# Patient Record
Sex: MALE | Race: WHITE | Employment: OTHER | URBAN - METROPOLITAN AREA
[De-identification: names, ages, dates, MRNs, and addresses within clinical notes are randomized per-mention and may not be internally consistent; named-entity substitution may affect disease eponyms.]

---

## 2019-05-07 ENCOUNTER — OFFICE VISIT (OUTPATIENT)
Dept: ORTHOPEDIC SURGERY | Age: 84
End: 2019-05-07
Payer: MEDICARE

## 2019-05-07 DIAGNOSIS — S46.312A STRAIN OF LEFT TRICEPS, INITIAL ENCOUNTER: ICD-10-CM

## 2019-05-07 DIAGNOSIS — M25.522 ELBOW PAIN, LEFT: Primary | ICD-10-CM

## 2019-05-07 PROCEDURE — G8421 BMI NOT CALCULATED: HCPCS | Performed by: ORTHOPAEDIC SURGERY

## 2019-05-07 PROCEDURE — E0191 PROTECTOR HEEL OR ELBOW: HCPCS | Performed by: ORTHOPAEDIC SURGERY

## 2019-05-07 PROCEDURE — G8427 DOCREV CUR MEDS BY ELIG CLIN: HCPCS | Performed by: ORTHOPAEDIC SURGERY

## 2019-05-07 PROCEDURE — 99203 OFFICE O/P NEW LOW 30 MIN: CPT | Performed by: ORTHOPAEDIC SURGERY

## 2019-05-07 PROCEDURE — 4004F PT TOBACCO SCREEN RCVD TLK: CPT | Performed by: ORTHOPAEDIC SURGERY

## 2019-05-07 PROCEDURE — 4040F PNEUMOC VAC/ADMIN/RCVD: CPT | Performed by: ORTHOPAEDIC SURGERY

## 2019-05-07 PROCEDURE — 1123F ACP DISCUSS/DSCN MKR DOCD: CPT | Performed by: ORTHOPAEDIC SURGERY

## 2019-05-07 NOTE — PROGRESS NOTES
Chief Complaint  Elbow Pain (left posterior elbow pain x 1 month, atruamatic (4/7/19))      History of Present Illness:  Cira Bridges is a 80 y.o. male left-hand-dominant, retired , who presents for left arm pain posteriorly just above the elbow along his triceps. No trauma or fall. He has been active and in a workout program, he feels he may have overdone it or strained. He denies any popping sensation. He has not noticed any bruising or abnormal swelling. Pain posteriorly is intermittent, mild and aching without radiation. No numbness of the hand. He was able to mow his yard yesterday and today without significant symptoms.   He has been trying rest.  He denies any fevers, night sweats or other red flags    Medical History  Past Medical History:   Diagnosis Date    Arthritis     Cancer ([address]     skin    Dry eyes, bilateral     Hyperlipidemia     Hypertension     Osteoporosis 2009    Type II or unspecified type diabetes mellitus without mention of complication, not stated as uncontrolled ([address]     Urinary incontinence      Past Surgical History:   Procedure Laterality Date    COLONOSCOPY  2002    diverticulosis    COLONOSCOPY  11/19/2012    diverticulosis    EYE SURGERY      micheal cataract    TONSILLECTOMY       Family History   Problem Relation Age of Onset    Heart Disease Mother     Cancer Sister 39        breast     Social History     Socioeconomic History    Marital status:      Spouse name: Not on file    Number of children: Not on file    Years of education: Not on file    Highest education level: Not on file   Occupational History    Not on file   Social Needs    Financial resource strain: Not on file    Food insecurity:     Worry: Not on file     Inability: Not on file    Transportation needs:     Medical: Not on file     Non-medical: Not on file   Tobacco Use    Smoking status: Never Smoker   Substance and Sexual Activity    Alcohol use: No    Drug use: Not on file    Sexual activity: Not on file   Lifestyle    Physical activity:     Days per week: Not on file     Minutes per session: Not on file    Stress: Not on file   Relationships    Social connections:     Talks on phone: Not on file     Gets together: Not on file     Attends Baptism service: Not on file     Active member of club or organization: Not on file     Attends meetings of clubs or organizations: Not on file     Relationship status: Not on file    Intimate partner violence:     Fear of current or ex partner: Not on file     Emotionally abused: Not on file     Physically abused: Not on file     Forced sexual activity: Not on file   Other Topics Concern    Not on file   Social History Narrative    Not on file     Current Outpatient Medications   Medication Sig Dispense Refill    lisinopril (PRINIVIL;ZESTRIL) 20 MG tablet Take 20 mg by mouth daily.  solifenacin (VESICARE) 5 MG tablet Take 10 mg by mouth daily.  simvastatin (ZOCOR) 10 MG tablet Take 10 mg by mouth nightly.  fosinopril (MONOPRIL) 20 MG tablet Take 20 mg by mouth daily.  sitagliptan (JANUVIA) 100 MG tablet Take 100 mg by mouth daily.  glipiZIDE (GLUCOTROL XL) 10 MG CR tablet Take 10 mg by mouth daily.  aspirin 81 MG chewable tablet Take 81 mg by mouth daily.  CycloSPORINE (RESTASIS OP) Apply  to eye. No current facility-administered medications for this visit. No Known Allergies    Review of Systems  Relevant review of systems reviewed and available in the patient's chart    Vital Signs  There were no vitals filed for this visit. General Exam:   Constitutional: Patient is adequately groomed with no evidence of malnutrition  Mental Status: The patient is oriented to time, place and person. The patient's mood and affect are appropriate. Lymphatic: The lymphatic examination bilaterally reveals all areas to be without enlargement or induration.   Neurological: The patient has good coordination. There is no weakness or sensory deficit. Elbow Examination  Inspection:  Left elbow reveals good alignment. No sign of bursitis. No apparent swelling or changes of the skin    Palpation:  Diffuse mild tenderness palpation along the triceps. No specific fullness or discrete masses palpable. No warmth or sign of infection. No palpable gap. Range of Motion:  Full motion left elbow is present. He has intact triceps extension strength today    Strength:  Good elbow motion strength. Intact neurovascular exam left hand    Special Tests:  No lymphadenopathy. No pain over the biceps    Skin: There are no additional worrisome rashes, ulcerations or lesions. Gait: normal    Circulation normal      Radiology:     X-rays obtained and reviewed in office:  Views 3  Location left elbow  Impression :  Osteophyte noted around the capitellum. Regarding the humerus, I do not see abnormal soft tissue mineralization or lesion of the bone. Assessment:  Left triceps pain    Impression:  Encounter Diagnoses   Name Primary?  Elbow pain, left Yes    Strain of left triceps, initial encounter        Office Procedures:  Orders Placed This Encounter   Procedures    XR ELBOW LEFT (MIN 3 VIEWS)     Standing Status:   Future     Number of Occurrences:   1     Standing Expiration Date:   6/7/2019    Elbow Protector B&C (Brace)     Patient was prescribed a [employer] Elbow Protector. The left elbow will require protection from this device to improve their function. The orthosis will assist in protecting the affected area, provide functional support and facilitate healing. The patient was educated and fit by a healthcare professional with expert knowledge and specialization in brace application while under the direct supervision of the treating physician. Verbal and written instructions for the use of and application of this item were provided.    They were instructed to contact the office

## 2019-06-03 DIAGNOSIS — S46.312A STRAIN OF LEFT TRICEPS, INITIAL ENCOUNTER: Primary | ICD-10-CM

## 2019-06-04 ENCOUNTER — OFFICE VISIT (OUTPATIENT)
Dept: ORTHOPEDIC SURGERY | Age: 84
End: 2019-06-04
Payer: MEDICARE

## 2019-06-04 VITALS — HEIGHT: 67 IN | BODY MASS INDEX: 21.18 KG/M2 | WEIGHT: 134.92 LBS

## 2019-06-04 DIAGNOSIS — M89.8X2 PAIN OF LEFT HUMERUS: Primary | ICD-10-CM

## 2019-06-04 PROCEDURE — 1036F TOBACCO NON-USER: CPT | Performed by: ORTHOPAEDIC SURGERY

## 2019-06-04 PROCEDURE — 4040F PNEUMOC VAC/ADMIN/RCVD: CPT | Performed by: ORTHOPAEDIC SURGERY

## 2019-06-04 PROCEDURE — G8420 CALC BMI NORM PARAMETERS: HCPCS | Performed by: ORTHOPAEDIC SURGERY

## 2019-06-04 PROCEDURE — 99213 OFFICE O/P EST LOW 20 MIN: CPT | Performed by: ORTHOPAEDIC SURGERY

## 2019-06-04 PROCEDURE — G8427 DOCREV CUR MEDS BY ELIG CLIN: HCPCS | Performed by: ORTHOPAEDIC SURGERY

## 2019-06-04 PROCEDURE — 1123F ACP DISCUSS/DSCN MKR DOCD: CPT | Performed by: ORTHOPAEDIC SURGERY

## 2019-06-04 RX ORDER — DIAZEPAM 5 MG/1
5 TABLET ORAL
Qty: 1 TABLET | Refills: 0 | Status: SHIPPED | OUTPATIENT
Start: 2019-06-04 | End: 2019-06-04

## 2019-06-04 NOTE — PROGRESS NOTES
Chief Complaint   Patient presents with    Follow-up     LEFT TRICEP       HISTORY OF PRESENT ILLNESS:  Luis Carlos Hall is a 80 y.o.  patient here for repeat x-ray evaluation after helping left humerus pain, posterior upper arm, over the past 2 months without specific trauma. Patient also reports some weight loss. Patient denies night sweats or fevers. We placed the patient under conservative measures since last visit 1 month ago, symptoms have persisted. ROS:  ROS neg     Past medical history is reviewed again today, no changes to report    PHYSICAL EXAMINATION:  Patient is alert and pleasant, in no acute distress. The affected extremity is examined today. Left upper extremity reveals no skin changes, erythema. No malalignment. No bruising. Diffuse discomfort posterior mid arm and distal aspect of the triceps. No palpable gap. No obvious discrete mass. Good motion of the left shoulder, left arm and left elbow. No streaking erythema or palpable lymphadenopathy. Intact neurovascular exam left hand    X-rays:  2 views of the left humerus are obtained and reviewed, impression: No fracture, no dislocation, no worrisome lytic lesion noted. There is some increased cortical density proximal medial humerus. IMPRESSION AND PLAN: Left humerus pain    We will proceed with MRI of the left humerus at this point to rule out soft tissue mass, triceps tear or other internal derangement. Thigh and was ordered for his pre-imaging anxiety, to be taken the day of the test.  He has done well with this in the past.  Follow-up after MRI. Narcotic monitoring system verified. All questions and concerns were addressed today. Patient is in agreement with the plan. Etta Akbar MD  Hand & Upper Extremity Surgery  [address] [facility]  A partner of [facility] ([facility])        Please note that this transcription was created using voice recognition software.   Any errors are unintentional

## 2019-06-11 ENCOUNTER — OFFICE VISIT (OUTPATIENT)
Dept: ORTHOPEDIC SURGERY | Age: 84
End: 2019-06-11
Payer: MEDICARE

## 2019-06-11 VITALS — HEIGHT: 67 IN | WEIGHT: 134.92 LBS | BODY MASS INDEX: 21.18 KG/M2

## 2019-06-11 DIAGNOSIS — M89.8X2 PAIN OF LEFT HUMERUS: Primary | ICD-10-CM

## 2019-06-11 PROCEDURE — G8420 CALC BMI NORM PARAMETERS: HCPCS | Performed by: ORTHOPAEDIC SURGERY

## 2019-06-11 PROCEDURE — 4040F PNEUMOC VAC/ADMIN/RCVD: CPT | Performed by: ORTHOPAEDIC SURGERY

## 2019-06-11 PROCEDURE — 1036F TOBACCO NON-USER: CPT | Performed by: ORTHOPAEDIC SURGERY

## 2019-06-11 PROCEDURE — G8427 DOCREV CUR MEDS BY ELIG CLIN: HCPCS | Performed by: ORTHOPAEDIC SURGERY

## 2019-06-11 PROCEDURE — 1123F ACP DISCUSS/DSCN MKR DOCD: CPT | Performed by: ORTHOPAEDIC SURGERY

## 2019-06-11 PROCEDURE — 99213 OFFICE O/P EST LOW 20 MIN: CPT | Performed by: ORTHOPAEDIC SURGERY

## 2019-06-11 NOTE — PROGRESS NOTES
Chief Complaint   Patient presents with    Follow-up     TR MRI LEFT HUMERUS        HISTORY OF PRESENT ILLNESS:  Solomon Solano is a 80 y.o.  patient here for repeat evaluation after undergoing advanced imaging of the left humerus and upper arm for evaluation of internal derangement or mass. He has had posterior upper left arm pain over the past 2 to 3 months without specific trauma. Patient still has some discomfort posteriorly. No numbness in the hand. he has some fatigue in the arm    ROS:  ROS neg     Past medical history, allergies, and medications are reviewed again today, no changes to report. PHYSICAL EXAMINATION:  Patient is alert and pleasant, in no acute distress. The affected extremity is again examined today. Left upper arm reveals no skin changes or palpable mass. Discomfort posterior centrally around the distal aspect of the triceps. Patient has full motion of the elbow, hand and wrist.  No lymphadenopathy. Hand is sensate with intact radial nerve function    X-rays:  None today    Advanced imaging:    MRI of the left wrist is reviewed, impression:  Site: [email or web address] [facility] #: [id number] #: [id number] Procedure: MR Left Upper Arm w/o Contrast ; Reason for Exam: soft tissue mass; pain of left humerus   This document is confidential medical information.  Unauthorized disclosure or use of this information is prohibited by law. If you are not the intended recipient of this document, please advise us by calling immediately [phone].       [email or web address] [facility]   [address]           Patient Name: Pooja Willis   Case ID: [id number]   Patient : 1934   Referring Physician: Melissa Metzger MD   Exam Date: 2019   Exam Description: MR Left Upper Arm w/o Contrast            HISTORY:  80year-old male, triceps soreness since 2019.  Assess for soft tissue mass and    pain in left humerus.       TECHNICAL FACTORS:  Long- and short-axis fat- and

## 2019-06-14 ENCOUNTER — HOSPITAL ENCOUNTER (OUTPATIENT)
Dept: OCCUPATIONAL THERAPY | Age: 84
Setting detail: THERAPIES SERIES
Discharge: HOME | End: 2019-06-14
Payer: MEDICARE

## 2019-06-14 PROCEDURE — 97035 APP MDLTY 1+ULTRASOUND EA 15: CPT | Performed by: OCCUPATIONAL THERAPIST

## 2019-06-14 PROCEDURE — 97530 THERAPEUTIC ACTIVITIES: CPT | Performed by: OCCUPATIONAL THERAPIST

## 2019-06-14 PROCEDURE — 97165 OT EVAL LOW COMPLEX 30 MIN: CPT | Performed by: OCCUPATIONAL THERAPIST

## 2019-06-14 PROCEDURE — 97140 MANUAL THERAPY 1/> REGIONS: CPT | Performed by: OCCUPATIONAL THERAPIST

## 2019-06-14 NOTE — PLAN OF CARE
[facility] [facility], [address]  Phone: [phone]  Fax [phone]    Occupational Sheba Kenny  Dear  Dr Baldemar Jensen,    We had the pleasure of evaluating the following patient for occupational therapy services at [address] [place] [address]. A summary of our findings can be found in the initial assessment below. This includes our plan of care. If you have any questions or concerns regarding these findings, please do not hesitate to contact me at the office phone number checked above. Thank you for the referral.     Physician Signature:_______________________________Date:__________________  By signing above (or electronic signature), therapists plan is approved by physician      Patient: Velvet Mccullough   : 1934   MRN: [id number]  Referring Physician: Referring Practitioner: Baldemar Jensen      Evaluation Date: 2019      Medical Diagnosis Information:  Diagnosis: P38.834 (ICD-10-CM) - Pain of left humerus       Treatment Dx:   H10.169 (ICD-10-CM) - Pain of left humerus     Date of injury: gradual onset over the last 2 mos ago   Date and type of Surgery: N/A                                   Insurance information:  Medicare and [place]  Precautions/ Contra-indications:   Latex Allergy:  [x]No      []Yes  Pacemaker:  [x] No       [] Yes     Preferred Language for Healthcare:   [x]English       []other:      [x] Patient reported history, allergies, and medications reviewed - see intake form. SUBJECTIVE:      Relevant Medical History/history of current problem: Pt reports frequent pain at mid upper arm in triceps muscle. Pt reports that he does strain triceps when he uses his hands to pushes up out of a chair due to pain and weakness in low back and both knees.   Pain Scale: 1-6/10   [x]Constant      []Intermittent    []other:  Pain Location:  Mid upper arm at tricep muscle  Easing factors: rest  Provocative factors: increased use      Occupational Profile:  Home Enviroment: lives with  [x] spouse,  [] family,  [] alone,  [] significant other,   [] other:    Occupation/School: retired    Recreational Activities/Meaningful Interests: none reported    Prior Level of Function: [x] Independent with ADLs/IADLs     [] Assistance needed (describe):    Patient-Identified Primary Performance Deficits (to be addressed in POC):   [] bathing    [x] household tasks (cooking/cleaning)   [] dressing    [] self feeding   [] grooming    [] work/education   [] functional mobility   [] sleeping/rest   [] toileting/hygiene   [] recreational activities   [] driving    [] community/social participation   [x] other:  Lifting, reaching    Comorbidities Affecting Functional Performance:     []Anxiety (F41.9)/Depression (F32.9)   [x]Diabetes Type 1(E10.65) or 2 (E11.65)   []Rheumatoid Arthritis (M05.9)  []Fibromyalgia (M79.7)  []Neuropathy(G60.9)  [x]Osteoarthritis(M19.91)  []None    []Other     OBJECTIVE:   Date:  Hand Dominance:     []  Right    [x] Left 6/14/2019     Objective Measures:    PAIN 1-6/10   Quick DASH %   Digits tip to DPFC in cm WFL   Thumb ROM WFL   Wrist ROM Ext/Flex WFL   Forearm ROM  Sup/pron WFL   Elbow ROM Ext/flex [facility]   Shoulder Flex  Shoulder Abd  Shoulder IR/ER WFL    strength in lbs R:50#  L:38#   Pinch Strengthin lbs: lat  R:  L:   Pinch Strength in lbs:  3 point R:  L:     MMT:       Observations (including splints, bandages, incisions, scars):   unremarkable    Sensation: [] No reported deficits  [] Intact to light touch    [] [place] Tam[place] test completed, findings as noted:  [x] Other:Constant Mild numbness in fingers    Palpation: unremarkable    Functional Mobility/Transfers/Gait: [x] Independent - no significant gait deviations  [] Assistance needed   [] Assistive device used:      Falls Risk Assessment (30 days):   [x] Falls Risk assessed and no intervention required. [] Falls Risk assessed and Patient requires intervention due to being higher risk   TUG score (>12s at risk):     [] Falls education provided, including      Review Of Systems (ROS): [x]Performed Review of systems (Integumentary, CardioPulmonary, Neurological) by intake and observation. Intake form has been scanned into medical record. Patient has been instructed to contact their primary care physician regarding ROS issues if not already being addressed at this time. ASSESSMENT:   This patient presents with signs and symptoms consistent with the medical diagnosis provided by the referring physician. Impairments (physical, cognitive and/or psychosocial):  [] Decreased mobility   [x] Weakness    [] Hypersensitivity   [x] Pain/tenderness   [] Edema/swelling   [] Decreased coordination (fine/gross motor)   [] Impaired body mechanics  [] Sensory loss  [] Loss of balance   [] Other:      Performance Deficits (to be addressed in plan of care):   [] Bathing    [x] Household Tasks (cooking/cleaning)   [] Dressing    [] Self Feeding   [] Grooming    [] Work/Education   [] Functional Mobility   [] Sleeping/Rest   [] Toileting/Hygiene   [] Recreational Activities   [] Driving    [] Community/Social Participation   [x] Other: lifting, reaching    Rehab Potential:   [] Excellent [x] Good [] Fair  [] Poor     Barriers affecting rehab potential:  []Age    []Lack of Motivation   []Co-Morbidities  []Cognitive Function  []Environmental/home/work barriers  []Other:     Tolerance of evaluation/treatment:    [] Excellent [x] Good [] Fair  [] Poor    PLAN OF CARE:  Interventions:   [x] Therapeutic Exercise [x] Therapeutic Activity    [x] Activities of Daily Living [x] Neuromuscular Re-education      [x] Patient Education  [x] Manual Therapy      [x] Modalities as needed, and not otherwise contraindicated, including: ultrasound,paraffin,moist heat/cold pack, electrical stimulation, contrast bath, iontophoresis  [] Splinting    Frequency/Duration:  1-2 days per week for 8 weeks    GOALS:  Short Term Goals: To be achieved in: 2 weeks  1. Independent in HEP and progression per patient tolerance, in order to prevent re-injury. 2. Patient will have a decrease in pain to facilitate improvement in movement, function, and ADLs as indicated by Functional Deficits. Long Term Goals to be achieved in 8  weeks, including patient directed goals to address identified performance deficits:  1) Pt to be independent in graded HEP progression with a good level of effort and compliance. 2) Pt to report a score of 25 % or less on the Quick DASH disability questionnaire for increased performance with carrying, moving, and handling objects. 4) Pt will demonstrate increased  strength to at least  43#for improved performance with home mgt tasks, lifting, and reaching tasks. 5) Pt will have a decrease in pain to 2/10 or less to facilitate improvement in performance with  home mgt tasks, lifting, and reaching tasks.   6)    7)    OCCUPATIONAL THERAPY EVALUATION COMPLEXITY JUSTIFICATION:    [x] An occupational profile and medical/therapy history, which includes:   [x] a brief history including medical and/or therapy records relating to the     presenting problem   [] an expanded review of medical and/or therapy records and additional review     of physical, cognitive or psychosocial history related to current functional    performance   [] an extensive additional review of review of medical and/or therapy records   and physical, cognitive, or psychosocial history related to current    functional performance    [x] An assessment that identifies performance deficits (relating to physical, cognitive, or psychosocial skills) that result in activity limitations and/or participation restrictions:   [x] 1-3 performance deficits   [] 3-5 performance deficits   [] 5 or more performance deficits    [x] Clinical decision making of:   [x] low complexity, including analysis of occupational profile, data analysis from problem focused assessment, and consideration of a limited number of treatment options. No comorbidities affect occupational performance. No task modifications or assistance needed to complete evaluation. [] moderate complexity, including analysis of occupational profile, data analysis from detailed assessment and consideration of several treatment options. Comorbidities that affect occupational performance may be present. Minimal to moderate task modifications or assistance needed to complete assessment. [] high complexity, including analysis of occupational profile, analysis of data from comprehensive assessment and consideration of multiple treatment options. Multiple comorbidities present that affect occupational performance. Significant task modifications or assistance needed to complete assessment.     Evaluation Code:  [x] Low Complexity EVAL 10537 (typically 30 minutes face to face)  [] Mod Complexity EVAL 81473 (typically 45 minutes face to face)  [] High Complexity EVAL 42948 (typically 60 minutes face to face)    Electronically signed by:  Nathen Leo OT/L [id number]

## 2019-06-14 NOTE — FLOWSHEET NOTE
Wrist ROM Ext/Flex WFL   Forearm ROM  Sup/pron WFL   Elbow ROM Ext/flex [facility]   Shoulder Flex  Shoulder Abd  Shoulder IR/ER WFL    strength in lbs R:50#  L:38#   Pinch Strengthin lbs: lat  R:  L:   Pinch Strength in lbs:  3 point R:  L:              Modalities: 6/14/19      HP 10'    US  50% @ 1.8 8'    Therapeutic Exercise & Activities:          Pt educ recommendations regarding precautions with ADL activities                                              Therapeutic Exercise and NMR EXR  [] (06020) Provided verbal/tactile cueing for activities related to strengthening, flexibility, endurance, ROM  for improvements in scapular, scapulothoracic and UE control with self care, reaching, carrying, lifting, house/yardwork, driving/computer work.    [] (22223) Provided verbal/tactile cueing for activities related to improving balance, coordination, kinesthetic sense, posture, motor skill, proprioception  to assist with  scapular, scapulothoracic and UE control with self care, reaching, carrying, lifting, house/yardwork, driving/computer work. Therapeutic Activities:    [] ( 58810) therapeutic activities, direct (one on one) patient contact. Use of dynamic activities to improve functional performance.     Activities of Daily Living:  [] (59536) Provided self-care/home management training (i.e., activities of daily living and compensatory training, meal preparation, safety procedures, and instructions in use of assistive technology devices/adaptive equipment)     Home Exercise Program:    [x] (72621) Reviewed/Progressed HEP activities related to strengthening, flexibility, endurance, ROM of scapular, scapulothoracic and UE control with self care, reaching, carrying, lifting, house/yardwork, driving/computer work    Manual Treatments: [facility], with Biofreeze  [x] ([phone]) Provided manual therapy to mobilize soft tissue/joints of the UE for the purpose of modulating pain, promoting relaxation,  increasing ROM, tasks.      Progression Towards Functional goals:  [] Patient is progressing as expected towards functional goals listed. [] Progression is slowed due to complexities listed. [] Progression has been slowed due to co-morbidities.   [x] Plan just implemented, too soon to assess goals progression  [] Other:     ASSESSMENT:     Treatment/Activity Tolerance:  [x] Patient tolerated treatment well [] Patient limited by fatique  [] Patient limited by pain  [] Patient limited by other medical complications  [] Other:     Prognosis: [x] Good [] Fair  [] Poor    Patient Requires Follow-up: [x] Yes  [] No    PLAN: Recommend Occupational Therapy 2  times a week for 28 weeks  [] Continue per plan of care [] Alter current plan (see comments)  [x] Plan of care initiated [] Hold pending MD visit [] Discharge    Plan for next session: modalities, ADL- activitiy modifications, [facility]    Electronically signed by: Davis Mckeon  OT/L [id number]

## 2019-06-18 ENCOUNTER — HOSPITAL ENCOUNTER (OUTPATIENT)
Dept: OCCUPATIONAL THERAPY | Age: 84
Setting detail: THERAPIES SERIES
Discharge: HOME | End: 2019-06-18
Payer: MEDICARE

## 2019-06-18 PROCEDURE — 97112 NEUROMUSCULAR REEDUCATION: CPT | Performed by: OCCUPATIONAL THERAPIST

## 2019-06-18 PROCEDURE — G0283 ELEC STIM OTHER THAN WOUND: HCPCS | Performed by: OCCUPATIONAL THERAPIST

## 2019-06-18 PROCEDURE — 97110 THERAPEUTIC EXERCISES: CPT | Performed by: OCCUPATIONAL THERAPIST

## 2019-06-18 PROCEDURE — 97140 MANUAL THERAPY 1/> REGIONS: CPT | Performed by: OCCUPATIONAL THERAPIST

## 2019-06-18 NOTE — FLOWSHEET NOTE
[facility],  [address] [place]  Phone: [phone]  Fax [phone]  Hand Therapy Daily Treatment Note  Date:  2019  Patient: Burgess Francois                                  : 1934                                  MRN: [id number]  Referring Physician: Referring Practitioner: Fabricio Aguilar                                                Evaluation Date: 2019                                         Medical Diagnosis Information:  Diagnosis: P45.628 (ICD-10-CM) - Pain of left humerus                       Treatment Dx:   R47.880 (ICD-10-CM) - Pain of left humerus           Date of injury: gradual onset over the last 2 mos ago   Date and type of Surgery: N/A                                   Insurance information:  Medicare and [employer]    Comorbidities Affecting Functional Performance:     []Anxiety (F41.9)/Depression (F32.9)   [x]Diabetes Type 1(E10.65) or 2 (E11.65)   []Rheumatoid Arthritis (M05.9)  []Fibromyalgia (M79.7)  []Neuropathy(G60.9)  [x]Osteoarthritis(M19.91)  []None   []Other:    Date of Patient follow up with Physician:   Preferred Language for Healthcare:   [x]English       []other:  Latex Allergy:  [x]NO      []YES  RESTRICTIONS/PRECAUTIONS:     Progress Note: [x]  Yes  []  No  Next due by : Visit #10       Visit # Insurance Allowable Requires auth   2 ?    no:[x]                  yes:[]    From 19 to 2019    Pain level:  1-6/10     SUBJECTIVE:       Relevant Medical History/history of current problem: Pt reports frequent pain at mid upper arm in triceps muscle. Pt reports that he does strain triceps when he uses his hands to pushes up out of a chair due to pain and weakness in low back and both knees.         OBJECTIVE:   Date:  Hand Dominance:     []  Right    [x] Left 2019      Objective Measures:     PAIN 1-6/10   Quick DASH 34%   Digits tip to DPFC in cm WFL   Thumb ROM [facility] tissue/joints of the UE for the purpose of modulating pain, promoting relaxation,  increasing ROM, reducing/eliminating soft tissue swelling/inflammation/restriction, improving soft tissue extensibility and allowing for proper ROM for normal function with self care, reaching, carrying, lifting, house/yardwork, driving/computer work    Splinting:  [] Fabrication of: L-code  [] (03568) Checkout for orthotic/prosthetic use, established patient   [] (34806) Orthotic management and training (fitting and assessment)  [] Comments:    Charges:  Timed Code Treatment Minutes: 45'   Total Treatment Minutes: 61'   [] EVAL (LOW) 22 637922   [] OT Re-eval (13174)  [] EVAL (MOD) 63470   [] EVAL (HIGH) 31316       [x] Vannesa (55567) x  1   [] RFIBW(69373)  [x] NMR (56802) x      [] Estim (attended) (94380)   [x] Manual ([id number]) x  1    [] US (97404)  [] TA (75904) x      [] Paraffin (98650)  [] ADL  (59901) x     [] Splint/L code:    [x] Estim (unattended) (00573)  [] Other:  [] Fluidotherapy (76231)  [](87703) Checkout for orthotic use, established patient x       [] (73656) Orthotic mgmt & training  x        GOALS:  Short Term Goals: To be achieved in: 2 weeks  1. Independent in HEP and progression per patient tolerance, in order to prevent re-injury. 2. Patient will have a decrease in pain to facilitate improvement in movement, function, and ADLs as indicated by Functional Deficits.     Long Term Goals to be achieved in 8  weeks, including patient directed goals to address identified performance deficits:  1) Pt to be independent in graded HEP progression with a good level of effort and compliance. 2) Pt to report a score of 25 % or less on the Quick DASH disability questionnaire for increased performance with carrying, moving, and handling objects. 4) Pt will demonstrate increased  strength to at least  43#for improved performance with home mgt tasks, lifting, and reaching tasks.   5) Pt will have a decrease in pain to 2/10 or less to facilitate improvement in performance with  home mgt tasks, lifting, and reaching tasks. Progression Towards Functional goals:  [] Patient is progressing as expected towards functional goals listed. [] Progression is slowed due to complexities listed. [] Progression has been slowed due to co-morbidities.   [x] Plan just implemented, too soon to assess goals progression  [] Other:     ASSESSMENT: No change in status    Treatment/Activity Tolerance:  [x] Patient tolerated treatment well [] Patient limited by fatique  [] Patient limited by pain  [] Patient limited by other medical complications  [] Other:     Prognosis: [x] Good [] Fair  [] Poor    Patient Requires Follow-up: [x] Yes  [] No    PLAN: Recommend Occupational Therapy 2  times a week for 28 weeks  [] Continue per plan of care [] Alter current plan (see comments)  [x] Plan of care initiated [] Hold pending MD visit [] Discharge    Plan for next session: modalities, ADL- activitiy modifications, [facility],[place]    Electronically signed by: Jovana Bridges  OT/L [id number]

## 2019-06-21 ENCOUNTER — HOSPITAL ENCOUNTER (OUTPATIENT)
Dept: OCCUPATIONAL THERAPY | Age: 84
Setting detail: THERAPIES SERIES
Discharge: HOME | End: 2019-06-21
Payer: MEDICARE

## 2019-06-21 PROCEDURE — 97035 APP MDLTY 1+ULTRASOUND EA 15: CPT | Performed by: OCCUPATIONAL THERAPIST

## 2019-06-21 PROCEDURE — 97140 MANUAL THERAPY 1/> REGIONS: CPT | Performed by: OCCUPATIONAL THERAPIST

## 2019-06-21 NOTE — FLOWSHEET NOTE
[facility], [address] [place]  Phone: [phone]  Fax [phone]  Hand Therapy Daily Treatment Note  Date:  2019  Patient: Clayton Heredia                                  : 1934                                  MRN: [id number]  Referring Physician: Referring Practitioner: Yolie Negro                                                Evaluation Date: 2019                                         Medical Diagnosis Information:  Diagnosis: T28.908 (ICD-10-CM) - Pain of left humerus                       Treatment Dx:   G56.167 (ICD-10-CM) - Pain of left humerus           Date of injury: gradual onset over the last 2 mos ago   Date and type of Surgery: N/A                                   Insurance information:  Medicare and [employer]    Comorbidities Affecting Functional Performance:     []Anxiety (F41.9)/Depression (F32.9)   [x]Diabetes Type 1(E10.65) or 2 (E11.65)   []Rheumatoid Arthritis (M05.9)  []Fibromyalgia (M79.7)  []Neuropathy(G60.9)  [x]Osteoarthritis(M19.91)  []None   []Other:    Date of Patient follow up with Physician:   Preferred Language for Healthcare:   [x]English       []other:  Latex Allergy:  [x]NO      []YES  RESTRICTIONS/PRECAUTIONS:     Progress Note: [x]  Yes  []  No  Next due by : Visit #10       Visit # Insurance Allowable Requires auth   3 ?    no:[x]                  yes:[]    From 19 to 2019    Pain level:  1-6/10     SUBJECTIVE:  Pt reports \"my arm feels the same. I tried the exercises you gave me and they made my arm very sore. I stopped doing them. \"     Relevant Medical History/history of current problem: Pt reports frequent pain at mid upper arm in triceps muscle. Pt reports that he does strain triceps when he uses his hands to pushes up out of a chair due to pain and weakness in low back and both knees.         OBJECTIVE:   Date:  Hand Dominance:     []  Right    [x] carrying, lifting, house/yardwork, driving/computer work    Manual Treatments: STM,SASTM  [x] (51604) Provided manual therapy to mobilize soft tissue/joints of the UE for the purpose of modulating pain, promoting relaxation,  increasing ROM, reducing/eliminating soft tissue swelling/inflammation/restriction, improving soft tissue extensibility and allowing for proper ROM for normal function with self care, reaching, carrying, lifting, house/yardwork, driving/computer work    Splinting:  [] Fabrication of: L-code  [] (97751) Checkout for orthotic/prosthetic use, established patient   [] (54993) Orthotic management and training (fitting and assessment)  [] Comments:    Charges:  Timed Code Treatment Minutes: 25'   Total Treatment Minutes: 25'   [] EVAL (LOW) 22 590370   [] OT Re-eval (36968)  [] EVAL (MOD) 21799   [] EVAL (HIGH) 0496 97 06 31       [x] Vannesa (23341) x  1   [] LMCEC(89086)  [] NMR (72879) x      [] Estim (attended) (61615)   [x] Manual ([id number]) x  1    [x] US (19145)  [] TA (04227) x      [] Paraffin (36356)  [] ADL  (43758) x     [] Splint/L code:    [] Estim (unattended) (68634)  [] Other:  [] Fluidotherapy (37867)  [](30063) Checkout for orthotic use, established patient x       [] (38937) Orthotic mgmt & training  x        GOALS:  Short Term Goals: To be achieved in: 2 weeks  1. Independent in HEP and progression per patient tolerance, in order to prevent re-injury. 2. Patient will have a decrease in pain to facilitate improvement in movement, function, and ADLs as indicated by Functional Deficits.     Long Term Goals to be achieved in 8  weeks, including patient directed goals to address identified performance deficits:  1) Pt to be independent in graded HEP progression with a good level of effort and compliance. 2) Pt to report a score of 25 % or less on the Quick DASH disability questionnaire for increased performance with carrying, moving, and handling objects.   4) Pt will demonstrate increased

## 2019-07-10 ENCOUNTER — OFFICE VISIT (OUTPATIENT)
Dept: ORTHOPEDIC SURGERY | Age: 84
End: 2019-07-10
Payer: MEDICARE

## 2019-07-10 VITALS — WEIGHT: 134.92 LBS | HEIGHT: 67 IN | BODY MASS INDEX: 21.18 KG/M2

## 2019-07-10 DIAGNOSIS — M54.9 CHRONIC BACK PAIN, UNSPECIFIED BACK LOCATION, UNSPECIFIED BACK PAIN LATERALITY: ICD-10-CM

## 2019-07-10 DIAGNOSIS — S46.312A STRAIN OF LEFT TRICEPS, INITIAL ENCOUNTER: ICD-10-CM

## 2019-07-10 DIAGNOSIS — G89.29 CHRONIC BACK PAIN, UNSPECIFIED BACK LOCATION, UNSPECIFIED BACK PAIN LATERALITY: ICD-10-CM

## 2019-07-10 DIAGNOSIS — M89.8X2 PAIN OF LEFT HUMERUS: Primary | ICD-10-CM

## 2019-07-10 PROCEDURE — 1036F TOBACCO NON-USER: CPT | Performed by: ORTHOPAEDIC SURGERY

## 2019-07-10 PROCEDURE — 4040F PNEUMOC VAC/ADMIN/RCVD: CPT | Performed by: ORTHOPAEDIC SURGERY

## 2019-07-10 PROCEDURE — 99213 OFFICE O/P EST LOW 20 MIN: CPT | Performed by: ORTHOPAEDIC SURGERY

## 2019-07-10 PROCEDURE — G8427 DOCREV CUR MEDS BY ELIG CLIN: HCPCS | Performed by: ORTHOPAEDIC SURGERY

## 2019-07-10 PROCEDURE — 1123F ACP DISCUSS/DSCN MKR DOCD: CPT | Performed by: ORTHOPAEDIC SURGERY

## 2019-07-10 PROCEDURE — G8420 CALC BMI NORM PARAMETERS: HCPCS | Performed by: ORTHOPAEDIC SURGERY

## 2019-07-10 NOTE — PROGRESS NOTES
Chief Complaint   Patient presents with    Follow-up     left Humerus pain , tricep  tendinitiS, STILL HAS PAIN       HISTORY OF PRESENT ILLNESS:  Melissa Denton is a 80 y.o.  patient here for repeat evaluation regarding pain on the posterior aspect of the left upper arm. He does not feel that the pain is related to his activities. However the pain has not gone away. 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 He does have history of lumbar spinal stenosis but does not remember being evaluated for cervical spinal stenosis. He has been seen in the past by a local surgeon colleague Dr. Kathy Benavidez, over at the [address] [place] [address], he would like to return there if possible. He denies troubles with his balance. Denies numbness in his hand. The recent course of therapy on the left upper arm has not resolved his posterior left arm pain. Patient states that certain movements of his cervical spine area actually worsen the left posterior arm pain. ROS:  ROS neg     Past medical history is reviewed again today, no changes to report    PHYSICAL EXAMINATION:  Patient is alert and pleasant, in no acute distress. Here with his wife. The affected extremity is examined today. Left arm does not reveal malalignment or atrophy. No palpable mass posterior left arm. No palpable discrete pinpoint area of pain posterior left arm. Good motion left arm, left elbow and left hand. Negative Toni test.  Intact neurovascular exam left hand. X-rays: None today    IMPRESSION AND PLAN: Left arm pain   We will place referral to Dr. Kathy Benavidez, over the [address] [place] [address], per the patient request, for evaluation of cervical involvement which may be causing radicular left upper arm pain. It would seem more reasonable, especially with normal MRI of the left arm, that the symptoms are originating from the cervical spine area.   We could offer a pinpoint cortisone injection into the posterior aspect of the left upper arm to see if it

## 2020-01-22 ENCOUNTER — HOSPITAL ENCOUNTER (EMERGENCY)
Age: 85
Discharge: HOME | End: 2020-01-22
Attending: EMERGENCY MEDICINE
Payer: MEDICARE

## 2020-01-22 VITALS
BODY MASS INDEX: 20.4 KG/M2 | HEART RATE: 88 BPM | SYSTOLIC BLOOD PRESSURE: 128 MMHG | WEIGHT: 130 LBS | TEMPERATURE: 97.9 F | DIASTOLIC BLOOD PRESSURE: 79 MMHG | RESPIRATION RATE: 20 BRPM | OXYGEN SATURATION: 99 %

## 2020-01-22 LAB
A/G RATIO: 1.5 (ref 1.1–2.2)
ALBUMIN SERPL-MCNC: 4.5 G/DL (ref 3.4–5)
ALP BLD-CCNC: 80 U/L (ref 40–129)
ALT SERPL-CCNC: 20 U/L (ref 10–40)
ANION GAP SERPL CALCULATED.3IONS-SCNC: 15 MMOL/L (ref 3–16)
AST SERPL-CCNC: 18 U/L (ref 15–37)
BASOPHILS ABSOLUTE: 0.1 K/UL (ref 0–0.2)
BASOPHILS RELATIVE PERCENT: 0.8 %
BILIRUB SERPL-MCNC: 0.3 MG/DL (ref 0–1)
BILIRUBIN URINE: NEGATIVE
BLOOD, URINE: NEGATIVE
BUN BLDV-MCNC: 46 MG/DL (ref 7–20)
CALCIUM SERPL-MCNC: 9.4 MG/DL (ref 8.3–10.6)
CHLORIDE BLD-SCNC: 100 MMOL/L (ref 99–110)
CLARITY: CLEAR
CO2: 25 MMOL/L (ref 21–32)
COLOR: YELLOW
CREAT SERPL-MCNC: 1.4 MG/DL (ref 0.8–1.3)
EOSINOPHILS ABSOLUTE: 0.2 K/UL (ref 0–0.6)
EOSINOPHILS RELATIVE PERCENT: 3 %
GFR AFRICAN AMERICAN: 58
GFR NON-AFRICAN AMERICAN: 48
GLOBULIN: 3.1 G/DL
GLUCOSE BLD-MCNC: 217 MG/DL (ref 70–99)
GLUCOSE URINE: 100 MG/DL
HCT VFR BLD CALC: 40.7 % (ref 40.5–52.5)
HEMOGLOBIN: 13.5 G/DL (ref 13.5–17.5)
KETONES, URINE: NEGATIVE MG/DL
LEUKOCYTE ESTERASE, URINE: NEGATIVE
LYMPHOCYTES ABSOLUTE: 2.1 K/UL (ref 1–5.1)
LYMPHOCYTES RELATIVE PERCENT: 25.7 %
MCH RBC QN AUTO: 33.1 PG (ref 26–34)
MCHC RBC AUTO-ENTMCNC: 33 G/DL (ref 31–36)
MCV RBC AUTO: 100.1 FL (ref 80–100)
MICROSCOPIC EXAMINATION: ABNORMAL
MONOCYTES ABSOLUTE: 0.9 K/UL (ref 0–1.3)
MONOCYTES RELATIVE PERCENT: 11.3 %
NEUTROPHILS ABSOLUTE: 4.9 K/UL (ref 1.7–7.7)
NEUTROPHILS RELATIVE PERCENT: 59.2 %
NITRITE, URINE: NEGATIVE
PDW BLD-RTO: 13.4 % (ref 12.4–15.4)
PH UA: 5 (ref 5–8)
PLATELET # BLD: 220 K/UL (ref 135–450)
PMV BLD AUTO: 7.6 FL (ref 5–10.5)
POTASSIUM SERPL-SCNC: 4.8 MMOL/L (ref 3.5–5.1)
POTASSIUM SERPL-SCNC: 5.2 MMOL/L (ref 3.5–5.1)
POTASSIUM SERPL-SCNC: 5.6 MMOL/L (ref 3.5–5.1)
PROTEIN UA: NEGATIVE MG/DL
RBC # BLD: 4.07 M/UL (ref 4.2–5.9)
SODIUM BLD-SCNC: 140 MMOL/L (ref 136–145)
SPECIFIC GRAVITY UA: 1.02 (ref 1–1.03)
TOTAL PROTEIN: 7.6 G/DL (ref 6.4–8.2)
URINE TYPE: ABNORMAL
UROBILINOGEN, URINE: 0.2 E.U./DL
WBC # BLD: 8.3 K/UL (ref 4–11)

## 2020-01-22 PROCEDURE — 99283 EMERGENCY DEPT VISIT LOW MDM: CPT

## 2020-01-22 PROCEDURE — 2580000003 HC RX 258: Performed by: NURSE PRACTITIONER

## 2020-01-22 PROCEDURE — 85025 COMPLETE CBC W/AUTO DIFF WBC: CPT

## 2020-01-22 PROCEDURE — 80053 COMPREHEN METABOLIC PANEL: CPT

## 2020-01-22 PROCEDURE — 81003 URINALYSIS AUTO W/O SCOPE: CPT

## 2020-01-22 PROCEDURE — 84132 ASSAY OF SERUM POTASSIUM: CPT

## 2020-01-22 RX ORDER — ACETAMINOPHEN 500 MG
500 TABLET ORAL EVERY 4 HOURS PRN
Status: ON HOLD | COMMUNITY
End: 2021-11-05 | Stop reason: HOSPADM

## 2020-01-22 RX ORDER — ACETAMINOPHEN 325 MG/1
1000 TABLET ORAL EVERY MORNING
Status: ON HOLD | COMMUNITY
End: 2021-09-19

## 2020-01-22 RX ORDER — 0.9 % SODIUM CHLORIDE 0.9 %
1000 INTRAVENOUS SOLUTION INTRAVENOUS ONCE
Status: COMPLETED | OUTPATIENT
Start: 2020-01-22 | End: 2020-01-22

## 2020-01-22 RX ADMIN — SODIUM CHLORIDE 1000 ML: 9 INJECTION, SOLUTION INTRAVENOUS at 16:14

## 2020-01-22 NOTE — ED PROVIDER NOTES
99%       GENERAL: Patient is well-developed, well-nourished. Awake and alert. Cooperative. Resting in bed. No apparent distress. HEENT:  Normocephalic, atraumatic. Conjunctiva appear normal. Sclera is non-icteric. External ears are normal.    NECK: Supple with normal ROM. Trachea midline. LUNGS: Equal and symmetric chest rise. Breathing is unlabored. Speaking comfortably in full sentences. Lungs are clear bilaterally to auscultation. Without wheezing, rales, or rhonchi. CADIOVASCULAR:  Regular rate and rhythm. Normal S1-S2 sounds. No murmurs, rubs, or gallops. Capillary refill is brisk in all 4 extremities. Bilateral lower extremities are equal in size, there is +1 swelling observed. There is no tenderness to palpation. There is no erythema observed or warmth palpated. GI: Soft, nontender, nondistended with positive bowel sounds. No rebound tenderness, guarding or any peritoneal signs. No masses or hepatosplenomegaly    MUSCULOSKELETAL:  No gross deformities or trauma noted. Moving all extremities equally and appropriately. Normal ROM. SKIN: Warm/dry. Skin is intact. No rashes/lesions noted. PSYCHIATRIC: Mood and affect appropriate. Speech is clear and articulate. NEUROLOGIC: Alert and oriented. No focal motor or sensory deficits. Gait was observed and is normal.    LABS  I have reviewed all labs for this visit.    Results for orders placed or performed during the hospital encounter of 01/22/20   CBC auto differential   Result Value Ref Range    WBC 8.3 4.0 - 11.0 K/uL    RBC 4.07 (L) 4.20 - 5.90 M/uL    Hemoglobin 13.5 13.5 - 17.5 g/dL    Hematocrit 40.7 40.5 - 52.5 %    .1 (H) 80.0 - 100.0 fL    MCH 33.1 26.0 - 34.0 pg    MCHC 33.0 31.0 - 36.0 g/dL    RDW 13.4 12.4 - 15.4 %    Platelets 740 757 - 022 K/uL    MPV 7.6 5.0 - 10.5 fL    Neutrophils % 59.2 %    Lymphocytes % 25.7 %    Monocytes % 11.3 %    Eosinophils % 3.0 %    Basophils % 0.8 %    Neutrophils Absolute 4.9 1.7 - 7.7 K/uL Lymphocytes Absolute 2.1 1.0 - 5.1 K/uL    Monocytes Absolute 0.9 0.0 - 1.3 K/uL    Eosinophils Absolute 0.2 0.0 - 0.6 K/uL    Basophils Absolute 0.1 0.0 - 0.2 K/uL   Comprehensive metabolic panel   Result Value Ref Range    Sodium 140 136 - 145 mmol/L    Potassium 5.2 (H) 3.5 - 5.1 mmol/L    Chloride 100 99 - 110 mmol/L    CO2 25 21 - 32 mmol/L    Anion Gap 15 3 - 16    Glucose 217 (H) 70 - 99 mg/dL    BUN 46 (H) 7 - 20 mg/dL    CREATININE 1.4 (H) 0.8 - 1.3 mg/dL    GFR Non- 48 (A) >60    GFR  58 (A) >60    Calcium 9.4 8.3 - 10.6 mg/dL    Total Protein 7.6 6.4 - 8.2 g/dL    Alb 4.5 3.4 - 5.0 g/dL    Albumin/Globulin Ratio 1.5 1.1 - 2.2    Total Bilirubin 0.3 0.0 - 1.0 mg/dL    Alkaline Phosphatase 80 40 - 129 U/L    ALT 20 10 - 40 U/L    AST 18 15 - 37 U/L    Globulin 3.1 g/dL   Urinalysis, reflex to microscopic   Result Value Ref Range    Color, UA Yellow Straw/Yellow    Clarity, UA Clear Clear    Glucose, Ur 100 (A) Negative mg/dL    Bilirubin Urine Negative Negative    Ketones, Urine Negative Negative mg/dL    Specific Gravity, UA 1.020 1.005 - 1.030    Blood, Urine Negative Negative    pH, UA 5.0 5.0 - 8.0    Protein, UA Negative Negative mg/dL    Urobilinogen, Urine 0.2 <2.0 E.U./dL    Nitrite, Urine Negative Negative    Leukocyte Esterase, Urine Negative Negative    Microscopic Examination Not Indicated     Urine Type NotGiven    Potassium   Result Value Ref Range    Potassium 5.6 (H) 3.5 - 5.1 mmol/L   Potassium   Result Value Ref Range    Potassium 4.8 3.5 - 5.1 mmol/L       RADIOLOGY    No results found. ED COURSE/MDM  Patient seen and evaluated. Old records reviewed. Diagnostic testing reviewed and results discussed. I have evaluated this patient. My supervising physician was available for consultation. Jose Braden presented to the ED today with above noted complaints. Physical exam unremarkable. CBC is without evidence of systemic infection.   No anemia. CMP without electrolyte abnormality except for the potassium. BUN and creatinine elevated at 46/1.4. When compared to results through care everywhere this is actually improved from yesterday when his  creatinine was 1.69. No liver dysfunction. UA is without evidence of infection. No blood. Potassium initially elevated at 5.2 but hemolyzed this had occurred so this is likely false. Potassium level was repeated and remained elevated at 5.6 but again hemolyzed this had occurred. Potassium level sent again on this time did come back normal at 4.8. Did consult the patient's primary care provider, I spoke with the on-call doctor for Dr. Larry Le. She stated that the patient was sent more for concern related to the potassium level and not the kidney function. She advised as long as the recheck was improved the patient can be discharged with follow-up in the office in the next 1 to 2 weeks. I advised the patient and his wife of this above conversation. They verbalized their agreement and understanding. Pt was given the following medications or treatments in the ED:   Medications   0.9 % sodium chloride bolus (0 mLs Intravenous Stopped 1/22/20 6717)       At this point I do not feel the patient requires further work up and it is reasonable to discharge the patient. Please refer to AVS for further details regarding discharge instructions. A discussion was had with the patient regarding diagnosis, diagnostic testing results, treatment/ plan of care, and follow up. All questions were answered. Patient will follow up as directed for further evaluation/treatment. The patient was given strict return precautions as we discussed symptoms that would necessitate return to the ED. Patient will return to ED for new/worsening symptoms. The patient verbalized their understanding and agreement with the above plan.       I estimate there is LOW risk for ACUTE CORONARY SYNDROME, INTRACRANIAL HEMORRHAGE,

## 2021-09-19 ENCOUNTER — APPOINTMENT (OUTPATIENT)
Dept: GENERAL RADIOLOGY | Age: 86
DRG: 682 | End: 2021-09-19
Payer: MEDICARE

## 2021-09-19 ENCOUNTER — APPOINTMENT (OUTPATIENT)
Dept: CT IMAGING | Age: 86
DRG: 682 | End: 2021-09-19
Payer: MEDICARE

## 2021-09-19 ENCOUNTER — HOSPITAL ENCOUNTER (INPATIENT)
Age: 86
LOS: 3 days | Discharge: ANOTHER HOSPITAL | DRG: 682 | End: 2021-09-22
Attending: EMERGENCY MEDICINE | Admitting: INTERNAL MEDICINE
Payer: MEDICARE

## 2021-09-19 DIAGNOSIS — R41.82 ALTERED MENTAL STATUS, UNSPECIFIED ALTERED MENTAL STATUS TYPE: Primary | ICD-10-CM

## 2021-09-19 DIAGNOSIS — N19 UREMIA: ICD-10-CM

## 2021-09-19 DIAGNOSIS — N13.9 ACUTE URINARY OBSTRUCTION: ICD-10-CM

## 2021-09-19 DIAGNOSIS — N17.9 ACUTE RENAL FAILURE, UNSPECIFIED ACUTE RENAL FAILURE TYPE (HCC): ICD-10-CM

## 2021-09-19 PROBLEM — E78.5 HLD (HYPERLIPIDEMIA): Status: ACTIVE | Noted: 2021-09-19

## 2021-09-19 PROBLEM — M48.062 LUMBAR STENOSIS WITH NEUROGENIC CLAUDICATION: Status: ACTIVE | Noted: 2021-09-19

## 2021-09-19 PROBLEM — N40.0 BPH (BENIGN PROSTATIC HYPERPLASIA): Status: ACTIVE | Noted: 2021-09-19

## 2021-09-19 PROBLEM — R33.8 ACUTE URINARY RETENTION: Status: ACTIVE | Noted: 2021-09-19

## 2021-09-19 LAB
A/G RATIO: 0.9 (ref 1.1–2.2)
A/G RATIO: 0.9 (ref 1.1–2.2)
ALBUMIN SERPL-MCNC: 3 G/DL (ref 3.4–5)
ALBUMIN SERPL-MCNC: 3.2 G/DL (ref 3.4–5)
ALP BLD-CCNC: 105 U/L (ref 40–129)
ALP BLD-CCNC: 109 U/L (ref 40–129)
ALT SERPL-CCNC: 26 U/L (ref 10–40)
ALT SERPL-CCNC: 27 U/L (ref 10–40)
AMORPHOUS: ABNORMAL /HPF
ANION GAP SERPL CALCULATED.3IONS-SCNC: 12 MMOL/L (ref 3–16)
ANION GAP SERPL CALCULATED.3IONS-SCNC: 12 MMOL/L (ref 3–16)
ANION GAP SERPL CALCULATED.3IONS-SCNC: 18 MMOL/L (ref 3–16)
ANISOCYTOSIS: ABNORMAL
AST SERPL-CCNC: 22 U/L (ref 15–37)
AST SERPL-CCNC: 28 U/L (ref 15–37)
BACTERIA: ABNORMAL /HPF
BANDED NEUTROPHILS RELATIVE PERCENT: 2 % (ref 0–7)
BASOPHILS ABSOLUTE: 0 K/UL (ref 0–0.2)
BASOPHILS RELATIVE PERCENT: 0 %
BILIRUB SERPL-MCNC: 0.4 MG/DL (ref 0–1)
BILIRUB SERPL-MCNC: 0.5 MG/DL (ref 0–1)
BILIRUBIN URINE: NEGATIVE
BLOOD, URINE: ABNORMAL
BUN BLDV-MCNC: 132 MG/DL (ref 7–20)
BUN BLDV-MCNC: 150 MG/DL (ref 7–20)
BUN BLDV-MCNC: 185 MG/DL (ref 7–20)
CALCIUM SERPL-MCNC: 8.4 MG/DL (ref 8.3–10.6)
CALCIUM SERPL-MCNC: 8.4 MG/DL (ref 8.3–10.6)
CALCIUM SERPL-MCNC: 8.7 MG/DL (ref 8.3–10.6)
CHLORIDE BLD-SCNC: 105 MMOL/L (ref 99–110)
CHLORIDE BLD-SCNC: 107 MMOL/L (ref 99–110)
CHLORIDE BLD-SCNC: 99 MMOL/L (ref 99–110)
CLARITY: CLEAR
CO2: 20 MMOL/L (ref 21–32)
CO2: 22 MMOL/L (ref 21–32)
CO2: 24 MMOL/L (ref 21–32)
COLOR: YELLOW
CREAT SERPL-MCNC: 2.2 MG/DL (ref 0.8–1.3)
CREAT SERPL-MCNC: 2.9 MG/DL (ref 0.8–1.3)
CREAT SERPL-MCNC: 4.5 MG/DL (ref 0.8–1.3)
EOSINOPHILS ABSOLUTE: 0 K/UL (ref 0–0.6)
EOSINOPHILS RELATIVE PERCENT: 0 %
EPITHELIAL CELLS, UA: ABNORMAL /HPF (ref 0–5)
GFR AFRICAN AMERICAN: 15
GFR AFRICAN AMERICAN: 25
GFR AFRICAN AMERICAN: 34
GFR NON-AFRICAN AMERICAN: 12
GFR NON-AFRICAN AMERICAN: 21
GFR NON-AFRICAN AMERICAN: 28
GLOBULIN: 3.2 G/DL
GLOBULIN: 3.4 G/DL
GLUCOSE BLD-MCNC: 178 MG/DL (ref 70–99)
GLUCOSE BLD-MCNC: 188 MG/DL (ref 70–99)
GLUCOSE BLD-MCNC: 197 MG/DL (ref 70–99)
GLUCOSE BLD-MCNC: 241 MG/DL (ref 70–99)
GLUCOSE BLD-MCNC: 241 MG/DL (ref 70–99)
GLUCOSE BLD-MCNC: 333 MG/DL (ref 70–99)
GLUCOSE BLD-MCNC: 371 MG/DL (ref 70–99)
GLUCOSE URINE: 100 MG/DL
HCT VFR BLD CALC: 23.8 % (ref 40.5–52.5)
HCT VFR BLD CALC: 25.3 % (ref 40.5–52.5)
HEMATOLOGY PATH CONSULT: YES
HEMOGLOBIN: 8.2 G/DL (ref 13.5–17.5)
HEMOGLOBIN: 8.5 G/DL (ref 13.5–17.5)
KETONES, URINE: NEGATIVE MG/DL
LEUKOCYTE ESTERASE, URINE: NEGATIVE
LYMPHOCYTES ABSOLUTE: 0.4 K/UL (ref 1–5.1)
LYMPHOCYTES RELATIVE PERCENT: 3 %
MCH RBC QN AUTO: 33.1 PG (ref 26–34)
MCH RBC QN AUTO: 33.9 PG (ref 26–34)
MCHC RBC AUTO-ENTMCNC: 33.4 G/DL (ref 31–36)
MCHC RBC AUTO-ENTMCNC: 34.4 G/DL (ref 31–36)
MCV RBC AUTO: 98.6 FL (ref 80–100)
MCV RBC AUTO: 99.1 FL (ref 80–100)
MICROSCOPIC EXAMINATION: YES
MONOCYTES ABSOLUTE: 0.7 K/UL (ref 0–1.3)
MONOCYTES RELATIVE PERCENT: 5 %
MONONUCLEAR UNIDENTIFIED CELLS: 1 %
NEUTROPHILS ABSOLUTE: 13.2 K/UL (ref 1.7–7.7)
NEUTROPHILS RELATIVE PERCENT: 89 %
NITRITE, URINE: NEGATIVE
OCCULT BLOOD SCREENING: ABNORMAL
PDW BLD-RTO: 14 % (ref 12.4–15.4)
PDW BLD-RTO: 14 % (ref 12.4–15.4)
PERFORMED ON: ABNORMAL
PH UA: 5.5 (ref 5–8)
PLATELET # BLD: 253 K/UL (ref 135–450)
PLATELET # BLD: 288 K/UL (ref 135–450)
PLATELET SLIDE REVIEW: ADEQUATE
PMV BLD AUTO: 7.5 FL (ref 5–10.5)
PMV BLD AUTO: 7.6 FL (ref 5–10.5)
POTASSIUM REFLEX MAGNESIUM: 4.8 MMOL/L (ref 3.5–5.1)
POTASSIUM SERPL-SCNC: 4.1 MMOL/L (ref 3.5–5.1)
POTASSIUM SERPL-SCNC: 4.3 MMOL/L (ref 3.5–5.1)
PROTEIN UA: NEGATIVE MG/DL
RBC # BLD: 2.41 M/UL (ref 4.2–5.9)
RBC # BLD: 2.56 M/UL (ref 4.2–5.9)
RBC UA: ABNORMAL /HPF (ref 0–4)
SLIDE REVIEW: ABNORMAL
SODIUM BLD-SCNC: 137 MMOL/L (ref 136–145)
SODIUM BLD-SCNC: 139 MMOL/L (ref 136–145)
SODIUM BLD-SCNC: 143 MMOL/L (ref 136–145)
SPECIFIC GRAVITY UA: 1.02 (ref 1–1.03)
TOTAL PROTEIN: 6.2 G/DL (ref 6.4–8.2)
TOTAL PROTEIN: 6.6 G/DL (ref 6.4–8.2)
URINE TYPE: ABNORMAL
UROBILINOGEN, URINE: 0.2 E.U./DL
WBC # BLD: 14.5 K/UL (ref 4–11)
WBC # BLD: 8.1 K/UL (ref 4–11)
WBC UA: ABNORMAL /HPF (ref 0–5)

## 2021-09-19 PROCEDURE — 80053 COMPREHEN METABOLIC PANEL: CPT

## 2021-09-19 PROCEDURE — 2580000003 HC RX 258: Performed by: INTERNAL MEDICINE

## 2021-09-19 PROCEDURE — 74176 CT ABD & PELVIS W/O CONTRAST: CPT

## 2021-09-19 PROCEDURE — 71045 X-RAY EXAM CHEST 1 VIEW: CPT

## 2021-09-19 PROCEDURE — 83036 HEMOGLOBIN GLYCOSYLATED A1C: CPT

## 2021-09-19 PROCEDURE — 6370000000 HC RX 637 (ALT 250 FOR IP): Performed by: INTERNAL MEDICINE

## 2021-09-19 PROCEDURE — 81001 URINALYSIS AUTO W/SCOPE: CPT

## 2021-09-19 PROCEDURE — 6360000002 HC RX W HCPCS: Performed by: INTERNAL MEDICINE

## 2021-09-19 PROCEDURE — 85025 COMPLETE CBC W/AUTO DIFF WBC: CPT

## 2021-09-19 PROCEDURE — 99285 EMERGENCY DEPT VISIT HI MDM: CPT

## 2021-09-19 PROCEDURE — 6370000000 HC RX 637 (ALT 250 FOR IP): Performed by: HOSPITALIST

## 2021-09-19 PROCEDURE — 1200000000 HC SEMI PRIVATE

## 2021-09-19 PROCEDURE — 36415 COLL VENOUS BLD VENIPUNCTURE: CPT

## 2021-09-19 PROCEDURE — 82270 OCCULT BLOOD FECES: CPT

## 2021-09-19 PROCEDURE — 51702 INSERT TEMP BLADDER CATH: CPT

## 2021-09-19 PROCEDURE — 85027 COMPLETE CBC AUTOMATED: CPT

## 2021-09-19 PROCEDURE — 70450 CT HEAD/BRAIN W/O DYE: CPT

## 2021-09-19 RX ORDER — POLYETHYLENE GLYCOL 3350 17 G/17G
17 POWDER, FOR SOLUTION ORAL DAILY
Status: DISCONTINUED | OUTPATIENT
Start: 2021-09-19 | End: 2021-09-22 | Stop reason: HOSPADM

## 2021-09-19 RX ORDER — SODIUM CHLORIDE, SODIUM LACTATE, POTASSIUM CHLORIDE, CALCIUM CHLORIDE 600; 310; 30; 20 MG/100ML; MG/100ML; MG/100ML; MG/100ML
INJECTION, SOLUTION INTRAVENOUS CONTINUOUS
Status: DISCONTINUED | OUTPATIENT
Start: 2021-09-19 | End: 2021-09-20

## 2021-09-19 RX ORDER — SENNA PLUS 8.6 MG/1
1 TABLET ORAL NIGHTLY
Status: DISCONTINUED | OUTPATIENT
Start: 2021-09-19 | End: 2021-09-19

## 2021-09-19 RX ORDER — AMLODIPINE BESYLATE 10 MG/1
10 TABLET ORAL DAILY
COMMUNITY

## 2021-09-19 RX ORDER — HEPARIN SODIUM 5000 [USP'U]/ML
5000 INJECTION, SOLUTION INTRAVENOUS; SUBCUTANEOUS EVERY 8 HOURS SCHEDULED
Status: DISCONTINUED | OUTPATIENT
Start: 2021-09-19 | End: 2021-09-20

## 2021-09-19 RX ORDER — DONEPEZIL HYDROCHLORIDE 10 MG/1
10 TABLET, FILM COATED ORAL NIGHTLY
COMMUNITY

## 2021-09-19 RX ORDER — CASTOR OIL AND BALSAM, PERU 788; 87 MG/G; MG/G
OINTMENT TOPICAL 2 TIMES DAILY
Status: DISCONTINUED | OUTPATIENT
Start: 2021-09-19 | End: 2021-09-22 | Stop reason: HOSPADM

## 2021-09-19 RX ORDER — INSULIN GLARGINE 100 [IU]/ML
0.15 INJECTION, SOLUTION SUBCUTANEOUS NIGHTLY
Status: DISCONTINUED | OUTPATIENT
Start: 2021-09-19 | End: 2021-09-22 | Stop reason: HOSPADM

## 2021-09-19 RX ORDER — TROSPIUM CHLORIDE 20 MG/1
20 TABLET, FILM COATED ORAL NIGHTLY
Status: DISCONTINUED | OUTPATIENT
Start: 2021-09-19 | End: 2021-09-20

## 2021-09-19 RX ORDER — CALCIUM CARB/VITAMIN D3/VIT K1 500-500-40
1000 TABLET,CHEWABLE ORAL DAILY
COMMUNITY

## 2021-09-19 RX ORDER — OXYCODONE AND ACETAMINOPHEN 10; 325 MG/1; MG/1
1 TABLET ORAL EVERY 6 HOURS PRN
Status: ON HOLD | COMMUNITY
End: 2021-09-21 | Stop reason: HOSPADM

## 2021-09-19 RX ORDER — VITAMIN B COMPLEX
1 CAPSULE ORAL DAILY
COMMUNITY

## 2021-09-19 RX ORDER — ZINC SULFATE 50(220)MG
220 CAPSULE ORAL DAILY
COMMUNITY

## 2021-09-19 RX ORDER — ATORVASTATIN CALCIUM 10 MG/1
10 TABLET, FILM COATED ORAL DAILY
Status: DISCONTINUED | OUTPATIENT
Start: 2021-09-19 | End: 2021-09-22 | Stop reason: HOSPADM

## 2021-09-19 RX ORDER — ONDANSETRON 4 MG/1
4 TABLET, ORALLY DISINTEGRATING ORAL EVERY 8 HOURS PRN
Status: DISCONTINUED | OUTPATIENT
Start: 2021-09-19 | End: 2021-09-22 | Stop reason: HOSPADM

## 2021-09-19 RX ORDER — SODIUM CHLORIDE 9 MG/ML
25 INJECTION, SOLUTION INTRAVENOUS PRN
Status: DISCONTINUED | OUTPATIENT
Start: 2021-09-19 | End: 2021-09-22 | Stop reason: HOSPADM

## 2021-09-19 RX ORDER — ACETAMINOPHEN 325 MG/1
650 TABLET ORAL EVERY 6 HOURS PRN
Status: DISCONTINUED | OUTPATIENT
Start: 2021-09-19 | End: 2021-09-22 | Stop reason: HOSPADM

## 2021-09-19 RX ORDER — NICOTINE POLACRILEX 4 MG
15 LOZENGE BUCCAL PRN
Status: DISCONTINUED | OUTPATIENT
Start: 2021-09-19 | End: 2021-09-22 | Stop reason: HOSPADM

## 2021-09-19 RX ORDER — ONDANSETRON 2 MG/ML
4 INJECTION INTRAMUSCULAR; INTRAVENOUS EVERY 6 HOURS PRN
Status: DISCONTINUED | OUTPATIENT
Start: 2021-09-19 | End: 2021-09-22 | Stop reason: HOSPADM

## 2021-09-19 RX ORDER — AMOXICILLIN 250 MG
1 CAPSULE ORAL 2 TIMES DAILY
COMMUNITY

## 2021-09-19 RX ORDER — SENNA PLUS 8.6 MG/1
1 TABLET ORAL 2 TIMES DAILY
Status: DISCONTINUED | OUTPATIENT
Start: 2021-09-19 | End: 2021-09-22 | Stop reason: HOSPADM

## 2021-09-19 RX ORDER — DEXTROSE MONOHYDRATE 50 MG/ML
100 INJECTION, SOLUTION INTRAVENOUS PRN
Status: DISCONTINUED | OUTPATIENT
Start: 2021-09-19 | End: 2021-09-22 | Stop reason: HOSPADM

## 2021-09-19 RX ORDER — LANOLIN ALCOHOL/MO/W.PET/CERES
3 CREAM (GRAM) TOPICAL NIGHTLY PRN
Status: DISCONTINUED | OUTPATIENT
Start: 2021-09-19 | End: 2021-09-22 | Stop reason: HOSPADM

## 2021-09-19 RX ORDER — OXYCODONE HYDROCHLORIDE AND ACETAMINOPHEN 5; 325 MG/1; MG/1
1 TABLET ORAL EVERY 6 HOURS PRN
Status: ON HOLD | COMMUNITY
End: 2021-09-21 | Stop reason: HOSPADM

## 2021-09-19 RX ORDER — HEPARIN SODIUM 5000 [USP'U]/ML
5000 INJECTION, SOLUTION INTRAVENOUS; SUBCUTANEOUS EVERY 8 HOURS
Status: ON HOLD | COMMUNITY
End: 2021-09-21 | Stop reason: HOSPADM

## 2021-09-19 RX ORDER — SODIUM CHLORIDE, SODIUM LACTATE, POTASSIUM CHLORIDE, CALCIUM CHLORIDE 600; 310; 30; 20 MG/100ML; MG/100ML; MG/100ML; MG/100ML
2000 INJECTION, SOLUTION INTRAVENOUS ONCE
Status: COMPLETED | OUTPATIENT
Start: 2021-09-19 | End: 2021-09-19

## 2021-09-19 RX ORDER — METHOCARBAMOL 500 MG/1
500 TABLET, FILM COATED ORAL EVERY 6 HOURS PRN
Status: ON HOLD | COMMUNITY
End: 2021-11-05 | Stop reason: HOSPADM

## 2021-09-19 RX ORDER — DEXTROSE MONOHYDRATE 25 G/50ML
12.5 INJECTION, SOLUTION INTRAVENOUS PRN
Status: DISCONTINUED | OUTPATIENT
Start: 2021-09-19 | End: 2021-09-22 | Stop reason: HOSPADM

## 2021-09-19 RX ORDER — TAMSULOSIN HYDROCHLORIDE 0.4 MG/1
0.4 CAPSULE ORAL DAILY
Status: DISCONTINUED | OUTPATIENT
Start: 2021-09-19 | End: 2021-09-22 | Stop reason: HOSPADM

## 2021-09-19 RX ORDER — SODIUM CHLORIDE 0.9 % (FLUSH) 0.9 %
10 SYRINGE (ML) INJECTION PRN
Status: DISCONTINUED | OUTPATIENT
Start: 2021-09-19 | End: 2021-09-22 | Stop reason: HOSPADM

## 2021-09-19 RX ORDER — ACETAMINOPHEN 650 MG/1
650 SUPPOSITORY RECTAL EVERY 6 HOURS PRN
Status: DISCONTINUED | OUTPATIENT
Start: 2021-09-19 | End: 2021-09-22 | Stop reason: HOSPADM

## 2021-09-19 RX ORDER — M-VIT,TX,IRON,MINS/CALC/FOLIC 27MG-0.4MG
1 TABLET ORAL DAILY
COMMUNITY

## 2021-09-19 RX ADMIN — HEPARIN SODIUM 5000 UNITS: 5000 INJECTION INTRAVENOUS; SUBCUTANEOUS at 14:50

## 2021-09-19 RX ADMIN — POLYETHYLENE GLYCOL 3350 17 G: 17 POWDER, FOR SOLUTION ORAL at 10:09

## 2021-09-19 RX ADMIN — CASTOR OIL AND BALSAM, PERU: 788; 87 OINTMENT TOPICAL at 15:19

## 2021-09-19 RX ADMIN — INSULIN LISPRO 3 UNITS: 100 INJECTION, SOLUTION INTRAVENOUS; SUBCUTANEOUS at 10:12

## 2021-09-19 RX ADMIN — ATORVASTATIN CALCIUM 10 MG: 10 TABLET, FILM COATED ORAL at 10:09

## 2021-09-19 RX ADMIN — SODIUM CHLORIDE, POTASSIUM CHLORIDE, SODIUM LACTATE AND CALCIUM CHLORIDE: 600; 310; 30; 20 INJECTION, SOLUTION INTRAVENOUS at 15:48

## 2021-09-19 RX ADMIN — INSULIN LISPRO 3 UNITS: 100 INJECTION, SOLUTION INTRAVENOUS; SUBCUTANEOUS at 12:36

## 2021-09-19 RX ADMIN — HEPARIN SODIUM 5000 UNITS: 5000 INJECTION INTRAVENOUS; SUBCUTANEOUS at 21:21

## 2021-09-19 RX ADMIN — INSULIN LISPRO 5 UNITS: 100 INJECTION, SOLUTION INTRAVENOUS; SUBCUTANEOUS at 12:36

## 2021-09-19 RX ADMIN — INSULIN LISPRO 4 UNITS: 100 INJECTION, SOLUTION INTRAVENOUS; SUBCUTANEOUS at 10:11

## 2021-09-19 RX ADMIN — INSULIN LISPRO 3 UNITS: 100 INJECTION, SOLUTION INTRAVENOUS; SUBCUTANEOUS at 16:58

## 2021-09-19 RX ADMIN — INSULIN GLARGINE 9 UNITS: 100 INJECTION, SOLUTION SUBCUTANEOUS at 21:20

## 2021-09-19 RX ADMIN — INSULIN LISPRO 1 UNITS: 100 INJECTION, SOLUTION INTRAVENOUS; SUBCUTANEOUS at 21:19

## 2021-09-19 RX ADMIN — CASTOR OIL AND BALSAM, PERU: 788; 87 OINTMENT TOPICAL at 21:29

## 2021-09-19 RX ADMIN — TAMSULOSIN HYDROCHLORIDE 0.4 MG: 0.4 CAPSULE ORAL at 10:09

## 2021-09-19 RX ADMIN — HEPARIN SODIUM 5000 UNITS: 5000 INJECTION INTRAVENOUS; SUBCUTANEOUS at 10:08

## 2021-09-19 RX ADMIN — INSULIN LISPRO 1 UNITS: 100 INJECTION, SOLUTION INTRAVENOUS; SUBCUTANEOUS at 16:58

## 2021-09-19 RX ADMIN — TROSPIUM CHLORIDE 20 MG: 20 TABLET, FILM COATED ORAL at 21:16

## 2021-09-19 RX ADMIN — SODIUM CHLORIDE, POTASSIUM CHLORIDE, SODIUM LACTATE AND CALCIUM CHLORIDE 2000 ML: 600; 310; 30; 20 INJECTION, SOLUTION INTRAVENOUS at 05:09

## 2021-09-19 ASSESSMENT — PAIN DESCRIPTION - LOCATION: LOCATION: GENERALIZED

## 2021-09-19 ASSESSMENT — PAIN SCALES - GENERAL: PAINLEVEL_OUTOF10: 3

## 2021-09-19 NOTE — CONSULTS
Consult Call Back    Who: DR. Tahir Yusuf  Date:9/19/2021,  Time:3:40 PM    Electronically signed by Sandra Kraus RN on 9/19/21 at 3:40 PM EDT

## 2021-09-19 NOTE — ED NOTES
Bed: 31  Expected date:   Expected time:   Means of arrival:   Comments:  Antonio Diana RN  09/19/21 0002

## 2021-09-19 NOTE — H&P
Hospital Medicine History & Physical      Patient:  Linsey Murphy  :   1934  MRN:   [id number]  Date of Service: 21    Chief Complaint   Patient presents with    Fatigue     hx laminectomy, lethargic 21    Abnormal Lab     creatinine 5.4, hx CKD st 3       HISTORY OF PRESENT ILLNESS:    Linsey Murphy is a 80 y.o. male. He was sent from his SNF. He has been staying there since a recent admission at [facility] for lumbar laminectomy. His course there was complicated by an epidural fluid collection which required urgent re-exploration of the operative wound. He was sent from his SNF because of abnormal labs and  altered mental status. He is confused but able to answer a few questions and follow simple commands. He has no complaints except some jain catheter related discomfort. Review of Systems:  All pertinent positives and negatives are as noted in the HPI section. All other systems were reviewed and are negative. Past Medical History:   Diagnosis Date    Arthritis     Cancer ([facility] [address]     skin    Dry eyes, bilateral     Hyperlipidemia     Hypertension     Osteoporosis     Type II or unspecified type diabetes mellitus without mention of complication, not stated as uncontrolled     Urinary incontinence        Past Surgical History:   Procedure Laterality Date    COLONOSCOPY      diverticulosis    COLONOSCOPY  2012    diverticulosis    EYE SURGERY      micheal cataract    TONSILLECTOMY           Prior to Admission medications    Medication Sig Start Date End Date Taking? Authorizing Provider   acetaminophen (TYLENOL) 325 MG tablet Take 1,000 mg by mouth every morning    Historical Provider, MD   acetaminophen (TYLENOL) 500 MG tablet Take 500 mg by mouth nightly    Historical Provider, MD   lisinopril (PRINIVIL;ZESTRIL) 20 MG tablet Take 20 mg by mouth daily. Historical Provider, MD   solifenacin (VESICARE) 5 MG tablet Take 10 mg by mouth daily.       Historical FINDINGS: Lower Chest: The visualized lung bases are clear. Elevation of the left hemidiaphragm is noted. Organs: The liver, spleen, pancreas, gallbladder, and adrenal glands are grossly negative given the limitations of the noncontrast technique. There is mild bilateral hydronephrosis. There are multiple calculi both kidneys. There are no ureteral calculi. GI/Bowel: Small bowel caliber is normal.  The appendix is not identified. There is no evidence for appendicitis. There is a large amount of stool in the colon, particularly the rectal vault. There is mild rectal wall thickening with perirectal fat stranding. Pelvis: There is a catheter in the urinary bladder. There are no bladder calculi Peritoneum/Retroperitoneum: No adenopathy, mesenteric stranding or free fluid. Aortic caliber is normal.  However, a small saccular aneurysm arises from the posterior wall of the distal aorta, best seen on the sagittal reconstructions. This measures 11 mm. Bones/Soft Tissues: There are moderate to severe degenerative changes in the lumbar spine. Wide laminectomy defects are identified from L1 through L4. A few small bubbles of gas are identified in the operative bed. 1. Bilateral hydronephrosis with bilateral intrarenal calculi but no distal calculi or obvious cause for obstruction seen. 2. Possible fecal impaction with suggested stercoral proctitis. 3. Soft tissue gas in lumbar laminectomy defects. If this is not a recent surgery then infection should be considered. 4. 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  Recommend referral to a vascular specialist. Reference: Megan Chen Ash Radiol 3599;[id number]     CT Head WO Contrast    Result Date: 9/19/2021  EXAMINATION: CT OF THE HEAD WITHOUT CONTRAST  9/19/2021 2:22 am TECHNIQUE: CT of the head was performed without the administration of intravenous contrast. Dose modulation, iterative reconstruction, and/or weight based adjustment of the mA/kV was utilized to reduce the radiation dose to as low as reasonably achievable. COMPARISON: None. HISTORY: ORDERING SYSTEM PROVIDED HISTORY: AMS TECHNOLOGIST PROVIDED HISTORY: Reason for exam:->AMS Has a \"code stroke\" or \"stroke alert\" been called? ->No Decision Support Exception - unselect if not a suspected or confirmed emergency medical condition->Emergency Medical Condition (MA) Reason for Exam: abd pain. unaable to get details from pt Acuity: Acute Type of Exam: Initial FINDINGS: BRAIN/VENTRICLES: There is no acute intracranial hemorrhage, mass effect or midline shift. No abnormal extra-axial fluid collection. The gray-white differentiation is maintained without evidence of an acute infarct. There is no evidence of hydrocephalus. There is mild periventricular and subcortical white matter low attenuation. There is a small, old lacunar infarct in the maria eugenia just left of midline. Prominence of the ventricles, sulci and fissures is identified. ORBITS: The visualized portion of the orbits demonstrate no acute abnormality. SINUSES: The visualized paranasal sinuses and mastoid air cells demonstrate no acute abnormality. SOFT TISSUES/SKULL:  No acute abnormality of the visualized skull or soft tissues. Small vessel chronic ischemic changes without acute hemorrhage or definite evidence for acute ischemia. XR CHEST PORTABLE    Result Date: 9/19/2021  EXAMINATION: ONE XRAY VIEW OF THE CHEST 9/19/2021 1:24 am COMPARISON: None. HISTORY: ORDERING SYSTEM PROVIDED HISTORY: AMS TECHNOLOGIST PROVIDED HISTORY: Reason for exam:->AMS Reason for Exam: fatigue and AMS, pt unable to give hx Acuity: Acute Type of Exam: Initial FINDINGS: The heart is normal.  The pulmonary vessels are normal.  There is asymmetric elevation of the left hemidiaphragm. No consolidation or effusion is seen. There is overlying EKG lead artifact. Mild asymmetric elevation of the left hemidiaphragm which is of uncertain etiology.   Recommend short-term follow-up. Overlying EKG lead artifact with no obvious acute pulmonary abnormality. I directly reviewed all recent imaging studies as well as pertinent prior studies. Radiology reports may or may not be available at the time of my review. Active Hospital Problems    Diagnosis Date Noted    HLD (hyperlipidemia) [E78.5] 09/19/2021    Lumbar stenosis with neurogenic claudication [M48.062] 09/19/2021    BPH (benign prostatic hyperplasia) [N40.0] 09/19/2021    ROXI (acute kidney injury) ([facility] [address] [N17.9] 09/19/2021    Acute urinary retention [R33.8] 09/19/2021    DM2 (diabetes mellitus, type 2) ([facility] [address] [E11.9]     Hypertension [I10]        ASSESSMENT & PLAN  ROXI, HTN  -  Due to postrenal obstruction and use of nephrotoxic medications (ACE, diuretics, etc.)  -  Avilez placed and urine draining. 1400mL drained in total since placement. U/A not suggestive of UTI. -  Avoid nephrotoxin exposure where possible. -  LR @ 250mL/hr x 8 hours overnight. Vigilance for post-ATN autodiuresis is warranted. -  Normotensive at the moment. Non-nephrotoxic antihypertensives may be needed once patient is volume repleted. BPH w/ acute urinary retention  -  Avilez as above. Start tamsulosin. Constipation  -  Recent surgery. Start daily miralax and senna. Hold for diarrhea. DM2  -  Hold all oral antidiabetic agents. Start s.c. Insulin regimen based on weight. DVT prophylaxis: SCDs, s.c. UFH  Code Status:  Full  Disposition:  Inpatient. Anticipate d/c to SNF in 3-4 days.     Lois Cruz MD MD

## 2021-09-19 NOTE — CONSULTS
Consult Call Back    Who: Dr. Aurelia Livingston [facility]  Date:9/19/2021,  Time:3:40 PM    Electronically signed by Avani Terrazas RN on 9/19/21 at 3:40 PM EDT

## 2021-09-19 NOTE — PROGRESS NOTES
Pt alert with some confusion, vss, assessment complete and charted, no needs or complaints at this time, call light in reach, will monitor

## 2021-09-19 NOTE — PROGRESS NOTES
4 Eyes Skin Assessment     The patient is being assess for  Admission    I agree that 2 RN's have performed a thorough Head to Toe Skin Assessment on the patient. ALL assessment sites listed below have been assessed. Areas assessed by both nurses: Goldy Castro RN  [x]   Head, Face, and Ears   [x]   Shoulders, Back, and Chest  [x]   Arms, Elbows, and Hands   [x]   Coccyx, Sacrum, and IschIum  [x]   Legs, Feet, and Heels        Does the Patient have Skin Breakdown?   Yes LDA WOUND CARE was Initiated documentation include the Cassie-wound, Wound Assessment, Measurements, Dressing Treatment, Drainage, and Color\",         Trace Prevention initiated:  Yes   Wound Care Orders initiated:  Yes      [id number] nurse consulted for Pressure Injury (Stage 3,4, Unstageable, DTI, NWPT, and Complex wounds), New and Established Ostomies:  Yes      Nurse 1 eSignature: Electronically signed by Bobbi Echavarria RN on 9/19/21 at 2:13 PM EDT    **SHARE this note so that the co-signing nurse is able to place an eSignature**    Nurse 2 eSignature: Electronically signed by Dianna Corrales RN on 9/19/21 at 2:19 PM EDT

## 2021-09-19 NOTE — ED NOTES
Wound care complete for st 3 bl buttock/coccyx area. Nonadherent pad and sacral mepilex in place.       Harmony Fontana RN  09/19/21 [id number]

## 2021-09-19 NOTE — CONSULTS
Consult placed    Who:Dr. Scooby Taylor  Date:9/19/2021,  Time:2:29 PM        Electronically signed by Vaughn Joyner on 9/19/2021 at 2:29 PM

## 2021-09-19 NOTE — CONSULTS
[address] [id number] Continence Nurse  Consult Note       NAME:  Salinas Valle  MEDICAL RECORD NUMBER:  [id number]  AGE: 80 y.o. GENDER: male  : 1934  TODAY'S DATE:  2021    Subjective   Reason for WOCN Evaluation and Assessment: Bilateral Heels and Sacrum - DTI      Salinas Valle is a 80 y.o. male referred by:   [] Physician  [x] Nursing  [] Other:     Wound Identification:  Wound Type: pressure  Contributing Factors: diabetes, chronic pressure, decreased mobility, shear force, incontinence of stool and incontinence of urine    Wound History: Salinas Valle is a 80 y.o. male. He was sent from his SNF. He has been staying there since a recent admission at [facility] for lumbar laminectomy. His course there was complicated by an epidural fluid collection which required urgent re-exploration of the operative wound. He was sent from his SNF because of abnormal labs and  altered mental status. He is confused but able to answer a few questions and follow simple commands.   He has no complaints except some jain catheter related discomfort  Current Wound Care Treatment: Bilateral Heels and Sacrum - Venelex    Patient Goal of Care:  [x] Wound Healing  [] Odor Control  [] Palliative Care  [] Pain Control   [] Other:         PAST MEDICAL HISTORY        Diagnosis Date    Arthritis     Cancer ([facility] [address]     skin    Dry eyes, bilateral     Hyperlipidemia     Hypertension     Osteoporosis     Type II or unspecified type diabetes mellitus without mention of complication, not stated as uncontrolled     Urinary incontinence        PAST SURGICAL HISTORY    Past Surgical History:   Procedure Laterality Date    COLONOSCOPY      diverticulosis    COLONOSCOPY  2012    diverticulosis    EYE SURGERY      micheal cataract    TONSILLECTOMY         FAMILY HISTORY    Family History   Problem Relation Age of Onset    Heart Disease Mother     Cancer Sister 39        breast       SOCIAL HISTORY    Social History     Tobacco Use    Smoking status: Never Smoker    Smokeless tobacco: Never Used   Vaping Use    Vaping Use: Never used   Substance Use Topics    Alcohol use: No    Drug use: Never       ALLERGIES    Allergies   Allergen Reactions    Iodine Itching and Swelling       MEDICATIONS    No current facility-administered medications on file prior to encounter. Current Outpatient Medications on File Prior to Encounter   Medication Sig Dispense Refill    Chromium Picolinate 1000 MCG TABS Take 1,000 mcg by mouth daily      cyanocobalamin 1000 MCG tablet Take 1,000 mcg by mouth daily      zinc sulfate (ZINCATE) 220 (50 Zn) MG capsule Take 220 mg by mouth daily      amLODIPine (NORVASC) 10 MG tablet Take 10 mg by mouth daily      Multiple Vitamins-Minerals (THERAPEUTIC MULTIVITAMIN-MINERALS) tablet Take 1 tablet by mouth daily      Heparin Sodium, Porcine, (HEPARIN, PORCINE,) 5000 UNIT/ML injection Inject 5,000 Units into the skin every 8 hours      donepezil (ARICEPT) 10 MG tablet Take 10 mg by mouth nightly      senna-docusate (PERICOLACE) 8.6-50 MG per tablet Take 1 tablet by mouth 2 times daily      b complex vitamins capsule Take 1 capsule by mouth daily      methocarbamol (ROBAXIN) 500 MG tablet Take 500 mg by mouth every 6 hours as needed      oxyCODONE-acetaminophen (PERCOCET) 5-325 MG per tablet Take 1 tablet by mouth every 6 hours as needed for Pain.  oxyCODONE-acetaminophen (PERCOCET)  MG per tablet Take 1 tablet by mouth every 6 hours as needed for Pain.  acetaminophen (TYLENOL) 500 MG tablet Take 500 mg by mouth every 4 hours as needed       lisinopril (PRINIVIL;ZESTRIL) 20 MG tablet Take 20 mg by mouth nightly       solifenacin (VESICARE) 5 MG tablet Take 10 mg by mouth daily.  simvastatin (ZOCOR) 10 MG tablet Take 10 mg by mouth nightly.  fosinopril (MONOPRIL) 20 MG tablet Take 20 mg by mouth daily.       sitagliptan (JANUVIA) 100 MG tablet Take 100 mg by mouth daily.  glipiZIDE (GLUCOTROL XL) 10 MG CR tablet Take 10 mg by mouth daily. Objective: Alert to self and time, wife at bedside    BP (!) 143/64   Pulse 89   Temp 97.8 °F (36.6 °C) (Oral)   Resp 18   Ht 5' 7\" (1.702 m)   Wt 123 lb 7.3 oz (56 kg)   SpO2 97%   BMI 19.34 kg/m²     LABS:  WBC:    Lab Results   Component Value Date    WBC 8.1 09/19/2021     H/H:    Lab Results   Component Value Date    HGB 8.2 09/19/2021    HCT 23.8 09/19/2021     PTT:  No results found for: APTT, PTT[APTT}  PT/INR:  No results found for: PROTIME, INR  HgBA1c:  No results found for: LABA1C    Assessment: Sacrum - large non blanching purple/maroon area with denuded skin from shearing  L Heel - intact maroon color blister  R Heel - non blanching purple/maroon area   Trace Risk Score:      Patient Active Problem List   Diagnosis Code    DM2 (diabetes mellitus, type 2) (HCC) E11.9    Hypertension I10    Localized osteoarthrosis, lower leg M17.10    HLD (hyperlipidemia) E78.5    Lumbar stenosis with neurogenic claudication M48.062    BPH (benign prostatic hyperplasia) N40.0    ROXI (acute kidney injury) ([facility] [address] N17.9    Acute urinary retention R33.8       Measurements:  Wound 09/19/21 Sacrum (Active)   Wound Image   09/19/21 1520   Wound Etiology Deep tissue/Injury 09/19/21 1520   Dressing Status New dressing applied 09/19/21 1520   Wound Cleansed Cleansed with saline 09/19/21 1520   Dressing/Treatment Pharmaceutical agent (see MAR); Foam 09/19/21 1520   Dressing Change Due 09/19/21 09/19/21 1520   Wound Length (cm) 8.5 cm 09/19/21 1520   Wound Width (cm) 4.5 cm 09/19/21 1520   Wound Depth (cm) 0.1 cm 09/19/21 1520   Wound Surface Area (cm^2) 38.25 cm^2 09/19/21 1520   Wound Volume (cm^3) 3.825 cm^3 09/19/21 1520   Wound Assessment Denuded; Purple/maroon 09/19/21 1520   Drainage Amount Small 09/19/21 1520   Drainage Description Serosanguinous 09/19/21 1520   Odor None 09/19/21 1520   Cassie-wound Assessment Blanchable erythema 09/19/21 1520   Margins Defined edges 09/19/21 1520   Number of days: 0    Sacrum:         Wound 09/19/21 Heel Left (Active)   Wound Image   09/19/21 1520   Wound Etiology Deep tissue/Injury 09/19/21 1520   Dressing Status New dressing applied 09/19/21 1520   Wound Cleansed Cleansed with saline 09/19/21 1520   Dressing/Treatment Pharmaceutical agent (see MAR) 09/19/21 1520   Offloading for Diabetic Foot Ulcers Offloading boot 09/19/21 1520   Dressing Change Due 09/19/21 09/19/21 1520   Wound Length (cm) 3.5 cm 09/19/21 1520   Wound Width (cm) 4 cm 09/19/21 1520   Wound Depth (cm) 0 cm 09/19/21 1520   Wound Surface Area (cm^2) 14 cm^2 09/19/21 1520   Wound Volume (cm^3) 0 cm^3 09/19/21 1520   Wound Assessment Blood filled blister; Purple/maroon 09/19/21 1520   Drainage Amount None 09/19/21 1520   Odor None 09/19/21 1520   Cassie-wound Assessment Dry/flaky 09/19/21 1520   Margins Attached edges; Defined edges 09/19/21 1520   Number of days: 0    L Heel:         Wound 09/19/21 Heel Right (Active)   Wound Image   09/19/21 1520   Wound Etiology Deep tissue/Injury 09/19/21 1520   Dressing Status New dressing applied 09/19/21 1520   Wound Cleansed Cleansed with saline 09/19/21 1520   Dressing/Treatment Pharmaceutical agent (see MAR) 09/19/21 1520   Offloading for Diabetic Foot Ulcers Offloading boot 09/19/21 1520   Dressing Change Due 09/19/21 09/19/21 1520   Wound Length (cm) 4.5 cm 09/19/21 1520   Wound Width (cm) 5 cm 09/19/21 1520   Wound Depth (cm) 0 cm 09/19/21 1520   Wound Surface Area (cm^2) 22.5 cm^2 09/19/21 1520   Wound Volume (cm^3) 0 cm^3 09/19/21 1520   Wound Assessment Purple/maroon 09/19/21 1520   Drainage Amount None 09/19/21 1520   Odor None 09/19/21 1520   Cassie-wound Assessment Blanchable erythema 09/19/21 1520   Margins Attached edges; Defined edges 09/19/21 1520   Number of days: 0   R Heel:      Response to treatment:  With complaints of pain.      Pain Assessment:  Severity:  7 / 10  Quality of pain: sharp  Wound Pain Timing/Severity: intermittent  Premedicated: No    Plan   Plan of Care: Wound 09/19/21 Sacrum-Dressing/Treatment: Pharmaceutical agent (see MAR), Foam  Wound 09/19/21 Heel Left-Dressing/Treatment: Pharmaceutical agent (see MAR) (Venelex)  Wound 09/19/21 Heel Right-Dressing/Treatment: Pharmaceutical agent (see MAR) (Venelex)   Recommendation: Sacrum, L Heel and R Heel - clean with NS, dry, apply Venelex BID  Midline Incision on Back - clean with NS, dry, apply abd pad, medipore tape, change daily  Reposition pt every 2 hours  Call Wound Care for deterioration [phone]    Specialty Bed Required : Yes ordered  [x] Low Air Loss   [x] Pressure Redistribution  [] Fluid Immersion  [] Bariatric  [] Total Pressure Relief  [] Other:     Current Diet: ADULT DIET; Regular; 3 carb choices (45 gm/meal);  Low Fat/Low Chol/High Fiber/OREN  Dietician consult:  No    Discharge Plan:  Placement for patient upon discharge: skilled nursing    Patient appropriate for Outpatient [address]: Yes    Referrals:  [x]   [] [facility]  [] Supplies  [] Other    Patient/Caregiver Teaching:  Level of patient/caregiver understanding able to:   [] Indicates understanding       [] Needs reinforcement  [] Unsuccessful      [] Verbal Understanding  [] Demonstrated understanding       [] No evidence of learning  [] Refused teaching         [] N/A       Electronically signed by Tereso Bailey RN, CWOCN on 9/19/2021 at 3:24 PM

## 2021-09-19 NOTE — PROGRESS NOTES
Hospitalist Progress Note      PCP: Marlin Julien MD    Date of Admission: 9/19/2021    Chief Complaint: Fatigue, abnormal lab    Hospital Course: This is a 31-year-old nursing home resident to emergency room with increased fatigue, creatinine of 5.4 history of a chronic kidney disease history of a recent lumbar laminectomy at [facility] where postop complicated by an epidural fluid collection which required urgent reexploration of the operative wound. Subjective: Patient is comfortable eating breakfast Avilez in place denies any chest pain or shortness of breath no nausea vomiting. Medications:  Reviewed    Infusion Medications    lactated ringers 2,000 mL (09/19/21 0506)    dextrose      sodium chloride       Scheduled Medications    trospium  20 mg Oral Nightly    atorvastatin  10 mg Oral Daily    heparin (porcine)  5,000 Units SubCUTAneous 3 times per day    insulin glargine  0.15 Units/kg SubCUTAneous Nightly    insulin lispro  0.05 Units/kg SubCUTAneous TID WC    insulin lispro  0-6 Units SubCUTAneous TID WC    insulin lispro  0-3 Units SubCUTAneous Nightly    polyethylene glycol  17 g Oral Daily    senna  1 tablet Oral Nightly    tamsulosin  0.4 mg Oral Daily     PRN Meds: glucose, dextrose, glucagon (rDNA), dextrose, sodium chloride flush, sodium chloride, ondansetron **OR** ondansetron, acetaminophen **OR** acetaminophen, melatonin      Intake/Output Summary (Last 24 hours) at 9/19/2021 0929  Last data filed at 9/19/2021 0141  Gross per 24 hour   Intake    Output 1400 ml   Net -1400 ml       Physical Exam Performed:    /60   Pulse 99   Temp 98.4 °F (36.9 °C) (Axillary)   Resp 22   Ht 5' 7\" (1.702 m)   Wt 123 lb 7.3 oz (56 kg)   SpO2 99%   BMI 19.34 kg/m²     General appearance: No apparent distress, appears stated age and cooperative. HEENT: Pupils equal, round, and reactive to light. Conjunctivae/corneas clear. Neck: Supple, with full range of motion.  No jugular venous distention. Trachea midline. Respiratory:  Normal respiratory effort. Clear to auscultation, bilaterally without Rales/Wheezes/Rhonchi. Cardiovascular: Regular rate and rhythm with normal S1/S2 without murmurs, rubs or gallops. Abdomen: Soft, non-tender, non-distended with normal bowel sounds. Musculoskeletal: No clubbing, cyanosis or edema bilaterally. Full range of motion without deformity. Skin: Skin color, texture, turgor normal.  No rashes or lesions. Neurologic:  Neurovascularly intact without any focal sensory/motor deficits. Cranial nerves: II-XII intact, grossly non-focal.  Psychiatric: Alert and oriented, thought content appropriate, normal insight  Capillary Refill: Brisk,3 seconds, normal   Peripheral Pulses: +2 palpable, equal bilaterally       Labs:   Recent Labs     09/19/21  0025   WBC 14.5*   HGB 8.5*   HCT 25.3*        Recent Labs     09/19/21  0025      K 4.8   CL 99   CO2 20*   *   CREATININE 4.5*   CALCIUM 8.4     Recent Labs     09/19/21  0025   AST 28   ALT 27   BILITOT 0.4   ALKPHOS 109     No results for input(s): INR in the last 72 hours. No results for input(s): Berta Shanks in the last 72 hours. Urinalysis:      Lab Results   Component Value Date    NITRU Negative 09/19/2021    WBCUA 0-2 09/19/2021    BACTERIA 3+ 09/19/2021    RBCUA 5-10 09/19/2021    BLOODU SMALL 09/19/2021    SPECGRAV 1.020 09/19/2021    GLUCOSEU 100 09/19/2021       Radiology:  CT ABDOMEN PELVIS WO CONTRAST Additional Contrast? None   Final Result   1. Bilateral hydronephrosis with bilateral intrarenal calculi but no distal   calculi or obvious cause for obstruction seen. 2. Possible fecal impaction with suggested stercoral proctitis. 3. Soft tissue gas in lumbar laminectomy defects. If this is not a recent   surgery then infection should be considered. 4. 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  Recommend   referral to a vascular

## 2021-09-20 LAB
ANION GAP SERPL CALCULATED.3IONS-SCNC: 11 MMOL/L (ref 3–16)
BANDED NEUTROPHILS RELATIVE PERCENT: 3 % (ref 0–7)
BASOPHILS ABSOLUTE: 0 K/UL (ref 0–0.2)
BASOPHILS RELATIVE PERCENT: 0 %
BUN BLDV-MCNC: 89 MG/DL (ref 7–20)
CALCIUM SERPL-MCNC: 8.5 MG/DL (ref 8.3–10.6)
CHLORIDE BLD-SCNC: 113 MMOL/L (ref 99–110)
CO2: 25 MMOL/L (ref 21–32)
CREAT SERPL-MCNC: 1.4 MG/DL (ref 0.8–1.3)
EOSINOPHILS ABSOLUTE: 0 K/UL (ref 0–0.6)
EOSINOPHILS RELATIVE PERCENT: 0 %
ESTIMATED AVERAGE GLUCOSE: 168.6 MG/DL
ESTIMATED AVERAGE GLUCOSE: 168.6 MG/DL
GFR AFRICAN AMERICAN: 58
GFR NON-AFRICAN AMERICAN: 48
GLUCOSE BLD-MCNC: 104 MG/DL (ref 70–99)
GLUCOSE BLD-MCNC: 143 MG/DL (ref 70–99)
GLUCOSE BLD-MCNC: 162 MG/DL (ref 70–99)
GLUCOSE BLD-MCNC: 174 MG/DL (ref 70–99)
GLUCOSE BLD-MCNC: 183 MG/DL (ref 70–99)
GLUCOSE BLD-MCNC: 302 MG/DL (ref 70–99)
HBA1C MFR BLD: 7.5 %
HBA1C MFR BLD: 7.5 %
HCT VFR BLD CALC: 23.2 % (ref 40.5–52.5)
HEMATOLOGY PATH CONSULT: NORMAL
HEMOGLOBIN: 7.9 G/DL (ref 13.5–17.5)
LYMPHOCYTES ABSOLUTE: 0.8 K/UL (ref 1–5.1)
LYMPHOCYTES RELATIVE PERCENT: 10 %
MAGNESIUM: 2.8 MG/DL (ref 1.8–2.4)
MCH RBC QN AUTO: 33.6 PG (ref 26–34)
MCHC RBC AUTO-ENTMCNC: 34.1 G/DL (ref 31–36)
MCV RBC AUTO: 98.5 FL (ref 80–100)
METAMYELOCYTES RELATIVE PERCENT: 1 %
MONOCYTES ABSOLUTE: 0.2 K/UL (ref 0–1.3)
MONOCYTES RELATIVE PERCENT: 3 %
NEUTROPHILS ABSOLUTE: 6.9 K/UL (ref 1.7–7.7)
NEUTROPHILS RELATIVE PERCENT: 83 %
PDW BLD-RTO: 13.9 % (ref 12.4–15.4)
PERFORMED ON: ABNORMAL
PLATELET # BLD: 252 K/UL (ref 135–450)
PLATELET SLIDE REVIEW: ADEQUATE
PMV BLD AUTO: 7.7 FL (ref 5–10.5)
POTASSIUM SERPL-SCNC: 4.2 MMOL/L (ref 3.5–5.1)
RBC # BLD: 2.35 M/UL (ref 4.2–5.9)
SLIDE REVIEW: ABNORMAL
SODIUM BLD-SCNC: 149 MMOL/L (ref 136–145)
WBC # BLD: 7.9 K/UL (ref 4–11)

## 2021-09-20 PROCEDURE — 1200000000 HC SEMI PRIVATE

## 2021-09-20 PROCEDURE — 36415 COLL VENOUS BLD VENIPUNCTURE: CPT

## 2021-09-20 PROCEDURE — 97530 THERAPEUTIC ACTIVITIES: CPT

## 2021-09-20 PROCEDURE — 97167 OT EVAL HIGH COMPLEX 60 MIN: CPT

## 2021-09-20 PROCEDURE — 6370000000 HC RX 637 (ALT 250 FOR IP): Performed by: INTERNAL MEDICINE

## 2021-09-20 PROCEDURE — 97162 PT EVAL MOD COMPLEX 30 MIN: CPT

## 2021-09-20 PROCEDURE — 2580000003 HC RX 258: Performed by: HOSPITALIST

## 2021-09-20 PROCEDURE — 80048 BASIC METABOLIC PNL TOTAL CA: CPT

## 2021-09-20 PROCEDURE — 83735 ASSAY OF MAGNESIUM: CPT

## 2021-09-20 PROCEDURE — 85025 COMPLETE CBC W/AUTO DIFF WBC: CPT

## 2021-09-20 PROCEDURE — 2580000003 HC RX 258: Performed by: INTERNAL MEDICINE

## 2021-09-20 PROCEDURE — 6360000002 HC RX W HCPCS: Performed by: INTERNAL MEDICINE

## 2021-09-20 PROCEDURE — 99223 1ST HOSP IP/OBS HIGH 75: CPT | Performed by: HOSPITALIST

## 2021-09-20 PROCEDURE — 97110 THERAPEUTIC EXERCISES: CPT

## 2021-09-20 PROCEDURE — 97535 SELF CARE MNGMENT TRAINING: CPT

## 2021-09-20 RX ORDER — DEXTROSE MONOHYDRATE 50 MG/ML
INJECTION, SOLUTION INTRAVENOUS CONTINUOUS
Status: DISCONTINUED | OUTPATIENT
Start: 2021-09-20 | End: 2021-09-21

## 2021-09-20 RX ADMIN — TAMSULOSIN HYDROCHLORIDE 0.4 MG: 0.4 CAPSULE ORAL at 08:28

## 2021-09-20 RX ADMIN — POLYETHYLENE GLYCOL 3350 17 G: 17 POWDER, FOR SOLUTION ORAL at 08:28

## 2021-09-20 RX ADMIN — INSULIN LISPRO 3 UNITS: 100 INJECTION, SOLUTION INTRAVENOUS; SUBCUTANEOUS at 08:29

## 2021-09-20 RX ADMIN — ATORVASTATIN CALCIUM 10 MG: 10 TABLET, FILM COATED ORAL at 08:28

## 2021-09-20 RX ADMIN — INSULIN LISPRO 2 UNITS: 100 INJECTION, SOLUTION INTRAVENOUS; SUBCUTANEOUS at 21:43

## 2021-09-20 RX ADMIN — DEXTROSE MONOHYDRATE: 50 INJECTION, SOLUTION INTRAVENOUS at 15:29

## 2021-09-20 RX ADMIN — SODIUM CHLORIDE, POTASSIUM CHLORIDE, SODIUM LACTATE AND CALCIUM CHLORIDE: 600; 310; 30; 20 INJECTION, SOLUTION INTRAVENOUS at 02:20

## 2021-09-20 RX ADMIN — CASTOR OIL AND BALSAM, PERU: 788; 87 OINTMENT TOPICAL at 08:28

## 2021-09-20 RX ADMIN — INSULIN GLARGINE 9 UNITS: 100 INJECTION, SOLUTION SUBCUTANEOUS at 21:43

## 2021-09-20 RX ADMIN — HEPARIN SODIUM 5000 UNITS: 5000 INJECTION INTRAVENOUS; SUBCUTANEOUS at 06:00

## 2021-09-20 RX ADMIN — CASTOR OIL AND BALSAM, PERU: 788; 87 OINTMENT TOPICAL at 21:49

## 2021-09-20 RX ADMIN — INSULIN LISPRO 1 UNITS: 100 INJECTION, SOLUTION INTRAVENOUS; SUBCUTANEOUS at 08:29

## 2021-09-20 ASSESSMENT — PAIN SCALES - GENERAL: PAINLEVEL_OUTOF10: 0

## 2021-09-20 NOTE — CARE COORDINATION
CASE MANAGEMENT INITIAL ASSESSMENT      Reviewed chart and completed assessment via telephone with: Son Sindy Bush on phone  Explained Case Management role/services. yes    Primary contact information:    Health Care Decision Maker :   Primary Decision Maker: Felipa Agosto - Spouse - [phone]    Secondary Decision Maker: Hira Nava - Child - [phone]          Can this person be reached and be able to respond quickly, such as within a few minutes or hours? Yes  Who would be your back-up decision maker? Name   Phone Number:    Admit date/status:9/19 IP  Diagnosis: ROXI, DM, PI  Is this a Readmission?:  No      Insurance:[facility]   Precert required for SNF: No       3 night stay required: No    Living arrangements, Adls, care needs, prior to admission:Lives w spouse- was recently in [facility] for lumbar OR and complications. DC to [facility]. Transportation:Ambulance to SNF     Durable Medical Equipment at home:  Walker__Cane__RTS__ BSC__Shower Chair__  02__ HHN__ CPAP__  BiPap__  Hospital Bed__ W/C___ Other___none per son_______    Services in the home and/or outpatient, prior to admission:none per son    ·     PT/OT recs:pending    Hospital Exemption Notification (HEN):not if return     Barriers to discharge:None identified    Plan/comments:Plans return to SNF- unclear if that will be [facility].   Following    [facility] on chart for MD ghulam Gonzales RN

## 2021-09-20 NOTE — PROGRESS NOTES
Physician Progress Note      PATIENT:               Yoon Miller  CSN #:                  [id number]  :                       1934  ADMIT DATE:       2021 12:02 AM  DISCH DATE:  RESPONDING  PROVIDER #:        Korina Hurtado MD          QUERY TEXT:    Pt admitted with ROXI. Pt noted by ED provider noted for \"acute change\" in   mental status, \"significant uremia is likely contributing to his altered   mental status\". If possible, please document in the progress notes and   discharge summary if you are evaluating and / or treating any of the   following: The medical record reflects the following:  Risk Factors: 79 yo. male s/p lumbar laminectomy, at SNF  Clinical Indicators: ER provider: 'Patient is not able to provide additional   history as patient is currently altered. He reportedly is alert and oriented   x3 at the nursing home and this represents an acute change. \"  on ER exam pt is   \"not oriented to circumstance or place. \"  h/p notes \"somnolent but arousable\" and oriented to \"self and hospital\"  subsequent pn: \" Alert and mostly oriented, thought content appropriate,   limited insight, poor focus, tangential thoughts\"    Treatment: jain catheter, urology, nephrology consults, serial labs, CT head   and abd/pelvis, supportive care and monitoring    Thank you,  Miroslava Wilson RN [facility]  [phone]  Options provided:  -- Metabolic encephalopathy  -- Delirium due to, Please specify cause. -- Delirium  -- Other - I will add my own diagnosis  -- Disagree - Not applicable / Not valid  -- Disagree - Clinically unable to determine / Unknown  -- Refer to Clinical Documentation Reviewer    PROVIDER RESPONSE TEXT:    This patient had metabolic encephalopathy. Query created by:  Reg Recio on 2021 1:19 PM      Electronically signed by:  Korina Hurtado MD 2021 3:23 PM

## 2021-09-20 NOTE — CONSULTS
Consult received   D/w RN   Had urinary retention   After jain placed UOP 1900 ml   Continue LR   Rpt BMP in 3 hrs     Call if any questions

## 2021-09-20 NOTE — PROGRESS NOTES
Hospitalist Progress Note      PCP: Dick Arrington MD    Date of Admission: 9/19/2021    Chief Complaint: Fatigue, abnormal lab    Hospital Course: This is a 77-year-old nursing home resident to emergency room with increased fatigue, creatinine of 5.4 history of a chronic kidney disease history of a recent lumbar laminectomy at [facility] where postop complicated by an epidural fluid collection which required urgent reexploration of the operative wound. Subjective:  No complaints other than not liking the food. Medications:  Reviewed    Infusion Medications    dextrose      dextrose      sodium chloride       Scheduled Medications    atorvastatin  10 mg Oral Daily    insulin glargine  0.15 Units/kg SubCUTAneous Nightly    insulin lispro  0.05 Units/kg SubCUTAneous TID WC    insulin lispro  0-6 Units SubCUTAneous TID WC    insulin lispro  0-3 Units SubCUTAneous Nightly    polyethylene glycol  17 g Oral Daily    tamsulosin  0.4 mg Oral Daily    Venelex   Topical BID    senna  1 tablet Oral BID     PRN Meds: glucose, dextrose, glucagon (rDNA), dextrose, sodium chloride flush, sodium chloride, ondansetron **OR** ondansetron, acetaminophen **OR** acetaminophen, melatonin      Intake/Output Summary (Last 24 hours) at 9/20/2021 1158  Last data filed at 9/20/2021 0610  Gross per 24 hour   Intake 1678.37 ml   Output 4520 ml   Net -2841.63 ml       Physical Exam Performed:    BP (!) 148/64   Pulse 100   Temp 97.8 °F (36.6 °C) (Oral)   Resp 16   Ht 5' 7\" (1.702 m)   Wt 126 lb 8.7 oz (57.4 kg)   SpO2 99%   BMI 19.82 kg/m²     General appearance: No apparent distress, appears stated age and cooperative. HEENT: Pupils equal, round, and reactive to light. Conjunctivae/corneas clear. Neck: Supple, with full range of motion. No jugular venous distention. Trachea midline. Respiratory:  Normal respiratory effort.  Clear to auscultation, bilaterally without Rales/Wheezes/Rhonchi. Cardiovascular: Regular rate and rhythm with normal S1/S2 without murmurs, rubs or gallops. Abdomen: Soft, non-tender, non-distended with normal bowel sounds. Musculoskeletal: No clubbing, cyanosis or edema bilaterally. Full range of motion without deformity. Muscle wasting. Skin: Skin color, texture, turgor normal.  Large wound on lower back. Neurologic:  Neurovascularly intact without any focal sensory/motor deficits. Cranial nerves: II-XII intact, grossly non-focal.  Psychiatric: Alert and mostly oriented, thought content appropriate, limited insight, poor focus, tangential thoughts  Capillary Refill: Brisk,3 seconds, normal   Peripheral Pulses: +2 palpable, equal bilaterally       Labs:   Recent Labs     09/19/21  0025 09/19/21  1400 09/20/21  0533   WBC 14.5* 8.1 7.9   HGB 8.5* 8.2* 7.9*   HCT 25.3* 23.8* 23.2*    253 252     Recent Labs     09/19/21  1400 09/19/21  1741 09/20/21  0533    143 149*   K 4.1 4.3 4.2    107 113*   CO2 22 24 25   * 132* 89*   CREATININE 2.9* 2.2* 1.4*   CALCIUM 8.4 8.7 8.5     Recent Labs     09/19/21  0025 09/19/21  1741   AST 28 22   ALT 27 26   BILITOT 0.4 0.5   ALKPHOS 109 105     No results for input(s): INR in the last 72 hours. No results for input(s): Cherre Gash in the last 72 hours. Urinalysis:      Lab Results   Component Value Date    NITRU Negative 09/19/2021    WBCUA 0-2 09/19/2021    BACTERIA 3+ 09/19/2021    RBCUA 5-10 09/19/2021    BLOODU SMALL 09/19/2021    SPECGRAV 1.020 09/19/2021    GLUCOSEU 100 09/19/2021       Radiology:  CT ABDOMEN PELVIS WO CONTRAST Additional Contrast? None   Final Result   1. Bilateral hydronephrosis with bilateral intrarenal calculi but no distal   calculi or obvious cause for obstruction seen. 2. Possible fecal impaction with suggested stercoral proctitis. 3. Soft tissue gas in lumbar laminectomy defects.   If this is not a recent   surgery then infection should be DIET; Regular; 3 carb choices (45 gm/meal); Low Fat/Low Chol/High Fiber/OREN  Code Status: Full Code    PT/OT Eval Status: eval ordered    Dispo - perhaps ready 9/21, pending improvement in renal function. He came from [facility].        Lenny Sanchez MD

## 2021-09-20 NOTE — PROGRESS NOTES
Shift assessment completed. Pt A&O, with some confusion. VSS. Avilez catheter intact draining clear, yellow urine. Q2 turning patient. Denies any needs at this time. Bed locked and in lowest position. Call light and bedside table within reach. Will continue to monitor.

## 2021-09-20 NOTE — PROGRESS NOTES
Pt alert with confusion. VSS, room air. Assessment completed as charted. Pt incontinent, elian care given and pt repositioned for comfort. Wound care completed per order. Avilez catheter in place. Pt resting in bed, denies needs at present time. Call light and bedside table within reach. Bed locked and in lowest position.

## 2021-09-20 NOTE — CONSULTS
Nephrology Consult Note                                                                                                                                                                                                                                                                                                                                                               Office : [phone]     Fax :[phone]              Patient's Name: Krunal Jimenes  11:44 AM  9/20/2021    Reason for Consult: ROXI      Requesting Physician:  Diana Kendall MD      Chief Complaint:      History of Present Ilness:    Krunal Jimenes is a 80 y.o. male with  PMH of DLP, HTN , DM type 2    Arthritis was sent from [facility] for abnormal labs    His serum cr 4.5 on 9/19  ----> had urinary retention ----> s/p jain : made 1400 ml urine     CT abdomen : BL hydronephrosis with BL intra renal calculi    He is AAO x3  Jain in place  Serum cr trending down    No diarrhea or vomiting or sob    Past Medical History:   Diagnosis Date    Arthritis     Cancer ([address]     skin    Dry eyes, bilateral     Hyperlipidemia     Hypertension     Osteoporosis 2009    Type II or unspecified type diabetes mellitus without mention of complication, not stated as uncontrolled     Urinary incontinence        Past Surgical History:   Procedure Laterality Date    COLONOSCOPY  2002    diverticulosis    COLONOSCOPY  11/19/2012    diverticulosis    EYE SURGERY      micheal cataract    TONSILLECTOMY         Family History   Problem Relation Age of Onset    Heart Disease Mother     Cancer Sister 39        breast        reports that he has never smoked. He has never used smokeless tobacco. He reports that he does not drink alcohol and does not use drugs.     Allergies:  Iodine    Current Medications:    dextrose 5 % solution, Continuous  atorvastatin (LIPITOR) tablet 10 mg, Daily  glucose (GLUTOSE) 40 % oral gel 15 g, PRN  dextrose 50 % IV solution, PRN  glucagon (rDNA) injection 1 mg, PRN  dextrose 5 % solution, PRN  sodium chloride flush 0.9 % injection 10 mL, PRN  0.9 % sodium chloride infusion, PRN  ondansetron (ZOFRAN-ODT) disintegrating tablet 4 mg, Q8H PRN   Or  ondansetron (ZOFRAN) injection 4 mg, Q6H PRN  acetaminophen (TYLENOL) tablet 650 mg, Q6H PRN   Or  acetaminophen (TYLENOL) suppository 650 mg, Q6H PRN  insulin glargine (LANTUS) injection vial 9 Units, Nightly  insulin lispro (HUMALOG) injection vial 3 Units, TID WC  insulin lispro (HUMALOG) injection vial 0-6 Units, TID WC  insulin lispro (HUMALOG) injection vial 0-3 Units, Nightly  melatonin tablet 3 mg, Nightly PRN  polyethylene glycol (GLYCOLAX) packet 17 g, Daily  tamsulosin (FLOMAX) capsule 0.4 mg, Daily  Venelex ointment, BID  senna (SENOKOT) tablet 8.6 mg, BID        Review of Systems:   14 point ROS obtained but were negative except mentioned in HPI      Physical exam:     Vitals:  BP (!) 148/64   Pulse 100   Temp 97.8 °F (36.6 °C) (Oral)   Resp 16   Ht 5' 7\" (1.702 m)   Wt 126 lb 8.7 oz (57.4 kg)   SpO2 99%   BMI 19.82 kg/m²   Constitutional:  OAA X3 NAD  Skin: no rash, turgor wnl  Heent:  eomi, mmm  Neck: no bruits or jvd noted  Cardiovascular:  S1, S2 without m/r/g  Respiratory: CTA B without w/r/r  Abdomen:  +bs, soft, nt, nd  Ext: no  lower extremity edema  Psychiatric: mood and affect appropriate  Musculoskeletal:  Rom, muscular strength intact    Data:   Labs:  CBC:   Recent Labs     09/19/21  0025 09/19/21  1400 09/20/21  0533   WBC 14.5* 8.1 7.9   HGB 8.5* 8.2* 7.9*    253 252     BMP:    Recent Labs     09/19/21  1400 09/19/21  1741 09/20/21  0533    143 149*   K 4.1 4.3 4.2    107 113*   CO2 22 24 25   * 132* 89*   CREATININE 2.9* 2.2* 1.4*   GLUCOSE 241* 188* 162*     Ca/Mg/Phos:   Recent Labs     09/19/21  1400 09/19/21  1741 09/20/21  0533   CALCIUM 8.4 8.7 8.5   MG  --   --  2.80*     Hepatic:   Recent Labs     09/19/21  0025 09/19/21  1741 AST 28 22   ALT 27 26   BILITOT 0.4 0.5   ALKPHOS 109 105     Troponin: No results for input(s): TROPONINI in the last 72 hours. BNP: No results for input(s): BNP in the last 72 hours. Lipids: No results for input(s): CHOL, TRIG, HDL, LDLCALC, LABVLDL in the last 72 hours. ABGs: No results for input(s): PHART, PO2ART, IVI2JRH in the last 72 hours. INR: No results for input(s): INR in the last 72 hours. UA:  Recent Labs     09/19/21  0025   COLORU Yellow   CLARITYU Clear   GLUCOSEU 100*   BILIRUBINUR Negative   KETUA Negative   SPECGRAV 1.020   BLOODU SMALL*   PHUR 5.5   PROTEINU Negative   UROBILINOGEN 0.2   NITRU Negative   LEUKOCYTESUR Negative   LABMICR YES   URINETYPE NotGiven      Urine Microscopic:   Recent Labs     09/19/21  0025   BACTERIA 3+*   WBCUA 0-2   RBCUA 5-10*   EPIU 0-1     Urine Culture: No results for input(s): LABURIN in the last 72 hours. Urine Chemistry: No results for input(s): Suann Pummel, PROTEINUR, NAUR in the last 72 hours. IMAGING:  CT ABDOMEN PELVIS WO CONTRAST Additional Contrast? None   Final Result   1. Bilateral hydronephrosis with bilateral intrarenal calculi but no distal   calculi or obvious cause for obstruction seen. 2. Possible fecal impaction with suggested stercoral proctitis. 3. Soft tissue gas in lumbar laminectomy defects. If this is not a recent   surgery then infection should be considered. 4. 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 Recommend   referral to a vascular specialist. Reference: J Am Ash Radiol 4124;25   (72):[id number]         CT Head WO Contrast   Final Result   Small vessel chronic ischemic changes without acute hemorrhage or definite   evidence for acute ischemia. XR CHEST PORTABLE   Final Result   Mild asymmetric elevation of the left hemidiaphragm which is of uncertain   etiology. Recommend short-term follow-up. Overlying EKG lead artifact with no obvious acute pulmonary abnormality. Assessment/Plan       1. ROXI on CKD 3 2/2 obstruction      Baseline serum cr : 1.4    Serum cr trend : 4.5 -----> 2.9 -----> 1.4    Plan    Cont jain catheter    Would DC LR    Give D5 water @ 100 ml/hr    Check Na lab in evening      2.  Hypernatremia    Dry mucosa on exam    Would give free water    Encourage PO intake of water       3 urinary obstruction    S/p jain            Thank you for allowing us to participate in care of Alyssa Leiva MD  Feel free to contact me   [facility] [address]  Office : [phone]  Fax :[phone]

## 2021-09-20 NOTE — PROGRESS NOTES
Physical Therapy    Facility/Department: [facility] C3 TELE/MED SURG/ONC  Initial Assessment    NAME: Wayne Jones  : 1934  MRN: [id number]    Date of Service: 2021    Discharge Recommendations:  Subacute/Skilled Nursing Facility   PT Equipment Recommendations  Equipment Needed: No  Other: defer to facility  If pt is unable to be seen after this session, please let this note serve as discharge summary. Please see case management note for discharge disposition. Thank you. Assessment   Body structures, Functions, Activity limitations: Decreased functional mobility ; Decreased strength;Decreased endurance;Decreased sensation;Decreased balance;Decreased posture  Assessment: Pt funnctioning below baseline following lumbar laminectomy then admitted to SNF and returned for AMS/UTI. Pt was previously indep living at home with his wife. No use of AD. Pt is very deconditioned and now has wounds on sacrum and heels. Pt required Mod A for rolling and Max to sit EOB. Fatigued very quickly and unable to safely attempt to stand. Returned to supine at EOS. Recommend returning to SNF upon DC. Pending much improved tolerance to activity, pt may benefit from ARU. Treatment Diagnosis: deconditioning  Prognosis: Good  Decision Making: Medium Complexity  PT Education: Goals; General Safety;PT Role;Plan of Care;Transfer Training;Pressure Relief  Patient Education: Pt expressed understanding on benefits of positional changes to improve skin integrity. Barriers to Learning: none  REQUIRES PT FOLLOW UP: Yes  Activity Tolerance  Activity Tolerance: Patient limited by fatigue;Patient limited by endurance       Patient Diagnosis(es): The primary encounter diagnosis was Altered mental status, unspecified altered mental status type. Diagnoses of Acute urinary obstruction, Acute renal failure, unspecified acute renal failure type ([address] and Uremia were also pertinent to this visit.      has a past medical history of Arthritis, Cancer ([facility] [address] Dry eyes, bilateral, Hyperlipidemia, Hypertension, Osteoporosis, Type II or unspecified type diabetes mellitus without mention of complication, not stated as uncontrolled, and Urinary incontinence. has a past surgical history that includes Tonsillectomy; Colonoscopy (2002); eye surgery; and Colonoscopy (11/19/2012). Restrictions  Restrictions/Precautions  Restrictions/Precautions: Fall Risk  Position Activity Restriction  Other position/activity restrictions: jain, IV  Vision/Hearing  Vision:  (reports double vision at times. Does not wear glasses)  Hearing: Within functional limits     Subjective  General  Chart Reviewed: Yes  Patient assessed for rehabilitation services?: Yes  Response To Previous Treatment: Not applicable  Family / Caregiver Present: Yes  Referring Practitioner: Adarsh Edward MD  Referral Date : 09/19/21  Diagnosis: Fatigue, abnormal lab  Follows Commands: Within Functional Limits  General Comment  Comments: cleared by nursing  Subjective  Subjective: pt resting in bed. Denies pain  Pain Screening  Patient Currently in Pain: No  Vital Signs  Pulse: 99  BP: (!) 146/57  Oxygen Therapy  SpO2: 98 %       Orientation  Orientation  Overall Orientation Status: Within Functional Limits  Social/Functional History  Social/Functional History  Lives With: Spouse  Type of Home: House  Home Layout: Two level, Bed/Bath upstairs, 1/2 bath on main level (stair lift to the room after completing 2 steps from basement to main level. 1 flight ot stairs to bedroom)  Home Access: Stairs to enter without rails  Entrance Stairs - Number of Steps: 2  Bathroom Shower/Tub: Tub/Shower unit  Bathroom Toilet: Standard  Bathroom Equipment: Shower chair, Grab bars around toilet  Home Equipment:  (no DME)  ADL Assistance: [address]: Needs assistance (shares with wife.  Wife reports she did most of of the chores)  Ambulation Assistance: Independent (without AD)  Active : bed, Nurse notified  Restraints  Initially in place: No      AM-PAC Score  AM-PAC Inpatient Mobility Raw Score : 8 (09/20/21 1552)  AM-PAC Inpatient T-Scale Score : 28.52 (09/20/21 1552)  Mobility Inpatient CMS 0-100% Score: 86.62 (09/20/21 1552)  Mobility Inpatient CMS G-Code Modifier : CM (09/20/21 1552)          Goals  Short term goals  Time Frame for Short term goals: 9/30  Short term goal 1: Pt will complete bed mobility with Min A  Short term goal 2: Pt will sit to stand with Mod A  Short term goal 3: Pt will SPT from bed to chair with RW with Mod A x 2  Short term goal 4: Pt will participate in B LE exercises to improve functional strength by 9/24  Patient Goals   Patient goals : to get stronger       Therapy Time   Individual Concurrent Group Co-treatment   Time In 1773         Time Out 1458         Minutes 43         Timed Code Treatment Minutes: 62 [facility], PT

## 2021-09-20 NOTE — CONSULTS
Urology Attending Consult Note      Reason for Consultation: AUR    History: This is a 80 y.o. male who presented with fatigue, ROXI on CKD, AUR. Urology is consulted for evaluation of AUR. Urologic history is pertinent for h/o nephrolithiasis. Unable to void, 1.9L on Avilez placement - CR and BUN downtrending now      Imaging: na    Family History, Social History, Review of Systems:  Reviewed and agreed to as per chart    Vitals:  BP (!) 148/64   Pulse 100   Temp 97.8 °F (36.6 °C) (Oral)   Resp 16   Ht 5' 7\" (1.702 m)   Wt 126 lb 8.7 oz (57.4 kg)   SpO2 99%   BMI 19.82 kg/m²   Temp  Av.9 °F (36.6 °C)  Min: 97.8 °F (36.6 °C)  Max: 98.4 °F (36.9 °C)    Intake/Output Summary (Last 24 hours) at 2021 0943  Last data filed at 2021 4069  Gross per 24 hour   Intake 1918.37 ml   Output 4520 ml   Net -2601.63 ml         Physical:   Well developed, well nourished in no acute distress   Mood indicates no abnormalities. Pt doesnt appear depressed   Orientated to time and place   Neck is supple, trachea is midline   Respiratory effort is normal   Cardiovascular show no extremity swelling   Abdomen no masses or hernias are palpated, there is no tenderness. Liver and Spleen appear normal.   Skin show no abnormal lesions   Lymph nodes are not palpated in the inguinal, neck, or axillary area.      Avilez fahad urine      Labs:  WBC:    Lab Results   Component Value Date    WBC 7.9 2021     Hemoglobin/Hematocrit:    Lab Results   Component Value Date    HGB 7.9 2021    HCT 23.2 2021     BMP:    Lab Results   Component Value Date     2021    K 4.2 2021    K 4.8 2021     2021    CO2 25 2021    BUN 89 2021    LABALBU 3.0 2021    CREATININE 1.4 2021    CALCIUM 8.5 2021    GFRAA 58 2021    LABGLOM 48 2021     PT/INR:  No results found for: PROTIME, INR  PTT:  No results found for: APTT[APTT        Impression/Plan:   Acute urinary retention  ROXI on CKD - improving    -- leave jain in for 10-14 days to allow extended period of bladder rest - leave jain in at discharge, patient understands  -- continue Flomax at discharge  -- stopping his trospium - this can exacerbate retention - he should stop this for now, we will decide as outpatient if needed    Can DC home from urology standpoint with jain when stable    Signing off - call with questions     Thank you for the opportunity to be involved in the care of this patient - please call with questions    Lorelei Ronquillo MD  [facility]  Office - [phone]

## 2021-09-21 ENCOUNTER — APPOINTMENT (OUTPATIENT)
Dept: MRI IMAGING | Age: 86
DRG: 682 | End: 2021-09-21
Payer: MEDICARE

## 2021-09-21 LAB
ANION GAP SERPL CALCULATED.3IONS-SCNC: 7 MMOL/L (ref 3–16)
BANDED NEUTROPHILS RELATIVE PERCENT: 9 % (ref 0–7)
BASOPHILS ABSOLUTE: 0.1 K/UL (ref 0–0.2)
BASOPHILS RELATIVE PERCENT: 1 %
BUN BLDV-MCNC: 40 MG/DL (ref 7–20)
CALCIUM SERPL-MCNC: 8.1 MG/DL (ref 8.3–10.6)
CHLORIDE BLD-SCNC: 109 MMOL/L (ref 99–110)
CO2: 27 MMOL/L (ref 21–32)
CREAT SERPL-MCNC: 1 MG/DL (ref 0.8–1.3)
EOSINOPHILS ABSOLUTE: 0.1 K/UL (ref 0–0.6)
EOSINOPHILS RELATIVE PERCENT: 1 %
GFR AFRICAN AMERICAN: >60
GFR NON-AFRICAN AMERICAN: >60
GLUCOSE BLD-MCNC: 117 MG/DL (ref 70–99)
GLUCOSE BLD-MCNC: 136 MG/DL (ref 70–99)
GLUCOSE BLD-MCNC: 243 MG/DL (ref 70–99)
GLUCOSE BLD-MCNC: 246 MG/DL (ref 70–99)
GLUCOSE BLD-MCNC: 259 MG/DL (ref 70–99)
GLUCOSE BLD-MCNC: 270 MG/DL (ref 70–99)
HCT VFR BLD CALC: 23.6 % (ref 40.5–52.5)
HEMOGLOBIN: 8 G/DL (ref 13.5–17.5)
LYMPHOCYTES ABSOLUTE: 0.4 K/UL (ref 1–5.1)
LYMPHOCYTES RELATIVE PERCENT: 7 %
MAGNESIUM: 2.3 MG/DL (ref 1.8–2.4)
MCH RBC QN AUTO: 33.4 PG (ref 26–34)
MCHC RBC AUTO-ENTMCNC: 33.8 G/DL (ref 31–36)
MCV RBC AUTO: 98.8 FL (ref 80–100)
MONOCYTES ABSOLUTE: 0.6 K/UL (ref 0–1.3)
MONOCYTES RELATIVE PERCENT: 10 %
NEUTROPHILS ABSOLUTE: 4.9 K/UL (ref 1.7–7.7)
NEUTROPHILS RELATIVE PERCENT: 72 %
PDW BLD-RTO: 13.4 % (ref 12.4–15.4)
PERFORMED ON: ABNORMAL
PLATELET # BLD: 227 K/UL (ref 135–450)
PMV BLD AUTO: 7.2 FL (ref 5–10.5)
POTASSIUM SERPL-SCNC: 4 MMOL/L (ref 3.5–5.1)
RBC # BLD: 2.38 M/UL (ref 4.2–5.9)
RBC # BLD: NORMAL 10*6/UL
SARS-COV-2, NAAT: NOT DETECTED
SODIUM BLD-SCNC: 143 MMOL/L (ref 136–145)
WBC # BLD: 6 K/UL (ref 4–11)

## 2021-09-21 PROCEDURE — 6360000002 HC RX W HCPCS: Performed by: INTERNAL MEDICINE

## 2021-09-21 PROCEDURE — 92610 EVALUATE SWALLOWING FUNCTION: CPT

## 2021-09-21 PROCEDURE — 85025 COMPLETE CBC W/AUTO DIFF WBC: CPT

## 2021-09-21 PROCEDURE — 72148 MRI LUMBAR SPINE W/O DYE: CPT

## 2021-09-21 PROCEDURE — 36415 COLL VENOUS BLD VENIPUNCTURE: CPT

## 2021-09-21 PROCEDURE — 83735 ASSAY OF MAGNESIUM: CPT

## 2021-09-21 PROCEDURE — 97530 THERAPEUTIC ACTIVITIES: CPT

## 2021-09-21 PROCEDURE — 99232 SBSQ HOSP IP/OBS MODERATE 35: CPT | Performed by: HOSPITALIST

## 2021-09-21 PROCEDURE — 1200000000 HC SEMI PRIVATE

## 2021-09-21 PROCEDURE — 80048 BASIC METABOLIC PNL TOTAL CA: CPT

## 2021-09-21 PROCEDURE — 6370000000 HC RX 637 (ALT 250 FOR IP): Performed by: INTERNAL MEDICINE

## 2021-09-21 PROCEDURE — 87635 SARS-COV-2 COVID-19 AMP PRB: CPT

## 2021-09-21 PROCEDURE — 2580000003 HC RX 258: Performed by: HOSPITALIST

## 2021-09-21 RX ORDER — HYDRALAZINE HYDROCHLORIDE 20 MG/ML
5 INJECTION INTRAMUSCULAR; INTRAVENOUS EVERY 4 HOURS PRN
Status: DISCONTINUED | OUTPATIENT
Start: 2021-09-21 | End: 2021-09-22 | Stop reason: HOSPADM

## 2021-09-21 RX ORDER — LORAZEPAM 2 MG/ML
0.5 INJECTION INTRAMUSCULAR DAILY PRN
Status: COMPLETED | OUTPATIENT
Start: 2021-09-21 | End: 2021-09-21

## 2021-09-21 RX ORDER — LISINOPRIL 20 MG/1
20 TABLET ORAL DAILY
Status: DISCONTINUED | OUTPATIENT
Start: 2021-09-21 | End: 2021-09-22 | Stop reason: HOSPADM

## 2021-09-21 RX ORDER — AMLODIPINE BESYLATE 5 MG/1
10 TABLET ORAL DAILY
Status: DISCONTINUED | OUTPATIENT
Start: 2021-09-21 | End: 2021-09-22 | Stop reason: HOSPADM

## 2021-09-21 RX ORDER — TAMSULOSIN HYDROCHLORIDE 0.4 MG/1
0.4 CAPSULE ORAL DAILY
Qty: 30 CAPSULE | Refills: 3
Start: 2021-09-22

## 2021-09-21 RX ADMIN — INSULIN LISPRO 2 UNITS: 100 INJECTION, SOLUTION INTRAVENOUS; SUBCUTANEOUS at 16:59

## 2021-09-21 RX ADMIN — CASTOR OIL AND BALSAM, PERU: 788; 87 OINTMENT TOPICAL at 20:58

## 2021-09-21 RX ADMIN — LORAZEPAM 0.5 MG: 2 INJECTION INTRAMUSCULAR; INTRAVENOUS at 12:08

## 2021-09-21 RX ADMIN — Medication 3 MG: at 21:04

## 2021-09-21 RX ADMIN — CASTOR OIL AND BALSAM, PERU: 788; 87 OINTMENT TOPICAL at 08:27

## 2021-09-21 RX ADMIN — TAMSULOSIN HYDROCHLORIDE 0.4 MG: 0.4 CAPSULE ORAL at 08:27

## 2021-09-21 RX ADMIN — ATORVASTATIN CALCIUM 10 MG: 10 TABLET, FILM COATED ORAL at 08:27

## 2021-09-21 RX ADMIN — INSULIN LISPRO 3 UNITS: 100 INJECTION, SOLUTION INTRAVENOUS; SUBCUTANEOUS at 16:59

## 2021-09-21 RX ADMIN — POLYETHYLENE GLYCOL 3350 17 G: 17 POWDER, FOR SOLUTION ORAL at 08:27

## 2021-09-21 RX ADMIN — HYDRALAZINE HYDROCHLORIDE 5 MG: 20 INJECTION INTRAMUSCULAR; INTRAVENOUS at 08:34

## 2021-09-21 RX ADMIN — INSULIN LISPRO 3 UNITS: 100 INJECTION, SOLUTION INTRAVENOUS; SUBCUTANEOUS at 08:28

## 2021-09-21 RX ADMIN — INSULIN GLARGINE 9 UNITS: 100 INJECTION, SOLUTION SUBCUTANEOUS at 21:05

## 2021-09-21 RX ADMIN — DEXTROSE MONOHYDRATE: 50 INJECTION, SOLUTION INTRAVENOUS at 03:49

## 2021-09-21 ASSESSMENT — PAIN DESCRIPTION - DESCRIPTORS: DESCRIPTORS: DISCOMFORT

## 2021-09-21 ASSESSMENT — PAIN DESCRIPTION - LOCATION: LOCATION: SACRUM

## 2021-09-21 ASSESSMENT — PAIN DESCRIPTION - PAIN TYPE: TYPE: ACUTE PAIN

## 2021-09-21 ASSESSMENT — PAIN SCALES - WONG BAKER: WONGBAKER_NUMERICALRESPONSE: 6

## 2021-09-21 NOTE — PROGRESS NOTES
Discussed with radiology. Patient has a probable postoperative hematoma or seroma causing severe spinal canal stenosis which is compressing his cauda equina. In light of his urinary retention we will need to get input from his neurosurgery group prior to discharge. We need to make sure he doesn't need another urgent hematoma evacuation like he had on 9/6 with Dr. Kiana Romero. The patient currently has essentially no strength in his L foot or ankle. His sensation is intact in his legs and perineum. He denies paresthesias. He has rectal tone. He has been unable to walk with PT here - requiring max assist x2 in order to do pivot transfers. We will see what neurosurgery says. I will update family.

## 2021-09-21 NOTE — CARE COORDINATION
CASE MANAGEMENT DISCHARGE SUMMARY      Discharge to: [facility]- return for skilled care    Precertification completed: [facility] Exemption Notification (HENS) completed: na- return    IMM given: (date) admit    New Durable Medical Equipment ordered/agency: na    Transportation:    Family/car:   Medical Transport explained to [facility] [employer]. Pt/family voice no agency preference. Agency used:[facility]   time:1500   Ambulance form completed: Yes    Confirmed discharge plan with:     Patient: yes     Family:  yes    Name: Son Colt Pugh number:     Facility/Agency, name:  [facility] faxed   Phone number for report to facility: [phone]     RN, name: Rideruzma Donnelly    Note: Discharging nurse to complete [facility], reconcile [facility], and place final copy with patient's discharge packet. RN to ensure that written prescriptions for  Level II medications are sent with patient to the facility as per protocol. Son requesting to speak to Dr Catarina Santizo and to [facility]- messages given to both with request to call son. RN states pt to have MRI prior to departure- will follow to be sure completed prior to departure. 46 MRI has been completed.      Leanne Mata RN

## 2021-09-21 NOTE — PROGRESS NOTES
Physical Therapy  Facility/Department: [facility] C3 TELE/MED SURG/ONC  Daily Treatment Note  NAME: Elza Her  : 1934  MRN: [id number]    Date of Service: 2021    Discharge Recommendations:  Subacute/Skilled Nursing Facility   PT Equipment Recommendations  Equipment Needed: No  Other: defer to facility    Assessment    Body structures, Functions, Activity limitations: Decreased functional mobility ; Decreased strength;Decreased endurance;Decreased sensation;Decreased balance;Decreased posture  Assessment: Patient seen for bed mobility and transfers. Patient cleared by RN for therapy participation this date. Patient agreeable to therapy. Patient completed rolling with mod A for pericare, max A x2 for supine>sit, sat EOB with SBA x5 mins, and completed squat pivot toward R with max A. P.T .will continue to follow throughout LOS. Recommending DC to SNF to progress independence with mobility. Treatment Diagnosis: deconditioning  Prognosis: Good  Decision Making: Medium Complexity  PT Education: Goals; General Safety;PT Role;Plan of Care;Transfer Training;Pressure Relief;Disease Specific Education; Functional Mobility Training;Home Exercise Program;Orientation;Precautions  Patient Education: pt educated on benefits of OOB  mobility - requires reinforcement  Barriers to Learning: none  REQUIRES PT FOLLOW UP: Yes  Activity Tolerance  Activity Tolerance: Patient limited by fatigue;Patient limited by endurance; Patient limited by cognitive status; Patient limited by pain  Activity Tolerance: 141/60, 107 bpm, 98% at rest in supine with HOB elevated; 119/52 at EOB with lightheadedness; 120/54 in recliner at end of session     Patient Diagnosis(es): The primary encounter diagnosis was Altered mental status, unspecified altered mental status type. Diagnoses of Acute urinary obstruction, Acute renal failure, unspecified acute renal failure type ([address] and Uremia were also pertinent to this visit.      has a past medical history of Arthritis, Cancer ([facility] Dry eyes, bilateral, Hyperlipidemia, Hypertension, Osteoporosis, Type II or unspecified type diabetes mellitus without mention of complication, not stated as uncontrolled, and Urinary incontinence. has a past surgical history that includes Tonsillectomy; Colonoscopy (); eye surgery; and Colonoscopy (2012). Restrictions  Restrictions/Precautions  Restrictions/Precautions: General Precautions, Fall Risk  Position Activity Restriction  Spinal Precautions: No Bending, No Lifting, No Twisting  Other position/activity restrictions: jain, limit up in chair no more than 2 hours due to sacral decubitus  Subjective   General  Chart Reviewed: Yes  Response To Previous Treatment: Patient with no complaints from previous session.   Family / Caregiver Present: Yes  Referring Practitioner: Kota Pinedo MD  Subjective  Subjective: pt in bed, agreeable to therapy  Pain Screening  Patient Currently in Pain: Yes  Pain Assessment  Pain Assessment: Faces  Gill-Baker Pain Rating: Hurts even more  Pain Type: Acute pain  Pain Location: Sacrum  Pain Descriptors: Discomfort  Non-Pharmaceutical Pain Intervention(s): Ambulation/Increased Activity;Repositioned  Vital Signs  Patient Currently in Pain: Yes       Orientation  Orientation  Overall Orientation Status: Within Functional Limits   Objective   Bed mobility  Rolling to Left: Moderate assistance  Rolling to Right: Moderate assistance  Supine to Sit: 2 Person assistance;Maximum assistance (max A x2 bed flat)  Sit to Supine: Unable to assess  Scootin Person assistance;Maximal assistance (max A x2 to reposition in bed, max A to EOB)  Comment: pt in recliner at end of session  Transfers  Sit to Stand: Maximum Assistance  Stand to sit: Maximum Assistance  Squat Pivot Transfers: Maximum Assistance (toward R bed>recliner)  Comment: Pt completes squat pivot transfers toward R with max A with pt placing RUE on armrest to assist  Ambulation  Ambulation?: No        Exercises  Ankle Pumps: 1x 10 BLE AAROM                   Addendum Second Session  Assessment: Returned to reposition pt in bed d/t low BP and to promote wound healing. Pt propped onto R side at end of session with wedge. Pt required additional pericare d/t small BM. Vitals: 97/45 in sitting, pt unable to report symptoms    Bed mobility: sit>supine max A x2, rolling L and R with mod A for pericare    Transfers: Pt completed squat pivot recliner>bed toward L with max A with pt assisting with LUE on bedrail           AM-PAC Score  AM-PAC Inpatient Mobility Raw Score : 10 (09/21/21 1025)  AM-PAC Inpatient T-Scale Score : 32.29 (09/21/21 1025)  Mobility Inpatient CMS 0-100% Score: 76.75 (09/21/21 1025)  Mobility Inpatient CMS G-Code Modifier : CL (09/21/21 1025)          Goals  Short term goals  Time Frame for Short term goals: 9/30  Short term goal 1: Pt will complete bed mobility with Min A  Short term goal 2: Pt will sit to stand with Mod A  Short term goal 3: Pt will SPT from bed to chair with RW with Mod A x 2  Short term goal 4: Pt will participate in B LE exercises to improve functional strength by 9/24  Patient Goals   Patient goals : to get stronger    Plan    Plan  Times per week: 3-5x/week  Current Treatment Recommendations: Strengthening, Balance Training, Endurance Training, Functional Mobility Training, Transfer Training, Positioning, Equipment Evaluation, Education, & procurement, Patient/Caregiver Education & Training, Safety Education & Training, Home Exercise Program  Safety Devices  Type of devices:  All fall risk precautions in place, Call light within reach, Patient at risk for falls, Nurse notified, Left in chair, Chair alarm in place, Gait belt  Restraints  Initially in place: No     Therapy Time   Individual Concurrent Group Co-treatment   Time In 0900         Time Out 0935         Minutes 35         Timed Code Treatment Minutes: 35 Minutes    Second

## 2021-09-21 NOTE — PROGRESS NOTES
Speech Language Pathology  Facility/Department: [facility] C3 TELE/MED SURG/ONC   CLINICAL BEDSIDE SWALLOW EVALUATION    NAME: Kike Guo  : 1934  MRN: [id number]    ADMISSION DATE: 2021  ADMITTING DIAGNOSIS: has DM2 (diabetes mellitus, type 2) ([facility] Hypertension; Localized osteoarthrosis, lower leg; HLD (hyperlipidemia); Lumbar stenosis with neurogenic claudication; BPH (benign prostatic hyperplasia); ROXI (acute kidney injury) ([facility] and Acute urinary retention on their problem list.  ONSET DATE: 21    Recent Chest Xray/CT of Chest: (21)  Impression   Mild asymmetric elevation of the left hemidiaphragm which is of uncertain   etiology.  Recommend short-term follow-up.       Overlying EKG lead artifact with no obvious acute pulmonary abnormality. Date of Eval: 2021  Evaluating Therapist: LIZETTE Pitts    Current Diet level:  Current Diet : Regular  Current Liquid Diet : Thin    Primary Complaint  Patient Complaint: Per MD H&P: \"\" Kike Guo is a 80 y.o. male. He was sent from his SNF. He has been staying there since a recent admission at [facility] for lumbar laminectomy. His course there was complicated by an epidural fluid collection which required urgent re-exploration of the operative wound. He was sent from his SNF because of abnormal labs and  altered mental status. He is confused but able to answer a few questions and follow simple commands. He has no complaints except some jain catheter related discomfort. \"\"    Pain:  Pain Assessment  Pain Assessment: 0-10  Pain Level: 0  Pain Location: Generalized  Response to Pain Intervention: Patient Satisfied  Multiple Pain Sites: No     Vitals/labs:   Temp: N/A  SpO2: 98%  RR: 18  BP: 173/72  HR: 96    Reason for Referral  Kike Guo was referred for a bedside swallow evaluation to assess the efficiency of his swallow function, identify signs and symptoms of aspiration and make recommendations regarding safe dietary consistencies, effective compensatory strategies, and safe eating environment. PO trials:   IDDSI 0 (thin):   - Cup: single x1, sequential x1; no overt s/s dysphagia  - Straw: sequential x2; no overt s/s dysphagia  - Pills w/ straw: 2 pills; no overt s/s dysphagia  IDDSI 7 (regular): bites x2; no overt s/s dysphagia    Assessment:  Pt is an 81 y/o male admitted d/t AMS who was seen for a clinical swallow evaluation. Pt oriented x3, able to answer questions and follow directions. Pt w/ tremors at baseline. RN gave SLP permission to administer meds PO. Pt w/ no overt s/s dysphagia across all consistencies trialed. Recommend pt remain on current diet. No further ST intervention indicated at this time. Impression  Dysphagia Diagnosis: Swallow function appears grossly intact  Dysphagia Outcome Severity Scale: Level 7: Normal in all situations     Treatment Plan  Requires SLP Intervention: No  Duration/Frequency of Treatment: No further ST intervention indicated  D/C Recommendations: To be determined    Recommended Diet and Intervention  Diet Solids Recommendation: Regular  Liquid Consistency Recommendation: Thin  Recommended Form of Meds: PO    Compensatory Swallowing Strategies  Compensatory Swallowing Strategies: Eat/Feed slowly;Upright as possible for all oral intake;Remain upright for 30-45 minutes after meals; External pacing    Treatment/Goals No further ST intervention indicated     General  Chart Reviewed: Yes  Subjective  Subjective: RN okayed SLP entry. Pt received sitting up in bed. Behavior/Cognition: Alert; Cooperative;Pleasant mood  Respiratory Status: Room air  Communication Observation: Functional  Follows Directions: Simple  Dentition: Adequate  Patient Positioning: Upright in bed  Baseline Vocal Quality: Normal  Volitional Cough: Weak  Consistencies Administered: Reg solid; Thin - cup; Thin - straw    Vision/Hearing  Vision  Vision: Within Functional Limits  Hearing  Hearing: Within functional limits    Oral Motor Deficits  Oral/Motor  Oral Motor: Exceptions to [facility]  Labial Strength: Reduced  Labial Coordination: Reduced  Lingual ROM: Reduced left; Reduced right  Lingual Strength: Reduced  Lingual Coordination: Reduced    Oral Phase Dysfunction  Oral Phase  Oral Phase: WFL     Indicators of Pharyngeal Phase Dysfunction   Pharyngeal Phase  Pharyngeal Phase: WFL    Prognosis  Prognosis  Prognosis for safe diet advancement: good  Individuals consulted  Consulted and agree with results and recommendations: Patient    Education  Patient Education: Pt educated on role of SLP, results of evaluation and diet recommendations. Patient Education Response: Verbalizes understanding  Safety Devices in place: Yes  Type of devices: Left in bed;Call light within reach; Bed alarm in place       Therapy Time  SLP Individual Minutes  Time In: 8255  Time Out: 0848  Minutes: [address] [facility], [address]  Speech Language Pathologist

## 2021-09-21 NOTE — PROGRESS NOTES
Occupational Therapy  Facility/Department: [facility] C3 TELE/MED SURG/ONC  Daily Treatment Note  NAME: Hillary Aguilar  : 1934  MRN: [id number]    Date of Service: 2021    Discharge Recommendations:  Subacute/Skilled Nursing Facility       Assessment   Performance deficits / Impairments: Decreased functional mobility ; Decreased ADL status; Decreased safe awareness;Decreased cognition;Decreased balance;Decreased strength;Decreased fine motor control;Decreased endurance;Decreased coordination  Assessment: Pt admitted for uremia. He has recent hx of lumbar laminectomy and was at [facility] for skilled therapy prior to admission. Pt reports BLE weakness and inability to ambulate. During OT session, he required max x2 for bed mobility and total assist LE ADLs in bed d/t impaired balance, endurance and strength. Recommend SNF for skilled OT to improve ADLs and mobility. cont skilled OT in acute care. OT Education: OT Role;Plan of Care;Precautions  Patient Education: disease specific:  importance of use or RED/nurse call light for A with ADL needs/transfers  Barriers to Learning: anxiety  REQUIRES OT FOLLOW UP: Yes  Activity Tolerance  Activity Tolerance: Patient Tolerated treatment well  Activity Tolerance: high vanegas's:  BP = 140/60, HR = 107, 98 % O 2 sats on RA; sitting in chair with Otoniel LE elevated:  BP = 120/54; Safety Devices  Safety Devices in place: Yes  Type of devices: Call light within reach; Chair alarm in place; Left in chair;Nurse notified (waffle cushion & Maxi Move sling to chair)         Patient Diagnosis(es): The primary encounter diagnosis was Altered mental status, unspecified altered mental status type. Diagnoses of Acute urinary obstruction, Acute renal failure, unspecified acute renal failure type ([address] and Uremia were also pertinent to this visit.       has a past medical history of Arthritis, Cancer ([address] Dry eyes, bilateral, Hyperlipidemia, Hypertension, Osteoporosis, Type II or unspecified type diabetes mellitus without mention of complication, not stated as uncontrolled, and Urinary incontinence. has a past surgical history that includes Tonsillectomy; Colonoscopy (2002); eye surgery; and Colonoscopy (11/19/2012). Restrictions  Restrictions/Precautions  Restrictions/Precautions: General Precautions, Fall Risk  Position Activity Restriction  Spinal Precautions: No Bending, No Lifting, No Twisting  Other position/activity restrictions: jain, limit up in chair no more than 2 hours due to sacral decubitus  Subjective   General  Chart Reviewed: Labs  Patient assessed for rehabilitation services?: Yes  Additional Pertinent Hx: s/p lumbar laminectomy L2, L3, L4 9-02-21  Family / Caregiver Present: No  Subjective  Subjective: \"can you help me call my wife? \"  General Comment  Comments: RN cleared pt for OT; pt awake in bed agreeable to therapy      Orientation  Orientation  Overall Orientation Status: Within Functional Limits  Objective    ADL  Feeding: Setup  Grooming: Setup (in chair to wash hands & face)  LE Dressing: Dependent/Total (for changing brief, donning non-skid socks)  Toileting: Dependent/Total (jain catheter, incontinent of watery stool)        Balance  Sitting Balance: Contact guard assistance (EOB 5 minutes)  Standing Balance: Unable to assess(comment)     Transfers  Sit Pivot Transfers: Maximum assistance (to Right with chair with arm rest removed)        Cognition  Overall Cognitive Status: Exceptions (anxious but cooperative)  Arousal/Alertness: Appropriate responses to stimuli  Following Commands:  Follows one step commands consistently  Attention Span: Attends with cues to redirect  Memory: Decreased recall of precautions  Safety Judgement: Decreased awareness of need for safety;Decreased awareness of need for assistance  Insights: Not aware of deficits  Initiation: Requires cues for some  Sequencing: Requires cues for some     Plan   Plan  Times per week: 3-5x  Current Treatment Recommendations: Strengthening, Balance Training, Functional Mobility Training, Positioning, Safety Education & Training, Self-Care / ADL    AM-PAC Score        AM-PAC Inpatient Daily Activity Raw Score: 11 (09/21/21 1043)  AM-PAC Inpatient ADL T-Scale Score : 29.04 (09/21/21 1043)  ADL Inpatient CMS 0-100% Score: 70.42 (09/21/21 1043)  ADL Inpatient CMS G-Code Modifier : CL (09/21/21 1043)    Goals  Short term goals  Time Frame for Short term goals: 1 week (9/27) unless noted  Short term goal 1: Perform 1-2 grooming tasks with min A by 9/24; 9/21 STG partially met, pt able wash face & hands with set up  Short term goal 2: Sit EOB x3 min with CGA in prep to transfer; 9/21 STG met pt tolerated sitting EOB 5 minutes with CGA  Short term goal 3: Participate in transfer with mod x2; 9/21 STG met, pt max assist of 1 sit-pivot transfers  Short term goal 4: Perform UE exer 10-15x each to improve endurance  Patient Goals   Patient goals :  \"Be able to sit up\"       Therapy Time   Individual Concurrent Group Co-treatment   Time In 0900         Time Out 0935         Minutes [address] [place] [address]

## 2021-09-21 NOTE — PROGRESS NOTES
Occupational Therapy  Facility/Department: [facility] C3 TELE/MED SURG/ONC  Daily Treatment Note  NAME: Chris Daly  : 1934  MRN: [id number]    Date of Service: 2021    Discharge Recommendations:  Subacute/Skilled Nursing Facility       Assessment   Performance deficits / Impairments: Decreased functional mobility ; Decreased ADL status; Decreased safe awareness;Decreased cognition;Decreased balance;Decreased strength;Decreased fine motor control;Decreased endurance;Decreased coordination  Assessment: Pt admitted for uremia. He has recent hx of lumbar laminectomy and was at [facility] for skilled therapy prior to admission. Pt reports BLE weakness and inability to ambulate. During OT session, he required max x2 for bed mobility and total assist LE ADLs in bed d/t impaired balance, endurance and strength. Recommend SNF for skilled OT to improve ADLs and mobility. cont skilled OT in acute care. OT Education: OT Role;Plan of Care;Precautions  Patient Education: disease specific:  importance of use or RED/nurse call light for A with ADL needs/transfers  Barriers to Learning: anxiety  REQUIRES OT FOLLOW UP: Yes  Activity Tolerance  Activity Tolerance: Patient Tolerated treatment well  Activity Tolerance: high vanegas's:  BP = 140/60, HR = 107, 98 % O 2 sats on RA; sitting in chair with Otoniel LE elevated:  BP = 120/54; Safety Devices  Safety Devices in place: Yes  Type of devices: Call light within reach; Chair alarm in place; Left in chair;Nurse notified (waffle cushion & Maxi Move sling to chair)         Patient Diagnosis(es): The primary encounter diagnosis was Altered mental status, unspecified altered mental status type. Diagnoses of Acute urinary obstruction, Acute renal failure, unspecified acute renal failure type ([address] and Uremia were also pertinent to this visit.       has a past medical history of Arthritis, Cancer ([address] Dry eyes, bilateral, Hyperlipidemia, Hypertension, Osteoporosis, Type II or unspecified type diabetes mellitus without mention of complication, not stated as uncontrolled, and Urinary incontinence. has a past surgical history that includes Tonsillectomy; Colonoscopy (); eye surgery; and Colonoscopy (2012). Restrictions  Restrictions/Precautions  Restrictions/Precautions: General Precautions, Fall Risk  Position Activity Restriction  Spinal Precautions: No Bending, No Lifting, No Twisting  Other position/activity restrictions: jain, limit up in chair no more than 2 hours due to sacral decubitus  Subjective   General  Chart Reviewed: Labs  Patient assessed for rehabilitation services?: Yes  Additional Pertinent Hx: s/p lumbar laminectomy L2, L3, L4 21  Family / Caregiver Present: No  Subjective  Subjective: \"can you help me call my wife? \"  General Comment  Comments: RN cleared pt for OT; pt awake in bed agreeable to therapy      Orientation  Orientation  Overall Orientation Status: Within Functional Limits  Objective    ADL  Feeding: Setup  Grooming: Setup (in chair to wash hands & face)  LE Dressing: Dependent/Total (for changing brief, donning non-skid socks)  Toileting: Dependent/Total (jain catheter, incontinent of watery stool)        Balance  Sitting Balance: Contact guard assistance (EOB 5 minutes)  Standing Balance: Unable to assess(comment)     Transfers  Sit Pivot Transfers: Maximum assistance (to Right with chair with arm rest removed)   Pt up in chair for 1 hour requesting to sit up longer in chair, however BP = 97/45 & educated pt due to pressure ulcer needed to be back in bed, for side lying, pt agreeable. Pt max assist of 1 with gait belt for squat -pivot transfer chair to bed with arm rest removed & pt using bedrail. Pt positioned on RIght side in bed, BP in Right side lyin/51; Cognition  Overall Cognitive Status: Exceptions (anxious but cooperative)  Arousal/Alertness: Appropriate responses to stimuli  Following Commands:  Follows one step commands consistently  Attention Span: Attends with cues to redirect  Memory: Decreased recall of precautions  Safety Judgement: Decreased awareness of need for safety;Decreased awareness of need for assistance  Insights: Not aware of deficits  Initiation: Requires cues for some  Sequencing: Requires cues for some         Plan   Plan  Times per week: 3-5x  Current Treatment Recommendations: Strengthening, Balance Training, Functional Mobility Training, Positioning, Safety Education & Training, Self-Care / ADL    AM-PAC Score        AM-PAC Inpatient Daily Activity Raw Score: 11 (09/21/21 1043)  AM-PAC Inpatient ADL T-Scale Score : 29.04 (09/21/21 1043)  ADL Inpatient CMS 0-100% Score: 70.42 (09/21/21 1043)  ADL Inpatient CMS G-Code Modifier : CL (09/21/21 1043)    Goals  Short term goals  Time Frame for Short term goals: 1 week (9/27) unless noted  Short term goal 1: Perform 1-2 grooming tasks with min A by 9/24; 9/21 STG partially met, pt able wash face & hands with set up  Short term goal 2: Sit EOB x3 min with CGA in prep to transfer; 9/21 STG met pt tolerated sitting EOB 5 minutes with CGA  Short term goal 3: Participate in transfer with mod x2; 9/21 STG met, pt max assist of 1 sit-pivot transfers  Short term goal 4: Perform UE exer 10-15x each to improve endurance  Patient Goals   Patient goals :  \"Be able to sit up\"       Therapy Time   Individual Concurrent Group Co-treatment   Time In 0900         Time Out 0935         Minutes [address] [place] [address]   Second Session Therapy Time:   Individual Concurrent Group Co-treatment   Time In 1100         Time Out 1119         Minutes 18           Timed Code Treatment Minutes:  18 minutes  Total Treatment Minutes:  53 minutes

## 2021-09-21 NOTE — PLAN OF CARE
Problem: Nutrition  Intervention: Swallowing evaluation  Note: Bedside swallow evaluation completed. Myriam Jacobs M.A. CCC-SLP  Speech Language Pathologist       Problem: Nutrition  Intervention: Aspiration precautions  Note: Bedside swallow evaluation completed. Myriam Jacobs M.A.  [id number] [facility]  Speech Language Pathologist

## 2021-09-21 NOTE — CARE COORDINATION
Pt had DC order and was set to depart at 3 pm for [facility]. MRI done at [id number] but not resulted- discussed with RN- she paged attending prior to pt leaving to see if ok to DC. MRI found to have ventral bulging and severe stenosis- DC to be cancelled. [facility] notified. Marta called to explain cancellation and delays experienced, and to thank the squad for their patience. Dr Omari Lopez will follow up with pt's surgeon from [facility], and states he will notify family. CM continues to follow.   Kostas Goodson RN

## 2021-09-21 NOTE — DISCHARGE INSTR - COC
Continuity of Care Form    Patient Name: Holli Roberson   :  1934  MRN:  [id number]    [facility] date:  2021  Discharge date:      Code Status Order: Full Code   Advance Directives:      Admitting Physician:  Emiliano Rivero MD  PCP: Marlin Julien MD    Discharging Nurse: Taya Echavarria RN  [address] Unit/Room#: 5278/3920-24  Discharging Unit Phone Number: [phone]    Emergency Contact:   Extended Emergency Contact Information  Primary Emergency Contact: Brunner,Janet  Address: [address] [place] [address]  Home Phone: [phone]  Relation: Spouse  Secondary Emergency Contact: Cirilo Zuniga  Mobile Phone: [phone]  Relation: Child    Past Surgical History:  Past Surgical History:   Procedure Laterality Date    COLONOSCOPY      diverticulosis    COLONOSCOPY  2012    diverticulosis    EYE SURGERY      micheal cataract    TONSILLECTOMY         Immunization History:   Immunization History   Administered Date(s) Administered   JULISA Enrique, 100mcg/0.5mL 2021, 2021       Active Problems:  Patient Active Problem List   Diagnosis Code    DM2 (diabetes mellitus, type 2) ([facility] E11.9    Hypertension I10    Localized osteoarthrosis, lower leg M17.10    HLD (hyperlipidemia) E78.5    Lumbar stenosis with neurogenic claudication M48.062    BPH (benign prostatic hyperplasia) N40.0    ROXI (acute kidney injury) ([facility] N17.9    Acute urinary retention R33.8       Isolation/Infection:   Isolation            No Isolation          Patient Infection Status       None to display            Nurse Assessment:  Last Vital Signs: BP (!) 173/72   Pulse 94   Temp 97.5 °F (36.4 °C) (Oral)   Resp 18   Ht 5' 7\" (1.702 m)   Wt 126 lb 5.2 oz (57.3 kg)   SpO2 100%   BMI 19.79 kg/m²     Last documented pain score (0-10 scale): Pain Level: 0  Last Weight:   Wt Readings from Last 1 Encounters:   21 126 lb 5.2 oz (57.3 kg)     Mental Status:  oriented, alert and confusion at times     IV Access:  - None    Nursing Mobility/ADLs:  Walking   Assisted  Transfer  Assisted  Bathing  Assisted  Dressing  Assisted  Toileting  Assisted  Feeding  Assisted  Med Admin  Assisted  Med Delivery   whole    Wound Care Documentation and Therapy:  Wound 09/19/21 Sacrum (Active)   Wound Image   09/19/21 1520   Wound Etiology Deep tissue/Injury 09/19/21 1520   Dressing Status New dressing applied 09/20/21 2248   Wound Cleansed Cleansed with saline 09/19/21 1520   Dressing/Treatment Pharmaceutical agent (see MAR); Foam 09/20/21 2248   Dressing Change Due 09/19/21 09/19/21 1520   Wound Length (cm) 8.5 cm 09/19/21 1520   Wound Width (cm) 4.5 cm 09/19/21 1520   Wound Depth (cm) 0.1 cm 09/19/21 1520   Wound Surface Area (cm^2) 38.25 cm^2 09/19/21 1520   Wound Volume (cm^3) 3.825 cm^3 09/19/21 1520   Wound Assessment Denuded; Purple/maroon 09/20/21 2248   Drainage Amount Small 09/19/21 1520   Drainage Description Serosanguinous 09/19/21 1520   Odor None 09/19/21 1520   Cassie-wound Assessment Blanchable erythema 09/19/21 1520   Margins Defined edges 09/19/21 1520   Number of days: 1       Wound 09/19/21 Heel Left (Active)   Wound Image   09/19/21 1520   Wound Etiology Deep tissue/Injury 09/19/21 1520   Dressing Status New dressing applied 09/19/21 1520   Wound Cleansed Cleansed with saline 09/19/21 1520   Dressing/Treatment Pharmaceutical agent (see MAR) 09/20/21 2248   Offloading for Diabetic Foot Ulcers Offloading boot 09/19/21 1520   Dressing Change Due 09/19/21 09/19/21 1520   Wound Length (cm) 3.5 cm 09/19/21 1520   Wound Width (cm) 4 cm 09/19/21 1520   Wound Depth (cm) 0 cm 09/19/21 1520   Wound Surface Area (cm^2) 14 cm^2 09/19/21 1520   Wound Volume (cm^3) 0 cm^3 09/19/21 1520   Wound Assessment Blood filled blister; Purple/maroon 09/20/21 2248   Drainage Amount None 09/19/21 1520   Odor None 09/19/21 1520   Cassie-wound Assessment Dry/flaky 09/19/21 1520   Margins Attached edges; Defined edges 09/19/21 1520   Number of days: 1       Wound 09/19/21 Heel Right (Active)   Wound Image   09/19/21 1520   Wound Etiology Deep tissue/Injury 09/19/21 1520   Dressing Status New dressing applied 09/19/21 1520   Wound Cleansed Cleansed with saline 09/19/21 1520   Dressing/Treatment Pharmaceutical agent (see MAR) 09/20/21 2248   Offloading for Diabetic Foot Ulcers Offloading boot 09/19/21 1520   Dressing Change Due 09/19/21 09/19/21 1520   Wound Length (cm) 4.5 cm 09/19/21 1520   Wound Width (cm) 5 cm 09/19/21 1520   Wound Depth (cm) 0 cm 09/19/21 1520   Wound Surface Area (cm^2) 22.5 cm^2 09/19/21 1520   Wound Volume (cm^3) 0 cm^3 09/19/21 1520   Wound Assessment Purple/maroon 09/20/21 2248   Drainage Amount None 09/19/21 1520   Odor None 09/19/21 1520   Cassie-wound Assessment Blanchable erythema 09/19/21 1520   Margins Attached edges; Defined edges 09/19/21 1520   Number of days: 1        Elimination:  Continence:   · Bowel: Yes  · Bladder: Yes  Urinary Catheter: Insertion Date: 9/19   Colostomy/Ileostomy/Ileal Conduit: No       Date of Last BM: 9/22/2021 - digital disimpaction - large amt    Intake/Output Summary (Last 24 hours) at 9/21/2021 0845  Last data filed at 9/21/2021 0554  Gross per 24 hour   Intake 2401.87 ml   Output 2700 ml   Net -298.13 ml     I/O last 3 completed shifts: In: 2401.9 [P.O.:530; I.V.:1871.9]  Out: 2700 [Urine:2700]    Safety Concerns: At Risk for Falls, Aspiration Risk   Impairments/Disabilities:      None    Nutrition Therapy:  Current Nutrition Therapy:   - Oral Diet:  General    Routes of Feeding: Oral  Liquids: Thin Liquids  Daily Fluid Restriction: no  Last Modified Barium Swallow with Video (Video Swallowing Test): not done    Treatments at the Time of Hospital Discharge:   Respiratory Treatments:   Oxygen Therapy:  is not on home oxygen therapy.   Ventilator:    - No ventilator support    Rehab Therapies: Physical Therapy and Occupational Therapy  Weight Bearing Status/Restrictions: No weight bearing restirctions  Other Medical Equipment (for information only, NOT a DME order):  walker and hospital bed  Other Treatments:     Patient's personal belongings (please select all that are sent with patient):  None    RN SIGNATURE:  Electronically signed by Jerrod Stauffer RN on 9/21/21 at 2:47 PM EDT    CASE MANAGEMENT/SOCIAL WORK SECTION    Inpatient Status Date: ***    Readmission Risk Assessment Score:  Readmission Risk              Risk of Unplanned Readmission:  15           Discharging to Facility/ Agency   [facility]   [phone]     ·     / signature: Electronically signed by Srinivasa Rice RN on 9/21/21 at 10:37 AM EDT    PHYSICIAN SECTION    Prognosis: Good    Condition at Discharge: Stable    Rehab Potential (if transferring to Rehab): Good    Recommended Labs or Other Treatments After Discharge: Follow up with PCP within 1-2 weeks. Follow up with your [place] [address] neurosurgeon within the next few days as originally planned - this is very important. Leave the jain in place and follow up with urology within two weeks to see if it can be removed. Physician Certification: I certify the above information and transfer of Micah Pryor  is necessary for the continuing treatment of the diagnosis listed and that he requires [facility] for less 30 days.      Update Admission H&P: No change in H&P    PHYSICIAN SIGNATURE:  Electronically signed by Serenity Frazier MD on 9/21/21 at 8:45 AM EDT

## 2021-09-21 NOTE — PROGRESS NOTES
Nephrology Progress Note                                                                                                                                                                                                                                                                                                                                                               Office : [phone]     Fax :[phone]              Patient's Name: Meryle Senate  11:28 AM  9/21/2021    Reason for Consult: ROXI      Requesting Physician:  Dalton Wadsworth MD      Chief Complaint:        Interval History :    Serum cr improved to baseline  Jain +  Hypernatremia improved with D5 water     History of Present Ilness:    Meryle Senate is a 80 y.o. male with  PMH of DLP, HTN , DM type 2    Arthritis was sent from [facility] for abnormal labs    His serum cr 4.5 on 9/19  ----> had urinary retention ----> s/p jain : made 1400 ml urine     CT abdomen : BL hydronephrosis with BL intra renal calculi    He is AAO x3  Jain in place  Serum cr trending down    No diarrhea or vomiting or sob    Past Medical History:   Diagnosis Date    Arthritis     Cancer ([facility]     skin    Dry eyes, bilateral     Hyperlipidemia     Hypertension     Osteoporosis 2009    Type II or unspecified type diabetes mellitus without mention of complication, not stated as uncontrolled     Urinary incontinence        Past Surgical History:   Procedure Laterality Date    COLONOSCOPY  2002    diverticulosis    COLONOSCOPY  11/19/2012    diverticulosis    EYE SURGERY      micheal cataract    TONSILLECTOMY         Family History   Problem Relation Age of Onset    Heart Disease Mother     Cancer Sister 39        breast        reports that he has never smoked. He has never used smokeless tobacco. He reports that he does not drink alcohol and does not use drugs.     Allergies:  Iodine    Current Medications:    hydrALAZINE (APRESOLINE) injection 5 mg, Q4H PRN  amLODIPine (NORVASC) tablet 10 mg, Daily  lisinopril (PRINIVIL;ZESTRIL) tablet 20 mg, Daily  LORazepam (ATIVAN) injection 0.5 mg, Daily PRN  atorvastatin (LIPITOR) tablet 10 mg, Daily  glucose (GLUTOSE) 40 % oral gel 15 g, PRN  dextrose 50 % IV solution, PRN  glucagon (rDNA) injection 1 mg, PRN  dextrose 5 % solution, PRN  sodium chloride flush 0.9 % injection 10 mL, PRN  0.9 % sodium chloride infusion, PRN  ondansetron (ZOFRAN-ODT) disintegrating tablet 4 mg, Q8H PRN   Or  ondansetron (ZOFRAN) injection 4 mg, Q6H PRN  acetaminophen (TYLENOL) tablet 650 mg, Q6H PRN   Or  acetaminophen (TYLENOL) suppository 650 mg, Q6H PRN  insulin glargine (LANTUS) injection vial 9 Units, Nightly  insulin lispro (HUMALOG) injection vial 3 Units, TID WC  insulin lispro (HUMALOG) injection vial 0-6 Units, TID WC  insulin lispro (HUMALOG) injection vial 0-3 Units, Nightly  melatonin tablet 3 mg, Nightly PRN  polyethylene glycol (GLYCOLAX) packet 17 g, Daily  tamsulosin (FLOMAX) capsule 0.4 mg, Daily  Venelex ointment, BID  senna (SENOKOT) tablet 8.6 mg, BID        Review of Systems:   14 point ROS obtained but were negative except mentioned in HPI      Physical exam:     Vitals:  BP (!) 141/60   Pulse 102   Temp 97.5 °F (36.4 °C) (Oral)   Resp 18   Ht 5' 7\" (1.702 m)   Wt 126 lb 5.2 oz (57.3 kg)   SpO2 98%   BMI 19.79 kg/m²   Constitutional:  OAA X3 NAD  Skin: no rash, turgor wnl  Heent:  eomi, mmm  Neck: no bruits or jvd noted  Cardiovascular:  S1, S2 without m/r/g  Respiratory: CTA B without w/r/r  Abdomen:  +bs, soft, nt, nd  Ext: no  lower extremity edema  Psychiatric: mood and affect appropriate  Musculoskeletal:  Rom, muscular strength intact    Data:   Labs:  CBC:   Recent Labs     09/19/21  1400 09/20/21  0533 09/21/21  0626   WBC 8.1 7.9 6.0   HGB 8.2* 7.9* 8.0*    252 227     BMP:    Recent Labs     09/19/21  1741 09/20/21  0533 09/21/21  0626    149* 143   K 4.3 4.2 4.0    113* 109   CO2 24 25 27   * 89* 40*   CREATININE 2.2* 1.4* 1.0   GLUCOSE 188* 162* 243*     Ca/Mg/Phos:   Recent Labs     09/19/21  1741 09/20/21  0533 09/21/21  0626   CALCIUM 8.7 8.5 8.1*   MG  --  2.80* 2.30     Hepatic:   Recent Labs     09/19/21  0025 09/19/21  1741   AST 28 22   ALT 27 26   BILITOT 0.4 0.5   ALKPHOS 109 105     Troponin: No results for input(s): TROPONINI in the last 72 hours. BNP: No results for input(s): BNP in the last 72 hours. Lipids: No results for input(s): CHOL, TRIG, HDL, LDLCALC, LABVLDL in the last 72 hours. ABGs: No results for input(s): PHART, PO2ART, BRQ1GYR in the last 72 hours. INR: No results for input(s): INR in the last 72 hours. UA:  Recent Labs     09/19/21  0025   COLORU Yellow   CLARITYU Clear   GLUCOSEU 100*   BILIRUBINUR Negative   KETUA Negative   SPECGRAV 1.020   BLOODU SMALL*   PHUR 5.5   PROTEINU Negative   UROBILINOGEN 0.2   NITRU Negative   LEUKOCYTESUR Negative   LABMICR YES   URINETYPE NotGiven      Urine Microscopic:   Recent Labs     09/19/21  0025   BACTERIA 3+*   WBCUA 0-2   RBCUA 5-10*   EPIU 0-1     Urine Culture: No results for input(s): LABURIN in the last 72 hours. Urine Chemistry: No results for input(s): Chanel Pipes, PROTEINUR, NAUR in the last 72 hours. IMAGING:  CT ABDOMEN PELVIS WO CONTRAST Additional Contrast? None   Final Result   1. Bilateral hydronephrosis with bilateral intrarenal calculi but no distal   calculi or obvious cause for obstruction seen. 2. Possible fecal impaction with suggested stercoral proctitis. 3. Soft tissue gas in lumbar laminectomy defects. If this is not a recent   surgery then infection should be considered. 4. 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  Recommend   referral to a vascular specialist. Reference: J Am Ash Radiol 3140;19   (95):[id number]         CT Head WO Contrast   Final Result   Small vessel chronic ischemic changes without acute hemorrhage or definite   evidence for acute ischemia. XR CHEST PORTABLE   Final Result   Mild asymmetric elevation of the left hemidiaphragm which is of uncertain   etiology. Recommend short-term follow-up. Overlying EKG lead artifact with no obvious acute pulmonary abnormality. MRI LUMBAR SPINE WO CONTRAST    (Results Pending)       Assessment/Plan       1. ROXI on CKD 3 2/2 obstruction      Baseline serum cr : 1.4    Serum cr trend : 4.5 -----> 2.9 -----> 1.4    Plan    Cont jain catheter    Serum cr improved to baseline          2.  Hypernatremia    Dry mucosa on exam      Hypernatremia improved to 143    Encourage water intake to thirst          3 urinary obstruction    S/p jain            Thank you for allowing us to participate in care of Lola Meza MD  Feel free to contact me   [facility] [address]  Office : [phone]  Fax :[phone]

## 2021-09-21 NOTE — ACP (ADVANCE CARE PLANNING)
ADVANCED CARE PLANNING    Name:Juan Alberto Go       :  1934              MRN:  [id number]  Admission Date: 2021 12:02 AM  Date of Discussion:  21       Purpose of Encounter: Advanced care planning in light of ROXI, AUR, sacral wound. Parties in attendance: :Anabell Novoa MD, Family member. Decisional Capacity:No      Objective/Medical Story:   Patient seems to be overall declining. Getting weaker, not healing his sacral wound well, not thinking clearly. The son wonders if it is time for \"palliative care. \"  After our discussion it was clear that the son wasn't ready for this yet, wants the patient to keep fighting to live. The patient is not capable of making these sort of decisions. For now we will see if he gets stronger and if his wound heals. Active Diagnoses: Active Hospital Problems    Diagnosis Date Noted    HLD (hyperlipidemia) [E78.5] 2021    Lumbar stenosis with neurogenic claudication [M48.062] 2021    BPH (benign prostatic hyperplasia) [N40.0] 2021    ROXI (acute kidney injury) ([facility] [N17.9] 2021    Acute urinary retention [R33.8] 2021    DM2 (diabetes mellitus, type 2) ([facility] [E11.9]     Hypertension [I10]        These active diagnoses are of sufficient risk that focused discussion on advance care planning is indicated in order to allow the patient to thoughtfully consider personal goals of care; and if situations arise that prevent the ability to personally give input, to ensure appropriate representation of their personal desires for different levels and agressiveness of care. Goals of Care Determinations: family wishes to focus on ongoing aggressive care. Code Status: At this time patient wishes to be DNR-CCA         Time Spent on Advanced Planning Documents: 16 mins. The following items were considered in medical decision making:  Independent review of images , Review / order clinical lab tests. Review / order radiology tests, Decision to obtain old records. Advanced Care Planning Documents:  advanced directives in chart. Electronically signed by Koki John MD on 9/21/2021 at 12:56 PM    Thank you Ileana Zendejas MD for the opportunity to be involved in this patient's care. If you have any questions or concerns please feel free to contact me at [phone].

## 2021-09-21 NOTE — DISCHARGE SUMMARY
Hospital Medicine PROGRESS NOTE - going to [facility] but no bed available on this day. Patient ID: Chris Daly      Patient's PCP: Jennifer Harrison MD    Admit Date: 9/19/2021     Discharge Date:   09/21/21     Admitting Physician: Angelita Simon MD     Discharge Physician: Josh Gaxiola MD     Discharge Diagnoses: Active Hospital Problems    Diagnosis     HLD (hyperlipidemia) [E78.5]     Lumbar stenosis with neurogenic claudication [M48.062]     BPH (benign prostatic hyperplasia) [N40.0]     ROXI (acute kidney injury) ([facility] [address] [N17.9]     Acute urinary retention [R33.8]     DM2 (diabetes mellitus, type 2) ([facility] [address] [E11.9]     Hypertension [I10]        The patient was seen and examined on day of discharge and this discharge summary is in conjunction with any daily progress note from day of discharge. Hospital Course:  \"80year-old male admitted from nursing home due to acute kidney injury on chronic kidney disease stage III due to post renal obstruction and on nephrotoxic medication. \"        Post-obstuctive ROXI on CKD3  - baseline Cr was normal a few weeks ago. Cr was 4.5 on admission here. - he couldn't urinate in the ED and a jain was placed, immediately drained 1.4 L. Cr rapidly improved thereafter, was 1 at the time of discharge. - urology recommended leaving the jain in place upon discharge, planning at least 10-14 days before outpatient voiding trial.  F/u with urology as outpatient. - stopped solifenacin. Constipation. Resolved with miralax. Recent spine surgery, with concern for cauda equina syndrome in light of the above issues  - on 9/2 Dr. Yaya Maher performed an L2-5 decompression and laminectomy at [place] [address].  - then postoperatively on 9/5 [facility] noted that the patient was unable to wiggle his toes and had BLE weakness to the point that he required maximum assistance x2 in order to stand.   An MRI was performed which showed severe spinal canal stenosis due to hematoma. - on 9/6 Dr. Emory Davila took him back to the OR for an urgent exploration and hematoma evacuation, left drains in place. - eventually the patient was discharged to SNF on 9/10.  - Now that the patient was admitted here with urinary retention, constipation, and ongoing leg weakness, we obtained a repeat MRI on this admission to [facility]. It showed an ill-defind fluid/soft tissue causing severe spinal canal stenosis from L1-4, also moderate-severe spinal canal stenosis from L4 - S1. I discussed with radiology and this could be residual hematoma or seroma. Discussed situation, MRI, and exam findings with Dr. Emory Davila, who would like this patient transferred to [facility]. He would like their hospital medicine team to be primary and he will follow in consultation. The hospital medicine team will be contacting me soon through the transfer center. DM2. A1c 7.5, continue outpatient oral regimen upon discharge. Noted the large, dark, unstageable sacral wound. It doesn't need debrided and isn't infected, but if he isn't able to consistently get off his back this will be at high risk of worsening and causing life-threatening complications. Nursing will keep working hard to offload this pressure area. Overall his prognosis seems poor and he might continue to have setbacks. The son has recognized this as well. The son is simultaneously considering palliative care and encouraging the patient to \"fight harder to live. \"  We talked about how if he isn't stronger and if the large sacral wound still isn't healing well in a few weeks then Hospice would be reasonable. The son was tearful but wasn't surprised by this and appreciated the advice.             Physical Exam Performed:     BP (!) 173/72   Pulse 94   Temp 97.5 °F (36.4 °C) (Oral)   Resp 18   Ht 5' 7\" (1.702 m)   Wt 126 lb 5.2 oz (57.3 kg)   SpO2 100%   BMI 19.79 kg/m²       General appearance: No apparent distress, appears stated age and cooperative. HEENT: Pupils equal, round, and reactive to light. Conjunctivae/corneas clear. Neck: Supple, with full range of motion. No jugular venous distention. Trachea midline. Respiratory:  Normal respiratory effort. Clear to auscultation, bilaterally without Rales/Wheezes/Rhonchi. Cardiovascular: Regular rate and rhythm with normal S1/S2 without murmurs, rubs or gallops. Abdomen: Soft, non-tender, non-distended with normal bowel sounds. Musculoskeletal: No clubbing, cyanosis or edema bilaterally. Full range of motion without deformity. Muscle wasting. Skin: Skin color, texture, turgor normal.  Large wound on lower back. Neurologic:  L foot and L ankle are flaccid. He does not have the strength to bend his knees or hips more than a couple inches against gravity. He reports normal sensation throughout BLE and in the perineal area. Normal rectal sphincter strength. Psychiatric: Alert and mostly oriented, thought content appropriate, limited insight, poor focus, tangential thoughts  Capillary Refill: Brisk,3 seconds, normal   Peripheral Pulses: +2 palpable, equal bilaterally       Labs: For convenience and continuity at follow-up the following most recent labs are provided:      CBC:    Lab Results   Component Value Date    WBC 6.0 09/21/2021    HGB 8.0 09/21/2021    HCT 23.6 09/21/2021     09/21/2021       Renal:    Lab Results   Component Value Date     09/21/2021    K 4.0 09/21/2021    K 4.8 09/19/2021     09/21/2021    CO2 27 09/21/2021    BUN 40 09/21/2021    CREATININE 1.0 09/21/2021    CALCIUM 8.1 09/21/2021         Significant Diagnostic Studies    Radiology:   CT ABDOMEN PELVIS WO CONTRAST Additional Contrast? None   Final Result   1. Bilateral hydronephrosis with bilateral intrarenal calculi but no distal   calculi or obvious cause for obstruction seen. 2. Possible fecal impaction with suggested stercoral proctitis.    3. Soft tissue gas in lumbar laminectomy defects. If this is not a recent   surgery then infection should be considered. 4. 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 Recommend   referral to a vascular specialist. Reference: J Am Ash Radiol 0630;02   (21):[id number]         CT Head WO Contrast   Final Result   Small vessel chronic ischemic changes without acute hemorrhage or definite   evidence for acute ischemia. XR CHEST PORTABLE   Final Result   Mild asymmetric elevation of the left hemidiaphragm which is of uncertain   etiology. Recommend short-term follow-up. Overlying EKG lead artifact with no obvious acute pulmonary abnormality.          MRI LUMBAR SPINE WO CONTRAST    (Results Pending)          Consults:     IP CONSULT TO HOSPITALIST  IP CONSULT TO NEPHROLOGY  IP CONSULT TO UROLOGY    Disposition:  Back to SNF     Condition at Discharge: Stable    Discharge Instructions/Follow-up:  TBD    Code Status:  Full Code     Activity: activity as tolerated    Diet: diabetic diet      Discharge Medications:     Current Discharge Medication List           Details   tamsulosin (FLOMAX) 0.4 MG capsule Take 1 capsule by mouth daily  Qty: 30 capsule, Refills: 3              Details   Chromium Picolinate 1000 MCG TABS Take 1,000 mcg by mouth daily      cyanocobalamin 1000 MCG tablet Take 1,000 mcg by mouth daily      zinc sulfate (ZINCATE) 220 (50 Zn) MG capsule Take 220 mg by mouth daily      amLODIPine (NORVASC) 10 MG tablet Take 10 mg by mouth daily      Multiple Vitamins-Minerals (THERAPEUTIC MULTIVITAMIN-MINERALS) tablet Take 1 tablet by mouth daily      donepezil (ARICEPT) 10 MG tablet Take 10 mg by mouth nightly      senna-docusate (PERICOLACE) 8.6-50 MG per tablet Take 1 tablet by mouth 2 times daily      b complex vitamins capsule Take 1 capsule by mouth daily      methocarbamol (ROBAXIN) 500 MG tablet Take 500 mg by mouth every 6 hours as needed      acetaminophen (TYLENOL) 500 MG tablet Take 500 mg by mouth every 4 hours as needed       lisinopril (PRINIVIL;ZESTRIL) 20 MG tablet Take 20 mg by mouth nightly     Associated Diagnoses: Bilateral knee pain      simvastatin (ZOCOR) 10 MG tablet Take 10 mg by mouth nightly. sitagliptan (JANUVIA) 100 MG tablet Take 100 mg by mouth daily. glipiZIDE (GLUCOTROL XL) 10 MG CR tablet Take 10 mg by mouth daily. Time Spent on discharge is more than 30 minutes in the examination, evaluation, counseling and review of medications and discharge plan. Signed:    Irina Henderson MD   9/21/2021      Thank you Betty Holland MD for the opportunity to be involved in this patient's care. If you have any questions or concerns please feel free to contact me at [phone].

## 2021-09-22 VITALS
TEMPERATURE: 98 F | RESPIRATION RATE: 16 BRPM | HEIGHT: 67 IN | HEART RATE: 105 BPM | DIASTOLIC BLOOD PRESSURE: 63 MMHG | WEIGHT: 126.32 LBS | OXYGEN SATURATION: 99 % | BODY MASS INDEX: 19.83 KG/M2 | SYSTOLIC BLOOD PRESSURE: 119 MMHG

## 2021-09-22 LAB
ANION GAP SERPL CALCULATED.3IONS-SCNC: 9 MMOL/L (ref 3–16)
ATYPICAL LYMPHOCYTE RELATIVE PERCENT: 1 % (ref 0–6)
BANDED NEUTROPHILS RELATIVE PERCENT: 14 % (ref 0–7)
BASOPHILS ABSOLUTE: 0.1 K/UL (ref 0–0.2)
BASOPHILS RELATIVE PERCENT: 1 %
BUN BLDV-MCNC: 33 MG/DL (ref 7–20)
CALCIUM SERPL-MCNC: 8.1 MG/DL (ref 8.3–10.6)
CHLORIDE BLD-SCNC: 109 MMOL/L (ref 99–110)
CO2: 26 MMOL/L (ref 21–32)
CREAT SERPL-MCNC: 0.9 MG/DL (ref 0.8–1.3)
EOSINOPHILS ABSOLUTE: 0.3 K/UL (ref 0–0.6)
EOSINOPHILS RELATIVE PERCENT: 4 %
GFR AFRICAN AMERICAN: >60
GFR NON-AFRICAN AMERICAN: >60
GLUCOSE BLD-MCNC: 166 MG/DL (ref 70–99)
GLUCOSE BLD-MCNC: 171 MG/DL (ref 70–99)
GLUCOSE BLD-MCNC: 171 MG/DL (ref 70–99)
HCT VFR BLD CALC: 24.9 % (ref 40.5–52.5)
HEMOGLOBIN: 8.3 G/DL (ref 13.5–17.5)
LYMPHOCYTES ABSOLUTE: 1.2 K/UL (ref 1–5.1)
LYMPHOCYTES RELATIVE PERCENT: 16 %
MAGNESIUM: 2.1 MG/DL (ref 1.8–2.4)
MCH RBC QN AUTO: 33.2 PG (ref 26–34)
MCHC RBC AUTO-ENTMCNC: 33.4 G/DL (ref 31–36)
MCV RBC AUTO: 99.5 FL (ref 80–100)
METAMYELOCYTES RELATIVE PERCENT: 2 %
MONOCYTES ABSOLUTE: 1.1 K/UL (ref 0–1.3)
MONOCYTES RELATIVE PERCENT: 15 %
MYELOCYTE PERCENT: 2 %
NEUTROPHILS ABSOLUTE: 4.5 K/UL (ref 1.7–7.7)
NEUTROPHILS RELATIVE PERCENT: 45 %
OVALOCYTES: ABNORMAL
PDW BLD-RTO: 13.6 % (ref 12.4–15.4)
PERFORMED ON: ABNORMAL
PERFORMED ON: ABNORMAL
PLATELET # BLD: 208 K/UL (ref 135–450)
PMV BLD AUTO: 7.5 FL (ref 5–10.5)
POTASSIUM SERPL-SCNC: 4.2 MMOL/L (ref 3.5–5.1)
RBC # BLD: 2.51 M/UL (ref 4.2–5.9)
SODIUM BLD-SCNC: 144 MMOL/L (ref 136–145)
WBC # BLD: 7.2 K/UL (ref 4–11)

## 2021-09-22 PROCEDURE — 85025 COMPLETE CBC W/AUTO DIFF WBC: CPT

## 2021-09-22 PROCEDURE — 36415 COLL VENOUS BLD VENIPUNCTURE: CPT

## 2021-09-22 PROCEDURE — 6370000000 HC RX 637 (ALT 250 FOR IP): Performed by: HOSPITALIST

## 2021-09-22 PROCEDURE — 99232 SBSQ HOSP IP/OBS MODERATE 35: CPT | Performed by: HOSPITALIST

## 2021-09-22 PROCEDURE — 80048 BASIC METABOLIC PNL TOTAL CA: CPT

## 2021-09-22 PROCEDURE — 6370000000 HC RX 637 (ALT 250 FOR IP): Performed by: INTERNAL MEDICINE

## 2021-09-22 PROCEDURE — 83735 ASSAY OF MAGNESIUM: CPT

## 2021-09-22 RX ADMIN — INSULIN LISPRO 3 UNITS: 100 INJECTION, SOLUTION INTRAVENOUS; SUBCUTANEOUS at 10:10

## 2021-09-22 RX ADMIN — TAMSULOSIN HYDROCHLORIDE 0.4 MG: 0.4 CAPSULE ORAL at 10:15

## 2021-09-22 RX ADMIN — INSULIN LISPRO 1 UNITS: 100 INJECTION, SOLUTION INTRAVENOUS; SUBCUTANEOUS at 10:11

## 2021-09-22 RX ADMIN — LISINOPRIL 20 MG: 20 TABLET ORAL at 10:09

## 2021-09-22 RX ADMIN — SENNOSIDES 8.6 MG: 8.6 TABLET, FILM COATED ORAL at 10:08

## 2021-09-22 RX ADMIN — ATORVASTATIN CALCIUM 10 MG: 10 TABLET, FILM COATED ORAL at 10:08

## 2021-09-22 RX ADMIN — AMLODIPINE BESYLATE 10 MG: 5 TABLET ORAL at 10:09

## 2021-09-22 RX ADMIN — CASTOR OIL AND BALSAM, PERU: 788; 87 OINTMENT TOPICAL at 10:08

## 2021-09-22 NOTE — PROGRESS NOTES
Pt assessment completed and charted. VSS. Pt a/o w/ intermittent confusion. Avilez catheter in place and patent. Pt digitally disimpacted w/ a large amount of solid brown stool. Pt tolerated well. Wound care completed. Pt complete Q2T. Able to feed himself after set up. Bed in lowest position and wheels locked. Call light within reach. Bedside table within reach. Non-skid footwear in place. Pt denies any other needs at this time. Pt calls out appropriately. Will continue to monitor.

## 2021-09-22 NOTE — DISCHARGE SUMMARY
Hospital Medicine Discharge Summary - going to [facility]       Patient ID: Henderson Situ      Patient's PCP: Marlin Julien MD    Admit Date: 9/19/2021     Discharge Date:   09/22/21     Admitting Physician: Emiliano Rivero MD     Discharge Physician: Ashley Esquivel MD     Discharge Diagnoses: Active Hospital Problems    Diagnosis     HLD (hyperlipidemia) [E78.5]     Lumbar stenosis with neurogenic claudication [M48.062]     BPH (benign prostatic hyperplasia) [N40.0]     ROXI (acute kidney injury) ([facility] [address] [N17.9]     Acute urinary retention [R33.8]     DM2 (diabetes mellitus, type 2) ([facility] [address] [E11.9]     Hypertension [I10]        The patient was seen and examined on day of discharge and this discharge summary is in conjunction with any daily progress note from day of discharge. Hospital Course:  \"80year-old male admitted from nursing home due to acute kidney injury on chronic kidney disease stage III due to post renal obstruction and on nephrotoxic medication. \"        Post-obstuctive ROXI on CKD3  - baseline Cr was normal a few weeks ago. Cr was 4.5 on admission here. - he couldn't urinate in the ED and a jain was placed, immediately drained 1.4 L. Cr rapidly improved thereafter, was 1 at the time of discharge. - urology recommended leaving the jain in place upon discharge, planning at least 10-14 days before outpatient voiding trial.  F/u with urology as outpatient. - stopped solifenacin. Constipation. Resolved with miralax. Recent spine surgery, with concern for cauda equina syndrome in light of the above issues  - on 9/2 Dr. Kishor Farrell performed an L2-5 decompression and laminectomy at [place] [address].  - then postoperatively on 9/5 [facility] noted that the patient was unable to wiggle his toes and had BLE weakness to the point that he required maximum assistance x2 in order to stand.   An MRI was performed which showed severe spinal canal stenosis due to hematoma. - on 9/6 Dr. Timmy Alvarado took him back to the OR for an urgent exploration and hematoma evacuation, left drains in place. - eventually the patient was discharged to SNF on 9/10.  - Now that the patient was admitted here with urinary retention, constipation, and ongoing leg weakness, we obtained a repeat MRI on this admission to [facility]. It showed an ill-defind fluid/soft tissue causing severe spinal canal stenosis from L1-4, also moderate-severe spinal canal stenosis from L4 - S1. I discussed with radiology and this could be residual hematoma or seroma. Discussed situation, MRI, and exam findings with Dr. Timmy Alvarado, who would like this patient transferred to [place] [address]. He would like their hospital medicine team to be primary and he will follow in consultation. Dr. Leroy Em (zulma?) of [facility] medicine graciously accepted this patient to his service on 9/21. DM2. A1c 7.5, continue outpatient oral regimen upon discharge. Noted the large, dark, unstageable sacral wound. It doesn't need debrided and isn't infected, but if he isn't able to consistently get off his back this will be at high risk of worsening and causing life-threatening complications. Nursing will keep working hard to offload this pressure area. Overall his prognosis seems poor and he might continue to have setbacks. The son has recognized this as well. The son is simultaneously considering palliative care and encouraging the patient to \"fight harder to live. \"  We talked about how if he isn't stronger and if the large sacral wound still isn't healing well in a few weeks then Hospice would be reasonable. The son was tearful but wasn't surprised by this and appreciated the advice.             Physical Exam Performed:     BP (!) 119/58   Pulse 109   Temp 97.6 °F (36.4 °C) (Oral)   Resp 17   Ht 5' 7\" (1.702 m)   Wt 126 lb 5.2 oz (57.3 kg)   SpO2 97%   BMI 19.79 kg/m²       General appearance: No apparent distress, appears stated age and cooperative. HEENT: Pupils equal, round, and reactive to light. Conjunctivae/corneas clear. Neck: Supple, with full range of motion. No jugular venous distention. Trachea midline. Respiratory:  Normal respiratory effort. Clear to auscultation, bilaterally without Rales/Wheezes/Rhonchi. Cardiovascular: Regular rate and rhythm with normal S1/S2 without murmurs, rubs or gallops. Abdomen: Soft, non-tender, non-distended with normal bowel sounds. Musculoskeletal: No clubbing, cyanosis or edema bilaterally. Full range of motion without deformity. Muscle wasting. Skin: Skin color, texture, turgor normal.  Large wound on lower back. Neurologic:  L foot and L ankle are flaccid. He does not have the strength to bend his knees or hips more than a couple inches against gravity. He reports normal sensation throughout BLE and in the perineal area. Normal rectal sphincter strength. Upper extremity intention tremors. Psychiatric: Alert and mostly oriented, thought content appropriate, limited insight, poor focus, tangential thoughts  Capillary Refill: Brisk,3 seconds, normal   Peripheral Pulses: +2 palpable, equal bilaterally       Labs:  For convenience and continuity at follow-up the following most recent labs are provided:      CBC:    Lab Results   Component Value Date    WBC 7.2 09/22/2021    HGB 8.3 09/22/2021    HCT 24.9 09/22/2021     09/22/2021       Renal:    Lab Results   Component Value Date     09/22/2021    K 4.2 09/22/2021    K 4.8 09/19/2021     09/22/2021    CO2 26 09/22/2021    BUN 33 09/22/2021    CREATININE 0.9 09/22/2021    CALCIUM 8.1 09/22/2021         Significant Diagnostic Studies    Radiology:   MRI LUMBAR SPINE WO CONTRAST   Final Result   Extensive postoperative changes from prior decompressive surgery with   ill-defined fluid/soft tissue postoperative products throughout the   laminectomy beds with resultant ventral bulging of the underlying dura   effacing the dorsal CSF space superimposed on degenerative disc changes   resulting in severe canal stenosis at L1-L2 through L3-L4. Additional   moderate to severe canal stenosis at L4-L5, and L5-S1, as above. Please note that prior MR lumbar spine was not available at the time of   dictation. Critical results were called by Dr. Tereza Obrien DO to Dr. Robin Allison   on 9/21/2021 at [id number]. CT ABDOMEN PELVIS WO CONTRAST Additional Contrast? None   Final Result   1. Bilateral hydronephrosis with bilateral intrarenal calculi but no distal   calculi or obvious cause for obstruction seen. 2. Possible fecal impaction with suggested stercoral proctitis. 3. Soft tissue gas in lumbar laminectomy defects. If this is not a recent   surgery then infection should be considered. 4. 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 Recommend   referral to a vascular specialist. Reference: J Am Ash Radiol 0431;57   (65):[id number]         CT Head WO Contrast   Final Result   Small vessel chronic ischemic changes without acute hemorrhage or definite   evidence for acute ischemia. XR CHEST PORTABLE   Final Result   Mild asymmetric elevation of the left hemidiaphragm which is of uncertain   etiology. Recommend short-term follow-up. Overlying EKG lead artifact with no obvious acute pulmonary abnormality.                 Consults:     IP CONSULT TO HOSPITALIST  IP CONSULT TO NEPHROLOGY  IP CONSULT TO UROLOGY    Disposition:  Back to SNF     Condition at Discharge: Stable    Discharge Instructions/Follow-up:  TBD    Code Status:  DNR-CCA     Activity: activity as tolerated    Diet: diabetic diet      Discharge Medications:     Current Discharge Medication List           Details   tamsulosin (FLOMAX) 0.4 MG capsule Take 1 capsule by mouth daily  Qty: 30 capsule, Refills: 3              Details   Chromium Picolinate 1000 MCG TABS Take 1,000 mcg by mouth daily      cyanocobalamin 1000 MCG tablet Take 1,000 mcg by mouth daily      zinc sulfate (ZINCATE) 220 (50 Zn) MG capsule Take 220 mg by mouth daily      amLODIPine (NORVASC) 10 MG tablet Take 10 mg by mouth daily      Multiple Vitamins-Minerals (THERAPEUTIC MULTIVITAMIN-MINERALS) tablet Take 1 tablet by mouth daily      donepezil (ARICEPT) 10 MG tablet Take 10 mg by mouth nightly      senna-docusate (PERICOLACE) 8.6-50 MG per tablet Take 1 tablet by mouth 2 times daily      b complex vitamins capsule Take 1 capsule by mouth daily      methocarbamol (ROBAXIN) 500 MG tablet Take 500 mg by mouth every 6 hours as needed      acetaminophen (TYLENOL) 500 MG tablet Take 500 mg by mouth every 4 hours as needed       lisinopril (PRINIVIL;ZESTRIL) 20 MG tablet Take 20 mg by mouth nightly     Associated Diagnoses: Bilateral knee pain      simvastatin (ZOCOR) 10 MG tablet Take 10 mg by mouth nightly. sitagliptan (JANUVIA) 100 MG tablet Take 100 mg by mouth daily. glipiZIDE (GLUCOTROL XL) 10 MG CR tablet Take 10 mg by mouth daily. Time Spent on discharge is more than 30 minutes in the examination, evaluation, counseling and review of medications and discharge plan. Signed:    John Zaragoza MD   9/22/2021      Thank you Madison Mcgill MD for the opportunity to be involved in this patient's care. If you have any questions or concerns please feel free to contact me at [phone].

## 2021-09-22 NOTE — PROGRESS NOTES
Pt a/o w/ intermittent confusion. VSS. All prescriptions, discharge and follow up instructions given to [facility]. Wife @ bedside. Pt being transported to [facility]. All questions answered. All belongings collected and sent with the patient. The patient was discharged off the unit by stretcher and [facility] in stable condition.

## 2021-09-22 NOTE — PROGRESS NOTES
Nephrology Progress Note                                                                                                                                                                                                                                                                                                                                                               Office : [phone]     Fax :[phone]              Patient's Name: Linsey Murphy  11:55 AM  9/22/2021    Reason for Consult: ROXI      Requesting Physician:  Marlene Jeff MD      Chief Complaint:        Interval History :    No sob or chest pain or fever  Good UOP  Serum cr improved to baseline  Jain +  Hypernatremia improved with D5 water     History of Present Ilness:    Linsey Murphy is a 80 y.o. male with  PMH of DLP, HTN , DM type 2    Arthritis was sent from [facility] for abnormal labs    His serum cr 4.5 on 9/19  ----> had urinary retention ----> s/p jain : made 1400 ml urine     CT abdomen : BL hydronephrosis with BL intra renal calculi    He is AAO x3  Jain in place  Serum cr trending down    No diarrhea or vomiting or sob    Past Medical History:   Diagnosis Date    Arthritis     Cancer ([address]     skin    Dry eyes, bilateral     Hyperlipidemia     Hypertension     Osteoporosis 2009    Type II or unspecified type diabetes mellitus without mention of complication, not stated as uncontrolled     Urinary incontinence        Past Surgical History:   Procedure Laterality Date    COLONOSCOPY  2002    diverticulosis    COLONOSCOPY  11/19/2012    diverticulosis    EYE SURGERY      micheal cataract    TONSILLECTOMY         Family History   Problem Relation Age of Onset    Heart Disease Mother     Cancer Sister 39        breast        reports that he has never smoked. He has never used smokeless tobacco. He reports that he does not drink alcohol and does not use drugs.     Allergies:  Iodine    Current Medications: hydrALAZINE (APRESOLINE) injection 5 mg, Q4H PRN  amLODIPine (NORVASC) tablet 10 mg, Daily  lisinopril (PRINIVIL;ZESTRIL) tablet 20 mg, Daily  atorvastatin (LIPITOR) tablet 10 mg, Daily  glucose (GLUTOSE) 40 % oral gel 15 g, PRN  dextrose 50 % IV solution, PRN  glucagon (rDNA) injection 1 mg, PRN  dextrose 5 % solution, PRN  sodium chloride flush 0.9 % injection 10 mL, PRN  0.9 % sodium chloride infusion, PRN  ondansetron (ZOFRAN-ODT) disintegrating tablet 4 mg, Q8H PRN   Or  ondansetron (ZOFRAN) injection 4 mg, Q6H PRN  acetaminophen (TYLENOL) tablet 650 mg, Q6H PRN   Or  acetaminophen (TYLENOL) suppository 650 mg, Q6H PRN  insulin glargine (LANTUS) injection vial 9 Units, Nightly  insulin lispro (HUMALOG) injection vial 3 Units, TID WC  insulin lispro (HUMALOG) injection vial 0-6 Units, TID WC  insulin lispro (HUMALOG) injection vial 0-3 Units, Nightly  melatonin tablet 3 mg, Nightly PRN  polyethylene glycol (GLYCOLAX) packet 17 g, Daily  tamsulosin (FLOMAX) capsule 0.4 mg, Daily  Venelex ointment, BID  senna (SENOKOT) tablet 8.6 mg, BID        Review of Systems:   14 point ROS obtained but were negative except mentioned in HPI      Physical exam:     Vitals:  BP (!) 167/52   Pulse 100   Temp 98 °F (36.7 °C) (Oral)   Resp 18   Ht 5' 7\" (1.702 m)   Wt 126 lb 5.2 oz (57.3 kg)   SpO2 96%   BMI 19.79 kg/m²   Constitutional:  OAA X3 NAD  Skin: no rash, turgor wnl  Heent:  eomi, mmm  Neck: no bruits or jvd noted  Cardiovascular:  S1, S2 without m/r/g  Respiratory: CTA B without w/r/r  Abdomen:  +bs, soft, nt, nd  Ext: no  lower extremity edema  Psychiatric: mood and affect appropriate  Musculoskeletal:  Rom, muscular strength intact    Data:   Labs:  CBC:   Recent Labs     09/20/21  0533 09/21/21  0626 09/22/21  0521   WBC 7.9 6.0 7.2   HGB 7.9* 8.0* 8.3*    227 208     BMP:    Recent Labs     09/20/21  0533 09/21/21  0626 09/22/21  0521   * 143 144   K 4.2 4.0 4.2   * 109 109   CO2 25 27 26 BUN 89* 40* 33*   CREATININE 1.4* 1.0 0.9   GLUCOSE 162* 243* 171*     Ca/Mg/Phos:   Recent Labs     09/20/21  0533 09/21/21  0626 09/22/21  0521   CALCIUM 8.5 8.1* 8.1*   MG 2.80* 2.30 2.10     Hepatic:   Recent Labs     09/19/21  1741   AST 22   ALT 26   BILITOT 0.5   ALKPHOS 105     Troponin: No results for input(s): TROPONINI in the last 72 hours. BNP: No results for input(s): BNP in the last 72 hours. Lipids: No results for input(s): CHOL, TRIG, HDL, LDLCALC, LABVLDL in the last 72 hours. ABGs: No results for input(s): PHART, PO2ART, MLO5OIX in the last 72 hours. INR: No results for input(s): INR in the last 72 hours. UA:  No results for input(s): Theone Ground, GLUCOSEU, BILIRUBINUR, KETUA, SPECGRAV, BLOODU, PHUR, PROTEINU, UROBILINOGEN, NITRU, LEUKOCYTESUR, Ancelmo Revels in the last 72 hours. Urine Microscopic:   No results for input(s): LABCAST, BACTERIA, COMU, HYALCAST, WBCUA, RBCUA, EPIU in the last 72 hours. Urine Culture: No results for input(s): LABURIN in the last 72 hours. Urine Chemistry: No results for input(s): Suann Pummel, PROTEINUR, NAUR in the last 72 hours. IMAGING:  MRI LUMBAR SPINE WO CONTRAST   Final Result   Extensive postoperative changes from prior decompressive surgery with   ill-defined fluid/soft tissue postoperative products throughout the   laminectomy beds with resultant ventral bulging of the underlying dura   effacing the dorsal CSF space superimposed on degenerative disc changes   resulting in severe canal stenosis at L1-L2 through L3-L4. Additional   moderate to severe canal stenosis at L4-L5, and L5-S1, as above. Please note that prior MR lumbar spine was not available at the time of   dictation. Critical results were called by Dr. Judith Cyr DO to Dr. Tony Brought   on 9/21/2021 at [id number] [id number]. CT ABDOMEN PELVIS WO CONTRAST Additional Contrast? None   Final Result   1.  Bilateral hydronephrosis with bilateral intrarenal calculi but no distal   calculi or obvious cause for obstruction seen. 2. Possible fecal impaction with suggested stercoral proctitis. 3. Soft tissue gas in lumbar laminectomy defects. If this is not a recent   surgery then infection should be considered. 4. 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 Recommend   referral to a vascular specialist. Reference: J Am Ash Radiol 6042;92   (33):124-272         CT Head WO Contrast   Final Result   Small vessel chronic ischemic changes without acute hemorrhage or definite   evidence for acute ischemia. XR CHEST PORTABLE   Final Result   Mild asymmetric elevation of the left hemidiaphragm which is of uncertain   etiology. Recommend short-term follow-up. Overlying EKG lead artifact with no obvious acute pulmonary abnormality. Assessment/Plan       1. ROXI on CKD 3 2/2 obstruction      Baseline serum cr : 1.4    Serum cr trend : 4.5 -----> 2.9 -----> 1.4    Plan    Stable renal standpoint    Cont jain catheter    Serum cr improved to baseline          2.  Hypernatremia : improved           Hypernatremia improved to 143    Encourage water intake to thirst          3 urinary obstruction    S/p jain            Thank you for allowing us to participate in care of Jennifer Prieto MD  Feel free to contact me   [facility] [address]  Office : [phone]  Fax :[phone]

## 2021-09-22 NOTE — PROGRESS NOTES
Physical Therapy/Occupational Therapy  Per chart, pt with new Neurosurgery consult to review MRI results. Will hold therapy pending consult. Thank you.   Elio Gaming, PT, DPT  Memory Cathy OTR/L

## 2021-09-22 NOTE — PROGRESS NOTES
Pt assessment completed and charted. VSS. Pt a/o with come confusion. Avilez catheter in place and draining appropriately. Repositioning pt Q2 hours. Changing pt as needed. Bed check active for safety. No IV or telemetry monitoring per nursing communication order. Wound care performed on sacrum and bilat heels per orders. Bed in lowest position and wheels locked. Call light within reach. Bedside table within reach. Non-skid footwear in place. Pt denies any other needs at this time. Pt calls out appropriately. Will continue to monitor.

## 2021-09-22 NOTE — CARE COORDINATION
Chart reviewed day 3. Care managed per nephrology and IM. Consult pending with neurosurgery, re: postoperative hematoma or seroma causing severe spinal canal stenosis which is compressing his cauda equina. Plan for [facility] when medically ready. Following.  Marilyn Perez RN

## 2021-09-22 NOTE — PROGRESS NOTES
Report called to [facility] @ the [facility] [address], [place]. All questions answered.

## 2021-09-28 NOTE — PROGRESS NOTES
Physician Progress Note      PATIENT:               Bunny Francisco  [facility] #:                  [id number]  :                       1934  ADMIT DATE:       2021 12:02 AM  DISCH DATE:        2021 1:36 PM  RESPONDING  PROVIDER #:        Ashley Esquivel MD          QUERY TEXT:    Pt admitted with post-obstructive ROXI. ? Pt noted to have ill-defined   fluid/tissue causing severe spinal canal stenosis from L1-4 on MRI, with hx   L2-5 laminectomy, suspect residual hematoma. ? If possible, please document in   progress notes and discharge summary:    The medical record reflects the following:  ?? Risk Factors: s/p laminectomy   ?? Clinical Indicators: PN : \" Patient has a probable postoperative   hematoma or seroma causing severe spinal canal stenosis which is compressing   his cauda equina. In light of his urinary retention we will need to get input   from his neurosurgery group prior to discharge. We need to make sure he   doesn't need another urgent hematoma evacuation like he had on  with Dr. Chris Teran"  ? ? Treatment: imaging, consult and transfer to [facility] for NSR eval    Thank you,  Luz Acosta RN [facility]  [phone]  Options provided:  -- Fluid/tissue causing severe spinal canal stenosis is a postoperative   complication and likely causing patient's inability to void  -- Fluid/tissue causing severe spinal canal stenosis is an expected/inherent   condition that occurred postoperatively and not a complication  -- Fluid/tissue causing severe spinal canal stenosis is a postoperative   complication, but is not contributing to patient's inability to void  -- Other - I will add my own diagnosis  -- Disagree - Not applicable / Not valid  -- Disagree - Clinically unable to determine / Unknown  -- Refer to Clinical Documentation Reviewer    PROVIDER RESPONSE TEXT:    The severe spinal stenosis was a postoperative complication but I am unsure   whether it was causing his inability to void.     Query created by:  Merline Keene on 9/28/2021 1:29 PM      Electronically signed by:  Tanner Habermann MD 9/28/2021 5:15 PM

## 2021-10-15 ENCOUNTER — APPOINTMENT (OUTPATIENT)
Dept: GENERAL RADIOLOGY | Age: 86
DRG: 853 | End: 2021-10-15
Payer: MEDICARE

## 2021-10-15 ENCOUNTER — HOSPITAL ENCOUNTER (INPATIENT)
Age: 86
LOS: 21 days | Discharge: NURSING, REHAB OR LONG-TERM CARE | DRG: 853 | End: 2021-11-05
Attending: EMERGENCY MEDICINE | Admitting: INTERNAL MEDICINE
Payer: MEDICARE

## 2021-10-15 ENCOUNTER — APPOINTMENT (OUTPATIENT)
Dept: CT IMAGING | Age: 86
DRG: 853 | End: 2021-10-15
Payer: MEDICARE

## 2021-10-15 DIAGNOSIS — L89.150 PRESSURE INJURY OF SACRAL REGION, UNSTAGEABLE (HCC): ICD-10-CM

## 2021-10-15 DIAGNOSIS — R65.20 SEVERE SEPSIS (HCC): Primary | ICD-10-CM

## 2021-10-15 DIAGNOSIS — A41.9 SEVERE SEPSIS (HCC): Primary | ICD-10-CM

## 2021-10-15 DIAGNOSIS — F41.9 ANXIETY: ICD-10-CM

## 2021-10-15 PROBLEM — R33.9 URINARY RETENTION: Status: ACTIVE | Noted: 2021-09-19

## 2021-10-15 LAB
A/G RATIO: 0.6 (ref 1.1–2.2)
ALBUMIN SERPL-MCNC: 2.5 G/DL (ref 3.4–5)
ALP BLD-CCNC: 146 U/L (ref 40–129)
ALT SERPL-CCNC: 37 U/L (ref 10–40)
ANION GAP SERPL CALCULATED.3IONS-SCNC: 15 MMOL/L (ref 3–16)
ANISOCYTOSIS: ABNORMAL
AST SERPL-CCNC: 35 U/L (ref 15–37)
ATYPICAL LYMPHOCYTE RELATIVE PERCENT: 3 % (ref 0–6)
BACTERIA: ABNORMAL /HPF
BANDED NEUTROPHILS RELATIVE PERCENT: 27 % (ref 0–7)
BASOPHILS ABSOLUTE: 0 K/UL (ref 0–0.2)
BASOPHILS RELATIVE PERCENT: 0 %
BILIRUB SERPL-MCNC: 0.5 MG/DL (ref 0–1)
BILIRUBIN URINE: NEGATIVE
BLOOD, URINE: ABNORMAL
BUN BLDV-MCNC: 58 MG/DL (ref 7–20)
CALCIUM SERPL-MCNC: 8.5 MG/DL (ref 8.3–10.6)
CHLORIDE BLD-SCNC: 101 MMOL/L (ref 99–110)
CLARITY: ABNORMAL
CO2: 23 MMOL/L (ref 21–32)
COLOR: YELLOW
CREAT SERPL-MCNC: 1.2 MG/DL (ref 0.8–1.3)
EKG ATRIAL RATE: 112 BPM
EKG DIAGNOSIS: NORMAL
EKG P AXIS: 69 DEGREES
EKG P-R INTERVAL: 140 MS
EKG Q-T INTERVAL: 366 MS
EKG QRS DURATION: 102 MS
EKG QTC CALCULATION (BAZETT): 499 MS
EKG R AXIS: 55 DEGREES
EKG T AXIS: 38 DEGREES
EKG VENTRICULAR RATE: 112 BPM
EOSINOPHILS ABSOLUTE: 0 K/UL (ref 0–0.6)
EOSINOPHILS RELATIVE PERCENT: 0 %
GFR AFRICAN AMERICAN: >60
GFR NON-AFRICAN AMERICAN: 57
GLOBULIN: 4.2 G/DL
GLUCOSE BLD-MCNC: 75 MG/DL (ref 70–99)
GLUCOSE URINE: 250 MG/DL
HCT VFR BLD CALC: 31.7 % (ref 40.5–52.5)
HEMOGLOBIN: 10.6 G/DL (ref 13.5–17.5)
KETONES, URINE: NEGATIVE MG/DL
LACTIC ACID, SEPSIS: 2.7 MMOL/L (ref 0.4–1.9)
LACTIC ACID, SEPSIS: 3.2 MMOL/L (ref 0.4–1.9)
LEUKOCYTE ESTERASE, URINE: ABNORMAL
LYMPHOCYTES ABSOLUTE: 2.7 K/UL (ref 1–5.1)
LYMPHOCYTES RELATIVE PERCENT: 15 %
MACROCYTES: ABNORMAL
MCH RBC QN AUTO: 31.2 PG (ref 26–34)
MCHC RBC AUTO-ENTMCNC: 33.3 G/DL (ref 31–36)
MCV RBC AUTO: 93.7 FL (ref 80–100)
MICROCYTES: ABNORMAL
MICROSCOPIC EXAMINATION: YES
MONOCYTES ABSOLUTE: 0.3 K/UL (ref 0–1.3)
MONOCYTES RELATIVE PERCENT: 2 %
NEUTROPHILS ABSOLUTE: 11.9 K/UL (ref 1.7–7.7)
NEUTROPHILS RELATIVE PERCENT: 53 %
NITRITE, URINE: NEGATIVE
PDW BLD-RTO: 15.7 % (ref 12.4–15.4)
PH UA: 7.5 (ref 5–8)
PLATELET # BLD: 398 K/UL (ref 135–450)
PLATELET SLIDE REVIEW: ADEQUATE
PMV BLD AUTO: 7 FL (ref 5–10.5)
POIKILOCYTES: ABNORMAL
POLYCHROMASIA: ABNORMAL
POTASSIUM REFLEX MAGNESIUM: 4.8 MMOL/L (ref 3.5–5.1)
PROTEIN UA: ABNORMAL MG/DL
RBC # BLD: 3.39 M/UL (ref 4.2–5.9)
RBC UA: ABNORMAL /HPF (ref 0–4)
SARS-COV-2, NAAT: NOT DETECTED
SLIDE REVIEW: ABNORMAL
SODIUM BLD-SCNC: 139 MMOL/L (ref 136–145)
SPECIFIC GRAVITY UA: 1.01 (ref 1–1.03)
TOTAL PROTEIN: 6.7 G/DL (ref 6.4–8.2)
URINE TYPE: ABNORMAL
UROBILINOGEN, URINE: 0.2 E.U./DL
WBC # BLD: 14.9 K/UL (ref 4–11)
WBC UA: ABNORMAL /HPF (ref 0–5)

## 2021-10-15 PROCEDURE — 80053 COMPREHEN METABOLIC PANEL: CPT

## 2021-10-15 PROCEDURE — 87205 SMEAR GRAM STAIN: CPT

## 2021-10-15 PROCEDURE — 70450 CT HEAD/BRAIN W/O DYE: CPT

## 2021-10-15 PROCEDURE — 83605 ASSAY OF LACTIC ACID: CPT

## 2021-10-15 PROCEDURE — 96367 TX/PROPH/DG ADDL SEQ IV INF: CPT

## 2021-10-15 PROCEDURE — 93005 ELECTROCARDIOGRAM TRACING: CPT | Performed by: PHYSICIAN ASSISTANT

## 2021-10-15 PROCEDURE — 87070 CULTURE OTHR SPECIMN AEROBIC: CPT

## 2021-10-15 PROCEDURE — 2580000003 HC RX 258: Performed by: PHYSICIAN ASSISTANT

## 2021-10-15 PROCEDURE — 71045 X-RAY EXAM CHEST 1 VIEW: CPT

## 2021-10-15 PROCEDURE — 72192 CT PELVIS W/O DYE: CPT

## 2021-10-15 PROCEDURE — 87077 CULTURE AEROBIC IDENTIFY: CPT

## 2021-10-15 PROCEDURE — 99284 EMERGENCY DEPT VISIT MOD MDM: CPT

## 2021-10-15 PROCEDURE — 96365 THER/PROPH/DIAG IV INF INIT: CPT

## 2021-10-15 PROCEDURE — 2700000000 HC OXYGEN THERAPY PER DAY

## 2021-10-15 PROCEDURE — 1200000000 HC SEMI PRIVATE

## 2021-10-15 PROCEDURE — 2580000003 HC RX 258: Performed by: EMERGENCY MEDICINE

## 2021-10-15 PROCEDURE — 93010 ELECTROCARDIOGRAM REPORT: CPT | Performed by: INTERNAL MEDICINE

## 2021-10-15 PROCEDURE — 96375 TX/PRO/DX INJ NEW DRUG ADDON: CPT

## 2021-10-15 PROCEDURE — 87186 SC STD MICRODIL/AGAR DIL: CPT

## 2021-10-15 PROCEDURE — 85025 COMPLETE CBC W/AUTO DIFF WBC: CPT

## 2021-10-15 PROCEDURE — 81001 URINALYSIS AUTO W/SCOPE: CPT

## 2021-10-15 PROCEDURE — 87040 BLOOD CULTURE FOR BACTERIA: CPT

## 2021-10-15 PROCEDURE — 6360000002 HC RX W HCPCS: Performed by: PHYSICIAN ASSISTANT

## 2021-10-15 PROCEDURE — 6370000000 HC RX 637 (ALT 250 FOR IP): Performed by: PHYSICIAN ASSISTANT

## 2021-10-15 PROCEDURE — 87086 URINE CULTURE/COLONY COUNT: CPT

## 2021-10-15 PROCEDURE — 87635 SARS-COV-2 COVID-19 AMP PRB: CPT

## 2021-10-15 PROCEDURE — 94761 N-INVAS EAR/PLS OXIMETRY MLT: CPT

## 2021-10-15 PROCEDURE — 96376 TX/PRO/DX INJ SAME DRUG ADON: CPT

## 2021-10-15 RX ORDER — ACETAMINOPHEN 650 MG/1
650 SUPPOSITORY RECTAL ONCE
Status: COMPLETED | OUTPATIENT
Start: 2021-10-15 | End: 2021-10-15

## 2021-10-15 RX ORDER — LORAZEPAM 2 MG/ML
0.5 INJECTION INTRAMUSCULAR ONCE
Status: COMPLETED | OUTPATIENT
Start: 2021-10-15 | End: 2021-10-15

## 2021-10-15 RX ORDER — FENTANYL CITRATE 50 UG/ML
25 INJECTION, SOLUTION INTRAMUSCULAR; INTRAVENOUS ONCE
Status: COMPLETED | OUTPATIENT
Start: 2021-10-15 | End: 2021-10-15

## 2021-10-15 RX ORDER — FENTANYL CITRATE 50 UG/ML
50 INJECTION, SOLUTION INTRAMUSCULAR; INTRAVENOUS ONCE
Status: COMPLETED | OUTPATIENT
Start: 2021-10-15 | End: 2021-10-15

## 2021-10-15 RX ORDER — 0.9 % SODIUM CHLORIDE 0.9 %
30 INTRAVENOUS SOLUTION INTRAVENOUS ONCE
Status: COMPLETED | OUTPATIENT
Start: 2021-10-15 | End: 2021-10-15

## 2021-10-15 RX ORDER — 0.9 % SODIUM CHLORIDE 0.9 %
1000 INTRAVENOUS SOLUTION INTRAVENOUS ONCE
Status: COMPLETED | OUTPATIENT
Start: 2021-10-15 | End: 2021-10-15

## 2021-10-15 RX ADMIN — LORAZEPAM 0.5 MG: 2 INJECTION INTRAMUSCULAR; INTRAVENOUS at 22:40

## 2021-10-15 RX ADMIN — ACETAMINOPHEN 650 MG: 650 SUPPOSITORY RECTAL at 19:38

## 2021-10-15 RX ADMIN — FENTANYL CITRATE 25 MCG: 50 INJECTION, SOLUTION INTRAMUSCULAR; INTRAVENOUS at 20:20

## 2021-10-15 RX ADMIN — SODIUM CHLORIDE 1632 ML: 9 INJECTION, SOLUTION INTRAVENOUS at 19:05

## 2021-10-15 RX ADMIN — VANCOMYCIN HYDROCHLORIDE 1000 MG: 1 INJECTION, POWDER, LYOPHILIZED, FOR SOLUTION INTRAVENOUS at 19:38

## 2021-10-15 RX ADMIN — CEFEPIME HYDROCHLORIDE 2000 MG: 2 INJECTION, POWDER, FOR SOLUTION INTRAVENOUS at 19:00

## 2021-10-15 RX ADMIN — SODIUM CHLORIDE 1000 ML: 9 INJECTION, SOLUTION INTRAVENOUS at 21:42

## 2021-10-15 RX ADMIN — FENTANYL CITRATE 50 MCG: 50 INJECTION, SOLUTION INTRAMUSCULAR; INTRAVENOUS at 21:30

## 2021-10-15 ASSESSMENT — PAIN SCALES - GENERAL
PAINLEVEL_OUTOF10: 6
PAINLEVEL_OUTOF10: 4
PAINLEVEL_OUTOF10: 9
PAINLEVEL_OUTOF10: 8

## 2021-10-15 NOTE — ED PROVIDER NOTES
[facility] Emergency Department    CHIEF COMPLAINT  Altered Mental Status, Shortness of Breath, and Wound Check      SHARED SERVICE VISIT  I have seen and evaluated this patient with my supervising physician, Dr. Marcia Gonsalves. HISTORY OF PRESENT ILLNESS  Nahum Weber is a 80 y.o. male Hx of Alzheimer's, sacral decubitus ulcer s/p debridement 9/23; L2-5 decompression and laminectomy (9/2); presenting from extended care facility for evaluation of altered mental status and wound check. Arrival patient was found to have soiled garments with saturated dressings within the wound. Patient denies any chest pain require breathing. She admits to chills. No other complaints, modifying factors or associated symptoms. Nursing notes reviewed.    Past Medical History:   Diagnosis Date    Arthritis     Cancer ([address]     skin    Dry eyes, bilateral     Hyperlipidemia     Hypertension     Osteoporosis 2009    Type II or unspecified type diabetes mellitus without mention of complication, not stated as uncontrolled     Urinary incontinence      Past Surgical History:   Procedure Laterality Date    COLONOSCOPY  2002    diverticulosis    COLONOSCOPY  11/19/2012    diverticulosis    EYE SURGERY      micheal cataract    TONSILLECTOMY       Family History   Problem Relation Age of Onset    Heart Disease Mother     Cancer Sister 39        breast     Social History     Socioeconomic History    Marital status:      Spouse name: Not on file    Number of children: Not on file    Years of education: Not on file    Highest education level: Not on file   Occupational History    Not on file   Tobacco Use    Smoking status: Never Smoker    Smokeless tobacco: Never Used   Vaping Use    Vaping Use: Never used   Substance and Sexual Activity    Alcohol use: No    Drug use: Never    Sexual activity: Not Currently   Other Topics Concern    Not on file   Social History Narrative    Not on file     Social Determinants of Health     Financial Resource Strain:     Difficulty of Paying Living Expenses:    Food Insecurity:     Worried About Running Out of Food in the Last Year:     [address] in the Last Year:    Transportation Needs:     Lack of Transportation (Medical):  Lack of Transportation (Non-Medical):    Physical Activity:     Days of Exercise per Week:     Minutes of Exercise per Session:    Stress:     Feeling of Stress :    Social Connections:     Frequency of Communication with Friends and Family:     Frequency of Social Gatherings with Friends and Family:     Attends Sabianism Services:     Active Member of Clubs or Organizations:     Attends Club or Organization Meetings:     Marital Status:    Intimate Partner Violence:     Fear of Current or Ex-Partner:     Emotionally Abused:     Physically Abused:     Sexually Abused:      No current facility-administered medications for this encounter.      Current Outpatient Medications   Medication Sig Dispense Refill    tamsulosin (FLOMAX) 0.4 MG capsule Take 1 capsule by mouth daily 30 capsule 3    Chromium Picolinate 1000 MCG TABS Take 1,000 mcg by mouth daily      cyanocobalamin 1000 MCG tablet Take 1,000 mcg by mouth daily      zinc sulfate (ZINCATE) 220 (50 Zn) MG capsule Take 220 mg by mouth daily      amLODIPine (NORVASC) 10 MG tablet Take 10 mg by mouth daily      Multiple Vitamins-Minerals (THERAPEUTIC MULTIVITAMIN-MINERALS) tablet Take 1 tablet by mouth daily      donepezil (ARICEPT) 10 MG tablet Take 10 mg by mouth nightly      senna-docusate (PERICOLACE) 8.6-50 MG per tablet Take 1 tablet by mouth 2 times daily      b complex vitamins capsule Take 1 capsule by mouth daily      methocarbamol (ROBAXIN) 500 MG tablet Take 500 mg by mouth every 6 hours as needed      acetaminophen (TYLENOL) 500 MG tablet Take 500 mg by mouth every 4 hours as needed       lisinopril (PRINIVIL;ZESTRIL) 20 MG tablet Take 20 mg by mouth nightly       simvastatin (ZOCOR) 10 MG tablet Take 10 mg by mouth nightly.  sitagliptan (JANUVIA) 100 MG tablet Take 100 mg by mouth daily.  glipiZIDE (GLUCOTROL XL) 10 MG CR tablet Take 10 mg by mouth daily. Allergies   Allergen Reactions    Iodine Itching and Swelling       REVIEW OF SYSTEMS  10 systems reviewed, pertinent positives per HPI otherwise noted to be negative    PHYSICAL EXAM  BP (!) 115/49   Pulse 85   Temp 98.4 °F (36.9 °C) (Oral)   Resp 14   Ht 6' (1.829 m)   Wt 120 lb (54.4 kg)   SpO2 99%   BMI 16.27 kg/m²   GENERAL APPEARANCE: Awake and alert. Cooperative. Frail  HEAD: Normocephalic. Atraumatic. EYES: PERRL. EOM's grossly intact. ENT: Mucous membranes are moist.   NECK: Supple. HEART: Tachycardic, regular rhythm  LUNGS: Respirations unlabored. CTAB. Good air exchange. Speaking comfortably in full sentences. ABDOMEN: Soft. Non-distended. Non-tender. No guarding or rebound. No masses. No organomegaly. EXTREMITIES: Decubitus ulcers bilateral heels. Upper extremity tremors. Upper extremity  strength and sensation is strongly intact. Strength 2 out of 5 bilateral lower extremities. Large decubitus ulcer in the sacral sacral region  SKIN: Large sacral acute ulcer as pictured below. NEUROLOGICAL: Alert and oriented. CN's 2-12 intact. No gross facial drooping. Strength 5/5, sensation intact. PSYCHIATRIC: Normal mood and affect. RADIOLOGY  CT HEAD WO CONTRAST   Final Result      Stable study. No evidence for acute intracranial hemorrhage, territorial   infarction or intracranial mass lesion. Mild chronic microangiopathic ischemic disease. Mild generalized volume loss. CT PELVIS WO CONTRAST Additional Contrast? None   Final Result   1. Large deep sacral decubitus ulcer with mild adjacent cellulitis. No   abscess or soft tissue gas.    2. Osseous uncovering of the posterior aspect of the lower sacrum and coccyx   without convincing evidence of osteomyelitis. If clinically indicated   further evaluation could be obtained with MRI. XR CHEST PORTABLE   Final Result   Chronic elevation of the left hemidiaphragm which is unchanged with   increasing subsegmental atelectasis or early infiltrate along the left lung   base.                    LABS  Labs Reviewed   CBC WITH AUTO DIFFERENTIAL - Abnormal; Notable for the following components:       Result Value    WBC 14.9 (*)     RBC 3.39 (*)     Hemoglobin 10.6 (*)     Hematocrit 31.7 (*)     RDW 15.7 (*)     Neutrophils Absolute 11.9 (*)     Bands Relative 27 (*)     Anisocytosis 1+ (*)     Macrocytes Occasional (*)     Microcytes Occasional (*)     Polychromasia Occasional (*)     Poikilocytes Occasional (*)     All other components within normal limits    Narrative:     Performed at:  [address], [facility] [place]   Phone [phone]   COMPREHENSIVE METABOLIC PANEL W/ REFLEX TO MG FOR LOW K - Abnormal; Notable for the following components:    BUN 58 (*)     GFR Non- 57 (*)     Albumin 2.5 (*)     Albumin/Globulin Ratio 0.6 (*)     Alkaline Phosphatase 146 (*)     All other components within normal limits    Narrative:     Performed at:  [address], [facility] [place]   Phone [phone] - Abnormal; Notable for the following components:    Clarity, UA CLOUDY (*)     Glucose, Ur 250 (*)     Blood, Urine SMALL (*)     Protein, UA TRACE (*)     Leukocyte Esterase, Urine LARGE (*)     All other components within normal limits    Narrative:     Performed at:  [place] [address] [place]   Phone [phone]   LACTATE, SEPSIS - Abnormal; Notable for the following components:    Lactic Acid, Sepsis 3.2 (*)     All other components within normal limits    Narrative:     Performed at:  [facility] [address], [address] [place]   Phone [phone]   LACTATE, SEPSIS - Abnormal; Notable for the following components:    Lactic Acid, Sepsis 2.7 (*)     All other components within normal limits    Narrative:     Performed at:  [address], [address]   Phone [phone]   MICROSCOPIC URINALYSIS - Abnormal; Notable for the following components:    WBC, UA 10-20 (*)     RBC, UA 5-10 (*)     Bacteria, UA 4+ (*)     All other components within normal limits    Narrative:     Performed at:  [address], [address] [place]   Phone [phone], RAPID    Narrative:     Performed at:  [address], [address] [place]   Phone [phone]   CULTURE, URINE   CULTURE, BLOOD 1   CULTURE, BLOOD 2   CULTURE, WOUND    Narrative:     ORDER#: [id number]                          ORDERED BY: Jana Cervantes  SOURCE: Anus                               COLLECTED:  10/15/21 20:16  ANTIBIOTICS AT [place].:                      RECEIVED :  10/15/21 20:28  Performed at:  [address] [address]   Phone [phone]       PROCEDURES  Unless otherwise noted below, none  Procedures    ED COURSE    ED Course as of Oct 15 2349   Fri Oct 15, 2021   2315 Was informed that [facility] is not currently taking any patients due to being at capacity. Will reach out to the hospital service to request admission. [MM]      ED Course User Index  [MM] Rubio Sterling PA-C       CRITICAL CARE TIME  The total critical care time spent while evaluating and treating this patient was 45 minutes. This excludes time spent doing separately billable procedures.   This includes time at the bedside, data interpretation, medication management, obtaining critical history from collateral sources if the patient is unable to provide it directly, and physician consultation. Specifics of interventions taken and potentially life-threatening diagnostic considerations are listed above in the medical decision making. MDM  MDM  24-year-old male history of large sacral decubitus ulcer multiple prior debridements presenting to the emergency department for evaluation of altered mental status and wound check. Patient is presenting from care facility. On arrival patient was tachycardic 124, febrile with a blood pressure 115/54. Initial evaluation patient has a large sacral cubitus ulcer as documented in pictured above. Initially the wound was filled with saturated gauze pads as well as fecal matter. Sepsis work-up initiated blood cultures were obtained as well as a lactic acid. Patient started on 30 cc/kg and was started on broad-spectrum antibiotics cefepime and vancomycin. On evaluation patient's most recent wound culture cefepime vancomycin are appropriate and susceptible antibiotics. He was given Tylenol for the fever. His vitals responded well to the fluids as he received 2 L of normal saline. She was on a cardiac monitor from arrival due to risk of dysrhythmia and tachycardia. Lab work demonstrated leukocytosis 14.9, lactic acid 3.2. BUN 58 creatinine 1.2. COVID-19 not detected. Patient's mental status actually did improve throughout his course in the emergency department with Tylenol and fluids. Patient was responsive to questioning and was swelling appropriately. We will reach out to the hospital service to request admission for severe sepsis secondary to decubitus ulceration    After discussion with the hospital service; we will plan to reach out to [facility] given he has had multiple prior general and neuro surgeries in the past at [facility] with this wound. Currently pending discussion from the hospital service.     DISPOSITION  Requested

## 2021-10-15 NOTE — ED NOTES
Bed: 07  Expected date:   Expected time:   Means of arrival:   Comments:  [address] Karine Salomon RN  10/15/21 9649

## 2021-10-15 NOTE — ED NOTES
Pt arrived via EMS moderate amount of stool noted in depends upon arrival, wound dressing on coccyx was saturated in stool. Hygiene performed at this time. MLP called to bedside to examine wound. Dressing applied, clean depend applied, reported that patient has had increased confusion, fever, sob. Pt arrived alert and oriented to self at this time. Bed alarm applied. Pt placed on left side, supported with pillows. Pt placed on tele. IV established.       Aishwarya Negro RN  10/15/21 [address], RN  10/15/21 1102

## 2021-10-16 ENCOUNTER — ANESTHESIA EVENT (OUTPATIENT)
Dept: OPERATING ROOM | Age: 86
DRG: 853 | End: 2021-10-16
Payer: MEDICARE

## 2021-10-16 ENCOUNTER — ANESTHESIA (OUTPATIENT)
Dept: OPERATING ROOM | Age: 86
DRG: 853 | End: 2021-10-16
Payer: MEDICARE

## 2021-10-16 VITALS
RESPIRATION RATE: 1 BRPM | DIASTOLIC BLOOD PRESSURE: 60 MMHG | SYSTOLIC BLOOD PRESSURE: 144 MMHG | OXYGEN SATURATION: 100 %

## 2021-10-16 PROBLEM — M86.9 OSTEOMYELITIS (HCC): Status: ACTIVE | Noted: 2021-10-16

## 2021-10-16 PROBLEM — D64.9 CHRONIC ANEMIA: Status: ACTIVE | Noted: 2021-10-16

## 2021-10-16 LAB
ANION GAP SERPL CALCULATED.3IONS-SCNC: 6 MMOL/L (ref 3–16)
BUN BLDV-MCNC: 20 MG/DL (ref 7–20)
CALCIUM SERPL-MCNC: 8 MG/DL (ref 8.3–10.6)
CHLORIDE BLD-SCNC: 104 MMOL/L (ref 99–110)
CO2: 26 MMOL/L (ref 21–32)
CREAT SERPL-MCNC: 0.6 MG/DL (ref 0.8–1.3)
GFR AFRICAN AMERICAN: >60
GFR NON-AFRICAN AMERICAN: >60
GLUCOSE BLD-MCNC: 159 MG/DL (ref 70–99)
GLUCOSE BLD-MCNC: 164 MG/DL (ref 70–99)
GLUCOSE BLD-MCNC: 173 MG/DL (ref 70–99)
GLUCOSE BLD-MCNC: 181 MG/DL (ref 70–99)
GLUCOSE BLD-MCNC: 212 MG/DL (ref 70–99)
PERFORMED ON: ABNORMAL
POTASSIUM SERPL-SCNC: 5 MMOL/L (ref 3.5–5.1)
SODIUM BLD-SCNC: 136 MMOL/L (ref 136–145)

## 2021-10-16 PROCEDURE — 87641 MR-STAPH DNA AMP PROBE: CPT

## 2021-10-16 PROCEDURE — 2580000003 HC RX 258: Performed by: INTERNAL MEDICINE

## 2021-10-16 PROCEDURE — 2500000003 HC RX 250 WO HCPCS: Performed by: SURGERY

## 2021-10-16 PROCEDURE — 3700000000 HC ANESTHESIA ATTENDED CARE: Performed by: SURGERY

## 2021-10-16 PROCEDURE — 1200000000 HC SEMI PRIVATE

## 2021-10-16 PROCEDURE — 3700000001 HC ADD 15 MINUTES (ANESTHESIA): Performed by: SURGERY

## 2021-10-16 PROCEDURE — 2709999900 HC NON-CHARGEABLE SUPPLY: Performed by: SURGERY

## 2021-10-16 PROCEDURE — 6370000000 HC RX 637 (ALT 250 FOR IP): Performed by: INTERNAL MEDICINE

## 2021-10-16 PROCEDURE — 6370000000 HC RX 637 (ALT 250 FOR IP): Performed by: SURGERY

## 2021-10-16 PROCEDURE — 2700000000 HC OXYGEN THERAPY PER DAY

## 2021-10-16 PROCEDURE — 87205 SMEAR GRAM STAIN: CPT

## 2021-10-16 PROCEDURE — 0QB10ZZ EXCISION OF SACRUM, OPEN APPROACH: ICD-10-PCS | Performed by: SURGERY

## 2021-10-16 PROCEDURE — 94761 N-INVAS EAR/PLS OXIMETRY MLT: CPT

## 2021-10-16 PROCEDURE — 87076 CULTURE ANAEROBE IDENT EACH: CPT

## 2021-10-16 PROCEDURE — 3600000004 HC SURGERY LEVEL 4 BASE: Performed by: SURGERY

## 2021-10-16 PROCEDURE — 7100000001 HC PACU RECOVERY - ADDTL 15 MIN: Performed by: SURGERY

## 2021-10-16 PROCEDURE — 6360000002 HC RX W HCPCS: Performed by: SURGERY

## 2021-10-16 PROCEDURE — 7100000000 HC PACU RECOVERY - FIRST 15 MIN: Performed by: SURGERY

## 2021-10-16 PROCEDURE — 2580000003 HC RX 258: Performed by: ANESTHESIOLOGY

## 2021-10-16 PROCEDURE — 2500000003 HC RX 250 WO HCPCS: Performed by: INTERNAL MEDICINE

## 2021-10-16 PROCEDURE — 87075 CULTR BACTERIA EXCEPT BLOOD: CPT

## 2021-10-16 PROCEDURE — 11047 DBRDMT BONE EACH ADDL: CPT | Performed by: SURGERY

## 2021-10-16 PROCEDURE — 3600000014 HC SURGERY LEVEL 4 ADDTL 15MIN: Performed by: SURGERY

## 2021-10-16 PROCEDURE — 87070 CULTURE OTHR SPECIMN AEROBIC: CPT

## 2021-10-16 PROCEDURE — 80048 BASIC METABOLIC PNL TOTAL CA: CPT

## 2021-10-16 PROCEDURE — 6360000002 HC RX W HCPCS: Performed by: INTERNAL MEDICINE

## 2021-10-16 PROCEDURE — 36415 COLL VENOUS BLD VENIPUNCTURE: CPT

## 2021-10-16 PROCEDURE — 2500000003 HC RX 250 WO HCPCS: Performed by: ANESTHESIOLOGY

## 2021-10-16 PROCEDURE — 99222 1ST HOSP IP/OBS MODERATE 55: CPT | Performed by: SURGERY

## 2021-10-16 PROCEDURE — 6360000002 HC RX W HCPCS: Performed by: ANESTHESIOLOGY

## 2021-10-16 PROCEDURE — 11044 DBRDMT BONE 1ST 20 SQ CM/<: CPT | Performed by: SURGERY

## 2021-10-16 RX ORDER — ONDANSETRON 2 MG/ML
INJECTION INTRAMUSCULAR; INTRAVENOUS PRN
Status: DISCONTINUED | OUTPATIENT
Start: 2021-10-16 | End: 2021-10-16 | Stop reason: SDUPTHER

## 2021-10-16 RX ORDER — DONEPEZIL HYDROCHLORIDE 5 MG/1
10 TABLET, FILM COATED ORAL NIGHTLY
Status: DISCONTINUED | OUTPATIENT
Start: 2021-10-16 | End: 2021-11-05 | Stop reason: HOSPADM

## 2021-10-16 RX ORDER — OXYCODONE HYDROCHLORIDE AND ACETAMINOPHEN 5; 325 MG/1; MG/1
1 TABLET ORAL PRN
Status: DISCONTINUED | OUTPATIENT
Start: 2021-10-16 | End: 2021-10-16 | Stop reason: HOSPADM

## 2021-10-16 RX ORDER — ACETAMINOPHEN 650 MG/1
650 SUPPOSITORY RECTAL EVERY 6 HOURS PRN
Status: DISCONTINUED | OUTPATIENT
Start: 2021-10-16 | End: 2021-11-05 | Stop reason: HOSPADM

## 2021-10-16 RX ORDER — SODIUM CHLORIDE, SODIUM LACTATE, POTASSIUM CHLORIDE, CALCIUM CHLORIDE 600; 310; 30; 20 MG/100ML; MG/100ML; MG/100ML; MG/100ML
INJECTION, SOLUTION INTRAVENOUS CONTINUOUS
Status: DISCONTINUED | OUTPATIENT
Start: 2021-10-16 | End: 2021-10-16

## 2021-10-16 RX ORDER — POLYETHYLENE GLYCOL 3350 17 G/17G
17 POWDER, FOR SOLUTION ORAL DAILY PRN
COMMUNITY

## 2021-10-16 RX ORDER — INSULIN GLARGINE 100 [IU]/ML
0.15 INJECTION, SOLUTION SUBCUTANEOUS NIGHTLY
Status: DISCONTINUED | OUTPATIENT
Start: 2021-10-16 | End: 2021-10-24

## 2021-10-16 RX ORDER — POTASSIUM CHLORIDE 20 MEQ/1
40 TABLET, EXTENDED RELEASE ORAL PRN
Status: DISCONTINUED | OUTPATIENT
Start: 2021-10-16 | End: 2021-11-05 | Stop reason: HOSPADM

## 2021-10-16 RX ORDER — LANOLIN ALCOHOL/MO/W.PET/CERES
3 CREAM (GRAM) TOPICAL NIGHTLY PRN
COMMUNITY

## 2021-10-16 RX ORDER — ATORVASTATIN CALCIUM 10 MG/1
10 TABLET, FILM COATED ORAL NIGHTLY
Status: DISCONTINUED | OUTPATIENT
Start: 2021-10-16 | End: 2021-11-05 | Stop reason: HOSPADM

## 2021-10-16 RX ORDER — SODIUM CHLORIDE 9 MG/ML
INJECTION, SOLUTION INTRAVENOUS CONTINUOUS PRN
Status: DISCONTINUED | OUTPATIENT
Start: 2021-10-16 | End: 2021-10-16 | Stop reason: SDUPTHER

## 2021-10-16 RX ORDER — MORPHINE SULFATE 2 MG/ML
2 INJECTION, SOLUTION INTRAMUSCULAR; INTRAVENOUS EVERY 5 MIN PRN
Status: DISCONTINUED | OUTPATIENT
Start: 2021-10-16 | End: 2021-10-16 | Stop reason: HOSPADM

## 2021-10-16 RX ORDER — ONDANSETRON 2 MG/ML
4 INJECTION INTRAMUSCULAR; INTRAVENOUS
Status: DISCONTINUED | OUTPATIENT
Start: 2021-10-16 | End: 2021-10-16 | Stop reason: HOSPADM

## 2021-10-16 RX ORDER — OXYCODONE HYDROCHLORIDE 5 MG/1
5 CAPSULE ORAL
Status: ON HOLD | COMMUNITY
End: 2021-11-05 | Stop reason: HOSPADM

## 2021-10-16 RX ORDER — SODIUM CHLORIDE 9 MG/ML
25 INJECTION, SOLUTION INTRAVENOUS PRN
Status: DISCONTINUED | OUTPATIENT
Start: 2021-10-16 | End: 2021-10-16 | Stop reason: HOSPADM

## 2021-10-16 RX ORDER — POTASSIUM CHLORIDE 7.45 MG/ML
10 INJECTION INTRAVENOUS PRN
Status: DISCONTINUED | OUTPATIENT
Start: 2021-10-16 | End: 2021-11-05 | Stop reason: HOSPADM

## 2021-10-16 RX ORDER — HYDRALAZINE HYDROCHLORIDE 20 MG/ML
25 INJECTION INTRAMUSCULAR; INTRAVENOUS EVERY 8 HOURS PRN
Status: ON HOLD | COMMUNITY
End: 2021-11-05 | Stop reason: HOSPADM

## 2021-10-16 RX ORDER — OXYCODONE HYDROCHLORIDE AND ACETAMINOPHEN 5; 325 MG/1; MG/1
2 TABLET ORAL PRN
Status: DISCONTINUED | OUTPATIENT
Start: 2021-10-16 | End: 2021-10-16 | Stop reason: HOSPADM

## 2021-10-16 RX ORDER — ONDANSETRON 4 MG/1
4 TABLET, ORALLY DISINTEGRATING ORAL EVERY 8 HOURS PRN
Status: DISCONTINUED | OUTPATIENT
Start: 2021-10-16 | End: 2021-11-05 | Stop reason: HOSPADM

## 2021-10-16 RX ORDER — ACETAMINOPHEN 325 MG/1
650 TABLET ORAL EVERY 6 HOURS PRN
Status: DISCONTINUED | OUTPATIENT
Start: 2021-10-16 | End: 2021-11-05 | Stop reason: HOSPADM

## 2021-10-16 RX ORDER — DEXTROSE MONOHYDRATE 25 G/50ML
12.5 INJECTION, SOLUTION INTRAVENOUS PRN
Status: DISCONTINUED | OUTPATIENT
Start: 2021-10-16 | End: 2021-11-05 | Stop reason: HOSPADM

## 2021-10-16 RX ORDER — PROPOFOL 10 MG/ML
INJECTION, EMULSION INTRAVENOUS PRN
Status: DISCONTINUED | OUTPATIENT
Start: 2021-10-16 | End: 2021-10-16 | Stop reason: SDUPTHER

## 2021-10-16 RX ORDER — OXYBUTYNIN CHLORIDE 10 MG/1
10 TABLET, EXTENDED RELEASE ORAL DAILY
Status: ON HOLD | COMMUNITY
End: 2021-11-05 | Stop reason: HOSPADM

## 2021-10-16 RX ORDER — M-VIT,TX,IRON,MINS/CALC/FOLIC 27MG-0.4MG
1 TABLET ORAL DAILY
Status: DISCONTINUED | OUTPATIENT
Start: 2021-10-16 | End: 2021-11-05 | Stop reason: HOSPADM

## 2021-10-16 RX ORDER — NICOTINE POLACRILEX 4 MG
15 LOZENGE BUCCAL PRN
Status: DISCONTINUED | OUTPATIENT
Start: 2021-10-16 | End: 2021-11-05 | Stop reason: HOSPADM

## 2021-10-16 RX ORDER — SUCCINYLCHOLINE CHLORIDE 20 MG/ML
INJECTION INTRAMUSCULAR; INTRAVENOUS PRN
Status: DISCONTINUED | OUTPATIENT
Start: 2021-10-16 | End: 2021-10-16 | Stop reason: SDUPTHER

## 2021-10-16 RX ORDER — MEPERIDINE HYDROCHLORIDE 50 MG/ML
12.5 INJECTION INTRAMUSCULAR; INTRAVENOUS; SUBCUTANEOUS EVERY 5 MIN PRN
Status: DISCONTINUED | OUTPATIENT
Start: 2021-10-16 | End: 2021-10-16 | Stop reason: HOSPADM

## 2021-10-16 RX ORDER — DOCUSATE SODIUM 100 MG/1
100 CAPSULE, LIQUID FILLED ORAL 2 TIMES DAILY
COMMUNITY

## 2021-10-16 RX ORDER — MAGNESIUM SULFATE 1 G/100ML
1000 INJECTION INTRAVENOUS PRN
Status: DISCONTINUED | OUTPATIENT
Start: 2021-10-16 | End: 2021-11-05 | Stop reason: HOSPADM

## 2021-10-16 RX ORDER — SODIUM CHLORIDE 0.9 % (FLUSH) 0.9 %
10 SYRINGE (ML) INJECTION PRN
Status: DISCONTINUED | OUTPATIENT
Start: 2021-10-16 | End: 2021-11-05 | Stop reason: HOSPADM

## 2021-10-16 RX ORDER — MORPHINE SULFATE 2 MG/ML
1 INJECTION, SOLUTION INTRAMUSCULAR; INTRAVENOUS EVERY 5 MIN PRN
Status: DISCONTINUED | OUTPATIENT
Start: 2021-10-16 | End: 2021-10-16 | Stop reason: HOSPADM

## 2021-10-16 RX ORDER — INSULIN GLARGINE 100 [IU]/ML
10 INJECTION, SOLUTION SUBCUTANEOUS NIGHTLY
Status: ON HOLD | COMMUNITY
End: 2021-11-05 | Stop reason: HOSPADM

## 2021-10-16 RX ORDER — BUPIVACAINE HYDROCHLORIDE 5 MG/ML
INJECTION, SOLUTION EPIDURAL; INTRACAUDAL PRN
Status: DISCONTINUED | OUTPATIENT
Start: 2021-10-16 | End: 2021-10-16 | Stop reason: ALTCHOICE

## 2021-10-16 RX ORDER — DEXTROSE MONOHYDRATE 50 MG/ML
100 INJECTION, SOLUTION INTRAVENOUS PRN
Status: DISCONTINUED | OUTPATIENT
Start: 2021-10-16 | End: 2021-11-05 | Stop reason: HOSPADM

## 2021-10-16 RX ORDER — SODIUM CHLORIDE 9 MG/ML
25 INJECTION, SOLUTION INTRAVENOUS PRN
Status: DISCONTINUED | OUTPATIENT
Start: 2021-10-16 | End: 2021-11-05 | Stop reason: HOSPADM

## 2021-10-16 RX ORDER — LANOLIN ALCOHOL/MO/W.PET/CERES
3 CREAM (GRAM) TOPICAL NIGHTLY PRN
Status: DISCONTINUED | OUTPATIENT
Start: 2021-10-16 | End: 2021-11-05 | Stop reason: HOSPADM

## 2021-10-16 RX ORDER — SODIUM CHLORIDE 0.9 % (FLUSH) 0.9 %
10 SYRINGE (ML) INJECTION PRN
Status: DISCONTINUED | OUTPATIENT
Start: 2021-10-16 | End: 2021-10-16 | Stop reason: HOSPADM

## 2021-10-16 RX ORDER — SODIUM CHLORIDE 0.9 % (FLUSH) 0.9 %
10 SYRINGE (ML) INJECTION EVERY 12 HOURS SCHEDULED
Status: DISCONTINUED | OUTPATIENT
Start: 2021-10-16 | End: 2021-10-16 | Stop reason: HOSPADM

## 2021-10-16 RX ORDER — FENTANYL CITRATE 50 UG/ML
INJECTION, SOLUTION INTRAMUSCULAR; INTRAVENOUS PRN
Status: DISCONTINUED | OUTPATIENT
Start: 2021-10-16 | End: 2021-10-16 | Stop reason: SDUPTHER

## 2021-10-16 RX ORDER — SODIUM CHLORIDE, SODIUM LACTATE, POTASSIUM CHLORIDE, CALCIUM CHLORIDE 600; 310; 30; 20 MG/100ML; MG/100ML; MG/100ML; MG/100ML
INJECTION, SOLUTION INTRAVENOUS CONTINUOUS
Status: DISCONTINUED | OUTPATIENT
Start: 2021-10-16 | End: 2021-11-04

## 2021-10-16 RX ORDER — ONDANSETRON 2 MG/ML
4 INJECTION INTRAMUSCULAR; INTRAVENOUS EVERY 6 HOURS PRN
Status: DISCONTINUED | OUTPATIENT
Start: 2021-10-16 | End: 2021-11-05 | Stop reason: HOSPADM

## 2021-10-16 RX ADMIN — FENTANYL CITRATE 50 MCG: 50 INJECTION INTRAMUSCULAR; INTRAVENOUS at 12:28

## 2021-10-16 RX ADMIN — INSULIN GLARGINE 8 UNITS: 100 INJECTION, SOLUTION SUBCUTANEOUS at 19:57

## 2021-10-16 RX ADMIN — CEFEPIME 2000 MG: 2 INJECTION, POWDER, FOR SOLUTION INTRAVENOUS at 17:48

## 2021-10-16 RX ADMIN — INSULIN LISPRO 2 UNITS: 100 INJECTION, SOLUTION INTRAVENOUS; SUBCUTANEOUS at 08:34

## 2021-10-16 RX ADMIN — FAMOTIDINE 20 MG: 10 INJECTION, SOLUTION INTRAVENOUS at 12:18

## 2021-10-16 RX ADMIN — SODIUM CHLORIDE, POTASSIUM CHLORIDE, SODIUM LACTATE AND CALCIUM CHLORIDE: 600; 310; 30; 20 INJECTION, SOLUTION INTRAVENOUS at 08:31

## 2021-10-16 RX ADMIN — PROPOFOL 100 MG: 10 INJECTION, EMULSION INTRAVENOUS at 12:23

## 2021-10-16 RX ADMIN — ONDANSETRON 4 MG: 2 INJECTION INTRAMUSCULAR; INTRAVENOUS at 12:38

## 2021-10-16 RX ADMIN — PHENYLEPHRINE HYDROCHLORIDE 100 MCG: 10 INJECTION INTRAVENOUS at 12:41

## 2021-10-16 RX ADMIN — FENTANYL CITRATE 25 MCG: 50 INJECTION INTRAMUSCULAR; INTRAVENOUS at 13:08

## 2021-10-16 RX ADMIN — SODIUM CHLORIDE: 9 INJECTION, SOLUTION INTRAVENOUS at 12:22

## 2021-10-16 RX ADMIN — SODIUM CHLORIDE, POTASSIUM CHLORIDE, SODIUM LACTATE AND CALCIUM CHLORIDE: 600; 310; 30; 20 INJECTION, SOLUTION INTRAVENOUS at 14:02

## 2021-10-16 RX ADMIN — FENTANYL CITRATE 25 MCG: 50 INJECTION INTRAMUSCULAR; INTRAVENOUS at 12:55

## 2021-10-16 RX ADMIN — Medication 3 MG: at 23:03

## 2021-10-16 RX ADMIN — SUCCINYLCHOLINE CHLORIDE 60 MG: 20 INJECTION, SOLUTION INTRAMUSCULAR; INTRAVENOUS at 12:23

## 2021-10-16 RX ADMIN — HYDROMORPHONE HYDROCHLORIDE 0.5 MG: 1 INJECTION, SOLUTION INTRAMUSCULAR; INTRAVENOUS; SUBCUTANEOUS at 19:57

## 2021-10-16 RX ADMIN — INSULIN LISPRO 1 UNITS: 100 INJECTION, SOLUTION INTRAVENOUS; SUBCUTANEOUS at 19:58

## 2021-10-16 RX ADMIN — METRONIDAZOLE 500 MG: 500 INJECTION, SOLUTION INTRAVENOUS at 18:24

## 2021-10-16 ASSESSMENT — PULMONARY FUNCTION TESTS
PIF_VALUE: 1
PIF_VALUE: 14
PIF_VALUE: 15
PIF_VALUE: 1
PIF_VALUE: 15
PIF_VALUE: 15
PIF_VALUE: 31
PIF_VALUE: 19
PIF_VALUE: 9
PIF_VALUE: 19
PIF_VALUE: 15
PIF_VALUE: 20
PIF_VALUE: 15
PIF_VALUE: 21
PIF_VALUE: 20
PIF_VALUE: 17
PIF_VALUE: 22
PIF_VALUE: 15
PIF_VALUE: 13
PIF_VALUE: 16
PIF_VALUE: 5
PIF_VALUE: 22
PIF_VALUE: 17
PIF_VALUE: 13
PIF_VALUE: 1
PIF_VALUE: 38
PIF_VALUE: 16
PIF_VALUE: 18
PIF_VALUE: 15
PIF_VALUE: 16
PIF_VALUE: 7
PIF_VALUE: 1
PIF_VALUE: 17
PIF_VALUE: 0
PIF_VALUE: 20
PIF_VALUE: 15
PIF_VALUE: 16
PIF_VALUE: 18
PIF_VALUE: 15
PIF_VALUE: 0
PIF_VALUE: 3
PIF_VALUE: -14
PIF_VALUE: 29
PIF_VALUE: 2
PIF_VALUE: 0
PIF_VALUE: 0
PIF_VALUE: 15
PIF_VALUE: 19
PIF_VALUE: 17
PIF_VALUE: 13
PIF_VALUE: 7
PIF_VALUE: 1
PIF_VALUE: 19
PIF_VALUE: 16
PIF_VALUE: 19
PIF_VALUE: 31
PIF_VALUE: 17
PIF_VALUE: 16
PIF_VALUE: 16
PIF_VALUE: 13
PIF_VALUE: 19
PIF_VALUE: -14
PIF_VALUE: 1

## 2021-10-16 ASSESSMENT — PAIN DESCRIPTION - PAIN TYPE
TYPE: SURGICAL PAIN
TYPE: ACUTE PAIN

## 2021-10-16 ASSESSMENT — PAIN DESCRIPTION - ONSET: ONSET: ON-GOING

## 2021-10-16 ASSESSMENT — PAIN DESCRIPTION - ORIENTATION
ORIENTATION: MID
ORIENTATION: MID

## 2021-10-16 ASSESSMENT — PAIN DESCRIPTION - LOCATION
LOCATION: BUTTOCKS
LOCATION: BUTTOCKS

## 2021-10-16 ASSESSMENT — PAIN SCALES - GENERAL
PAINLEVEL_OUTOF10: 0
PAINLEVEL_OUTOF10: 4
PAINLEVEL_OUTOF10: 0
PAINLEVEL_OUTOF10: 8
PAINLEVEL_OUTOF10: 0

## 2021-10-16 ASSESSMENT — ENCOUNTER SYMPTOMS: SHORTNESS OF BREATH: 0

## 2021-10-16 ASSESSMENT — PAIN DESCRIPTION - DESCRIPTORS: DESCRIPTORS: DISCOMFORT

## 2021-10-16 ASSESSMENT — PAIN DESCRIPTION - FREQUENCY: FREQUENCY: CONTINUOUS

## 2021-10-16 NOTE — PROGRESS NOTES
10/16/21 1702 -Infectious disease consult perfect served and RNs number provided for callback.    -Gabby Schuler, PCA

## 2021-10-16 NOTE — CONSULTS
Pharmacy to Dose Vancomycin    Dx: Sepsis  Goal trough = 15-20  Pt wt = 55.2 kg  Recent Labs     10/15/21  1753   CREATININE 1.2     Recent Labs     10/15/21  1753   WBC 14.9*     Vanc 1g x1 in ED 10/15 1930  Pulse dose  Vancomycin random now  Anca Garzon, [facility] 10/16/2021 7:00 AM

## 2021-10-16 NOTE — ED PROVIDER NOTES
I independently performed a history and physical on Radha Dotson. All diagnostic, treatment, and disposition decisions were made by myself in conjunction with the advanced practice provider. I have participated in the medical decision making and directed the treatment plan and disposition of the patient. For further details of [place] emergency department encounter, please see the advanced practice provider's documentation. CHIEF COMPLAINT  Chief Complaint   Patient presents with    Altered Mental Status    Shortness of Breath    Wound Check       Briefly, Radha Dotson is a 80 y.o. male  who presents to the ED complaining of altered mental status, shortness of breath    FOCUSED PHYSICAL EXAMINATION  BP (!) 115/52   Pulse 89   Temp 99.7 °F (37.6 °C) (Oral)   Resp (!) 35   Ht 6' (1.829 m)   Wt 120 lb (54.4 kg)   SpO2 100%   BMI 16.27 kg/m²      Focused physical examination:  General appearance:  Cooperative. No acute distress. Skin:  Warm. Dry. Very large open unstageable necrotic wound down to the bone over the patient's sacrum and upper buttocks with surrounding erythema and warmth. Patient also has darkened pressure wound to the bilateral heels. Eye:  Extraocular movements intact. Ears, nose, mouth and throat:  Oral mucosa dry  Neck:  Trachea midline. Heart: Tachycardic but regular  Perfusion:  intact  Respiratory:  Lungs clear to auscultation bilaterally. Respirations nonlabored. Abdominal:   Non distended. Nontender  Neurological: Awake but oriented to person and place but not time. .  Moves all extremities spontaneously  Musculoskeletal:   Normal ROM, no deformities          Psychiatric:  Normal mood      EKG: Sinus tachycardia at a rate of 112 bpm with an incomplete right bundle branch block. Nonspecific T wave abnormalities. No ST elevation. MDM: Patient presents emergency department today concern for sepsis. Febrile tachycardic. Blood pressure stable. Found to have leukocytosis and lactic acidosis. Suspect infected sacral wound as the etiology that the patient does have a concerning findings for UTI as well. Started on broad-spectrum antibiotics and plan to admit to the hospital.    [address]   Total Critical Care time was 30 minutes, excluding separately reportable procedures. There was a high probability of clinically significant/life threatening deterioration in the patient's condition which required my urgent intervention. This time was spent reviewing the patient chart, interpreting diagnostic/laboratory data, transfusion of large-volume IV fluids and broad-spectrum antibiotics and treatment for sepsis      During the patient's ED course, the patient was given:  Medications   0.9 % sodium chloride IV bolus 1,632 mL (0 mLs IntraVENous Stopped 10/15/21 2051)   cefepime (MAXIPIME) 2000 mg IVPB minibag (0 mg IntraVENous Stopped 10/15/21 1935)   vancomycin 1000 mg IVPB in 250 mL D5W addavial (0 mg IntraVENous Stopped 10/15/21 2046)   acetaminophen (TYLENOL) suppository 650 mg (650 mg Rectal Given 10/15/21 1938)   fentaNYL (SUBLIMAZE) injection 25 mcg (25 mcg IntraVENous Given 10/15/21 2020)   fentaNYL (SUBLIMAZE) injection 50 mcg (50 mcg IntraVENous Given 10/15/21 2130)        CLINICAL IMPRESSION  1. Severe sepsis ([address]    2. Pressure injury of sacral region, unstageable [facility])        DISPOSITION  Admission      This chart was created using Dragon dictation software. Efforts were made by me to ensure accuracy, however some errors may be present due to limitations of this technology.             Daniel Mendoza MD  10/15/21 2138

## 2021-10-16 NOTE — PROGRESS NOTES
10/16/21 0848 -General surgery consult called in and RNs number provided for callback.    -Kamryn Jessica, PCA

## 2021-10-16 NOTE — ED NOTES
Wound care performed on patient's coccyx. Wound was irrigated with sterile saline, cleaned with wet gauze, and dressed with wet to dry dressing and mepilex. Wound cultures were also obtained from wound and sent to lab.       Barbara Olivares RN  10/15/21 0337

## 2021-10-16 NOTE — ED NOTES
Patient old jain catheter discontinued and new jain inserted. Patient tolerated well.      Neha Paulson, RN  10/16/21 0005

## 2021-10-16 NOTE — OP NOTE
Date of Surgery: 10/16/21    Preop Dx:  Sacral Wound    Postop Dx:  Same, osteomyelitis    Procedure:  Excisional Debridement of Sacral Wound    Surgeon:  Trevin Piña    Assistant:      Anesthesia:  GETA    EBL:   <50ml    Specimen:  Wound cultures and bone/tissue culture    Complications: none    Drains/Lines:  none    Indications:  79 yo with progressive sacral wound with nonviable tissue and infection. Description:  Patient's family was given adequate description of the risks and rewards of the procedure, including bleeding, infection, need for other operations and freely consented. He was given appropriate antibiotics and brought to the OR where GETA anesthesia was induced. He was placed in lateral position. Prepped and draped in usual sterile fashion. Nonviable tissue was sharply/excisionally debrided. This included skin, subcutaneous tissue, muscle and bone. The lower part of the coccyx was exposed and friable. Wound cultures and bone/tissue cultures were taken. Hemostasis achieved with cautery. Local anesthetic instilled and irrigated with saline. Measured about [id number] and conclusion. Saline soaked gauze placed. Outer gauze dressing placed. All suture, sponge and instrument count correct times two at end of case. Transferred to PACU in stable condition.     Steve Hernandez MD

## 2021-10-16 NOTE — PROGRESS NOTES
Spoke to son Kaykay Fall [phone], obtained phone consent for sacral wound debridement. Son would like DNR to remain in place during surgery. 2nd witness Stacy Persaud RN.

## 2021-10-16 NOTE — H&P
Hospital Medicine History & Physical      Patient:  Grecia Garzon  :   1934  MRN:   [id number]  Date of Service: 10/15/21    Chief Complaint   Patient presents with    Altered Mental Status    Shortness of Breath    Wound Check       HISTORY OF PRESENT ILLNESS:    Grecia Garzon is a 80 y.o. male. He was sent from his SNF for altered mental status and a large sacral wound. He was agitated and screaming in the ER. He received IV morphine. He is now very somnolent and difficult to arouse. He stirs and opens his eyes briefly but does not answer questions or follow commands. History was obtained via chart review. Patient has a h/o lumbar spinal stenosis. On 21 Dr. Szymanski performed an L2-5 decompression and laminectomy at [facility].  Three days later on  patient was unable to wiggle his toes and had BLE weakness to the point that he required maximum assistance x 2 in order to stand. An MRI was performed which showed severe spinal canal stenosis due to a new hematoma. On  Dr. Debra Manzo took him back to the OR for an urgent exploration and hematoma evacuation and left drains in place. Patient was discharged to SNF on 9/10. Patient was then admitted to [facility] on . He had a large sacral ulcer and ROXI due to urinary retention requiring placement of a jain catheter. Another L-spine MRI was performed showing an ill-defind fluid/soft tissue causing severe spinal canal stenosis from L1-4, also moderate-severe spinal canal stenosis from L4 - S1. He was transferred to [facility] on  for operative management  By Dr. Debra Manzo. No further operative intervention was necessary on patient's lumbar spine. He did undergo operative debridement of the sacral ulcer on . Tissue culture was positive for enterococcus and enterobacter. Based on sensitivities he was treated with Augmentin. He was also treated with a wound vac and discharged back to the SNF on .       Review of Systems:  All pertinent positives and negatives are as noted in the HPI section. All other systems were reviewed and are negative. Past Medical History:   Diagnosis Date    Arthritis     Cancer ([address]     skin    Dry eyes, bilateral     Hyperlipidemia     Hypertension     Osteoporosis 2009    Type II or unspecified type diabetes mellitus without mention of complication, not stated as uncontrolled     Urinary incontinence        Past Surgical History:   Procedure Laterality Date    COLONOSCOPY  2002    diverticulosis    COLONOSCOPY  11/19/2012    diverticulosis    EYE SURGERY      micheal cataract    TONSILLECTOMY           Prior to Admission medications    Medication Sig Start Date End Date Taking?  Authorizing Provider   tamsulosin (FLOMAX) 0.4 MG capsule Take 1 capsule by mouth daily 9/22/21   Abimbola Bustillos MD   Chromium Picolinate 1000 MCG TABS Take 1,000 mcg by mouth daily    Historical Provider, MD   cyanocobalamin 1000 MCG tablet Take 1,000 mcg by mouth daily    Historical Provider, MD   zinc sulfate (ZINCATE) 220 (50 Zn) MG capsule Take 220 mg by mouth daily    Historical Provider, MD   amLODIPine (NORVASC) 10 MG tablet Take 10 mg by mouth daily    Historical Provider, MD   Multiple Vitamins-Minerals (THERAPEUTIC MULTIVITAMIN-MINERALS) tablet Take 1 tablet by mouth daily    Historical Provider, MD   donepezil (ARICEPT) 10 MG tablet Take 10 mg by mouth nightly    Historical Provider, MD   senna-docusate (Reford Second) 8.6-50 MG per tablet Take 1 tablet by mouth 2 times daily    Historical Provider, MD   b complex vitamins capsule Take 1 capsule by mouth daily    Historical Provider, MD   methocarbamol (ROBAXIN) 500 MG tablet Take 500 mg by mouth every 6 hours as needed    Historical Provider, MD   acetaminophen (TYLENOL) 500 MG tablet Take 500 mg by mouth every 4 hours as needed     Historical Provider, MD   lisinopril (PRINIVIL;ZESTRIL) 20 MG tablet Take 20 mg by mouth nightly     Historical Provider, MD   simvastatin (ZOCOR) 10 MG tablet Take 10 mg by mouth nightly. Historical Provider, MD   sitagliptan (JANUVIA) 100 MG tablet Take 100 mg by mouth daily. Historical Provider, MD   glipiZIDE (GLUCOTROL XL) 10 MG CR tablet Take 10 mg by mouth daily. Historical Provider, MD       Allergies:   Iodine    Social:   reports that he has never smoked. He has never used smokeless tobacco.   reports no history of alcohol use. Social History     Substance and Sexual Activity   Drug Use Never       Family History   Problem Relation Age of Onset    Heart Disease Mother     Cancer Sister 39        breast       PHYSICAL EXAM:  I performed this physical examination. Vitals:  Patient Vitals for the past 24 hrs:   BP Temp Temp src Pulse Resp SpO2 Height Weight   10/15/21 2131 (!) 115/52   89 (!) 35 100 %     10/15/21 2035 (!) 111/35   92 23 96 %     10/15/21 2004 (!) 116/53   104 (!) 32 100 %     10/15/21 1944    107       10/1934 (!) 143/46   104 21 100 %     10/15/21 1905 (!) 121/44   105 29 98 %     10/15/21 1902 (!) 121/44 99.7 °F (37.6 °C) Oral        10/15/21 1901  [facility]    6' (1.829 m) 120 lb (54.4 kg)   10/15/21 1713  101.8 °F (38.8 °C) Rectal        10/15/21 1705 (!) 115/54   124 18 96 %       No intake or output data in the 24 hours ending 10/15/21 2148    Room air     GEN:               Appearance:  Elderly male in NAD . Level of Consciousness:  Somnolent but arousable . Orientation:  Self and \"the hospital\"     HEENT:           Sclera anicteric.  no conjunctival chemosis. tacky mucus membranes. no specific or diagnostic oral lesions. NECK:             no signs of meningismus. Jugular veins not distended. Carotid pulses  2+.  no cervical lymphadenopathy. no thyromegaly. CV:                  regular rhythm. normal S1 & S2.    no murmur. no rub.  no gallop.      PULM:             Chest excursion is symmetric. Breath sounds are vesicular. Adventitious sounds:  none     AB:                  Abdominal shape is normal.  Bowel sounds are hypoactive but present. Generally soft to palpation. no tenderness is present. no involuntary guarding. no rebound guarding. RECTAL:           :                  Avilez in situ. Diana urine in reservoir. EXTR:              Skin is warm. Capillary refill brisk. no specific or pathognomic rash. no clubbing. no pitting edema. There are bilateral heel ulcers. BACK:  Large, foul-smelling sacral wound as picture below. LABS:  Lab Results   Component Value Date    WBC 14.9 (H) 10/15/2021    HGB 10.6 (L) 10/15/2021    HCT 31.7 (L) 10/15/2021    MCV 93.7 10/15/2021     10/15/2021     Lab Results   Component Value Date    CREATININE 1.2 10/15/2021    BUN 58 (H) 10/15/2021     10/15/2021    K 4.8 10/15/2021     10/15/2021    CO2 23 10/15/2021     Lab Results   Component Value Date    ALT 37 10/15/2021    AST 35 10/15/2021    ALKPHOS 146 (H) 10/15/2021    BILITOT 0.5 10/15/2021                   IMAGING:  CT HEAD WO CONTRAST    Result Date: 10/15/2021  EXAMINATION: CT OF THE HEAD WITHOUT CONTRAST  10/15/2021 7:59 pm TECHNIQUE: CT of the head was performed without the administration of intravenous contrast. Dose modulation, iterative reconstruction, and/or weight based adjustment of the mA/kV was utilized to reduce the radiation dose to as low as reasonably achievable. COMPARISON: 09/19/2021 HISTORY: ORDERING SYSTEM PROVIDED HISTORY: AMS TECHNOLOGIST PROVIDED HISTORY: Reason for exam:->AMS Has a \"code stroke\" or \"stroke alert\" been called? ->No Decision Support Exception - unselect if not a suspected or confirmed emergency medical condition->Emergency Medical Condition (MA) Reason for Exam: ams Acuity: Acute Type of Exam: Initial FINDINGS: There is no acute infarction, intracranial hemorrhage or intracranial mass lesion. No mass effect, midline shift or extra-axial collection is noted. There are mild nonspecific foci of periventricular and subcortical cerebral white matter hypodensity, most likely representing chronic microangiopathic disease in this age group. The brain parenchyma is otherwise normal. The cerebellar tonsils are in normal position. The ventricles, sulci, and cisterns are mildly prominent suggestive of mild generalized volume loss. The globes and orbits are within normal limits. The visualized extracranial structures including paranasal sinuses and mastoid air cells are unremarkable. No fracture is identified. Stable study. No evidence for acute intracranial hemorrhage, territorial infarction or intracranial mass lesion. Mild chronic microangiopathic ischemic disease. Mild generalized volume loss. XR CHEST PORTABLE    Result Date: 10/15/2021  EXAMINATION: ONE XRAY VIEW OF THE CHEST 10/15/2021 5:27 pm COMPARISON: 09/19/2021 HISTORY: ORDERING SYSTEM PROVIDED HISTORY: Sepsis TECHNOLOGIST PROVIDED HISTORY: Reason for exam:->Sepsis Reason for Exam: SOB, and AMS Additional signs and symptoms: Came in for wound check on back FINDINGS: The heart is normal.  The pulmonary vessels are slightly prominent centrally. The lungs are hypoinflated with asymmetric elevation left hemidiaphragm which is unchanged. There is subsegmental linear opacity along the left lung base which is more apparent. No effusion is seen. Chronic elevation of the left hemidiaphragm which is unchanged with increasing subsegmental atelectasis or early infiltrate along the left lung base. I directly reviewed all recent imaging studies as well as pertinent prior studies. Radiology reports may or may not be available at the time of my review. EKG:  New and pertinent prior tracings were directly reviewed.   My interpretation is as follows:  Sinus tachycardia w/ IRBBB    Active Hospital Problems    Diagnosis Date Noted    Decubitus ulcer of sacral region, unstageable [facility]) Mick Garcia 10/15/2021    Lumbar stenosis with neurogenic claudication [M48.062] 09/19/2021    Urinary retention [R33.9] 09/19/2021    DM2 [facility]di[facility] mellitus, [facility] [E11.9]        ASSESSMENT & PLAN  Altered mental status  -  Likely d/t infection. Clear sources of potential infection include SSTI/Bone (sacrum) as well as CA-UTI. -  Patient was given vancomycin and cefepime in the ER. With deep tissue infections it is best to avoid empiric abx if at all possible until an operative tissue specimen can be obtained for culture, so will not continue empiric abx beyond these one-time doses. -  Replace existing jain catheter. Urine sent for culture. -  Will request help from surgery and enterostomy. Sacral decubitus Ulcer  -  This has developed following patient's initial lumbar decompression surgery in September, 2021. It has been managed primarily at [address] [place] [address] St was contacted but unable to accept patient. The hospital was at capacity. -  Cultures at [facility] grew of Enterococcus and Enterobacter. See results above. -  General surgery assistance was requested for debridement. Enterostomy assistance also requested. -  Considering patient's advanced age and very limited strength and mobility the odds of this wound ever healing seem slim. Urinary retention, Indwelling Jain catheter  -  Indwelling jain since 9/19. Jain replaced this admission 10/15. DM2  -  Hold all oral antidiabetic agents. Start s.c. Insulin regimen based on weight    HTN  -  Holding lisinopril    Dementia  -  Donepezil. DVT prophylaxis: SCDs, lovenox  Code Status:  DNR-CCA. Warrants ongoing discussion. Disposition:  Inpatient for the foreseeable future.     Joya Alexander MD MD

## 2021-10-16 NOTE — CONSULTS
Department of General Surgery Consult    PATIENT NAME: Tobias Lugo OF BIRTH: 1934    ADMISSION DATE: 10/15/2021  5:04 PM      TODAY'S DATE: 10/16/2021    Reason for Consult:  Sacral wound    Chief Complaint: change in mental status    Requesting Physician:  Axel Sanchez    HISTORY OF PRESENT ILLNESS:              The patient is a 80 y.o. male who presents with change in mental status and worsening sacral wound. Patient with recent history of L2-5 decompression and laminectomy with postop hematoma and spinal canal stenosis and cauda equina syndrome. Also with large sacral wound that underwent debridement about three weeks ago. No reported bone involvement at that time. Was reportedly getting wound treatment at SNF but worsened per family. Has not been eating much and losing weight.   Mental status changes and wound changes prompted return to hospital.    Past Medical History:        Diagnosis Date    Arthritis     Cancer ([address]     skin    Dry eyes, bilateral     Hyperlipidemia     Hypertension     Osteoporosis 2009    Type II or unspecified type diabetes mellitus without mention of complication, not stated as uncontrolled     Urinary incontinence        Past Surgical History:        Procedure Laterality Date    COLONOSCOPY  2002    diverticulosis    COLONOSCOPY  11/19/2012    diverticulosis    EYE SURGERY      micheal cataract    TONSILLECTOMY         Current Medications:   Current Facility-Administered Medications: donepezil (ARICEPT) tablet 10 mg, 10 mg, Oral, Nightly  atorvastatin (LIPITOR) tablet 10 mg, 10 mg, Oral, Nightly  vitamin B complex w/C 1 tablet, 1 tablet, Oral, Daily  therapeutic multivitamin-minerals 1 tablet, 1 tablet, Oral, Daily  glucose (GLUTOSE) 40 % oral gel 15 g, 15 g, Oral, PRN  dextrose 50 % IV solution, 12.5 g, IntraVENous, PRN  glucagon (rDNA) injection 1 mg, 1 mg, IntraMUSCular, PRN  dextrose 5 % solution, 100 mL/hr, IntraVENous, PRN  sodium chloride flush 0.9 % injection 10 mL, 10 mL, IntraVENous, PRN  0.9 % sodium chloride infusion, 25 mL, IntraVENous, PRN  potassium chloride (KLOR-CON M) extended release tablet 40 mEq, 40 mEq, Oral, PRN **OR** potassium bicarb-citric acid (EFFER-K) effervescent tablet 40 mEq, 40 mEq, Oral, PRN **OR** potassium chloride 10 mEq/100 mL IVPB (Peripheral Line), 10 mEq, IntraVENous, PRN  magnesium sulfate 1000 mg in dextrose 5% 100 mL IVPB, 1,000 mg, IntraVENous, PRN  ondansetron (ZOFRAN-ODT) disintegrating tablet 4 mg, 4 mg, Oral, Q8H PRN **OR** ondansetron (ZOFRAN) injection 4 mg, 4 mg, IntraVENous, Q6H PRN  acetaminophen (TYLENOL) tablet 650 mg, 650 mg, Oral, Q6H PRN **OR** acetaminophen (TYLENOL) suppository 650 mg, 650 mg, Rectal, Q6H PRN  enoxaparin (LOVENOX) injection 40 mg, 40 mg, SubCUTAneous, Daily  insulin glargine (LANTUS) injection vial 8 Units, 0.15 Units/kg, SubCUTAneous, Nightly  insulin lispro (HUMALOG) injection vial 3 Units, 0.05 Units/kg, SubCUTAneous, TID WC  insulin lispro (HUMALOG) injection vial 0-6 Units, 0-6 Units, SubCUTAneous, TID WC  insulin lispro (HUMALOG) injection vial 0-3 Units, 0-3 Units, SubCUTAneous, Nightly  insulin lispro (HUMALOG) injection vial 0-6 Units, 0-6 Units, SubCUTAneous, Q4H  melatonin tablet 3 mg, 3 mg, Oral, Nightly PRN  HYDROmorphone (DILAUDID) injection 0.5 mg, 0.5 mg, IntraVENous, Q3H PRN  lactated ringers infusion, , IntraVENous, Continuous  Prior to Admission medications    Medication Sig Start Date End Date Taking? Authorizing Provider   insulin glargine (LANTUS) 100 UNIT/ML injection vial Inject 10 Units into the skin nightly   Yes Historical Provider, MD   melatonin 3 MG TABS tablet Take 3 mg by mouth nightly as needed   Yes Historical Provider, MD   oxybutynin (DITROPAN-XL) 10 MG extended release tablet Take 10 mg by mouth daily   Yes Historical Provider, MD   oxyCODONE 5 MG capsule Take 5 mg by mouth every 3 hours as needed for Pain.    Yes Historical Provider, MD polyethylene glycol (GLYCOLAX) 17 g packet Take 17 g by mouth daily as needed for Constipation   Yes Historical Provider, MD   Chromium Picolinate 1000 MCG TABS Take 1,000 mcg by mouth daily   Yes Historical Provider, MD   cyanocobalamin 1000 MCG tablet Take 1,000 mcg by mouth daily   Yes Historical Provider, MD   amLODIPine (NORVASC) 10 MG tablet Take 10 mg by mouth daily   Yes Historical Provider, MD   Multiple Vitamins-Minerals (THERAPEUTIC MULTIVITAMIN-MINERALS) tablet Take 1 tablet by mouth daily   Yes Historical Provider, MD   donepezil (ARICEPT) 10 MG tablet Take 10 mg by mouth nightly   Yes Historical Provider, MD   senna-docusate (Darcella Baldy) 8.6-50 MG per tablet Take 1 tablet by mouth 2 times daily   Yes Historical Provider, MD   b complex vitamins capsule Take 1 capsule by mouth daily   Yes Historical Provider, MD   methocarbamol (ROBAXIN) 500 MG tablet Take 500 mg by mouth every 6 hours as needed   Yes Historical Provider, MD   lisinopril (PRINIVIL;ZESTRIL) 20 MG tablet Take 20 mg by mouth nightly    Yes Historical Provider, MD   simvastatin (ZOCOR) 10 MG tablet Take 10 mg by mouth nightly. Yes Historical Provider, MD   hydrALAZINE (APRESOLINE) 20 MG/ML injection Infuse 25 mg intravenously every 8 hours as needed (for hypertension SBP >160)    Historical Provider, MD   docusate sodium (COLACE) 100 MG capsule Take 100 mg by mouth 2 times daily    Historical Provider, MD   tamsulosin (FLOMAX) 0.4 MG capsule Take 1 capsule by mouth daily 9/22/21   Uyen Girard MD   zinc sulfate (ZINCATE) 220 (50 Zn) MG capsule Take 220 mg by mouth daily    Historical Provider, MD   acetaminophen (TYLENOL) 500 MG tablet Take 500 mg by mouth every 4 hours as needed     Historical Provider, MD   sitagliptan (JANUVIA) 100 MG tablet Take 100 mg by mouth daily. Historical Provider, MD   glipiZIDE (GLUCOTROL XL) 10 MG CR tablet Take 10 mg by mouth daily.     Historical Provider, MD        Allergies:  Iodine    Social no obvious osteomyelitis      IMPRESSION/RECOMMENDATIONS:    81 yo with large sacral wound  Discussed at length with son and wife. Barriers to healing and potential for worsening include large wound, possible osteomyelitis, location near rectum and soilage, malnutrition and lack of mobility and pressure. Currently are against a feeding tube. I discussed that hospice is an option but do not want to go that route yet. Still want everything else done and want an operation to clean wound. If osteomyelitis is present and if continues to worsen because of the above parameters may decide upon hospice but are not considering currently.     Electronically signed by Eve Campuzano, [address] [place] [address]  [phone]

## 2021-10-16 NOTE — ANESTHESIA PRE PROCEDURE
Department of Anesthesiology  Preprocedure Note       Name:  Leidy Velazquez   Age:  80 y.o.  :  1934                                          MRN:  [id number]         Date:  10/16/2021      Surgeon: April Lagunas):  Tracie Gomez MD    Procedure: Procedure(s):  SACRAL WOUND DEBRIDMENT    Medications prior to admission:   Prior to Admission medications    Medication Sig Start Date End Date Taking? Authorizing Provider   insulin glargine (LANTUS) 100 UNIT/ML injection vial Inject 10 Units into the skin nightly   Yes Historical Provider, MD   melatonin 3 MG TABS tablet Take 3 mg by mouth nightly as needed   Yes Historical Provider, MD   oxybutynin (DITROPAN-XL) 10 MG extended release tablet Take 10 mg by mouth daily   Yes Historical Provider, MD   oxyCODONE 5 MG capsule Take 5 mg by mouth every 3 hours as needed for Pain.    Yes Historical Provider, MD   polyethylene glycol (GLYCOLAX) 17 g packet Take 17 g by mouth daily as needed for Constipation   Yes Historical Provider, MD   Chromium Picolinate 1000 MCG TABS Take 1,000 mcg by mouth daily   Yes Historical Provider, MD   cyanocobalamin 1000 MCG tablet Take 1,000 mcg by mouth daily   Yes Historical Provider, MD   amLODIPine (NORVASC) 10 MG tablet Take 10 mg by mouth daily   Yes Historical Provider, MD   Multiple Vitamins-Minerals (THERAPEUTIC MULTIVITAMIN-MINERALS) tablet Take 1 tablet by mouth daily   Yes Historical Provider, MD   donepezil (ARICEPT) 10 MG tablet Take 10 mg by mouth nightly   Yes Historical Provider, MD   senna-docusate (PERICOLACE) 8.6-50 MG per tablet Take 1 tablet by mouth 2 times daily   Yes Historical Provider, MD   b complex vitamins capsule Take 1 capsule by mouth daily   Yes Historical Provider, MD   methocarbamol (ROBAXIN) 500 MG tablet Take 500 mg by mouth every 6 hours as needed   Yes Historical Provider, MD   lisinopril (PRINIVIL;ZESTRIL) 20 MG tablet Take 20 mg by mouth nightly    Yes Historical Provider, MD   simvastatin (ZOCOR) 10 MG tablet Take 10 mg by mouth nightly. Yes Historical Provider, MD   hydrALAZINE (APRESOLINE) 20 MG/ML injection Infuse 25 mg intravenously every 8 hours as needed (for hypertension SBP >160)    Historical Provider, MD   docusate sodium (COLACE) 100 MG capsule Take 100 mg by mouth 2 times daily    Historical Provider, MD   tamsulosin (FLOMAX) 0.4 MG capsule Take 1 capsule by mouth daily 9/22/21   Bulmaro Arce MD   zinc sulfate (ZINCATE) 220 (50 Zn) MG capsule Take 220 mg by mouth daily    Historical Provider, MD   acetaminophen (TYLENOL) 500 MG tablet Take 500 mg by mouth every 4 hours as needed     Historical Provider, MD   sitagliptan (JANUVIA) 100 MG tablet Take 100 mg by mouth daily. Historical Provider, MD   glipiZIDE (GLUCOTROL XL) 10 MG CR tablet Take 10 mg by mouth daily.     Historical Provider, MD       Current medications:    Current Facility-Administered Medications   Medication Dose Route Frequency Provider Last Rate Last Admin    donepezil (ARICEPT) tablet 10 mg  10 mg Oral Nightly Jazmine Holland MD        atorvastatin (LIPITOR) tablet 10 mg  10 mg Oral Nightly Jazmine Holland MD        b complex vitamins capsule 1 capsule  1 capsule Oral Daily Jazmine Holland MD        therapeutic multivitamin-minerals 1 tablet  1 tablet Oral Daily Jazmine Holland MD        glucose (GLUTOSE) 40 % oral gel 15 g  15 g Oral PRN Jazmine Holland MD        dextrose 50 % IV solution  12.5 g IntraVENous PRASHWIN Holland MD        glucagon (rDNA) injection 1 mg  1 mg IntraMUSCular PRN Jazmine Holland MD        dextrose 5 % solution  100 mL/hr IntraVENous PRASHWIN Holland MD        sodium chloride flush 0.9 % injection 10 mL  10 mL IntraVENous PRASHWIN Holland MD        0.9 % sodium chloride infusion  25 mL IntraVENous PRASHWIN Holland MD        potassium chloride (KLOR-CON M) extended release tablet 40 mEq  40 mEq Oral PRMD Jaime Devine potassium bicarb-citric acid (EFFER-K) effervescent tablet 40 mEq  40 mEq Oral PRN Lesly Amezcua MD        Or    potassium chloride 10 mEq/100 mL IVPB (Peripheral Line)  10 mEq IntraVENous PRN Lesly Amezcua MD        magnesium sulfate 1000 mg in dextrose 5% 100 mL IVPB  1,000 mg IntraVENous PRN Lesly Amezcua MD        ondansetron (ZOFRAN-ODT) disintegrating tablet 4 mg  4 mg Oral Q8H PRN Lesly Amezcua MD        Or    ondansetron [facility]) injection 4 mg  4 mg IntraVENous Q6H PRN Lesly Amezcua MD        acetaminophen (TYLENOL) tablet 650 mg  650 mg Oral Q6H PRN Lesly Amezcua MD        Or    acetaminophen (TYLENOL) suppository 650 mg  650 mg Rectal Q6H PRN Lesly Amezcua MD        enoxaparin (LOVENOX) injection 40 mg  40 mg SubCUTAneous Daily Lesly Amezcua MD        insulin glargine (LANTUS) injection vial 8 Units  0.15 Units/kg SubCUTAneous Nightly Lesly Amezcua MD        insulin lispro (HUMALOG) injection vial 3 Units  0.05 Units/kg SubCUTAneous TID  Lesly Amezcua MD        insulin lispro (HUMALOG) injection vial 0-6 Units  0-6 Units SubCUTAneous TID  Lesly Amezcua MD        insulin lispro (HUMALOG) injection vial 0-3 Units  0-3 Units SubCUTAneous Nightly Lesly Amezcua MD        insulin lispro (HUMALOG) injection vial 0-6 Units  0-6 Units SubCUTAneous Q4H Lesly Amezcua MD   2 Units at 10/16/21 0834    melatonin tablet 3 mg  3 mg Oral Nightly PRN Lesly Amezcua MD        HYDROmorphone (DILAUDID) injection 0.5 mg  0.5 mg IntraVENous Q3H PRN Lesly Amezcua MD        lactated ringers infusion   IntraVENous Continuous Lesly Amezcua  mL/hr at 10/16/21 0831 New Bag at 10/16/21 0831       Allergies:     Allergies   Allergen Reactions    Iodine Itching and Swelling       Problem List:    Patient Active Problem List   Diagnosis Code    DM2 (diabetes mellitus, type 2) ([facility] E11.9    Hypertension I10    Localized osteoarthrosis, lower leg M17.10    HLD (hyperlipidemia) E78.5    Lumbar stenosis with neurogenic claudication M48.062    BPH (benign prostatic hyperplasia) N40.0    ROXI (acute kidney injury) ([facility] N17.9    Urinary retention R33.9    Decubitus ulcer of sacral region, unstageable ([facility] L89.150       Past Medical History:        Diagnosis Date    Arthritis     Cancer ([facility]     skin    Dry eyes, bilateral     Hyperlipidemia     Hypertension     Osteoporosis 2009    Type II or unspecified type diabetes mellitus without mention of complication, not stated as uncontrolled     Urinary incontinence        Past Surgical History:        Procedure Laterality Date    COLONOSCOPY  2002    diverticulosis    COLONOSCOPY  11/19/2012    diverticulosis    EYE SURGERY      micheal cataract    TONSILLECTOMY         Social History:    Social History     Tobacco Use    Smoking status: Never Smoker    Smokeless tobacco: Never Used   Substance Use Topics    Alcohol use: No                                Counseling given: Not Answered      Vital Signs (Current):   Vitals:    10/16/21 0445 10/16/21 0459 10/16/21 0502 10/16/21 0751   BP:  (!) 100/47 (!) 144/69    Pulse:  68 77    Resp:  20 20    Temp: 98.7 °F (37.1 °C) 98.7 °F (37.1 °C)     TempSrc:  Oral     SpO2:   97% 97%   Weight:   121 lb 11.1 oz (55.2 kg)    Height:                                                  BP Readings from Last 3 Encounters:   10/16/21 (!) 144/69   09/22/21 119/63   01/22/20 128/79       NPO Status:  MN+, SEE MAR                                                                               BMI:   Wt Readings from Last 3 Encounters:   10/16/21 121 lb 11.1 oz (55.2 kg)   09/21/21 126 lb 5.2 oz (57.3 kg)   01/22/20 130 lb (59 kg)     Body mass index is 16.5 kg/m².     CBC:   Lab Results   Component Value Date    WBC 14.9 10/15/2021    RBC 3.39 10/15/2021    HGB 10.6 10/15/2021    HCT 31.7 10/15/2021    MCV 93.7 10/15/2021    RDW 15.7 10/15/2021     10/15/2021 malignancy/cancer               Abdominal:       Abdomen: soft. Vascular: negative vascular ROS. Other Findings: POOR SCATTERED DENTITION  //  Avilez in          Anesthesia Plan      general     ASA 3 - emergent     (DNR: ETT, IV RESUS MEDS ONLY , NO COMP, DEFIB)  Induction: intravenous. MIPS: Postoperative opioids intended and Prophylactic antiemetics administered. Anesthetic plan and risks discussed with patient and healthcare power of  (SOFIE KIRKLAND). This pre-anesthesia assessment may be used as a history and physical.    DOS STAFF ADDENDUM:    Pt seen and examined, chart reviewed (including anesthesia, drug and allergy history). No interval changes to history and physical examination. Anesthetic plan, risks, benefits, alternatives, and personnel involved discussed with patient. Patient verbalized an understanding and agrees to proceed.     SOFIE KIRKLAND PREOP CALL FOR H/P , PLAN, CONSENT, DNR  Ravi Antoine MD  October 16, 2021  9:28 AM    Ravi Antoine MD   10/16/2021

## 2021-10-16 NOTE — PROGRESS NOTES
Hospitalist Progress Note      PCP: Timothy Romano MD    Date of Admission: 10/15/2021    Chief Complaint: Fatigue and abnormal labs, creatinine 5.4 with history of CKD stage III    Hospital Course: Marce Cyr is a 80 y.o. male. He was sent from his SNF. He has been staying there since a recent admission at [facility] for lumbar laminectomy. His course there was complicated by an epidural fluid collection which required urgent re-exploration of the operative wound. He was sent from his SNF because of abnormal labs and  altered mental status. He is confused but able to answer a few questions and follow simple commands. Was find to have a large necrotic decubitus ulcer. General surgery consulted and planning for debridement. Subjective:   Seen and examined at bedside. He was oriented to person, place, time. However, with intermittent confusion. Denies pain. Underwent surgical debridement today. Wound culture showed 4+ gram-positive cocci, 4+ gram-positive rods. Surgical culture showed 4+ gram-negative rods present, 1+ gram-positive cocci. Darted Vanco, cefepime and Flagyl. Consulted ID.     Medications:  Reviewed    Infusion Medications    dextrose      sodium chloride      lactated ringers 100 mL/hr at 10/16/21 1402     Scheduled Medications    donepezil  10 mg Oral Nightly    atorvastatin  10 mg Oral Nightly    vitamin B complex w/C  1 tablet Oral Daily    therapeutic multivitamin-minerals  1 tablet Oral Daily    enoxaparin  40 mg SubCUTAneous Daily    insulin glargine  0.15 Units/kg SubCUTAneous Nightly    insulin lispro  0.05 Units/kg SubCUTAneous TID WC    insulin lispro  0-6 Units SubCUTAneous TID WC    insulin lispro  0-3 Units SubCUTAneous Nightly    insulin lispro  0-6 Units SubCUTAneous Q4H    cefepime  2,000 mg IntraVENous Q12H    metroNIDAZOLE  500 mg IntraVENous Q8H     PRN Meds: glucose, dextrose, glucagon (rDNA), dextrose, sodium chloride flush, sodium chloride, potassium chloride **OR** potassium alternative oral replacement **OR** potassium chloride, magnesium sulfate, ondansetron **OR** ondansetron, acetaminophen **OR** acetaminophen, melatonin, HYDROmorphone      Intake/Output Summary (Last 24 hours) at 10/16/2021 1656  Last data filed at 10/16/2021 1327  Gross per 24 hour   Intake 350 ml   Output 1250 ml   Net -900 ml       Physical Exam Performed:    /70   Pulse 81   Temp 97.4 °F (36.3 °C) (Axillary)   Resp 14   Ht 6' (1.829 m)   Wt 121 lb 11.1 oz (55.2 kg)   SpO2 96%   BMI 16.50 kg/m²     General appearance: No apparent distress, appears stated age and cooperative. HEENT: Pupils equal, round, and reactive to light. Conjunctivae/corneas clear. Neck: Supple, with full range of motion. No jugular venous distention. Trachea midline. Respiratory:  Normal respiratory effort. Clear to auscultation, bilaterally without Rales/Wheezes/Rhonchi. Cardiovascular: Regular rate and rhythm with normal S1/S2 without murmurs, rubs or gallops. Abdomen: Soft, non-tender, non-distended with normal bowel sounds. Musculoskeletal: No clubbing, cyanosis or edema bilaterally. Full range of motion without deformity. Skin: Big decubitus wound  Neurologic:  Neurovascularly intact without any focal sensory/motor deficits. Cranial nerves: II-XII intact, grossly non-focal.  Psychiatric: Alert and oriented x4 with remittent confusion  Capillary Refill: Brisk,3 seconds, normal   Peripheral Pulses: +2 palpable, equal bilaterally       Labs:   Recent Labs     10/15/21  1753   WBC 14.9*   HGB 10.6*   HCT 31.7*        Recent Labs     10/15/21  1753      K 4.8      CO2 23   BUN 58*   CREATININE 1.2   CALCIUM 8.5     Recent Labs     10/15/21  1753   AST 35   ALT 37   BILITOT 0.5   ALKPHOS 146*     No results for input(s): INR in the last 72 hours. No results for input(s): Elma Dross in the last 72 hours.     Urinalysis:      Lab Results   Component Value Date    NITRU Negative 10/15/2021    WBCUA 10-20 10/15/2021    BACTERIA 4+ 10/15/2021    RBCUA 5-10 10/15/2021    BLOODU SMALL 10/15/2021    SPECGRAV 1.015 10/15/2021    GLUCOSEU 250 10/15/2021       Radiology:  CT HEAD WO CONTRAST   Final Result      Stable study. No evidence for acute intracranial hemorrhage, territorial   infarction or intracranial mass lesion. Mild chronic microangiopathic ischemic disease. Mild generalized volume loss. CT PELVIS WO CONTRAST Additional Contrast? None   Final Result   1. Large deep sacral decubitus ulcer with mild adjacent cellulitis. No   abscess or soft tissue gas. 2. Osseous uncovering of the posterior aspect of the lower sacrum and coccyx   without convincing evidence of osteomyelitis. If clinically indicated   further evaluation could be obtained with MRI. XR CHEST PORTABLE   Final Result   Chronic elevation of the left hemidiaphragm which is unchanged with   increasing subsegmental atelectasis or early infiltrate along the left lung   base. Assessment/Plan:    Active Hospital Problems    Diagnosis     Osteomyelitis ([address] [M86.9]     Chronic anemia [D64.9]     Severe sepsis ([address] [A41.9, R65.20]     Decubitus ulcer of sacral region, unstageable ([address] [L89.150]     Lumbar stenosis with neurogenic claudication [M48.062]     Urinary retention [R33.9]     DM2 (diabetes mellitus, type 2) ([address] [E11.9]        Acute metabolic encephalopathy  -  Likely d/t infection. Clear sources of potential infection include SSTI/Bone as well as UTI. -  Patient was given vancomycin and cefepime in the ER. With deep tissue infections it is best to avoid empiric abx until an operative tissue specimen can be obtained for culture, so will not continue empiric abx beyond these one-time doses. -  Replace existing jain catheter.   Urine sent for culture.         Osteomyelitis secondary to sacral decubitus Ulcer  -  This has been managed primary at [address] [place] [address] was contacted but unable to accept patient. The hospital was at capacity. -  Culture growth of Enterococcus and Enterobacter. See results above.  -Underwent surgical debridement today 10/16/21  -Wound culture showed 4+ gram-positive cocci, 4+ gram-positive rods.  -Surgical culture showed 4+ gram-negative rods present, 1+ gram-positive cocci.  -started Vanco, cefepime and Flagyl. Consulted ID.     Urinary retention, Indwelling Avilez catheter  -  Avilez replaced.     DM2  -  Hold all oral antidiabetic agents. Start s.c. Insulin regimen based on weight     HTN  -  Holding lisinopril     Dementia  -  Donepezil. Chronic anemia  -Hgb 10.6 on admission, baseline around 8  -Monitor with daily CBC, transfuse if Hgb <7      DVT Prophylaxis: Lovenox  Diet: ADULT DIET;  Regular  Adult Oral Nutrition Supplement; Standard High Calorie/High Protein Oral Supplement  Code Status: DNR-CCA    PT/OT Eval Status: Ordered    Dispo -opending clinical improvement    Rogers Eduardo MD

## 2021-10-16 NOTE — PROGRESS NOTES
D: Pt brought to PACU. Report obtained from OR RN and anesthesia. Pt placed on monitor and O2, VSS, drowsy but arousable, denies pain at this time.

## 2021-10-17 LAB
A/G RATIO: 0.7 (ref 1.1–2.2)
ALBUMIN SERPL-MCNC: 2.1 G/DL (ref 3.4–5)
ALP BLD-CCNC: 104 U/L (ref 40–129)
ALT SERPL-CCNC: 20 U/L (ref 10–40)
ANION GAP SERPL CALCULATED.3IONS-SCNC: 5 MMOL/L (ref 3–16)
AST SERPL-CCNC: 31 U/L (ref 15–37)
BANDED NEUTROPHILS RELATIVE PERCENT: 12 % (ref 0–7)
BASOPHILS ABSOLUTE: 0 K/UL (ref 0–0.2)
BASOPHILS RELATIVE PERCENT: 0 %
BILIRUB SERPL-MCNC: 0.3 MG/DL (ref 0–1)
BUN BLDV-MCNC: 16 MG/DL (ref 7–20)
C-REACTIVE PROTEIN: 127.9 MG/L (ref 0–5.1)
CALCIUM SERPL-MCNC: 7.9 MG/DL (ref 8.3–10.6)
CHLORIDE BLD-SCNC: 103 MMOL/L (ref 99–110)
CO2: 27 MMOL/L (ref 21–32)
CREAT SERPL-MCNC: 0.6 MG/DL (ref 0.8–1.3)
EOSINOPHILS ABSOLUTE: 0.1 K/UL (ref 0–0.6)
EOSINOPHILS RELATIVE PERCENT: 1 %
GFR AFRICAN AMERICAN: >60
GFR NON-AFRICAN AMERICAN: >60
GLOBULIN: 3 G/DL
GLUCOSE BLD-MCNC: 115 MG/DL (ref 70–99)
GLUCOSE BLD-MCNC: 167 MG/DL (ref 70–99)
GLUCOSE BLD-MCNC: 182 MG/DL (ref 70–99)
GLUCOSE BLD-MCNC: 185 MG/DL (ref 70–99)
GLUCOSE BLD-MCNC: 248 MG/DL (ref 70–99)
HCT VFR BLD CALC: 26.9 % (ref 40.5–52.5)
HEMOGLOBIN: 9 G/DL (ref 13.5–17.5)
HYPOCHROMIA: ABNORMAL
LYMPHOCYTES ABSOLUTE: 1 K/UL (ref 1–5.1)
LYMPHOCYTES RELATIVE PERCENT: 12 %
MAGNESIUM: 1.9 MG/DL (ref 1.8–2.4)
MCH RBC QN AUTO: 31.4 PG (ref 26–34)
MCHC RBC AUTO-ENTMCNC: 33.4 G/DL (ref 31–36)
MCV RBC AUTO: 93.8 FL (ref 80–100)
MONOCYTES ABSOLUTE: 0.4 K/UL (ref 0–1.3)
MONOCYTES RELATIVE PERCENT: 5 %
MRSA SCREEN RT-PCR: ABNORMAL
MYELOCYTE PERCENT: 3 %
NEUTROPHILS ABSOLUTE: 7 K/UL (ref 1.7–7.7)
NEUTROPHILS RELATIVE PERCENT: 67 %
ORGANISM: ABNORMAL
PDW BLD-RTO: 15.3 % (ref 12.4–15.4)
PERFORMED ON: ABNORMAL
PLATELET # BLD: 300 K/UL (ref 135–450)
PLATELET SLIDE REVIEW: ADEQUATE
PMV BLD AUTO: 6.6 FL (ref 5–10.5)
POTASSIUM SERPL-SCNC: 4.8 MMOL/L (ref 3.5–5.1)
RBC # BLD: 2.87 M/UL (ref 4.2–5.9)
REASON FOR REJECTION: NORMAL
REJECTED TEST: NORMAL
SEDIMENTATION RATE, ERYTHROCYTE: 85 MM/HR (ref 0–20)
SLIDE REVIEW: ABNORMAL
SODIUM BLD-SCNC: 135 MMOL/L (ref 136–145)
TOTAL PROTEIN: 5.1 G/DL (ref 6.4–8.2)
WBC # BLD: 8.5 K/UL (ref 4–11)

## 2021-10-17 PROCEDURE — 85025 COMPLETE CBC W/AUTO DIFF WBC: CPT

## 2021-10-17 PROCEDURE — 6360000002 HC RX W HCPCS: Performed by: INTERNAL MEDICINE

## 2021-10-17 PROCEDURE — 36415 COLL VENOUS BLD VENIPUNCTURE: CPT

## 2021-10-17 PROCEDURE — 2580000003 HC RX 258: Performed by: SURGERY

## 2021-10-17 PROCEDURE — 6370000000 HC RX 637 (ALT 250 FOR IP): Performed by: SURGERY

## 2021-10-17 PROCEDURE — 99223 1ST HOSP IP/OBS HIGH 75: CPT | Performed by: INTERNAL MEDICINE

## 2021-10-17 PROCEDURE — 2580000003 HC RX 258: Performed by: INTERNAL MEDICINE

## 2021-10-17 PROCEDURE — 2500000003 HC RX 250 WO HCPCS: Performed by: INTERNAL MEDICINE

## 2021-10-17 PROCEDURE — 6360000002 HC RX W HCPCS: Performed by: SURGERY

## 2021-10-17 PROCEDURE — 94761 N-INVAS EAR/PLS OXIMETRY MLT: CPT

## 2021-10-17 PROCEDURE — 1200000000 HC SEMI PRIVATE

## 2021-10-17 PROCEDURE — 2700000000 HC OXYGEN THERAPY PER DAY

## 2021-10-17 PROCEDURE — 86140 C-REACTIVE PROTEIN: CPT

## 2021-10-17 PROCEDURE — 83735 ASSAY OF MAGNESIUM: CPT

## 2021-10-17 PROCEDURE — 80053 COMPREHEN METABOLIC PANEL: CPT

## 2021-10-17 PROCEDURE — 6370000000 HC RX 637 (ALT 250 FOR IP): Performed by: INTERNAL MEDICINE

## 2021-10-17 PROCEDURE — 85652 RBC SED RATE AUTOMATED: CPT

## 2021-10-17 PROCEDURE — 99231 SBSQ HOSP IP/OBS SF/LOW 25: CPT | Performed by: SURGERY

## 2021-10-17 RX ORDER — LORAZEPAM 2 MG/ML
0.5 INJECTION INTRAMUSCULAR ONCE
Status: COMPLETED | OUTPATIENT
Start: 2021-10-17 | End: 2021-10-17

## 2021-10-17 RX ADMIN — HYDROMORPHONE HYDROCHLORIDE 0.5 MG: 1 INJECTION, SOLUTION INTRAMUSCULAR; INTRAVENOUS; SUBCUTANEOUS at 10:12

## 2021-10-17 RX ADMIN — SODIUM CHLORIDE, POTASSIUM CHLORIDE, SODIUM LACTATE AND CALCIUM CHLORIDE: 600; 310; 30; 20 INJECTION, SOLUTION INTRAVENOUS at 01:18

## 2021-10-17 RX ADMIN — CEFEPIME 2000 MG: 2 INJECTION, POWDER, FOR SOLUTION INTRAVENOUS at 04:59

## 2021-10-17 RX ADMIN — INSULIN LISPRO 1 UNITS: 100 INJECTION, SOLUTION INTRAVENOUS; SUBCUTANEOUS at 08:57

## 2021-10-17 RX ADMIN — METRONIDAZOLE 500 MG: 500 INJECTION, SOLUTION INTRAVENOUS at 02:31

## 2021-10-17 RX ADMIN — INSULIN LISPRO 1 UNITS: 100 INJECTION, SOLUTION INTRAVENOUS; SUBCUTANEOUS at 12:11

## 2021-10-17 RX ADMIN — HYDROMORPHONE HYDROCHLORIDE 0.5 MG: 1 INJECTION, SOLUTION INTRAMUSCULAR; INTRAVENOUS; SUBCUTANEOUS at 19:49

## 2021-10-17 RX ADMIN — INSULIN GLARGINE 8 UNITS: 100 INJECTION, SOLUTION SUBCUTANEOUS at 21:19

## 2021-10-17 RX ADMIN — METRONIDAZOLE 500 MG: 500 INJECTION, SOLUTION INTRAVENOUS at 08:49

## 2021-10-17 RX ADMIN — VANCOMYCIN HYDROCHLORIDE 1000 MG: 1 INJECTION, POWDER, LYOPHILIZED, FOR SOLUTION INTRAVENOUS at 01:28

## 2021-10-17 RX ADMIN — CEFEPIME 2000 MG: 2 INJECTION, POWDER, FOR SOLUTION INTRAVENOUS at 18:13

## 2021-10-17 RX ADMIN — MUPIROCIN: 20 OINTMENT TOPICAL at 23:12

## 2021-10-17 RX ADMIN — ENOXAPARIN SODIUM 40 MG: 40 INJECTION SUBCUTANEOUS at 08:46

## 2021-10-17 RX ADMIN — INSULIN LISPRO 1 UNITS: 100 INJECTION, SOLUTION INTRAVENOUS; SUBCUTANEOUS at 21:18

## 2021-10-17 RX ADMIN — METRONIDAZOLE 500 MG: 500 INJECTION, SOLUTION INTRAVENOUS at 18:47

## 2021-10-17 RX ADMIN — LORAZEPAM 0.5 MG: 2 INJECTION INTRAMUSCULAR; INTRAVENOUS at 16:30

## 2021-10-17 RX ADMIN — Medication 3 MG: at 19:49

## 2021-10-17 ASSESSMENT — PAIN SCALES - GENERAL
PAINLEVEL_OUTOF10: 4
PAINLEVEL_OUTOF10: 0
PAINLEVEL_OUTOF10: 5

## 2021-10-17 NOTE — CONSULTS
Pharmacy to Dose Vancomycin    Dx: ssti  Goal trough = 15-20  Pt wt = 55.2 kg  Recent Labs     10/15/21  1753 10/16/21  2157   CREATININE 1.2 0.6*     Recent Labs     10/15/21  1753   WBC 14.9*     Vanc 1000 x1 now. Pulse dose for now. Vancomycin random 10/18 0600  Griselda Lyons, [address] [place] [address] 10/17/2021 12:44 AM     Vancomycin Day 4  Current Dosing: Pulse dosing    Recent Labs     10/18/21  0540   VANCORANDOM 4.3     Recent Labs     10/16/21  2157 10/17/21  0608 10/18/21  0540   CREATININE 0.6* 0.6* 0.6*     Estimated Creatinine Clearance: 68 mL/min (A) (based on SCr of 0.6 mg/dL (L)). Recent Labs     10/15/21  1753 10/17/21  0926 10/18/21  0539   WBC 14.9* 8.5 6.3     Renal functions greatly improved  Scheduled Vancomycin 750 mg q12h with predicted AUC of 454  Recheck Vanc level 10/19 0700    Chito Esquivel, [address] [place] [address] 10/18/2021 7:20 AM    Vancomycin Day 10   Current Dosing: Vancomycin 750 q12h  Vancomycin trough = 19.7 mcg/mL at 0625  SCr < 0.5; Calc   Continue current dose & monitor  Rolando Rivera, PharmD 10/26/2021  1:48 PM    Vancomycin Day 17  Current Dosing: Vancomycin 750 mg q12h  Estimated Creatinine Clearance: 60 mL/min (A) (based on SCr of 0.7 mg/dL (L)).   Calc  likely due to bump in Scr   Decrease to Vancomycin 1000 mg q24h  Vanc level ordered for 11/5 at [facility], PharmD 11/2/2021  11:47 AM    Vancomycin Day 18  Current Dosing: Vancomycin 1000 mg q24h  SCr < 0.5; Est AUC < 400  Will adjust dose to Vanc 1000 mg q18h  Vanc level ordered for tomorrow at [facility], PharmD 11/3/2021  11:58 AM

## 2021-10-17 NOTE — CONSULTS
Infectious Diseases Inpatient Consult Note    Reason for Consult:   Sacral wound infection  Requesting Physician:   Dr Ratna Cervantes  Primary Care Physician:  Surjit Patterson MD  History Obtained From:   Pt, EPIC    Admit Date: 10/15/2021  Hospital Day: 3    CHIEF COMPLAINT:       Chief Complaint   Patient presents with    Altered Mental Status    Shortness of Breath    Wound Check       HISTORY OF PRESENT ILLNESS:      81 yo man with hx DM, HTN, HL, lumbar stenosis, dementia    Hx L2-5 lumbar surg - 9/2/21, [facility], postop hematoma, return to OR 9/5 and 9/23  Sacral PU, s/p debridement 9/23 at [facility], cult + Enterococcus, Enterobacter  Discharged on 9/27 on po augmentin    Presented 10/15 with lethargy  Afeb, WBC 14.9  Seen by Gen Surg - OR debridement 10/16    Today 10/17 - afeb, WBC 8.5      Past Medical History:    Past Medical History:   Diagnosis Date    Arthritis     Cancer ([address]     skin    Dry eyes, bilateral     Hyperlipidemia     Hypertension     Osteoporosis 2009    Type II or unspecified type diabetes mellitus without mention of complication, not stated as uncontrolled     Urinary incontinence        Past Surgical History:    Past Surgical History:   Procedure Laterality Date    COLONOSCOPY  2002    diverticulosis    COLONOSCOPY  11/19/2012    diverticulosis    EYE SURGERY      micheal cataract    TONSILLECTOMY         Current Medications:     vancomycin (VANCOCIN) intermittent dosing (placeholder)   Other RX Placeholder    donepezil  10 mg Oral Nightly    atorvastatin  10 mg Oral Nightly    vitamin B complex w/C  1 tablet Oral Daily    therapeutic multivitamin-minerals  1 tablet Oral Daily    enoxaparin  40 mg SubCUTAneous Daily    insulin glargine  0.15 Units/kg SubCUTAneous Nightly    insulin lispro  0.05 Units/kg SubCUTAneous TID WC    insulin lispro  0-6 Units SubCUTAneous TID WC    insulin lispro  0-3 Units SubCUTAneous Nightly    cefepime  2,000 mg IntraVENous Q12H    metroNIDAZOLE  500 mg IntraVENous Q8H       Allergies:  Iodine    Social History:    TOBACCO:    None   ETOH:    None   DRUGS:   None   MARITAL STATUS:      OCCUPATION:   Retired     Family History:   No immunodeficiency    REVIEW OF SYSTEMS:    No fever / chills / sweats. No weight loss. No visual change, eye pain, eye discharge. No oral lesion, sore throat, dysphagia. Denies cough / sputum. Denies chest pain, palpitations. Denies n / v / abd pain. No diarrhea. Denies dysuria or change in urinary function. Denies joint swelling or pain. No myalgia, arthralgia. Denies skin changes, itching  Denies focal weakness, sensory change or other neurologic symptom    Denies new / worse depression, psychiatric symptoms    PHYSICAL EXAM:      Vitals:    /62   Pulse 75   Temp 97.5 °F (36.4 °C) (Oral)   Resp 14   Ht 6' (1.829 m)   Wt 121 lb 7.6 oz (55.1 kg)   SpO2 100%   BMI 16.47 kg/m²     GENERAL: No apparent distress.     HEENT: Membranes moist, no oral lesion, PERRL  NECK:  Supple, no lymphadenopathy  LUNGS: Clear b/l, no rales, no dullness  CARDIAC: RRR, no murmur appreciated  ABD:  + BS, soft / NT  EXT:  No rash, no edema, no lesions  NEURO: No focal neurologic findings  PSYCH: Orientation, sensorium, mood normal  LINES:  Peripheral iv  Wound - sacral, with packing, no surrounding induration     DATA:    Lab Results   Component Value Date    WBC 8.5 10/17/2021    HGB 9.0 (L) 10/17/2021    HCT 26.9 (L) 10/17/2021    MCV 93.8 10/17/2021     10/17/2021     Lab Results   Component Value Date    CREATININE 0.6 (L) 10/17/2021    BUN 16 10/17/2021     (L) 10/17/2021    K 4.8 10/17/2021     10/17/2021    CO2 27 10/17/2021       Hepatic Function Panel:   Lab Results   Component Value Date    ALKPHOS 104 10/17/2021    ALT 20 10/17/2021    AST 31 10/17/2021    PROT 5.1 10/17/2021    BILITOT 0.3 10/17/2021    LABALBU 2.1 10/17/2021       Micro:  10/16 Sacral wound: GS 3+WBC, 2+GPR, 2+GPC, 1+GNR  10/16 Nasal MRSA probe sent  10/15 BC no growth to date  10/15 SARS CoV-2 NAAT neg    9/23 Wound cult -   Heavy growth E faecalis (S amp / vanco), E cloacae   Clostridium perfringens, Clostridium innocuum, Bacteriodes fragilis    Imaging:   10/15 Pelvic CT  1. Large deep sacral decubitus ulcer with mild adjacent cellulitis.  No   abscess or soft tissue gas. 2. Osseous uncovering of the posterior aspect of the lower sacrum and coccyx   without convincing evidence of osteomyelitis.  If clinically indicated   further evaluation could be obtained with MRI    10/15 CXR  Chronic elevation of the left hemidiaphragm which is unchanged with   increasing subsegmental atelectasis or early infiltrate along the left lung base      IMPRESSION:      Patient Active Problem List   Diagnosis    DM2 (diabetes mellitus, type 2) ([address]    Hypertension    Localized osteoarthrosis, lower leg    HLD (hyperlipidemia)    Lumbar stenosis with neurogenic claudication    BPH (benign prostatic hyperplasia)    ROXI (acute kidney injury) ([address]    Urinary retention    Decubitus ulcer of sacral region, unstageable ([address]    Severe sepsis (HCC)    Osteomyelitis ([facility]CC)    Chronic anemia       Hx DM, HTN, HL, lumbar stenosis, dementia    Hx L2-5 lumbar surg - 9/2/21, [facility], postop hematoma, return to OR 9/5 and 9/23    Sacral PU  - s/p debridement 9/23 at [address], cult + Enterococcus faecalis, Enterobacter cloacae, 3 anaerobes  Discharged on 9/27 on po augmentin   - POD#1 debridement     Encephalopathy - improved / resolved  Leukocytosis - resolved     RECOMMENDATIONS:    Cont vanco / cefepime / flagyl for now - reviewed dosing  Will follow on cult  Wound care    Anticipate able to simplify antibiotic    Medical Decision Making:   The following items were considered in medical decision making:  Discussion of patient care with other providers  Reviewed clinical lab tests  Reviewed radiology tests  Reviewed other diagnostic tests/interventions  Independent review of radiologic images  Microbiology cultures and other micro tests reviewed      Risk of Complications/Morbidity: High   Illness(es)/ Infection present that pose threat to bodily function. There is potential for severe exacerbation of infection/side effects of treatment.   Therapy requires intensive monitoring for antimicrobial agent toxicity    Discussed with pt, CLEMENTE Florian MD

## 2021-10-17 NOTE — PROGRESS NOTES
[facility], [place] GENERAL SURGERY    Surgery Progress Note           POD # 1    PATIENT NAME: Bear Liz     TODAY'S DATE: 10/17/2021    INTERVAL HISTORY:    Pt with mild pain. OBJECTIVE:   VITALS:  /62   Pulse 75   Temp 97.5 °F (36.4 °C) (Oral)   Resp 14   Ht 6' (1.829 m)   Wt 121 lb 7.6 oz (55.1 kg)   SpO2 97%   BMI 16.47 kg/m²     INTAKE/OUTPUT:    I/O last 3 completed shifts: In: 26 [P.O.:120; I.V.:350]  Out: 1925 [Urine:1825; Blood:100]  No intake/output data recorded. CONSTITUTIONAL:  awake and alert  ABDOMEN:   normal bowel sounds, soft, non-distended, non-tender   INCISION: clean base, exposed coccyx, minor areas of oozing    Data:  CBC: Recent Labs     10/15/21  1753   WBC 14.9*   HGB 10.6*   HCT 31.7*        BMP:    Recent Labs     10/15/21  1753 10/16/21  2157    136   K 4.8 5.0    104   CO2 23 26   BUN 58* 20   CREATININE 1.2 0.6*   GLUCOSE 75 159*     Hepatic:   Recent Labs     10/15/21  1753   AST 35   ALT 37   BILITOT 0.5   ALKPHOS 146*     Mag:    No results for input(s): MG in the last 72 hours. Phos:   No results for input(s): PHOS in the last 72 hours. INR: No results for input(s): INR in the last 72 hours. Radiology Review:  NA    ASSESSMENT AND PLAN:  80 y.o. male status post sacral wound debridement  1. Dressing changed today. Continue with wet to dry as daily and as needed  2. Follow up labs and cultures  3.   Continue IV abx         Electronically signed by Warner Rudolph MD

## 2021-10-17 NOTE — PROGRESS NOTES
Called to rapid response for onset of perioral dyskinesia. Wife at bedside reports this is new but may have happened after a prior surgery. He is post op day #1 excision and debridement of sacral wound for which received general anesthesia. Review medications and he is not on any anti-psychotics although he is donepezil for Alzheimer dementia. He is also usually on oxybutynin which was held on admission. Exam:  Relatively mild perioral dyskinesia. We will try a one time dose of IV lorazepam. It is possible that he received compazine perioperatively although we don't see it on the [facility]         Monique Mccallum MD  [facility]

## 2021-10-18 LAB
A/G RATIO: 0.6 (ref 1.1–2.2)
ALBUMIN SERPL-MCNC: 1.8 G/DL (ref 3.4–5)
ALP BLD-CCNC: 109 U/L (ref 40–129)
ALT SERPL-CCNC: 21 U/L (ref 10–40)
ANION GAP SERPL CALCULATED.3IONS-SCNC: 8 MMOL/L (ref 3–16)
AST SERPL-CCNC: 22 U/L (ref 15–37)
BANDED NEUTROPHILS RELATIVE PERCENT: 18 % (ref 0–7)
BASOPHILS ABSOLUTE: 0.1 K/UL (ref 0–0.2)
BASOPHILS RELATIVE PERCENT: 1 %
BILIRUB SERPL-MCNC: 0.3 MG/DL (ref 0–1)
BUN BLDV-MCNC: 17 MG/DL (ref 7–20)
CALCIUM SERPL-MCNC: 7.5 MG/DL (ref 8.3–10.6)
CHLORIDE BLD-SCNC: 98 MMOL/L (ref 99–110)
CO2: 26 MMOL/L (ref 21–32)
CREAT SERPL-MCNC: 0.6 MG/DL (ref 0.8–1.3)
EOSINOPHILS ABSOLUTE: 0.1 K/UL (ref 0–0.6)
EOSINOPHILS RELATIVE PERCENT: 1 %
GFR AFRICAN AMERICAN: >60
GFR NON-AFRICAN AMERICAN: >60
GLOBULIN: 3 G/DL
GLUCOSE BLD-MCNC: 133 MG/DL (ref 70–99)
GLUCOSE BLD-MCNC: 218 MG/DL (ref 70–99)
GLUCOSE BLD-MCNC: 267 MG/DL (ref 70–99)
GLUCOSE BLD-MCNC: 273 MG/DL (ref 70–99)
GLUCOSE BLD-MCNC: 296 MG/DL (ref 70–99)
HCT VFR BLD CALC: 23 % (ref 40.5–52.5)
HEMOGLOBIN: 7.7 G/DL (ref 13.5–17.5)
LYMPHOCYTES ABSOLUTE: 1.1 K/UL (ref 1–5.1)
LYMPHOCYTES RELATIVE PERCENT: 18 %
MAGNESIUM: 1.8 MG/DL (ref 1.8–2.4)
MCH RBC QN AUTO: 30.5 PG (ref 26–34)
MCHC RBC AUTO-ENTMCNC: 33.4 G/DL (ref 31–36)
MCV RBC AUTO: 91.4 FL (ref 80–100)
METAMYELOCYTES RELATIVE PERCENT: 2 %
MONOCYTES ABSOLUTE: 0.4 K/UL (ref 0–1.3)
MONOCYTES RELATIVE PERCENT: 7 %
MYELOCYTE PERCENT: 4 %
NEUTROPHILS ABSOLUTE: 4.6 K/UL (ref 1.7–7.7)
NEUTROPHILS RELATIVE PERCENT: 49 %
ORGANISM: ABNORMAL
PDW BLD-RTO: 15.3 % (ref 12.4–15.4)
PERFORMED ON: ABNORMAL
PLATELET # BLD: 270 K/UL (ref 135–450)
PMV BLD AUTO: 6.9 FL (ref 5–10.5)
POTASSIUM SERPL-SCNC: 4.4 MMOL/L (ref 3.5–5.1)
RBC # BLD: 2.52 M/UL (ref 4.2–5.9)
RBC # BLD: NORMAL 10*6/UL
SODIUM BLD-SCNC: 132 MMOL/L (ref 136–145)
TOTAL PROTEIN: 4.8 G/DL (ref 6.4–8.2)
URINE CULTURE, ROUTINE: ABNORMAL
VANCOMYCIN RANDOM: 4.3 UG/ML
WBC # BLD: 6.3 K/UL (ref 4–11)

## 2021-10-18 PROCEDURE — 2500000003 HC RX 250 WO HCPCS: Performed by: INTERNAL MEDICINE

## 2021-10-18 PROCEDURE — 6360000002 HC RX W HCPCS: Performed by: SURGERY

## 2021-10-18 PROCEDURE — 6370000000 HC RX 637 (ALT 250 FOR IP): Performed by: INTERNAL MEDICINE

## 2021-10-18 PROCEDURE — 6360000002 HC RX W HCPCS: Performed by: INTERNAL MEDICINE

## 2021-10-18 PROCEDURE — 80053 COMPREHEN METABOLIC PANEL: CPT

## 2021-10-18 PROCEDURE — 2580000003 HC RX 258: Performed by: SURGERY

## 2021-10-18 PROCEDURE — 80202 ASSAY OF VANCOMYCIN: CPT

## 2021-10-18 PROCEDURE — 99232 SBSQ HOSP IP/OBS MODERATE 35: CPT | Performed by: SURGERY

## 2021-10-18 PROCEDURE — 6370000000 HC RX 637 (ALT 250 FOR IP): Performed by: SURGERY

## 2021-10-18 PROCEDURE — 83735 ASSAY OF MAGNESIUM: CPT

## 2021-10-18 PROCEDURE — 1200000000 HC SEMI PRIVATE

## 2021-10-18 PROCEDURE — 2580000003 HC RX 258: Performed by: INTERNAL MEDICINE

## 2021-10-18 PROCEDURE — 36415 COLL VENOUS BLD VENIPUNCTURE: CPT

## 2021-10-18 PROCEDURE — 85025 COMPLETE CBC W/AUTO DIFF WBC: CPT

## 2021-10-18 RX ORDER — HYDROXYZINE HYDROCHLORIDE 50 MG/ML
50 INJECTION, SOLUTION INTRAMUSCULAR ONCE
Status: COMPLETED | OUTPATIENT
Start: 2021-10-18 | End: 2021-10-19

## 2021-10-18 RX ADMIN — HYDROMORPHONE HYDROCHLORIDE 0.5 MG: 1 INJECTION, SOLUTION INTRAMUSCULAR; INTRAVENOUS; SUBCUTANEOUS at 20:28

## 2021-10-18 RX ADMIN — CEFEPIME 2000 MG: 2 INJECTION, POWDER, FOR SOLUTION INTRAVENOUS at 18:28

## 2021-10-18 RX ADMIN — HYDROMORPHONE HYDROCHLORIDE 0.5 MG: 1 INJECTION, SOLUTION INTRAMUSCULAR; INTRAVENOUS; SUBCUTANEOUS at 10:00

## 2021-10-18 RX ADMIN — METRONIDAZOLE 500 MG: 500 INJECTION, SOLUTION INTRAVENOUS at 17:07

## 2021-10-18 RX ADMIN — MUPIROCIN: 20 OINTMENT TOPICAL at 20:31

## 2021-10-18 RX ADMIN — Medication 3 MG: at 20:28

## 2021-10-18 RX ADMIN — METRONIDAZOLE 500 MG: 500 INJECTION, SOLUTION INTRAVENOUS at 02:03

## 2021-10-18 RX ADMIN — VANCOMYCIN HYDROCHLORIDE 750 MG: 750 INJECTION, POWDER, LYOPHILIZED, FOR SOLUTION INTRAVENOUS at 20:34

## 2021-10-18 RX ADMIN — CEFEPIME 2000 MG: 2 INJECTION, POWDER, FOR SOLUTION INTRAVENOUS at 04:51

## 2021-10-18 RX ADMIN — METRONIDAZOLE 500 MG: 500 INJECTION, SOLUTION INTRAVENOUS at 11:55

## 2021-10-18 RX ADMIN — VANCOMYCIN HYDROCHLORIDE 750 MG: 750 INJECTION, POWDER, LYOPHILIZED, FOR SOLUTION INTRAVENOUS at 09:19

## 2021-10-18 RX ADMIN — SODIUM CHLORIDE, POTASSIUM CHLORIDE, SODIUM LACTATE AND CALCIUM CHLORIDE: 600; 310; 30; 20 INJECTION, SOLUTION INTRAVENOUS at 20:32

## 2021-10-18 RX ADMIN — SODIUM CHLORIDE, PRESERVATIVE FREE 10 ML: 5 INJECTION INTRAVENOUS at 20:26

## 2021-10-18 RX ADMIN — HYDROMORPHONE HYDROCHLORIDE 0.5 MG: 1 INJECTION, SOLUTION INTRAMUSCULAR; INTRAVENOUS; SUBCUTANEOUS at 16:29

## 2021-10-18 RX ADMIN — INSULIN GLARGINE 8 UNITS: 100 INJECTION, SOLUTION SUBCUTANEOUS at 20:38

## 2021-10-18 RX ADMIN — ENOXAPARIN SODIUM 40 MG: 40 INJECTION SUBCUTANEOUS at 09:10

## 2021-10-18 RX ADMIN — HYDROMORPHONE HYDROCHLORIDE 0.5 MG: 1 INJECTION, SOLUTION INTRAMUSCULAR; INTRAVENOUS; SUBCUTANEOUS at 13:14

## 2021-10-18 RX ADMIN — INSULIN LISPRO 3 UNITS: 100 INJECTION, SOLUTION INTRAVENOUS; SUBCUTANEOUS at 09:11

## 2021-10-18 RX ADMIN — INSULIN LISPRO 3 UNITS: 100 INJECTION, SOLUTION INTRAVENOUS; SUBCUTANEOUS at 12:43

## 2021-10-18 RX ADMIN — HYDROMORPHONE HYDROCHLORIDE 0.5 MG: 1 INJECTION, SOLUTION INTRAMUSCULAR; INTRAVENOUS; SUBCUTANEOUS at 04:50

## 2021-10-18 RX ADMIN — MUPIROCIN: 20 OINTMENT TOPICAL at 09:20

## 2021-10-18 RX ADMIN — ACETAMINOPHEN 650 MG: 325 TABLET ORAL at 11:55

## 2021-10-18 RX ADMIN — INSULIN LISPRO 2 UNITS: 100 INJECTION, SOLUTION INTRAVENOUS; SUBCUTANEOUS at 17:12

## 2021-10-18 ASSESSMENT — PAIN SCALES - PAIN ASSESSMENT IN ADVANCED DEMENTIA (PAINAD)
FACIALEXPRESSION: 1
BREATHING: 1
TOTALSCORE: 8
NEGVOCALIZATION: 2
BODYLANGUAGE: 1
FACIALEXPRESSION: 2
CONSOLABILITY: 2
CONSOLABILITY: 1
TOTALSCORE: 5
BODYLANGUAGE: 1
BREATHING: 1
NEGVOCALIZATION: 1

## 2021-10-18 ASSESSMENT — PAIN SCALES - GENERAL
PAINLEVEL_OUTOF10: 8
PAINLEVEL_OUTOF10: 10
PAINLEVEL_OUTOF10: 9
PAINLEVEL_OUTOF10: 8
PAINLEVEL_OUTOF10: 9
PAINLEVEL_OUTOF10: 10

## 2021-10-18 ASSESSMENT — PAIN SCALES - WONG BAKER: WONGBAKER_NUMERICALRESPONSE: 6

## 2021-10-18 ASSESSMENT — PAIN DESCRIPTION - PAIN TYPE: TYPE: SURGICAL PAIN

## 2021-10-18 ASSESSMENT — PAIN DESCRIPTION - LOCATION: LOCATION: BUTTOCKS

## 2021-10-18 NOTE — PROGRESS NOTES
Several episodes of diarrhea thru the night. Incontinence care provided. Dressing on coccyx changed d/t soiled. Wound bed cleaned with normal saline. Entire kerlix roll dampened with normal saline and fluffed placed in wound covered with dry dressing and secured with tape.

## 2021-10-18 NOTE — CARE COORDINATION
[facility] can accept at [place]. Has Palliative care consult pending. Continue to follow.   Sona Paige RN

## 2021-10-18 NOTE — PROGRESS NOTES
Hospitalist Progress Note      PCP: John Aviles MD    Date of Admission: 10/15/2021        Subjective:    Patient complains of pain, remains confused, unclear if this is baseline. . Noted orofacial dyskinesia has been present for the past 24 hrs      Medications:  Reviewed    Infusion Medications    dextrose      sodium chloride      lactated ringers 100 mL/hr at 10/17/21 0118     Scheduled Medications    vancomycin  750 mg IntraVENous Q12H    mupirocin   Nasal BID    donepezil  10 mg Oral Nightly    atorvastatin  10 mg Oral Nightly    vitamin B complex w/C  1 tablet Oral Daily    therapeutic multivitamin-minerals  1 tablet Oral Daily    enoxaparin  40 mg SubCUTAneous Daily    insulin glargine  0.15 Units/kg SubCUTAneous Nightly    insulin lispro  0.05 Units/kg SubCUTAneous TID WC    insulin lispro  0-6 Units SubCUTAneous TID WC    insulin lispro  0-3 Units SubCUTAneous Nightly    cefepime  2,000 mg IntraVENous Q12H    metroNIDAZOLE  500 mg IntraVENous Q8H     PRN Meds: glucose, dextrose, glucagon (rDNA), dextrose, sodium chloride flush, sodium chloride, potassium chloride **OR** potassium alternative oral replacement **OR** potassium chloride, magnesium sulfate, ondansetron **OR** ondansetron, acetaminophen **OR** acetaminophen, melatonin, HYDROmorphone      Intake/Output Summary (Last 24 hours) at 10/18/2021 0835  Last data filed at 10/18/2021 0631  Gross per 24 hour   Intake 100 ml   Output 1700 ml   Net -1600 ml       Physical Exam Performed:    BP (!) 144/69   Pulse 76   Temp 98.2 °F (36.8 °C) (Oral)   Resp 16   Ht 6' (1.829 m)   Wt 121 lb 11.1 oz (55.2 kg)   SpO2 97%   BMI 16.50 kg/m²     General appearance: No apparent distress, confused  HEENT: Pupils equal, round, and reactive to light. Conjunctivae/corneas clear. Neck: Supple, with full range of motion. No jugular venous distention. Trachea midline. Respiratory:  Normal respiratory effort.  Clear to auscultation, bilaterally without Rales/Wheezes/Rhonchi. Cardiovascular: Regular rate and rhythm with normal S1/S2 without murmurs, rubs or gallops. Abdomen: Soft, non-tender, non-distended with normal bowel sounds. Musculoskeletal: No clubbing, cyanosis or edema bilaterally. Full range of motion without deformity. Skin: Skin color, texture, turgor normal.  No rashes or lesions. Neurologic:  Neurovascularly intact without any focal sensory/motor deficits. Cranial nerves: II-XII intact, grossly non-focal.  Capillary Refill: Brisk,3 seconds, normal   Peripheral Pulses: +2 palpable, equal bilaterally       Labs:   Recent Labs     10/15/21  1753 10/17/21  0926 10/18/21  0539   WBC 14.9* 8.5 6.3   HGB 10.6* 9.0* 7.7*   HCT 31.7* 26.9* 23.0*    300 270     Recent Labs     10/16/21  2157 10/17/21  0608 10/18/21  0540    135* 132*   K 5.0 4.8 4.4    103 98*   CO2 26 27 26   BUN 20 16 17   CREATININE 0.6* 0.6* 0.6*   CALCIUM 8.0* 7.9* 7.5*     Recent Labs     10/15/21  1753 10/17/21  0608 10/18/21  0540   AST 35 31 22   ALT 37 20 21   BILITOT 0.5 0.3 0.3   ALKPHOS 146* 104 109     No results for input(s): INR in the last 72 hours. No results for input(s): Serenity Kras in the last 72 hours. Urinalysis:      Lab Results   Component Value Date    NITRU Negative 10/15/2021    WBCUA 10-20 10/15/2021    BACTERIA 4+ 10/15/2021    RBCUA 5-10 10/15/2021    BLOODU SMALL 10/15/2021    SPECGRAV 1.015 10/15/2021    GLUCOSEU 250 10/15/2021       Radiology:  CT HEAD WO CONTRAST   Final Result      Stable study. No evidence for acute intracranial hemorrhage, territorial   infarction or intracranial mass lesion. Mild chronic microangiopathic ischemic disease. Mild generalized volume loss. CT PELVIS WO CONTRAST Additional Contrast? None   Final Result   1. Large deep sacral decubitus ulcer with mild adjacent cellulitis. No   abscess or soft tissue gas.    2. Osseous uncovering of the posterior aspect of the lower sacrum and coccyx   without convincing evidence of osteomyelitis. If clinically indicated   further evaluation could be obtained with MRI. XR CHEST PORTABLE   Final Result   Chronic elevation of the left hemidiaphragm which is unchanged with   increasing subsegmental atelectasis or early infiltrate along the left lung   base. 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 ..    - s/p debridement 9/23 at [facility], cult + Enterococcus faecalis, Enterobacter cloacae, 3 anaerobesDischarged on 9/27 on po augmentin    -s/p debridement 10/6-cultures growing MRSA, Enterococcus, corynebacterium--follow-up on sensitivities    -Antibiotics--- on IV cefepime, vancomycin and Flagyl--management per ID        -Recent spine surgery, with concern for cauda equina syndrome due to postop hematoma     \"on 9/2 Dr. Beryle Ganja performed an L2-5 decompression and laminectomy at [place] [address], then postoperatively on 9/5 [facility] noted that the patient was unable to wiggle his toes and had BLE weakness to the point that he required maximum assistance x2 in order to stand. An MRI was performed which showed severe spinal canal stenosis due to hematoma. on 9/6 Dr. Jarred Franks took him back to the OR for an urgent exploration and hematoma evacuation, left drains in place. eventually the patient was discharged to SNF on 9/10. readmitted 9/19-9/22 urinary retention, constipation, and ongoing leg weakness, we obtained a repeat MRI on this admission to [facility]. It showed an ill-defind fluid/soft tissue causing severe spinal canal stenosis from L1-4, also moderate-severe spinal canal stenosis from L4 - S1, transferred to [facility] for neurosurgery eval-did not require spinal surgery but had debirdement of decub ulcer  -Acute metabolic encephalopathy. . Presented with increased confusion from baseline,Likely due to ongoing infection --continue supportive care  -MRSA UTI  -UCx postive for [facility] decolonization with mupirocin 2% x 7 days  -On Vanc    -Acute orofacial dyskinesia-onset 10/17--was given IV Ativan with no improvement---the patient has not received any antipsychotics/ Reglan this hospitalization. . IV cefepime may cause neurotoxicity with dyskinesias. .. Consult neurology   -Dementiaon Ariceptcontinue supportive care  -Chronic indwelling Jain-Indwelling jain since 9/19. Jain replaced this admission 10/15  -DM type II--- continue insulin therapy  -Essential hypertension-stablelisinopril held on addition continue to monitor off meds    DVT Prophylaxis: Lovenox  Diet: ADULT DIET; Regular  Adult Oral Nutrition Supplement; Standard High Calorie/High Protein Oral Supplement  Code Status: DNR-CCA      Disposition. .. Remains inpatient given ongoing wound care with surgery. ... Consult palliative care. ..  Patient's family has apparently declined hospice in the recent past--this was brought up by Dr Jaida Genao (general surgery) as well as Koko Villanueva MD

## 2021-10-18 NOTE — CONSULTS
Wound Care consulted for Sacral wound, General Surgery changed dressing this morning, Wound Care to follow up tomorrow.

## 2021-10-18 NOTE — CONSULTS
[place] [place] [address] Continence Nurse  Consult Note       NAME:  Ashley Wiggins  MEDICAL RECORD NUMBER:  [id number]  AGE: 80 y.o. GENDER: male  : 1934  TODAY'S DATE:  10/18/2021    Subjective  It hurts. Reason for WOCN Evaluation and Assessment: Stage 4 pressure injury. Ashley Wiggins is a 80 y.o. male referred by:   [x] Physician  [] Nursing  [] Other:     Wound Identification:  Wound Type: Stage 4 pressure injury with osteomyelitis on sacrum debrided on 10/16/21 by Dr Lanny Mancera. Contributing Factors: chronic pressure, decreased mobility, shear force, obesity, incontinence of stool and incontinence of urine    Wound History: Ashley Wiggins is a 80 y.o. male Hx of Alzheimer's, sacral decubitus ulcer s/p debridements 21 &  21; L2-5 decompression and laminectomy (21); presenting from extended care facility for evaluation of altered mental status and wound check. Order or palliative care to see patient and family to discuss long term goals. Current Wound Care Treatment:  Moist to moist dressing.     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    MRSA (methicillin resistant staph aureus) culture positive 10/15/2021    urine    MRSA (methicillin resistant staph aureus) culture positive 10/15/2021    anus    Osteoporosis 2009    Type II or unspecified type diabetes mellitus without mention of complication, not stated as uncontrolled     Urinary incontinence        PAST SURGICAL HISTORY    Past Surgical History:   Procedure Laterality Date    COLONOSCOPY      diverticulosis    COLONOSCOPY  2012    diverticulosis    EYE SURGERY      micheal cataract    RECTAL SURGERY N/A 10/16/2021    SACRAL WOUND DEBRIDMENT performed by Margaux Adams MD at [facility]    Family History   Problem Relation Age of Onset    Heart Disease Mother     Cancer Sister 39        breast       SOCIAL HISTORY    Social History     Tobacco Use    Smoking status: Never Smoker    Smokeless tobacco: Never Used   Vaping Use    Vaping Use: Never used   Substance Use Topics    Alcohol use: No    Drug use: Never       ALLERGIES    Allergies   Allergen Reactions    Iodine Itching and Swelling       MEDICATIONS    No current facility-administered medications on file prior to encounter. Current Outpatient Medications on File Prior to Encounter   Medication Sig Dispense Refill    insulin glargine (LANTUS) 100 UNIT/ML injection vial Inject 10 Units into the skin nightly      melatonin 3 MG TABS tablet Take 3 mg by mouth nightly as needed      oxybutynin (DITROPAN-XL) 10 MG extended release tablet Take 10 mg by mouth daily      oxyCODONE 5 MG capsule Take 5 mg by mouth every 3 hours as needed for Pain.  polyethylene glycol (GLYCOLAX) 17 g packet Take 17 g by mouth daily as needed for Constipation      Chromium Picolinate 1000 MCG TABS Take 1,000 mcg by mouth daily      cyanocobalamin 1000 MCG tablet Take 1,000 mcg by mouth daily      amLODIPine (NORVASC) 10 MG tablet Take 10 mg by mouth daily      Multiple Vitamins-Minerals (THERAPEUTIC MULTIVITAMIN-MINERALS) tablet Take 1 tablet by mouth daily      donepezil (ARICEPT) 10 MG tablet Take 10 mg by mouth nightly      senna-docusate (PERICOLACE) 8.6-50 MG per tablet Take 1 tablet by mouth 2 times daily      b complex vitamins capsule Take 1 capsule by mouth daily      methocarbamol (ROBAXIN) 500 MG tablet Take 500 mg by mouth every 6 hours as needed      lisinopril (PRINIVIL;ZESTRIL) 20 MG tablet Take 20 mg by mouth nightly       simvastatin (ZOCOR) 10 MG tablet Take 10 mg by mouth nightly.       Measurements:  Wound 09/19/21 Sacrum (Active)   Wound Image   10/18/21 1453   Wound Etiology Pressure Stage  4 10/18/21 1453   Dressing Status New dressing applied 10/18/21 1453   Wound Cleansed Cleansed with saline 10/18/21 1453   Dressing/Treatment Moist to moist;Dry dressing;ABD;Tape/Soft cloth adhesive tape 10/18/21 1453   Offloading for Diabetic Foot Ulcers Other (comment) 10/18/21 1453   Dressing Change Due 10/19/21 10/18/21 1453   Wound Length (cm) 13 cm 10/18/21 1453   Wound Width (cm) 7.5 cm 10/18/21 1453   Wound Depth (cm) 3.5 cm 10/18/21 1453   Wound Surface Area (cm^2) 97.5 cm^2 10/18/21 1453   Change in Wound Size % (l*w) -154.9 10/18/21 1453   Wound Volume (cm^3) 341.25 cm^3 10/18/21 1453   Wound Healing % -8822 10/18/21 1453   Tunneling Position ___ O'Clock 0 10/18/21 1453   Undermining Starts ___ O'Clock 12 10/18/21 1453   Undermining Ends___ O'Clock 12 10/18/21 1453   Undermining Maxium Distance (cm) 2.2 10/18/21 1453   Wound Assessment Exposed structure bone;Exposed structure muscle;Eschar dry;Fibrin;Epithelialization;Eschar moist;Pink/red;Slough 10/18/21 1453   Drainage Amount Moderate 10/18/21 1453   Drainage Description Serosanguinous 10/18/21 1453   Odor None 10/18/21 1453   Cassie-wound Assessment Dry/flaky;Fragile 10/18/21 1453   Margins Unattached edges; Defined edges 10/18/21 1453   Wound Thickness Description not for Pressure Injury Full thickness 10/18/21 1453   Number of days: 29      Sacrum:           Wound 09/19/21 Heel Left (Active)   Wound Image   10/18/21 1453   Wound Etiology Pressure Unstageable 10/18/21 1453   Dressing Status Other (Comment) 10/18/21 1453   Wound Cleansed Betadine/povidone iodine 10/18/21 1453   Dressing/Treatment Betadine swabs/povidone iodine 10/18/21 1453   Offloading for Diabetic Foot Ulcers Offloading boot 10/18/21 1453   Dressing Change Due 09/19/21 09/19/21 1520   Wound Length (cm) 6 cm 10/18/21 1453   Wound Width (cm) 7 cm 10/18/21 1453   Wound Depth (cm) 0 cm 10/18/21 1453   Wound Surface Area (cm^2) 42 cm^2 10/18/21 1453   Change in Wound Size % (l*w) -200 10/18/21 1453   Wound Volume (cm^3) 0 cm^3 10/18/21 1453   Distance Tunneling (cm) 0 cm 10/18/21 1453   Tunneling Position ___ O'Clock 0 10/18/21 1453   Undermining Starts ___ O'Clock 0 10/18/21 1453   Undermining Ends___ O'Clock 0 10/18/21 1453   Undermining Maxium Distance (cm) 0 10/18/21 1453   Wound Assessment Eschar dry 10/18/21 1453   Drainage Amount None 10/18/21 1453   Drainage Description Black 10/16/21 0430   Odor None 10/18/21 1453   Cassie-wound Assessment Dry/flaky; Intact 10/18/21 1453   Margins Attached edges; Defined edges 10/18/21 1453   Number of days: 29      Left heel:           Wound 09/19/21 Heel Right (Active)   Wound Image   10/18/21 1453   Wound Etiology Pressure Unstageable 10/18/21 1453   Dressing Status Other (Comment) 10/18/21 1453   Wound Cleansed Betadine/povidone iodine 10/18/21 1453   Dressing/Treatment Betadine swabs/povidone iodine 10/18/21 1453   Offloading for Diabetic Foot Ulcers Offloading boot 10/18/21 1453   Dressing Change Due 09/23/21 09/22/21 0845   Wound Length (cm) 6.5 cm 10/18/21 1453   Wound Width (cm) 4.5 cm 10/18/21 1453   Wound Depth (cm) 0 cm 10/18/21 1453   Wound Surface Area (cm^2) 29.25 cm^2 10/18/21 1453   Change in Wound Size % (l*w) -30 10/18/21 1453   Wound Volume (cm^3) 0 cm^3 10/18/21 1453   Distance Tunneling (cm) 0 cm 10/18/21 1453   Tunneling Position ___ O'Clock 0 10/18/21 1453   Undermining Starts ___ O'Clock 0 10/18/21 1453   Undermining Ends___ O'Clock 0 10/18/21 1453   Undermining Maxium Distance (cm) 0 10/18/21 1453   Wound Assessment Eschar dry 10/18/21 1453   Drainage Amount None 10/18/21 1453   Odor None 10/18/21 1453   Cassie-wound Assessment Dry/flaky; Intact 10/18/21 1453   Margins Attached edges; Defined edges 10/18/21 1453   Number of days: 29      Right heel:            Response to treatment:  With complaints of pain.      Pain Assessment:  Severity:  6 / 10  Quality of pain: sharp, burning  Wound Pain Timing/Severity: intermittent  Premedicated: Yes    Plan   Plan of Care:   Wound 09/19/21 Sacrum-Dressing/Treatment: Moist to moist, Dry dressing, ABD, Tape/Soft cloth adhesive tape  Wound 09/19/21 Heel Right-Dressing/Treatment: Barrier film  Wound 09/19/21 Heel Left-Dressing/Treatment: barrier film daily    Recommend:  Concur with surgeon. Moist to moist dressing changes to sacrum wound daily and prn soilage. Lightly pack with moist roll guaze, cover with dry dressing, abd pad and medipore tape. Can use tegaderm dressing on distal edge to protect against stool and in effort to keep dressing . Paint bilateral heels with betadine daily. Off load heels. Off load sacrum with wedge pillow and turn every 2 hours. Wound care to follow. Call wound care for deterioration [phone].     Specialty Bed Required : Yes   [x] Low Air Loss   [x] Pressure Redistribution  [x] Fluid Immersion Dolphin mattress with topper  [] Bariatric  [] Total Pressure Relief  [x] Other: low air loss topper    Current Diet: ADULT DIET; Regular  Adult Oral Nutrition Supplement; Diabetic Oral Supplement  Dietician consult:  Yes dietary following to increase needs for protein to assist with wound healing.     Discharge Plan:  Placement for patient upon discharge: intermediate care facility    Patient appropriate for Outpatient [address]: Yes    Referrals:  []  / discharge planner following  [] [address] [place] [employer] [facility]  [] Supplies  [] Other    Patient/Caregiver Teaching:  Level of patient/caregiver understanding able to:   [] Indicates understanding       [] Needs reinforcement  [] Unsuccessful      [] Verbal Understanding  [] Demonstrated understanding       [x] No evidence of learning  [] Refused teaching         [] N/A       Electronically signed by Jules Manzanares, RN, MSN, Eliza Zarco on 10/18/2021 at 2:58 PM

## 2021-10-18 NOTE — PROGRESS NOTES
Hospitalist Progress Note      PCP: Dalton Wadsworth MD    Date of Admission: 10/15/2021    Chief Complaint: Fatigue and abnormal labs, creatinine 5.4 with history of CKD stage III    Hospital Course: Meryle Senate is a 80 y.o. male. He was sent from his SNF. He has been staying there since a recent admission at [facility] for lumbar laminectomy. His course there was complicated by an epidural fluid collection which required urgent re-exploration of the operative wound. He was sent from his SNF because of abnormal labs and  altered mental status. He is confused but able to answer a few questions and follow simple commands. Was find to have a large necrotic decubitus ulcer. General surgery consulted and planning for debridement. Subjective:   Seen and examined at bedside. He was oriented to person, place, time. He was more aware of what's going on. Pain under control. S/p surgical debridement yesterday 10/16.   Afebrile overnight     Medications:  Reviewed    Infusion Medications    dextrose      sodium chloride      lactated ringers 100 mL/hr at 10/17/21 0118     Scheduled Medications    vancomycin (VANCOCIN) intermittent dosing (placeholder)   Other RX Placeholder    mupirocin   Nasal BID    donepezil  10 mg Oral Nightly    atorvastatin  10 mg Oral Nightly    vitamin B complex w/C  1 tablet Oral Daily    therapeutic multivitamin-minerals  1 tablet Oral Daily    enoxaparin  40 mg SubCUTAneous Daily    insulin glargine  0.15 Units/kg SubCUTAneous Nightly    insulin lispro  0.05 Units/kg SubCUTAneous TID WC    insulin lispro  0-6 Units SubCUTAneous TID WC    insulin lispro  0-3 Units SubCUTAneous Nightly    cefepime  2,000 mg IntraVENous Q12H    metroNIDAZOLE  500 mg IntraVENous Q8H     PRN Meds: glucose, dextrose, glucagon (rDNA), dextrose, sodium chloride flush, sodium chloride, potassium chloride **OR** potassium alternative oral replacement **OR** potassium chloride, magnesium sulfate, ondansetron **OR** ondansetron, acetaminophen **OR** acetaminophen, melatonin, HYDROmorphone      Intake/Output Summary (Last 24 hours) at 10/17/2021 2206  Last data filed at 10/17/2021 1132  Gross per 24 hour   Intake 100 ml   Output 850 ml   Net -750 ml       Physical Exam Performed:    BP (!) 158/67   Pulse 99   Temp 98.1 °F (36.7 °C) (Oral)   Resp 18   Ht 6' (1.829 m)   Wt 121 lb 7.6 oz (55.1 kg)   SpO2 95%   BMI 16.47 kg/m²     General appearance: No apparent distress, appears stated age and cooperative. HEENT: Pupils equal, round, and reactive to light. Conjunctivae/corneas clear. Neck: Supple, with full range of motion. No jugular venous distention. Trachea midline. Respiratory:  Normal respiratory effort. Clear to auscultation, bilaterally without Rales/Wheezes/Rhonchi. Cardiovascular: Regular rate and rhythm with normal S1/S2 without murmurs, rubs or gallops. Abdomen: Soft, non-tender, non-distended with normal bowel sounds. Musculoskeletal: No clubbing, cyanosis or edema bilaterally. Full range of motion without deformity. Skin: Big decubitus wound  Neurologic:  Neurovascularly intact without any focal sensory/motor deficits. Cranial nerves: II-XII intact, grossly non-focal.  Psychiatric: Alert and oriented x4 with remittent confusion  Capillary Refill: Brisk,3 seconds, normal   Peripheral Pulses: +2 palpable, equal bilaterally       Labs:   Recent Labs     10/15/21  1753 10/17/21  0926   WBC 14.9* 8.5   HGB 10.6* 9.0*   HCT 31.7* 26.9*    300     Recent Labs     10/15/21  1753 10/16/21  2157 10/17/21  0608    136 135*   K 4.8 5.0 4.8    104 103   CO2 23 26 27   BUN 58* 20 16   CREATININE 1.2 0.6* 0.6*   CALCIUM 8.5 8.0* 7.9*     Recent Labs     10/15/21  1753 10/17/21  0608   AST 35 31   ALT 37 20   BILITOT 0.5 0.3   ALKPHOS 146* 104     No results for input(s): INR in the last 72 hours.   No results for input(s): [facility] in the last 72 sacral decubitus Ulcer  -  This has been managed primary at [address]  was contacted but unable to accept patient. The hospital was at capacity. -  Culture growth of Enterococcus and Enterobacter. See results above.  -Underwent surgical debridement today 10/16/21  -Wound culture showed 4+ gram-positive cocci, 4+ gram-positive rods.  -Surgical culture showed 4+ gram-negative rods present, 1+ gram-positive cocci.  -Continue Vanco, cefepime and Flagyl.    - ID consulted    MRSA UTI  -UCx postive for MRSA  -MRSA decolonization with mupirocin 2% x 7 days  -Contact isolation  -On VAnc  -ID following     Urinary retention, Indwelling Avilez catheter  -  Avilez replaced.     DM2  -  Hold all oral antidiabetic agents. Start s.c. Insulin regimen based on weight     HTN  -  Holding lisinopril     Dementia  -  Donepezil. Chronic anemia  -Hgb 10.6 on admission, baseline around 8  -Monitor with daily CBC, transfuse if Hgb <7      DVT Prophylaxis: Lovenox  Diet: ADULT DIET;  Regular  Adult Oral Nutrition Supplement; Standard High Calorie/High Protein Oral Supplement  Code Status: DNR-CCA    PT/OT Eval Status: Ordered    Dispo -opending clinical improvement    Jared Quinn MD

## 2021-10-18 NOTE — PLAN OF CARE
Nutrition Problem #1: Increased nutrient needs  Intervention: Food and/or Nutrient Delivery: Start Oral Nutrition Supplement, Continue Current Diet  Nutritional Goals: Consume 50% or greater of 3 meals per day and ONS during this admission.

## 2021-10-18 NOTE — PROGRESS NOTES
Calorie/High Protein Oral Supplement    Anthropometric Measures:  · Height: 6' (182.9 cm)  · Current Body Weight: 121 lb (54.9 kg)      · Ideal Body Weight: 178 lbs; % Ideal Body Weight 68 %   · BMI: 16.4  · BMI Categories: Underweight (BMI less than 22) age over 72       Nutrition Diagnosis:   · Increased nutrient needs related to increase demand for energy/nutrients as evidenced by wounds, BMI, weight loss    Nutrition Interventions:   Food and/or Nutrient Delivery:  Start Oral Nutrition Supplement, Continue Current Diet  Nutrition Education/Counseling:  Education not indicated   Coordination of Nutrition Care:  Continue to monitor while inpatient    Goals:  Consume 50% or greater of 3 meals per day and ONS during this admission.        Nutrition Monitoring and Evaluation:   Behavioral-Environmental Outcomes:  None Identified   Food/Nutrient Intake Outcomes:  Food and Nutrient Intake, Supplement Intake  Physical Signs/Symptoms Outcomes:  Biochemical Data, Skin     Discharge Planning:    Continue current diet, Continue Oral Nutrition Supplement     Electronically signed by Aleksandr Barksdale, MS, RD, LD on 10/18/21 at 2:51 PM EDT    Contact: Office: [phone]; [address] [place] [address] Road: [phone]

## 2021-10-18 NOTE — CARE COORDINATION
CASE MANAGEMENT INITIAL ASSESSMENT      Reviewed chart and completed assessment via telephone with: spouse on phone  Explained Case Management role/services. yes    Primary contact information:    Health Care Decision Maker :   Primary Decision Maker: Nestor Aschoff - Spouse - [phone]    Secondary Decision Maker: Jim Ambrocio - Child - [phone]          Can this person be reached and be able to respond quickly, such as within a few minutes or hours? Yes  Who would be your back-up decision maker? Name   Phone Number:    Admit date/status:10/15 IP  Diagnosis:sacral wound  Is this a Readmission?:  Yes      Insurance:[facility]   Precert required for SNF: No       3 night stay required: No    Living arrangements, Adls, care needs, prior to admission:Lives w spouse, but released from [facility] recently. Has been at [facility] [place] for SNF. Spouse seeking alternate placement at [facility]- call placed and referral made.      Transportation:ambulance    Durable Medical Equipment at home:  Walker__Cane__RTS__ BSC__Shower Chair__  02__ HHN__ CPAP__  BiPap__  Hospital Bed__ W/C___ Other__________    Services in the home and/or outpatient, prior to admission:Has been in SNF    ·     PT/OT recs:none    Hospital Exemption Notification (HEN):await placement decision    Barriers to discharge:sacral wound care    Plan/comments:Sp[facility] plans [facility]- call placed and referral sent    [address] on chart for MD ghulam Prado RN

## 2021-10-18 NOTE — PROGRESS NOTES
Physician Progress Note      PATIENT:               Oscar Schilling  CSN #:                  [id number]  :                       1934  ADMIT DATE:       10/15/2021 5:04 PM  DISCH DATE:  RESPONDING  PROVIDER #:        Paula Elizabeth MD          QUERY TEXT:    Pt admitted with Metabolic encephalopathy, CAUTI, and sacral decubitus ulcer. Noted documentation of sepsis on ED consult note. If possible, please   document in progress notes and discharge summary:      The medical record reflects the following:  Risk Factors: Diabetes, CAUTI, osteomyelitis secondary to sacral decubitus   ulcer, malnutrition  Clinical Indicators: Temp 101.8, WBC 14.9, Pulse 124, Lactic acid 3.2,   metabolic encephalopathy. Per ED consult note \"CLINICAL IMPRESSION  1. Severe sepsis (HCC)\". Treatment: ID consult, wound debridement, IV fluid bolus 30ml/kg,  IV   cefepime, IV Flagyl, IV Vancomycin, blood cultures, urine culture, serial   labs, supportive care. Thank you,  [email or web address]  Options provided:  -- Sepsis due to sacral decubitus ulcer confirmed present on arrival  -- Sepsis due to CAUTI confirmed present on arrival  -- Sepsis due to other confirmed present on arrival, please specify present on   arrival.  -- Sepsis ruled out  -- Other - I will add my own diagnosis  -- Disagree - Not applicable / Not valid  -- Disagree - Clinically unable to determine / Unknown  -- Refer to Clinical Documentation Reviewer    PROVIDER RESPONSE TEXT:    The diagnosis of sepsis due to sacral decubitus ulcer confirmed present on   arrival.    Query created by: Kirill Bravo on 10/18/2021 11:12 AM      Electronically signed by:  Paula Elizabeth MD 10/18/2021 3:22 PM

## 2021-10-19 LAB
A/G RATIO: 0.7 (ref 1.1–2.2)
ALBUMIN SERPL-MCNC: 1.9 G/DL (ref 3.4–5)
ALP BLD-CCNC: 98 U/L (ref 40–129)
ALT SERPL-CCNC: 22 U/L (ref 10–40)
ANAEROBIC CULTURE: ABNORMAL
ANAEROBIC CULTURE: ABNORMAL
ANION GAP SERPL CALCULATED.3IONS-SCNC: 6 MMOL/L (ref 3–16)
ANISOCYTOSIS: ABNORMAL
AST SERPL-CCNC: 30 U/L (ref 15–37)
BANDED NEUTROPHILS RELATIVE PERCENT: 27 % (ref 0–7)
BASOPHILS ABSOLUTE: 0 K/UL (ref 0–0.2)
BASOPHILS RELATIVE PERCENT: 0 %
BILIRUB SERPL-MCNC: 0.3 MG/DL (ref 0–1)
BLOOD CULTURE, ROUTINE: NORMAL
BUN BLDV-MCNC: 17 MG/DL (ref 7–20)
CALCIUM SERPL-MCNC: 7.4 MG/DL (ref 8.3–10.6)
CHLORIDE BLD-SCNC: 98 MMOL/L (ref 99–110)
CO2: 25 MMOL/L (ref 21–32)
CREAT SERPL-MCNC: 0.8 MG/DL (ref 0.8–1.3)
CULTURE SURGICAL: ABNORMAL
CULTURE, BLOOD 2: NORMAL
EOSINOPHILS ABSOLUTE: 0.1 K/UL (ref 0–0.6)
EOSINOPHILS RELATIVE PERCENT: 2 %
GFR AFRICAN AMERICAN: >60
GFR NON-AFRICAN AMERICAN: >60
GLOBULIN: 2.8 G/DL
GLUCOSE BLD-MCNC: 199 MG/DL (ref 70–99)
GLUCOSE BLD-MCNC: 215 MG/DL (ref 70–99)
GLUCOSE BLD-MCNC: 223 MG/DL (ref 70–99)
GLUCOSE BLD-MCNC: 259 MG/DL (ref 70–99)
GLUCOSE BLD-MCNC: 315 MG/DL (ref 70–99)
GRAM STAIN RESULT: ABNORMAL
GRAM STAIN RESULT: ABNORMAL
HCT VFR BLD CALC: 22.8 % (ref 40.5–52.5)
HEMOGLOBIN: 7.6 G/DL (ref 13.5–17.5)
HYPOCHROMIA: ABNORMAL
LYMPHOCYTES ABSOLUTE: 1.2 K/UL (ref 1–5.1)
LYMPHOCYTES RELATIVE PERCENT: 17 %
MACROCYTES: ABNORMAL
MAGNESIUM: 1.7 MG/DL (ref 1.8–2.4)
MCH RBC QN AUTO: 31 PG (ref 26–34)
MCHC RBC AUTO-ENTMCNC: 33.3 G/DL (ref 31–36)
MCV RBC AUTO: 92.9 FL (ref 80–100)
METAMYELOCYTES RELATIVE PERCENT: 2 %
MICROCYTES: ABNORMAL
MONOCYTES ABSOLUTE: 0.1 K/UL (ref 0–1.3)
MONOCYTES RELATIVE PERCENT: 2 %
MYELOCYTE PERCENT: 3 %
NEUTROPHILS ABSOLUTE: 5.5 K/UL (ref 1.7–7.7)
NEUTROPHILS RELATIVE PERCENT: 47 %
ORGANISM: ABNORMAL
PDW BLD-RTO: 15.7 % (ref 12.4–15.4)
PERFORMED ON: ABNORMAL
PLATELET # BLD: 267 K/UL (ref 135–450)
PLATELET SLIDE REVIEW: ADEQUATE
PMV BLD AUTO: 6.7 FL (ref 5–10.5)
POIKILOCYTES: ABNORMAL
POLYCHROMASIA: ABNORMAL
POTASSIUM SERPL-SCNC: 4.1 MMOL/L (ref 3.5–5.1)
RBC # BLD: 2.46 M/UL (ref 4.2–5.9)
SLIDE REVIEW: ABNORMAL
SODIUM BLD-SCNC: 129 MMOL/L (ref 136–145)
TOTAL PROTEIN: 4.7 G/DL (ref 6.4–8.2)
VANCOMYCIN TROUGH: 9.5 UG/ML (ref 10–20)
WBC # BLD: 7 K/UL (ref 4–11)
WOUND/ABSCESS: ABNORMAL

## 2021-10-19 PROCEDURE — 85025 COMPLETE CBC W/AUTO DIFF WBC: CPT

## 2021-10-19 PROCEDURE — 6370000000 HC RX 637 (ALT 250 FOR IP): Performed by: INTERNAL MEDICINE

## 2021-10-19 PROCEDURE — 6360000002 HC RX W HCPCS: Performed by: SURGERY

## 2021-10-19 PROCEDURE — 2580000003 HC RX 258: Performed by: INTERNAL MEDICINE

## 2021-10-19 PROCEDURE — 99222 1ST HOSP IP/OBS MODERATE 55: CPT | Performed by: PSYCHIATRY & NEUROLOGY

## 2021-10-19 PROCEDURE — 80053 COMPREHEN METABOLIC PANEL: CPT

## 2021-10-19 PROCEDURE — 83735 ASSAY OF MAGNESIUM: CPT

## 2021-10-19 PROCEDURE — 99232 SBSQ HOSP IP/OBS MODERATE 35: CPT | Performed by: INTERNAL MEDICINE

## 2021-10-19 PROCEDURE — 6360000002 HC RX W HCPCS: Performed by: INTERNAL MEDICINE

## 2021-10-19 PROCEDURE — 6360000002 HC RX W HCPCS: Performed by: NURSE PRACTITIONER

## 2021-10-19 PROCEDURE — 80202 ASSAY OF VANCOMYCIN: CPT

## 2021-10-19 PROCEDURE — 1200000000 HC SEMI PRIVATE

## 2021-10-19 PROCEDURE — 2580000003 HC RX 258: Performed by: SURGERY

## 2021-10-19 PROCEDURE — 2500000003 HC RX 250 WO HCPCS: Performed by: INTERNAL MEDICINE

## 2021-10-19 PROCEDURE — 36415 COLL VENOUS BLD VENIPUNCTURE: CPT

## 2021-10-19 PROCEDURE — 6370000000 HC RX 637 (ALT 250 FOR IP): Performed by: SURGERY

## 2021-10-19 PROCEDURE — 99231 SBSQ HOSP IP/OBS SF/LOW 25: CPT | Performed by: SURGERY

## 2021-10-19 RX ORDER — LORAZEPAM 2 MG/ML
0.5 INJECTION INTRAMUSCULAR EVERY 6 HOURS PRN
Status: DISCONTINUED | OUTPATIENT
Start: 2021-10-19 | End: 2021-11-05 | Stop reason: HOSPADM

## 2021-10-19 RX ORDER — HYDROXYZINE HYDROCHLORIDE 50 MG/ML
50 INJECTION, SOLUTION INTRAMUSCULAR
Status: DISPENSED | OUTPATIENT
Start: 2021-10-19 | End: 2021-10-19

## 2021-10-19 RX ADMIN — MUPIROCIN: 20 OINTMENT TOPICAL at 21:05

## 2021-10-19 RX ADMIN — METRONIDAZOLE 500 MG: 500 INJECTION, SOLUTION INTRAVENOUS at 01:18

## 2021-10-19 RX ADMIN — INSULIN LISPRO 2 UNITS: 100 INJECTION, SOLUTION INTRAVENOUS; SUBCUTANEOUS at 08:03

## 2021-10-19 RX ADMIN — HYDROMORPHONE HYDROCHLORIDE 0.5 MG: 1 INJECTION, SOLUTION INTRAMUSCULAR; INTRAVENOUS; SUBCUTANEOUS at 03:03

## 2021-10-19 RX ADMIN — METRONIDAZOLE 500 MG: 500 INJECTION, SOLUTION INTRAVENOUS at 09:49

## 2021-10-19 RX ADMIN — INSULIN LISPRO 2 UNITS: 100 INJECTION, SOLUTION INTRAVENOUS; SUBCUTANEOUS at 21:03

## 2021-10-19 RX ADMIN — LORAZEPAM 0.5 MG: 2 INJECTION INTRAMUSCULAR; INTRAVENOUS at 17:43

## 2021-10-19 RX ADMIN — HYDROXYZINE HYDROCHLORIDE 50 MG: 50 INJECTION, SOLUTION INTRAMUSCULAR at 01:10

## 2021-10-19 RX ADMIN — METRONIDAZOLE 500 MG: 500 INJECTION, SOLUTION INTRAVENOUS at 17:45

## 2021-10-19 RX ADMIN — CEFEPIME 2000 MG: 2 INJECTION, POWDER, FOR SOLUTION INTRAVENOUS at 05:17

## 2021-10-19 RX ADMIN — ONDANSETRON 4 MG: 2 INJECTION INTRAMUSCULAR; INTRAVENOUS at 08:06

## 2021-10-19 RX ADMIN — VANCOMYCIN HYDROCHLORIDE 750 MG: 750 INJECTION, POWDER, LYOPHILIZED, FOR SOLUTION INTRAVENOUS at 08:02

## 2021-10-19 RX ADMIN — SODIUM CHLORIDE, POTASSIUM CHLORIDE, SODIUM LACTATE AND CALCIUM CHLORIDE: 600; 310; 30; 20 INJECTION, SOLUTION INTRAVENOUS at 21:01

## 2021-10-19 RX ADMIN — ENOXAPARIN SODIUM 40 MG: 40 INJECTION SUBCUTANEOUS at 08:00

## 2021-10-19 RX ADMIN — MUPIROCIN: 20 OINTMENT TOPICAL at 08:03

## 2021-10-19 RX ADMIN — HYDROMORPHONE HYDROCHLORIDE 0.5 MG: 1 INJECTION, SOLUTION INTRAMUSCULAR; INTRAVENOUS; SUBCUTANEOUS at 13:47

## 2021-10-19 RX ADMIN — VANCOMYCIN HYDROCHLORIDE 750 MG: 750 INJECTION, POWDER, LYOPHILIZED, FOR SOLUTION INTRAVENOUS at 21:03

## 2021-10-19 RX ADMIN — INSULIN GLARGINE 8 UNITS: 100 INJECTION, SOLUTION SUBCUTANEOUS at 21:04

## 2021-10-19 ASSESSMENT — PAIN SCALES - PAIN ASSESSMENT IN ADVANCED DEMENTIA (PAINAD)
CONSOLABILITY: 1
CONSOLABILITY: 0
FACIALEXPRESSION: 1
NEGVOCALIZATION: 1
BREATHING: 1
BODYLANGUAGE: 1
BODYLANGUAGE: 0
BREATHING: 0
NEGVOCALIZATION: 1
TOTALSCORE: 1
TOTALSCORE: 5
FACIALEXPRESSION: 0

## 2021-10-19 ASSESSMENT — PAIN SCALES - GENERAL
PAINLEVEL_OUTOF10: 4
PAINLEVEL_OUTOF10: 5
PAINLEVEL_OUTOF10: 7

## 2021-10-19 ASSESSMENT — PAIN SCALES - WONG BAKER: WONGBAKER_NUMERICALRESPONSE: 4

## 2021-10-19 NOTE — PROGRESS NOTES
[facility] GENERAL SURGERY    Surgery Progress Note           POD # 3    PATIENT NAME: Kike Guo     TODAY'S DATE: 10/19/2021    INTERVAL HISTORY:    Improving orofacial dyskinesia. Denies pain. OBJECTIVE:   VITALS:  BP (!) 175/78   Pulse 102   Temp 97.8 °F (36.6 °C) (Oral)   Resp 18   Ht 6' (1.829 m)   Wt 130 lb 12.8 oz (59.3 kg)   SpO2 97%   BMI 17.74 kg/m²     INTAKE/OUTPUT:    I/O last 3 completed shifts: In: 200 [P.O.:200]  Out: 2400 [Urine:2400]  No intake/output data recorded. CONSTITUTIONAL:  awake and alert  ABDOMEN:  soft, non-distended, non-tender   INCISION: dressing intact    Data:  CBC:   Recent Labs     10/17/21  0926 10/18/21  0539 10/19/21  0631   WBC 8.5 6.3 7.0   HGB 9.0* 7.7* 7.6*   HCT 26.9* 23.0* 22.8*    270 267     BMP:    Recent Labs     10/17/21  0608 10/18/21  0540 10/19/21  0631   * 132* 129*   K 4.8 4.4 4.1    98* 98*   CO2 27 26 25   BUN 16 17 17   CREATININE 0.6* 0.6* 0.8   GLUCOSE 185* 273* 215*     Hepatic:   Recent Labs     10/17/21  0608 10/18/21  0540 10/19/21  0631   AST 31 22 30   ALT 20 21 22   BILITOT 0.3 0.3 0.3   ALKPHOS 104 109 98     Mag:      Recent Labs     10/17/21  0608 10/18/21  0540 10/19/21  0631   MG 1.90 1.80 1.70*      Phos:   No results for input(s): PHOS in the last 72 hours. INR: No results for input(s): INR in the last 72 hours. Radiology Review:  NA    ASSESSMENT AND PLAN:  80 y.o. male status post sacral wound debridement  1. Continue daily dressing changes  2. Continue IV abx with cefepime, flagyl, and vanc. Wound culture with corynebacterium, MRSA, E. Faecalis. F/u ID recs. 3. Follow up palliative consult and neurology    Electronically signed by Brain Ariza MD     Patient seen and agree with above. Continue wet to dry dressing today.     Roseanna Larson MD

## 2021-10-19 NOTE — PROGRESS NOTES
Pt stated this am that he felt sick to his stomach and that his eyes are cloudy and he couldn't see. Gave Zofran for nausea and will notify md of vision changes.

## 2021-10-19 NOTE — PROGRESS NOTES
Hospitalist Progress Note      PCP: Betty Holland MD    Date of Admission: 10/15/2021        Subjective:    Says his back pain is not as bad this morning. He is facial, mouth twitches seem to be better today. Medications:  Reviewed    Infusion Medications    dextrose      sodium chloride      lactated ringers 100 mL/hr at 10/18/21 2032     Scheduled Medications    vancomycin  750 mg IntraVENous Q12H    mupirocin   Nasal BID    donepezil  10 mg Oral Nightly    atorvastatin  10 mg Oral Nightly    vitamin B complex w/C  1 tablet Oral Daily    therapeutic multivitamin-minerals  1 tablet Oral Daily    enoxaparin  40 mg SubCUTAneous Daily    insulin glargine  0.15 Units/kg SubCUTAneous Nightly    insulin lispro  0.05 Units/kg SubCUTAneous TID WC    insulin lispro  0-6 Units SubCUTAneous TID WC    insulin lispro  0-3 Units SubCUTAneous Nightly    cefepime  2,000 mg IntraVENous Q12H    metroNIDAZOLE  500 mg IntraVENous Q8H     PRN Meds: glucose, dextrose, glucagon (rDNA), dextrose, sodium chloride flush, sodium chloride, potassium chloride **OR** potassium alternative oral replacement **OR** potassium chloride, magnesium sulfate, ondansetron **OR** ondansetron, acetaminophen **OR** acetaminophen, melatonin, HYDROmorphone      Intake/Output Summary (Last 24 hours) at 10/19/2021 0716  Last data filed at 10/19/2021 0528  Gross per 24 hour   Intake 200 ml   Output 2400 ml   Net -2200 ml       Physical Exam Performed:    BP (!) 150/62   Pulse 99   Temp 99.4 °F (37.4 °C) (Axillary)   Resp 20   Ht 6' (1.829 m)   Wt 130 lb 12.8 oz (59.3 kg)   SpO2 97%   BMI 17.74 kg/m²     General appearance: No apparent distress, confused  HEENT: Pupils equal, round, and reactive to light. Conjunctivae/corneas clear. Neck: Supple, with full range of motion. No jugular venous distention. Trachea midline. Respiratory:  Normal respiratory effort.  Clear to auscultation, bilaterally without Rales/Wheezes/Rhonchi. Cardiovascular: Regular rate and rhythm with normal S1/S2 without murmurs, rubs or gallops. Abdomen: Soft, non-tender, non-distended with normal bowel sounds. Musculoskeletal: No clubbing, cyanosis or edema bilaterally. Full range of motion without deformity. Skin: Skin color, texture, turgor normal.  No rashes or lesions. Neurologic:  Neurovascularly intact without any focal sensory/motor deficits. Cranial nerves: II-XII intact, grossly non-focal.  Capillary Refill: Brisk,3 seconds, normal   Peripheral Pulses: +2 palpable, equal bilaterally       Labs:   Recent Labs     10/17/21  0926 10/18/21  0539   WBC 8.5 6.3   HGB 9.0* 7.7*   HCT 26.9* 23.0*    270     Recent Labs     10/16/21  2157 10/17/21  0608 10/18/21  0540    135* 132*   K 5.0 4.8 4.4    103 98*   CO2 26 27 26   BUN 20 16 17   CREATININE 0.6* 0.6* 0.6*   CALCIUM 8.0* 7.9* 7.5*     Recent Labs     10/17/21  0608 10/18/21  0540   AST 31 22   ALT 20 21   BILITOT 0.3 0.3   ALKPHOS 104 109     No results for input(s): INR in the last 72 hours. No results for input(s): Assunta Smith in the last 72 hours. Urinalysis:      Lab Results   Component Value Date    NITRU Negative 10/15/2021    WBCUA 10-20 10/15/2021    BACTERIA 4+ 10/15/2021    RBCUA 5-10 10/15/2021    BLOODU SMALL 10/15/2021    SPECGRAV 1.015 10/15/2021    GLUCOSEU 250 10/15/2021       Radiology:  CT HEAD WO CONTRAST   Final Result      Stable study. No evidence for acute intracranial hemorrhage, territorial   infarction or intracranial mass lesion. Mild chronic microangiopathic ischemic disease. Mild generalized volume loss. CT PELVIS WO CONTRAST Additional Contrast? None   Final Result   1. Large deep sacral decubitus ulcer with mild adjacent cellulitis. No   abscess or soft tissue gas. 2. Osseous uncovering of the posterior aspect of the lower sacrum and coccyx   without convincing evidence of osteomyelitis.   If clinically indicated   further evaluation could be obtained with MRI. XR CHEST PORTABLE   Final Result   Chronic elevation of the left hemidiaphragm which is unchanged with   increasing subsegmental atelectasis or early infiltrate along the left lung   base. 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 ..    - s/p debridement 9/23 at [facility], cult + Enterococcus faecalis, Enterobacter cloacae, 3 anaerobesDischarged on 9/27 on po augmentin    -s/p debridement 10/6-cultures growing MRSA, Enterococcus, corynebacterium--follow-up on sensitivities   -Continue care per wound team general surgery    -Antibiotics--- on IV cefepime, vancomycin and Flagyl--management per ID        -Recent spine surgery, with concern for cauda equina syndrome due to postop hematoma     \"on 9/2 Dr. Raysa Paige performed an L2-5 decompression and laminectomy at [place] [address], then postoperatively on 9/5 [facility] noted that the patient was unable to wiggle his toes and had BLE weakness to the point that he required maximum assistance x2 in order to stand. An MRI was performed which showed severe spinal canal stenosis due to hematoma. on 9/6 Dr. Nanda Mejía took him back to the OR for an urgent exploration and hematoma evacuation, left drains in place. eventually the patient was discharged to SNF on 9/10. readmitted 9/19-9/22 urinary retention, constipation, and ongoing leg weakness, we obtained a repeat MRI on this admission to [facility]. It showed an ill-defind fluid/soft tissue causing severe spinal canal stenosis from L1-4, also moderate-severe spinal canal stenosis from L4 - S1, transferred to [facility] for neurosurgery eval-did not require spinal surgery but had debirdement of decub ulcer  -Acute metabolic encephalopathy. . Presented with increased confusion from baseline,Likely due to ongoing infection --continue supportive care  -MRSA UTI  -UCx postive for [facility] decolonization with mupirocin 2%

## 2021-10-19 NOTE — PROGRESS NOTES
Infectious Disease Follow up Notes    CC :   Infected sacral ulcer      Antibiotics:   Cefepime 2g q12  Flagyl 500 IV q8   vanc 750 q12    Admit Date:   10/15/2021  Hospital Day: 5    Subjective:   He remains afebrile   On RA   He has no new complaints  Eating well per PCA. No diarrhea. Avilez in place. Facial dyskinesias are baseline per patient and wife at bedside. Wife says she and a son had a conference call today with his surgeon at [facility] and there was mention of plastic surgery.       Objective:     Patient Vitals for the past 8 hrs:   BP Temp Temp src Pulse Resp SpO2   10/19/21 0745 (!) 175/78 97.8 °F (36.6 °C) Oral 102 18 97 %       EXAM:  General:  Alert, communicative  Frail, pale    HEENT:  NCAT, PERRL, sclera anicteric     NECK:   Supple      LUNGS:   Non-labored breathing       ABD: Soft, flat, NT     EXT: No focal rash  +LE edema     SKIN: Large sacral wound, some bleeding, palpable bone, no odor or necrosis           LINE: IV site ok   Avilez in place        Scheduled Meds:   vancomycin  750 mg IntraVENous Q12H    mupirocin   Nasal BID    donepezil  10 mg Oral Nightly    atorvastatin  10 mg Oral Nightly    vitamin B complex w/C  1 tablet Oral Daily    therapeutic multivitamin-minerals  1 tablet Oral Daily    enoxaparin  40 mg SubCUTAneous Daily    insulin glargine  0.15 Units/kg SubCUTAneous Nightly    insulin lispro  0.05 Units/kg SubCUTAneous TID WC    insulin lispro  0-6 Units SubCUTAneous TID WC    insulin lispro  0-3 Units SubCUTAneous Nightly    cefepime  2,000 mg IntraVENous Q12H    metroNIDAZOLE  500 mg IntraVENous Q8H       Continuous Infusions:   dextrose      sodium chloride      lactated ringers 100 mL/hr at 10/18/21 2032          Data Review:    Lab Results   Component Value Date    WBC 7.0 10/19/2021    HGB 7.6 (L) 10/19/2021    HCT 22.8 (L) 10/19/2021    MCV 92.9 10/19/2021     10/19/2021     Lab Results   Component Value Date    CREATININE 0.8 10/19/2021    BUN 17 10/19/2021     (L) 10/19/2021    K 4.1 10/19/2021    CL 98 (L) 10/19/2021    CO2 25 10/19/2021       Hepatic Function Panel:   Lab Results   Component Value Date    ALKPHOS 98 10/19/2021    ALT 22 10/19/2021    AST 30 10/19/2021    PROT 4.7 10/19/2021    BILITOT 0.3 10/19/2021    LABALBU 1.9 10/19/2021         MICRO:  10/15 BC x2 neg   UC >100k Staph aureus mrsa  Antibiotic Interpretation MIESHA Status    nitrofurantoin Sensitive <=16 mcg/mL     oxacillin Resistant >=4 mcg/mL     tetracycline Resistant >=16 mcg/mL     trimethoprim-sulfamethoxazole Sensitive <=10 mcg/mL     vancomycin Sensitive 1 mcg/mL       10/15 Wound culture C striatum, MRSA, E faecalis; GPC/GNR on GS   Staph aureus mrsa   Antibiotic Interpretation MIESHA Status    clindamycin Resistant >=8 mcg/mL     erythromycin Resistant >=8 mcg/mL     oxacillin Resistant >=4 mcg/mL     tetracycline Resistant >=16 mcg/mL     trimethoprim-sulfamethoxazole Sensitive <=10 mcg/mL     vancomycin Sensitive <=0.5 mcg/mL     Enterococcus  faecalis   Antibiotic Interpretation MIESHA Status    ampicillin Sensitive <=2 mcg/mL     vancomycin Sensitive 1 mcg/mL       10/16 MRSA screen +   Surgical culture sacrum NGTD; GS 4+gnr, 1+gpc    Bone culture C striatum; mixed vahe on GS       IMAGING:  CT Pelvis 10/15/21  Impression   1. Large deep sacral decubitus ulcer with mild adjacent cellulitis.  No   abscess or soft tissue gas. 2. Osseous uncovering of the posterior aspect of the lower sacrum and coccyx   without convincing evidence of osteomyelitis.  If clinically indicated   further evaluation could be obtained with MRI.        Assessment:     Patient Active Problem List    Diagnosis Date Noted    Osteomyelitis ([address] 10/16/2021    Chronic anemia 10/16/2021    Severe sepsis ([address]     Decubitus ulcer of sacral region, unstageable ([address] 10/15/2021    HLD (hyperlipidemia) 09/19/2021  Lumbar stenosis with neurogenic claudication 09/19/2021     Overview Note:     L2-5 decompression, laminectomy; 9-2-21;  Emergency exploration of lumbar laminectomy for epidural fluid collection; 9-6-21        BPH (benign prostatic hyperplasia) 09/19/2021    ROXI (acute kidney injury) ([facility] 09/19/2021    Urinary retention 09/19/2021    Localized osteoarthrosis, lower leg 09/18/2014    DM2 (diabetes mellitus, type 2) ([facility]     Hypertension        Hx DM, HTN, HLD, lumbar stenosis, dementia     L2-5 lumbar surg 9/2/23EYE  Complicated by post-op hematoma, return to OR for debridement 9/5/21      Sacral PU  S/p debridement 9/23/21 at [address], culture with+ Enterococcus faecalis, Enterobacter cloacae, 3 anaerobes  Discharged on 9/27/21 on po Augmentin    Readmitted 10/15/21 with encephalopathy, deteriorating sacral wound, leukocytosis   S/p I&D down to bone 10/16/21  Bone culture is positive c/w diagnosis OM     Otoniel heel pressure ulcers     MRSA nasal colonization      Encephalopathy - improved / resolved    Leukocytosis - resolved     Orofacial dyskinesias   Acute on chronic     No abx allergies     Plan:   Given advanced age, comorbidities, malnutrition, other factors, the prognosis for healing the wound is very poor  With chronic OM, would have to consider continued IV abx for up to 6 weeks, though he will be at high risk of complication including CLABSI, C diff, renal/hepatic toxicity and overall low likelihood of healing     Palliative care to see the patient and family     Continue IV vanc and flagyl for now  DC cefepime     Nasal bactroban  Contact isolation     D/w wife   D/w CLEMENTE Gillespie MD  Phone: [phone]   Fax : [phone]

## 2021-10-19 NOTE — CONSULTS
Palliative Care Initial Note  Palliative Care Admit date:  10/1921  Reason for c/s:  [address] 64    Advance Directives: It is uncertain if pt has executed AD's. In the absence of a HCPOA, pts spouse is viewed as his NOK    Pt currently has a DNRCC-A code status. Plan of care/goals:  Spoke to Mrs. Liberyt Hemphill who affirmed having the benefit of d/w provider(s) and understands that pts wound \"isn't likely to heal;\" Albumin is 1.9, Total Protein 4.7. She stated that she and family are wanting to consider shifting the focus of care to comfort and involving hospice. Noted in ID documentation that family talked w/ surgeon @ [facility] about the possibility of plastic surgery. W/ wife's permission, call placed to son, Lottie Zhou, but had to leave message requesting return call. Social/Spiritual:  Pt admitted here from [facility] [place] but wife does not anticipate pts return. She is presently looking at [facility] upon d/c    Plan: Will speak w/ Angelo either this afternoon or in the am, depending on his availability.   We will address [facility] further        Reason for consult:  _X_ Advance Care Planning  _X_ Transition of Care Planning; initiating discussion re: hospice  ___ Psychosocial/Spiritual Support  ___ Symptom Management                                                                                                                                              Raymond Meehan RN

## 2021-10-19 NOTE — CARE COORDINATION
Call received from son of pt- Arlyn Samayoa. Seeks to speak to MD and also to be included in Palliative Care conversations. Message to [facility] and to Dr Elspeth Goodpasture. At this time time, DC plan remains [facility]- had been at [facility] [place] but family does not wish him to return. CM continues to follow.   Kevin Mace RN

## 2021-10-19 NOTE — CONSULTS
COLONOSCOPY  11/19/2012    diverticulosis    EYE SURGERY      micheal cataract    RECTAL SURGERY N/A 10/16/2021    SACRAL WOUND DEBRIDMENT performed by Harvey Mcmahan MD at [address] History     Tobacco Use    Smoking status: Never Smoker    Smokeless tobacco: Never Used   Vaping Use    Vaping Use: Never used   Substance Use Topics    Alcohol use: No    Drug use: Never     Allergies   Allergen Reactions    Iodine Itching and Swelling     Current Facility-Administered Medications   Medication Dose Route Frequency Provider Last Rate Last Admin    vancomycin (VANCOCIN) 750 mg in dextrose 5 % 250 mL IVPB  750 mg IntraVENous Q12H Tammy Mata MD   Stopped at 10/19/21 6181    mupirocin (BACTROBAN) 2 % ointment   Nasal BID Tammy Mata MD   Given at 10/19/21 0803    donepezil (ARICEPT) tablet 10 mg  10 mg Oral Nightly Harvey Mcmahan MD   10 mg at 10/18/21 2029    atorvastatin (LIPITOR) tablet 10 mg  10 mg Oral Nightly Harvey Mcmahan MD   10 mg at 10/18/21 2029    vitamin B complex w/C 1 tablet  1 tablet Oral Daily Harvey Mcmahan MD   1 tablet at 10/19/21 0800    therapeutic multivitamin-minerals 1 tablet  1 tablet Oral Daily Harvey Mcmahan MD   1 tablet at 10/19/21 0800    glucose (GLUTOSE) 40 % oral gel 15 g  15 g Oral PRN Harvey Mcmahan MD        dextrose 50 % IV solution  12.5 g IntraVENous PRN Harvey Mcmahan MD        glucagon (rDNA) injection 1 mg  1 mg IntraMUSCular PRN Harvey Mcmahan MD        dextrose 5 % solution  100 mL/hr IntraVENous PRN Harvey Mcmahan MD        sodium chloride flush 0.9 % injection 10 mL  10 mL IntraVENous PRN Harvey Mcmahan MD   10 mL at 10/18/21 2026    0.9 % sodium chloride infusion  25 mL IntraVENous PRN Harvey Mcmahan MD        potassium chloride [place] [employer] [facility]) extended release tablet 40 mEq  40 mEq Oral PRN Farhana Burdick Betty Lambert MD        Or    potassium bicarb-citric acid (EFFER-K) effervescent tablet 40 mEq  40 mEq Oral PRN Julianna Sterling MD        Or    potassium chloride 10 mEq/100 mL IVPB (Peripheral Line)  10 mEq IntraVENous PRN Julianna Sterling MD        magnesium sulfate 1000 mg in dextrose 5% 100 mL IVPB  1,000 mg IntraVENous PRN Julianna Sterling MD        ondansetron (ZOFRAN-ODT) disintegrating tablet 4 mg  4 mg Oral Q8H PRN Julianna Sterling MD        Or    ondansetron [facility]) injection 4 mg  4 mg IntraVENous Q6H PRN Julianna Sterling MD   4 mg at 10/19/21 0806    acetaminophen (TYLENOL) tablet 650 mg  650 mg Oral Q6H PRN Julianna Sterling MD   650 mg at 10/18/21 1155    Or    acetaminophen (TYLENOL) suppository 650 mg  650 mg Rectal Q6H PRN Julianna Sterling MD        enoxaparin (LOVENOX) injection 40 mg  40 mg SubCUTAneous Daily Julianna Sterling MD   40 mg at 10/19/21 0800    insulin glargine (LANTUS) injection vial 8 Units  0.15 Units/kg SubCUTAneous Nightly Julianna Sterling MD   8 Units at 10/18/21 2038    insulin lispro (HUMALOG) injection vial 3 Units  0.05 Units/kg SubCUTAneous TID ARIELLE Sterling MD   3 Units at 10/19/21 0803    insulin lispro (HUMALOG) injection vial 0-6 Units  0-6 Units SubCUTAneous TID ARIELLE Sterling MD   2 Units at 10/19/21 0803    insulin lispro (HUMALOG) injection vial 0-3 Units  0-3 Units SubCUTAneous Nightly Julianna Sterling MD   1 Units at 10/17/21 2118    melatonin tablet 3 mg  3 mg Oral Nightly PRN Julianna Sterling MD   3 mg at 10/18/21 2028    HYDROmorphone (DILAUDID) injection 0.5 mg  0.5 mg IntraVENous Q3H PRN Julianna Sterling MD   0.5 mg at 10/19/21 0303    lactated ringers infusion   IntraVENous Continuous Julianna Sterling  mL/hr at 10/18/21 2032 New Bag at 10/18/21 2032    cefepime (MAXIPIME) 2000 mg IVPB minibag 2,000 mg IntraVENous Q12H Lara MD Marlin   Stopped at 10/19/21 0659    metronidazole (FLAGYL) 500 mg in NaCl 100 mL IVPB premix  500 mg IntraVENous Q8H Lara MD Marlin   Stopped at 10/19/21 0218       ROS: 10-14 system review was limited due to MS changes or as per HPI. Constitutional:   Vitals:    10/19/21 0303 10/19/21 0358 10/19/21 0418 10/19/21 0745   BP: (!) 150/62   (!) 175/78   Pulse: 99   102   Resp: 28 20 18   Temp: 99.4 °F (37.4 °C)   97.8 °F (36.6 °C)   TempSrc: Axillary   Oral   SpO2: 97%   97%   Weight:   130 lb 12.8 oz (59.3 kg)    Height:           General appearance: Pleasantly confused. Neck: supple  Cardiovascular: No lower leg edema with good pulsation. Mental Status:   AAO times one. Poor attention and concentration. Waxing and waning. Unable to assess recent or remote memory due to confusion  Language: Fluent but with poor comprehension and not following directions  Unable to assess fund of knowledge due to confusion. Cranial Nerves:   II:  Pupils: equal, round, reactive to light  III,IV,VI: Extra Ocular Movements are intact. No nystagmus  V: Facial sensation : not tested due to confusion  VII: Facial strength and movements: right sided oral-facial dyskinesia noted. VIII: Hearing: can track my voice  IX: Palate elevation: NT due to confusion  XI: Shoulder shrug: NT due to confusion  XII: Tongue movements: midline  Musculoskeletal:  Bilateral lower extremities 2/5. Bilateral upper extremities 4/5. Tone: Normal tone. No rigidity. Reflexes: Symmetric 2+ in both arms legs. Planters: flexor bilaterally. Coordination: No abnormal movement  Sensation: Patient can withdraw to pain from left and right   Gait/Posture: Unable to ambulate.     Data:  LABS:   Lab Results   Component Value Date     10/19/2021    K 4.1 10/19/2021    K 4.8 10/15/2021    CL 98 10/19/2021    CO2 25 10/19/2021    BUN 17 10/19/2021    CREATININE 0.8 10/19/2021    GFRAA >60 10/19/2021    LABGLOM >60 10/19/2021    GLUCOSE 215 10/19/2021    MG 1.70 10/19/2021    CALCIUM 7.4 10/19/2021     Lab Results   Component Value Date    WBC 7.0 10/19/2021    RBC 2.46 10/19/2021    HGB 7.6 10/19/2021    HCT 22.8 10/19/2021    MCV 92.9 10/19/2021    RDW 15.7 10/19/2021     10/19/2021   No results found for: INR, PROTIME    Neuroimaging were independently reviewed by me. Reviewed notes from different physicians  Reviewed lab and blood testing    Impression:    Oral-facial dyskinesia - unclear if this is new. No family present. Does not appear to be on any medications to cause. Could be related to underlying dementia. Less likely CVA or CNS infection. Dementia  Sepsis  Sacral wound  MRSA skin infection    Recommendation:     Avoid anti-psychotics, anti-emetics (specifically Reglan, Compazine). Supportive care for dementia  Continue Aricept. Abx for sacral wound infection. PT/OT/SLP    Thank you for referring such patient. If you have any questions regarding my consult note, please don't hesitate to call me. Chito Ohm, CNP    This dictation was generated by voice recognition computer software.  Although all attempts are made to edit the dictation for accuracy, there may be errors in the  transcription that are not intended

## 2021-10-19 NOTE — PLAN OF CARE
Palliative Care Progress Note  Palliative Care Admit date:  10/19/21    Advance Directives:  DNRCC-A in place    Plan of care/goals:  Rec'd return call from pts son, Thomas Terrazas. He is very receptive to considering comfort care and hospice for pt and will further d/w his mom this evening. Have reinforced the challenges for pts wound healing during our discussion and fact that ID feels pt has a very poor px in that regard. We also discussed the medical concerns associated w/ treating pt w/ long term abx if family were to proceed w/ treating wound. Thomas Terrazas was able to verbalize understanding and expressed support of his mom's decision for comfort care \"if that's what she says tonclaire. \"   Thomas Terrazas was feeling the need to better understand pts life expectancy and, while he understood the difficulty in giving exact timing, he agree's timing could be short after stopping abx and w/ pts poor nutritional status. We have discussed hospice in the LTC facility vs hospice in the [facility]. Thomas Terrazas understands that long term placement in an ECF would require OOP room and board payment. He would like to have pt evaluated for the [facility] (for pain mgt) given the proximity to mom and dad's home for easy access for Mrs. Randi Davis. If pt does not qualify for IPU, they are considering placement @ [address] [facility]. Social/Spiritual:   Thomas Terrazas was appropriately tearful discussing EOL care for his dad. He struggles w/ his feeling that less than optimal care was provided during pts SNF stay. Plan:  Thomas Terrazas or his mom will contact writer in am.  We agreed, if they do not call by ~1030, Radha Mccallum will reach out to Mrs. Randi Davis.         Reason for consult:  _X_ Advance Care Planning  _X_ Transition of Care Planning  _X_ Psychosocial/Spiritual Support  ___ Symptom Management                                                                                                                                                              Nat Mcdowell RN

## 2021-10-20 ENCOUNTER — APPOINTMENT (OUTPATIENT)
Dept: GENERAL RADIOLOGY | Age: 86
DRG: 853 | End: 2021-10-20
Payer: MEDICARE

## 2021-10-20 LAB
A/G RATIO: 0.7 (ref 1.1–2.2)
ALBUMIN SERPL-MCNC: 1.9 G/DL (ref 3.4–5)
ALP BLD-CCNC: 98 U/L (ref 40–129)
ALT SERPL-CCNC: 25 U/L (ref 10–40)
ANION GAP SERPL CALCULATED.3IONS-SCNC: 6 MMOL/L (ref 3–16)
AST SERPL-CCNC: 40 U/L (ref 15–37)
BANDED NEUTROPHILS RELATIVE PERCENT: 3 % (ref 0–7)
BASOPHILS ABSOLUTE: 0.1 K/UL (ref 0–0.2)
BASOPHILS RELATIVE PERCENT: 1 %
BILIRUB SERPL-MCNC: <0.2 MG/DL (ref 0–1)
BUN BLDV-MCNC: 16 MG/DL (ref 7–20)
CALCIUM SERPL-MCNC: 7.6 MG/DL (ref 8.3–10.6)
CHLORIDE BLD-SCNC: 101 MMOL/L (ref 99–110)
CO2: 27 MMOL/L (ref 21–32)
CREAT SERPL-MCNC: 0.8 MG/DL (ref 0.8–1.3)
EOSINOPHILS ABSOLUTE: 0.1 K/UL (ref 0–0.6)
EOSINOPHILS RELATIVE PERCENT: 2 %
GFR AFRICAN AMERICAN: >60
GFR NON-AFRICAN AMERICAN: >60
GLOBULIN: 2.9 G/DL
GLUCOSE BLD-MCNC: 210 MG/DL (ref 70–99)
GLUCOSE BLD-MCNC: 228 MG/DL (ref 70–99)
GLUCOSE BLD-MCNC: 240 MG/DL (ref 70–99)
GLUCOSE BLD-MCNC: 274 MG/DL (ref 70–99)
GLUCOSE BLD-MCNC: 390 MG/DL (ref 70–99)
HCT VFR BLD CALC: 22.7 % (ref 40.5–52.5)
HEMOGLOBIN: 7.7 G/DL (ref 13.5–17.5)
INR BLD: 1.61 (ref 0.88–1.12)
LYMPHOCYTES ABSOLUTE: 0.8 K/UL (ref 1–5.1)
LYMPHOCYTES RELATIVE PERCENT: 11 %
MACROCYTES: ABNORMAL
MAGNESIUM: 1.9 MG/DL (ref 1.8–2.4)
MCH RBC QN AUTO: 31.4 PG (ref 26–34)
MCHC RBC AUTO-ENTMCNC: 33.8 G/DL (ref 31–36)
MCV RBC AUTO: 92.9 FL (ref 80–100)
MONOCYTES ABSOLUTE: 0.5 K/UL (ref 0–1.3)
MONOCYTES RELATIVE PERCENT: 7 %
NEUTROPHILS ABSOLUTE: 5.5 K/UL (ref 1.7–7.7)
NEUTROPHILS RELATIVE PERCENT: 76 %
PDW BLD-RTO: 15.6 % (ref 12.4–15.4)
PERFORMED ON: ABNORMAL
PLATELET # BLD: 282 K/UL (ref 135–450)
PLATELET SLIDE REVIEW: ADEQUATE
PMV BLD AUTO: 6.6 FL (ref 5–10.5)
POTASSIUM SERPL-SCNC: 4.5 MMOL/L (ref 3.5–5.1)
PROTHROMBIN TIME: 18.6 SEC (ref 9.9–12.7)
RBC # BLD: 2.45 M/UL (ref 4.2–5.9)
SLIDE REVIEW: ABNORMAL
SODIUM BLD-SCNC: 134 MMOL/L (ref 136–145)
TOTAL PROTEIN: 4.8 G/DL (ref 6.4–8.2)
WBC # BLD: 6.9 K/UL (ref 4–11)

## 2021-10-20 PROCEDURE — 85025 COMPLETE CBC W/AUTO DIFF WBC: CPT

## 2021-10-20 PROCEDURE — 85610 PROTHROMBIN TIME: CPT

## 2021-10-20 PROCEDURE — 6370000000 HC RX 637 (ALT 250 FOR IP): Performed by: INTERNAL MEDICINE

## 2021-10-20 PROCEDURE — 36415 COLL VENOUS BLD VENIPUNCTURE: CPT

## 2021-10-20 PROCEDURE — C1751 CATH, INF, PER/CENT/MIDLINE: HCPCS

## 2021-10-20 PROCEDURE — 80053 COMPREHEN METABOLIC PANEL: CPT

## 2021-10-20 PROCEDURE — 2580000003 HC RX 258: Performed by: INTERNAL MEDICINE

## 2021-10-20 PROCEDURE — 99232 SBSQ HOSP IP/OBS MODERATE 35: CPT | Performed by: INTERNAL MEDICINE

## 2021-10-20 PROCEDURE — 76937 US GUIDE VASCULAR ACCESS: CPT

## 2021-10-20 PROCEDURE — 2500000003 HC RX 250 WO HCPCS: Performed by: INTERNAL MEDICINE

## 2021-10-20 PROCEDURE — 6360000002 HC RX W HCPCS: Performed by: SURGERY

## 2021-10-20 PROCEDURE — 71045 X-RAY EXAM CHEST 1 VIEW: CPT

## 2021-10-20 PROCEDURE — 6360000002 HC RX W HCPCS: Performed by: INTERNAL MEDICINE

## 2021-10-20 PROCEDURE — 2580000003 HC RX 258: Performed by: SURGERY

## 2021-10-20 PROCEDURE — 83735 ASSAY OF MAGNESIUM: CPT

## 2021-10-20 PROCEDURE — 1200000000 HC SEMI PRIVATE

## 2021-10-20 PROCEDURE — 6370000000 HC RX 637 (ALT 250 FOR IP): Performed by: SURGERY

## 2021-10-20 PROCEDURE — 02HV33Z INSERTION OF INFUSION DEVICE INTO SUPERIOR VENA CAVA, PERCUTANEOUS APPROACH: ICD-10-PCS | Performed by: PEDIATRICS

## 2021-10-20 RX ORDER — METRONIDAZOLE 250 MG/1
500 TABLET ORAL EVERY 8 HOURS SCHEDULED
Status: DISCONTINUED | OUTPATIENT
Start: 2021-10-20 | End: 2021-11-05 | Stop reason: HOSPADM

## 2021-10-20 RX ADMIN — Medication 3 MG: at 22:17

## 2021-10-20 RX ADMIN — MUPIROCIN: 20 OINTMENT TOPICAL at 20:46

## 2021-10-20 RX ADMIN — INSULIN GLARGINE 8 UNITS: 100 INJECTION, SOLUTION SUBCUTANEOUS at 20:37

## 2021-10-20 RX ADMIN — INSULIN LISPRO 2 UNITS: 100 INJECTION, SOLUTION INTRAVENOUS; SUBCUTANEOUS at 09:20

## 2021-10-20 RX ADMIN — METRONIDAZOLE 500 MG: 500 INJECTION, SOLUTION INTRAVENOUS at 01:41

## 2021-10-20 RX ADMIN — METRONIDAZOLE 500 MG: 500 INJECTION, SOLUTION INTRAVENOUS at 08:50

## 2021-10-20 RX ADMIN — SODIUM CHLORIDE, POTASSIUM CHLORIDE, SODIUM LACTATE AND CALCIUM CHLORIDE: 600; 310; 30; 20 INJECTION, SOLUTION INTRAVENOUS at 20:29

## 2021-10-20 RX ADMIN — VANCOMYCIN HYDROCHLORIDE 750 MG: 750 INJECTION, POWDER, LYOPHILIZED, FOR SOLUTION INTRAVENOUS at 20:32

## 2021-10-20 RX ADMIN — ENOXAPARIN SODIUM 40 MG: 40 INJECTION SUBCUTANEOUS at 08:46

## 2021-10-20 RX ADMIN — INSULIN LISPRO 3 UNITS: 100 INJECTION, SOLUTION INTRAVENOUS; SUBCUTANEOUS at 17:45

## 2021-10-20 RX ADMIN — INSULIN LISPRO 1 UNITS: 100 INJECTION, SOLUTION INTRAVENOUS; SUBCUTANEOUS at 20:37

## 2021-10-20 RX ADMIN — VANCOMYCIN HYDROCHLORIDE 750 MG: 750 INJECTION, POWDER, LYOPHILIZED, FOR SOLUTION INTRAVENOUS at 10:57

## 2021-10-20 RX ADMIN — SODIUM CHLORIDE, POTASSIUM CHLORIDE, SODIUM LACTATE AND CALCIUM CHLORIDE: 600; 310; 30; 20 INJECTION, SOLUTION INTRAVENOUS at 12:03

## 2021-10-20 RX ADMIN — INSULIN LISPRO 5 UNITS: 100 INJECTION, SOLUTION INTRAVENOUS; SUBCUTANEOUS at 11:57

## 2021-10-20 RX ADMIN — MUPIROCIN: 20 OINTMENT TOPICAL at 09:18

## 2021-10-20 ASSESSMENT — PAIN SCALES - GENERAL: PAINLEVEL_OUTOF10: 0

## 2021-10-20 NOTE — PLAN OF CARE
Palliative Care Progress Note  Palliative Care Admit date:  10/19/21    Advance Directives:  DNRCC-A in place    Plan of care/goals:  Rec'd f/u call from son, Rina Almonte, who spoke w/ Mrs. Wilbert Francois last evening and they wish to move forward w/ hospice care. While spouse would like to consider caring for pt in the home, she prefers ref be called to [facility] in the event they would require the support of the [place] [id number] (spouse lives only [place] few miles from [place] [address]). Spouse needs to explore hospice in the ecf & home.     Plan:  Ref called to [facility] who will arrange appt w/ spouse          Reason for consult:  ___ Advance Care Planning  _X_ Transition of Care Planning  ___ Psychosocial/Spiritual Support  ___ Symptom Management                                                                                                                                                                      Lidia Mcdaniel RN

## 2021-10-20 NOTE — PROGRESS NOTES
Met with patient and wife to discuss hospice philosophy and services. Patient very alert and oriented with good recall of visit with  this morning and able to discuss back surgery and complications in good detail. He and wife say he is on aricept for memory issues but does not have diagnosis of dementia. They said memory issues were very mild and noted prior to back surgery. Discussed wound and issues impacting healing. He wanted to see pictures and was shown. Went over benefits and burdens of treatment. Discussed hospice IPU and home care. Wife is going to look into LTC insurance as she said it does have home care coverage. However she said it goes into effect after 90 days of medicare /insurance. They were agreeable to [facility] following up tomorrow. Patient is requesting to talk to physicians as he wants to hear what they say. Notified LOKI Cook and ID Dr. Jill Jenkins. They were agreeable to [facility] talking to son Jacque Downing and he was called and updated. During our conversation, Dr. Jill Jenkins saw patient and reported that he wants to pursue IV antibiotics at this time. Son updated. Updated Mardee and message left for DORITA Hardin. Joya Jerry RN, [address], [facility], [address] [place] [address]   O: [phone]  C: [phone]  F: [phone]   Main & Referrals: [phone]   [email or web address]

## 2021-10-20 NOTE — PROGRESS NOTES
Dressing changed on buttocks done. Wet kerlix with dry guaze pads ABD pad and medipore. Done by hao RN and Eden Giron RN.

## 2021-10-20 NOTE — CARE COORDINATION
Writer received vmail from [facility] [address] [facility], she said new discharge plan per pt is SNF with IV atbx. Per Dr Eather Gottron note yesterday, continue IV vanc and flagyl for now. Writer called Ulises/admissions at [facility], left voicemail asking him to verify they can accept pt on IV vanc and flagyl. Will await final ID recs. Likely discharge tomorrow.   BEBETO Adams-RN

## 2021-10-20 NOTE — CARE COORDINATION
Writer aware [facility] CLEMENTE Livingston is involved, awaiting plan from her discussion with pt's wife.   BEBETO Paz-RN

## 2021-10-20 NOTE — PROGRESS NOTES
Infectious Disease Follow up Notes    CC :   Infected sacral ulcer      Antibiotics:   Flagyl 500 IV q8   vanc 750 q12    Admit Date:   10/15/2021  Hospital Day: 6    Subjective:   He remains afebrile on RA   He has minimal discomfort from the sacral wound.      Objective:     Patient Vitals for the past 8 hrs:   BP Temp Temp src Pulse Resp SpO2   10/20/21 1433 130/67 98.4 °F (36.9 °C) Axillary 88 21 98 %       EXAM:  General:  More alert, communicative  Frail, pale    HEENT:  NCAT, PERRL, sclera anicteric     NECK:   Supple      LUNGS:   Non-labored breathing       ABD: Soft, flat, NT     EXT: No focal rash  +LE edema           LINE: IV site ok   Avilez in place        Scheduled Meds:   mupirocin   Nasal BID    vancomycin  750 mg IntraVENous Q12H    donepezil  10 mg Oral Nightly    atorvastatin  10 mg Oral Nightly    vitamin B complex w/C  1 tablet Oral Daily    therapeutic multivitamin-minerals  1 tablet Oral Daily    enoxaparin  40 mg SubCUTAneous Daily    insulin glargine  0.15 Units/kg SubCUTAneous Nightly    insulin lispro  0.05 Units/kg SubCUTAneous TID WC    insulin lispro  0-6 Units SubCUTAneous TID WC    insulin lispro  0-3 Units SubCUTAneous Nightly    metroNIDAZOLE  500 mg IntraVENous Q8H       Continuous Infusions:   dextrose      sodium chloride      lactated ringers 100 mL/hr at 10/20/21 1203          Data Review:    Lab Results   Component Value Date    WBC 6.9 10/20/2021    HGB 7.7 (L) 10/20/2021    HCT 22.7 (L) 10/20/2021    MCV 92.9 10/20/2021     10/20/2021     Lab Results   Component Value Date    CREATININE 0.8 10/20/2021    BUN 16 10/20/2021     (L) 10/20/2021    K 4.5 10/20/2021     10/20/2021    CO2 27 10/20/2021       Hepatic Function Panel:   Lab Results   Component Value Date    ALKPHOS 98 10/20/2021    ALT 25 10/20/2021    AST 40 10/20/2021    PROT 4.8 10/20/2021    BILITOT <0.2 10/20/2021    LABALBU 1.9 10/20/2021         MICRO:  10/15 BC x2 neg   UC >100k Staph aureus mrsa  Antibiotic Interpretation MIESHA Status    nitrofurantoin Sensitive <=16 mcg/mL     oxacillin Resistant >=4 mcg/mL     tetracycline Resistant >=16 mcg/mL     trimethoprim-sulfamethoxazole Sensitive <=10 mcg/mL     vancomycin Sensitive 1 mcg/mL       10/15 Wound culture C striatum, MRSA, E faecalis; GPC/GNR on GS   Staph aureus mrsa   Antibiotic Interpretation MIESHA Status    clindamycin Resistant >=8 mcg/mL     erythromycin Resistant >=8 mcg/mL     oxacillin Resistant >=4 mcg/mL     tetracycline Resistant >=16 mcg/mL     trimethoprim-sulfamethoxazole Sensitive <=10 mcg/mL     vancomycin Sensitive <=0.5 mcg/mL     Enterococcus  faecalis   Antibiotic Interpretation MIESHA Status    ampicillin Sensitive <=2 mcg/mL     vancomycin Sensitive 1 mcg/mL       10/16 MRSA screen +   Surgical culture sacrum NGTD; GS 4+gnr, 1+gpc    Bone culture C striatum, Bacteroides, Clostridium; mixed vahe on GS       IMAGING:  CT Pelvis 10/15/21  Impression   1. Large deep sacral decubitus ulcer with mild adjacent cellulitis.  No   abscess or soft tissue gas. 2. Osseous uncovering of the posterior aspect of the lower sacrum and coccyx   without convincing evidence of osteomyelitis.  If clinically indicated   further evaluation could be obtained with MRI.        Assessment:     Patient Active Problem List    Diagnosis Date Noted    Tic disorder     Osteomyelitis ([facility] 10/16/2021    Chronic anemia 10/16/2021    Severe sepsis (HCC)     Pressure injury of sacral region, unstageable ([address] 10/15/2021    HLD (hyperlipidemia) 09/19/2021    Lumbar stenosis with neurogenic claudication 09/19/2021     Overview Note:     L2-5 decompression, laminectomy; 9-2-21;  Emergency exploration of lumbar laminectomy for epidural fluid collection; 9-6-21        BPH (benign prostatic hyperplasia) 09/19/2021    ROXI (acute kidney injury) ([address] 09/19/2021    Urinary retention 09/19/2021    Localized osteoarthrosis, lower leg 09/18/2014    DM2 (diabetes mellitus, type 2) ([facility]     Hypertension        Hx DM, HTN, HLD, lumbar stenosis, dementia     L2-5 lumbar surg 7/8/05QUX  Complicated by post-op hematoma, return to OR for debridement 9/5/21      Extensive sacral PU  S/p debridement 9/23/21 at [address], culture with+ Enterococcus faecalis, Enterobacter cloacae, 3 anaerobes  Discharged on 9/27/21 on po Augmentin    Readmitted 10/15/21 with encephalopathy, deteriorating sacral wound, leukocytosis   S/p I&D down to bone 10/16/21  Bone culture is positive c/w diagnosis OM   Polymicrobial infection     Otoniel heel pressure ulcers     MRSA nasal colonization      Encephalopathy - improved / resolved    Leukocytosis - resolved     Orofacial dyskinesias   Acute on chronic     No abx allergies     Plan:   Ongoing discussion re GOC. Patient has minimal discomfort from the wound and after discussion with Hospice, he is inclined to continue to pursue aggressive interventions    Again discussed advanced age, comorbidities, malnutrition, other factors, the prognosis for healing the wound is very poor  With chronic OM, will plan IV abx for up to 6 weeks    Fecal soilage of wound is an issue.   Defer any discussion of divert colostomy to Sgy team   Maintaining the jain is probably in his best interest to promote wound healing     Continue IV vanc and po flagyl   Place PICC     Nasal bactroban  Contact isolation     D/w wife   D/w CLEMENTE Manuel MD  Phone: [phone]   Fax : [phone]

## 2021-10-20 NOTE — PROGRESS NOTES
corynebacterium--    Subjective: No acute events overnight. Pt is awake and alert, tells me \" im not having a good day feel like I'm dying\" He really couldn't elaborate any more. Medications:  Reviewed    Infusion Medications    dextrose      sodium chloride      lactated ringers 100 mL/hr at 10/20/21 1203     Scheduled Medications    metroNIDAZOLE  500 mg Oral 3 times per day    mupirocin   Nasal BID    vancomycin  750 mg IntraVENous Q12H    donepezil  10 mg Oral Nightly    atorvastatin  10 mg Oral Nightly    vitamin B complex w/C  1 tablet Oral Daily    therapeutic multivitamin-minerals  1 tablet Oral Daily    enoxaparin  40 mg SubCUTAneous Daily    insulin glargine  0.15 Units/kg SubCUTAneous Nightly    insulin lispro  0.05 Units/kg SubCUTAneous TID WC    insulin lispro  0-6 Units SubCUTAneous TID WC    insulin lispro  0-3 Units SubCUTAneous Nightly     PRN Meds: LORazepam, glucose, dextrose, glucagon (rDNA), dextrose, sodium chloride flush, sodium chloride, potassium chloride **OR** potassium alternative oral replacement **OR** potassium chloride, magnesium sulfate, ondansetron **OR** ondansetron, acetaminophen **OR** acetaminophen, melatonin, HYDROmorphone      Intake/Output Summary (Last 24 hours) at 10/20/2021 1830  Last data filed at 10/20/2021 1323  Gross per 24 hour   Intake    Output 2575 ml   Net -2575 ml       Physical Exam Performed:    /67   Pulse 88   Temp 98.4 °F (36.9 °C) (Axillary)   Resp 21   Ht 6' (1.829 m)   Wt 131 lb 9.8 oz (59.7 kg)   SpO2 98%   BMI 17.85 kg/m²     General appearance: No apparent distress, appears stated age and cooperative. HEENT: Pupils equal, round, and reactive to light. Conjunctivae/corneas clear. Neck: Supple, with full range of motion. No jugular venous distention. Trachea midline. Respiratory:  Normal respiratory effort. Clear to auscultation, bilaterally without Rales/Wheezes/Rhonchi.   Cardiovascular: Regular rate and rhythm with normal S1/S2 without murmurs, rubs or gallops. Abdomen: Soft, non-tender, non-distended with normal bowel sounds. Musculoskeletal: No clubbing, cyanosis or edema bilaterally. Full range of motion without deformity. Skin: seepictures,   Neurologic:  Neurovascularly intact without any focal sensory/motor deficits. Cranial nerves: II-XII intact, grossly non-focal.  Psychiatric: Alert and oriented, ? [employer]   Capillary Refill: Brisk,3 seconds, normal   Peripheral Pulses: +2 palpable, equal bilaterally       Labs:   Recent Labs     10/18/21  0539 10/19/21  0631 10/20/21  0454   WBC 6.3 7.0 6.9   HGB 7.7* 7.6* 7.7*   HCT 23.0* 22.8* 22.7*    267 282     Recent Labs     10/18/21  0540 10/19/21  0631 10/20/21  0454   * 129* 134*   K 4.4 4.1 4.5   CL 98* 98* 101   CO2 26 25 27   BUN 17 17 16   CREATININE 0.6* 0.8 0.8   CALCIUM 7.5* 7.4* 7.6*     Recent Labs     10/18/21  0540 10/19/21  0631 10/20/21  0454   AST 22 30 40*   ALT 21 22 25   BILITOT 0.3 0.3 <0.2   ALKPHOS 109 98 98     Recent Labs     10/20/21  1729   INR 1.61*     No results for input(s): Assunta Smith in the last 72 hours. Urinalysis:      Lab Results   Component Value Date    NITRU Negative 10/15/2021    WBCUA 10-20 10/15/2021    BACTERIA 4+ 10/15/2021    RBCUA 5-10 10/15/2021    BLOODU SMALL 10/15/2021    SPECGRAV 1.015 10/15/2021    GLUCOSEU 250 10/15/2021       Radiology:  CT HEAD WO CONTRAST   Final Result      Stable study. No evidence for acute intracranial hemorrhage, territorial   infarction or intracranial mass lesion. Mild chronic microangiopathic ischemic disease. Mild generalized volume loss. CT PELVIS WO CONTRAST Additional Contrast? None   Final Result   1. Large deep sacral decubitus ulcer with mild adjacent cellulitis. No   abscess or soft tissue gas. 2. Osseous uncovering of the posterior aspect of the lower sacrum and coccyx   without convincing evidence of osteomyelitis.   If clinically indicated   further evaluation could be obtained with MRI. XR CHEST PORTABLE   Final Result   Chronic elevation of the left hemidiaphragm which is unchanged with   increasing subsegmental atelectasis or early infiltrate along the left lung   base. 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 Pinky Angles .    - s/p debridement 9/23 at [facility], cult + Enterococcus faecalis, Enterobacter cloacae, 3 anaerobesDischarged on 9/27 on po augmentin    -s/p debridement 10/6-cultures growing MRSA, Enterococcus, corynebacterium--follow-up on sensitivities   -Continue care per wound team general surgery    -Antibiotics--- on IV cefepime, vancomycin and Flagyl--management per ID        -Recent spine surgery, with concern for cauda equina syndrome due to postop hematoma     \"on 9/2 Dr. Hoa Dahl performed an L2-5 decompression and laminectomy at [place] [address], then postoperatively on 9/5 [facility] noted that the patient was unable to wiggle his toes and had BLE weakness to the point that he required maximum assistance x2 in order to stand.  An MRI was performed which showed severe spinal canal stenosis due to hematoma.  on 9/6 Dr. Timmy Alvarado took him back to the OR for an urgent exploration and hematoma evacuation, left drains in place. eventually the patient was discharged to SNF on 9/10.  readmitted 9/19-9/22 urinary retention, constipation, and ongoing leg weakness, we obtained a repeat MRI on this admission to [facility] showed an ill-defind fluid/soft tissue causing severe spinal canal stenosis from L1-4, also moderate-severe spinal canal stenosis from L4 - S1, transferred to [facility] for neurosurgery eval-did not require spinal surgery but had debirdement of decub ulcer  -Acute metabolic encephalopathy. . 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 Supportive care for     -Dementiaon Ariceptcontinue supportive care    -Chronic indwelling Jain-Indwelling jain since 9/19.  Jain replaced this admission 10/15    -DM type II--- continue insulin therapy    -Essential hypertension-stablelisinopril held on addition continue to monitor off meds        Overall with extensive wound, mobility and issues a and fecal soilage- Hospice was consulted, but today pt does not want to go to Hospice, he would like to continue with abx and w=explore further surgery. Uncertain if this is survival for him     DVT Prophylaxis: lovenox   Diet: ADULT DIET;  Regular  Adult Oral Nutrition Supplement; Diabetic Oral Supplement  Code Status: DNR-CCA    PT/OT Eval Status: ongoing     Dispo - uncertain     TERA Gonzales - CNP

## 2021-10-21 ENCOUNTER — TELEPHONE (OUTPATIENT)
Dept: INFECTIOUS DISEASES | Age: 86
End: 2021-10-21

## 2021-10-21 LAB
A/G RATIO: 0.7 (ref 1.1–2.2)
A/G RATIO: 0.7 (ref 1.1–2.2)
ALBUMIN SERPL-MCNC: 2.1 G/DL (ref 3.4–5)
ALBUMIN SERPL-MCNC: 2.1 G/DL (ref 3.4–5)
ALP BLD-CCNC: 111 U/L (ref 40–129)
ALP BLD-CCNC: 133 U/L (ref 40–129)
ALT SERPL-CCNC: 43 U/L (ref 10–40)
ALT SERPL-CCNC: 43 U/L (ref 10–40)
ANION GAP SERPL CALCULATED.3IONS-SCNC: 3 MMOL/L (ref 3–16)
ANION GAP SERPL CALCULATED.3IONS-SCNC: 7 MMOL/L (ref 3–16)
ANISOCYTOSIS: ABNORMAL
ANISOCYTOSIS: ABNORMAL
AST SERPL-CCNC: 53 U/L (ref 15–37)
AST SERPL-CCNC: 72 U/L (ref 15–37)
BANDED NEUTROPHILS RELATIVE PERCENT: 13 % (ref 0–7)
BANDED NEUTROPHILS RELATIVE PERCENT: 6 % (ref 0–7)
BASOPHILS ABSOLUTE: 0 K/UL (ref 0–0.2)
BASOPHILS ABSOLUTE: 0 K/UL (ref 0–0.2)
BASOPHILS RELATIVE PERCENT: 0 %
BASOPHILS RELATIVE PERCENT: 0 %
BILIRUB SERPL-MCNC: 0.3 MG/DL (ref 0–1)
BILIRUB SERPL-MCNC: <0.2 MG/DL (ref 0–1)
BUN BLDV-MCNC: 17 MG/DL (ref 7–20)
BUN BLDV-MCNC: 21 MG/DL (ref 7–20)
CALCIUM SERPL-MCNC: 7.7 MG/DL (ref 8.3–10.6)
CALCIUM SERPL-MCNC: 7.8 MG/DL (ref 8.3–10.6)
CHLORIDE BLD-SCNC: 101 MMOL/L (ref 99–110)
CHLORIDE BLD-SCNC: 96 MMOL/L (ref 99–110)
CO2: 26 MMOL/L (ref 21–32)
CO2: 28 MMOL/L (ref 21–32)
CREAT SERPL-MCNC: 0.7 MG/DL (ref 0.8–1.3)
CREAT SERPL-MCNC: 1.1 MG/DL (ref 0.8–1.3)
EOSINOPHILS ABSOLUTE: 0 K/UL (ref 0–0.6)
EOSINOPHILS ABSOLUTE: 0.1 K/UL (ref 0–0.6)
EOSINOPHILS RELATIVE PERCENT: 0 %
EOSINOPHILS RELATIVE PERCENT: 2 %
GFR AFRICAN AMERICAN: >60
GFR AFRICAN AMERICAN: >60
GFR NON-AFRICAN AMERICAN: >60
GFR NON-AFRICAN AMERICAN: >60
GLOBULIN: 3.2 G/DL
GLOBULIN: 3.2 G/DL
GLUCOSE BLD-MCNC: 189 MG/DL (ref 70–99)
GLUCOSE BLD-MCNC: 211 MG/DL (ref 70–99)
GLUCOSE BLD-MCNC: 258 MG/DL (ref 70–99)
GLUCOSE BLD-MCNC: 263 MG/DL (ref 70–99)
GLUCOSE BLD-MCNC: 315 MG/DL (ref 70–99)
GLUCOSE BLD-MCNC: 375 MG/DL (ref 70–99)
HCT VFR BLD CALC: 23.8 % (ref 40.5–52.5)
HCT VFR BLD CALC: 24.1 % (ref 40.5–52.5)
HEMOGLOBIN: 8.1 G/DL (ref 13.5–17.5)
HEMOGLOBIN: 8.1 G/DL (ref 13.5–17.5)
LYMPHOCYTES ABSOLUTE: 1.9 K/UL (ref 1–5.1)
LYMPHOCYTES ABSOLUTE: 2 K/UL (ref 1–5.1)
LYMPHOCYTES RELATIVE PERCENT: 22 %
LYMPHOCYTES RELATIVE PERCENT: 28 %
MACROCYTES: ABNORMAL
MAGNESIUM: 2 MG/DL (ref 1.8–2.4)
MCH RBC QN AUTO: 30.8 PG (ref 26–34)
MCH RBC QN AUTO: 32 PG (ref 26–34)
MCHC RBC AUTO-ENTMCNC: 33.4 G/DL (ref 31–36)
MCHC RBC AUTO-ENTMCNC: 33.8 G/DL (ref 31–36)
MCV RBC AUTO: 92.2 FL (ref 80–100)
MCV RBC AUTO: 94.5 FL (ref 80–100)
METAMYELOCYTES RELATIVE PERCENT: 1 %
METAMYELOCYTES RELATIVE PERCENT: 4 %
MICROCYTES: ABNORMAL
MONOCYTES ABSOLUTE: 0.5 K/UL (ref 0–1.3)
MONOCYTES ABSOLUTE: 0.7 K/UL (ref 0–1.3)
MONOCYTES RELATIVE PERCENT: 6 %
MONOCYTES RELATIVE PERCENT: 9 %
MYELOCYTE PERCENT: 1 %
NEUTROPHILS ABSOLUTE: 4.5 K/UL (ref 1.7–7.7)
NEUTROPHILS ABSOLUTE: 6.1 K/UL (ref 1.7–7.7)
NEUTROPHILS RELATIVE PERCENT: 50 %
NEUTROPHILS RELATIVE PERCENT: 58 %
PDW BLD-RTO: 15.8 % (ref 12.4–15.4)
PDW BLD-RTO: 16.1 % (ref 12.4–15.4)
PERFORMED ON: ABNORMAL
PLATELET # BLD: 279 K/UL (ref 135–450)
PLATELET # BLD: 313 K/UL (ref 135–450)
PLATELET SLIDE REVIEW: ADEQUATE
PLATELET SLIDE REVIEW: ADEQUATE
PMV BLD AUTO: 6.6 FL (ref 5–10.5)
PMV BLD AUTO: 7 FL (ref 5–10.5)
POIKILOCYTES: ABNORMAL
POLYCHROMASIA: ABNORMAL
POTASSIUM SERPL-SCNC: 4.2 MMOL/L (ref 3.5–5.1)
POTASSIUM SERPL-SCNC: 4.9 MMOL/L (ref 3.5–5.1)
RBC # BLD: 2.52 M/UL (ref 4.2–5.9)
RBC # BLD: 2.62 M/UL (ref 4.2–5.9)
SLIDE REVIEW: ABNORMAL
SODIUM BLD-SCNC: 130 MMOL/L (ref 136–145)
SODIUM BLD-SCNC: 131 MMOL/L (ref 136–145)
TOTAL PROTEIN: 5.3 G/DL (ref 6.4–8.2)
TOTAL PROTEIN: 5.3 G/DL (ref 6.4–8.2)
WBC # BLD: 7.3 K/UL (ref 4–11)
WBC # BLD: 8.5 K/UL (ref 4–11)

## 2021-10-21 PROCEDURE — 6360000002 HC RX W HCPCS: Performed by: INTERNAL MEDICINE

## 2021-10-21 PROCEDURE — 99232 SBSQ HOSP IP/OBS MODERATE 35: CPT | Performed by: SURGERY

## 2021-10-21 PROCEDURE — 85025 COMPLETE CBC W/AUTO DIFF WBC: CPT

## 2021-10-21 PROCEDURE — 6360000002 HC RX W HCPCS: Performed by: SURGERY

## 2021-10-21 PROCEDURE — 1200000000 HC SEMI PRIVATE

## 2021-10-21 PROCEDURE — 99232 SBSQ HOSP IP/OBS MODERATE 35: CPT | Performed by: INTERNAL MEDICINE

## 2021-10-21 PROCEDURE — 2580000003 HC RX 258: Performed by: INTERNAL MEDICINE

## 2021-10-21 PROCEDURE — 83735 ASSAY OF MAGNESIUM: CPT

## 2021-10-21 PROCEDURE — 6370000000 HC RX 637 (ALT 250 FOR IP): Performed by: INTERNAL MEDICINE

## 2021-10-21 PROCEDURE — 36415 COLL VENOUS BLD VENIPUNCTURE: CPT

## 2021-10-21 PROCEDURE — 6370000000 HC RX 637 (ALT 250 FOR IP): Performed by: SURGERY

## 2021-10-21 PROCEDURE — 80053 COMPREHEN METABOLIC PANEL: CPT

## 2021-10-21 PROCEDURE — 2580000003 HC RX 258: Performed by: SURGERY

## 2021-10-21 RX ORDER — HYDROCORTISONE ACETATE 25 MG/1
25 SUPPOSITORY RECTAL 2 TIMES DAILY
Status: DISCONTINUED | OUTPATIENT
Start: 2021-10-21 | End: 2021-11-05 | Stop reason: HOSPADM

## 2021-10-21 RX ADMIN — SODIUM CHLORIDE, PRESERVATIVE FREE 10 ML: 5 INJECTION INTRAVENOUS at 20:58

## 2021-10-21 RX ADMIN — INSULIN GLARGINE 8 UNITS: 100 INJECTION, SOLUTION SUBCUTANEOUS at 20:59

## 2021-10-21 RX ADMIN — MUPIROCIN: 20 OINTMENT TOPICAL at 21:04

## 2021-10-21 RX ADMIN — LORAZEPAM 0.5 MG: 2 INJECTION INTRAMUSCULAR; INTRAVENOUS at 20:58

## 2021-10-21 RX ADMIN — INSULIN LISPRO 2 UNITS: 100 INJECTION, SOLUTION INTRAVENOUS; SUBCUTANEOUS at 20:58

## 2021-10-21 RX ADMIN — VANCOMYCIN HYDROCHLORIDE 750 MG: 750 INJECTION, POWDER, LYOPHILIZED, FOR SOLUTION INTRAVENOUS at 21:08

## 2021-10-21 RX ADMIN — MUPIROCIN: 20 OINTMENT TOPICAL at 10:06

## 2021-10-21 RX ADMIN — INSULIN LISPRO 5 UNITS: 100 INJECTION, SOLUTION INTRAVENOUS; SUBCUTANEOUS at 12:00

## 2021-10-21 RX ADMIN — LORAZEPAM 0.5 MG: 2 INJECTION INTRAMUSCULAR; INTRAVENOUS at 00:19

## 2021-10-21 RX ADMIN — INSULIN LISPRO 2 UNITS: 100 INJECTION, SOLUTION INTRAVENOUS; SUBCUTANEOUS at 10:05

## 2021-10-21 RX ADMIN — INSULIN LISPRO 4 UNITS: 100 INJECTION, SOLUTION INTRAVENOUS; SUBCUTANEOUS at 17:40

## 2021-10-21 RX ADMIN — ENOXAPARIN SODIUM 40 MG: 40 INJECTION SUBCUTANEOUS at 09:58

## 2021-10-21 RX ADMIN — VANCOMYCIN HYDROCHLORIDE 750 MG: 750 INJECTION, POWDER, LYOPHILIZED, FOR SOLUTION INTRAVENOUS at 10:01

## 2021-10-21 RX ADMIN — HYDROMORPHONE HYDROCHLORIDE 0.5 MG: 1 INJECTION, SOLUTION INTRAMUSCULAR; INTRAVENOUS; SUBCUTANEOUS at 23:41

## 2021-10-21 ASSESSMENT — PAIN DESCRIPTION - FREQUENCY: FREQUENCY: CONTINUOUS

## 2021-10-21 ASSESSMENT — PAIN DESCRIPTION - LOCATION: LOCATION: BUTTOCKS

## 2021-10-21 ASSESSMENT — PAIN SCALES - GENERAL
PAINLEVEL_OUTOF10: 0
PAINLEVEL_OUTOF10: 5

## 2021-10-21 ASSESSMENT — PAIN DESCRIPTION - ORIENTATION: ORIENTATION: MID

## 2021-10-21 ASSESSMENT — PAIN DESCRIPTION - DESCRIPTORS: DESCRIPTORS: DISCOMFORT

## 2021-10-21 ASSESSMENT — PAIN DESCRIPTION - PAIN TYPE: TYPE: SURGICAL PAIN

## 2021-10-21 ASSESSMENT — PAIN DESCRIPTION - ONSET: ONSET: ON-GOING

## 2021-10-21 NOTE — PROCEDURES
PICC PLACEMENT:  First arrival at 1800 and pt is unavailable at this time, eating dinner and visiting w wife; returned at 1900 to perform procedure    Preprocedure & timeout done w primary RN BRENDON; no difficulty accessing R BASILIC vein; picc tip is verified using Teleflex VPS ECG Technology; +VPS tracing visualized; however, the ECG technology is not provided a waveform to read after 5-7  Min of attempting to reset; will need cxr to obtained definitive tip confirmation; stat cxr ordered and results are pending at this time; reported same to primary RN; Nursing Order for PICC OK to use will be placed in EMR once cxr is resulted; pt tolerated procedure fairly well, despite is severe anxiety and obssesive and consistent references to \"dying in the next week\"; he will remain in bed in order to promote hemostasis to the insertion site; pt is left in a position of comfort w siderails up x3, call light in reach and bed is locked in lowest position. NURSES:  ONCE PICC OK TO USE:  *please replace all existing IV tubing with new IV tubing prior to using the PICC for current IV infusions. *please remove any PIVs from RIGHT arm. *please refrain from obtaining BPs in the PICC arm. All of the above may be sources of infection or damage to the PICC line/site.     SARA Brock RN, BSN, [facility]ay.

## 2021-10-21 NOTE — CARE COORDINATION
CM received voice mail from [facility]. They can accept on IV Vanc. DORITA spoke with Edgard Schrader at [facility]. Pt will be a new admission to their facility. He will need a HENS. They can accept. He would like to get pt tomorrow 11a-12p if possible. DORITA spoke with nurse, Aurelio Galvan. She is aware need CHARLENE completed. ID rec's and surgery to sign off.     3:51 PM  Dorita spoke with nurse, consulting GI, Pt unsure if he wants to pursue IV abx or not at this time. DORITA spoke with Lesia Ngo at [facility]. She is aware pt is not sure on care at this time. Consulting GI. At this time unsure if pt will dc tomorrow. Will update her tomorrow.

## 2021-10-21 NOTE — PROGRESS NOTES
Infectious Disease Follow up Notes    CC :   Infected sacral ulcer      Antibiotics:   Flagyl 500 po TID   vanc 750 q12    Admit Date:   10/15/2021  Hospital Day: 7    Subjective:   He remains afebrile on RA   He has minimal discomfort from the sacral wound.    No acute changes     Objective:     Patient Vitals for the past 8 hrs:   BP Temp Temp src Pulse Resp SpO2   10/21/21 1455 135/65 97.8 °F (36.6 °C) Oral 95 (!) 32 95 %       EXAM:  General:  Alert, eating lunch, NAD   Frail, pale    HEENT:  NCAT, PERRL, sclera anicteric     NECK:   Supple      LUNGS:   Non-labored breathing       ABD: Soft, flat, NT     EXT: No focal rash  +LE edema           LINE: PICC RUE   Avilez in place        Scheduled Meds:   hydrocortisone  25 mg Rectal BID    metroNIDAZOLE  500 mg Oral 3 times per day    mupirocin   Nasal BID    vancomycin  750 mg IntraVENous Q12H    donepezil  10 mg Oral Nightly    atorvastatin  10 mg Oral Nightly    vitamin B complex w/C  1 tablet Oral Daily    therapeutic multivitamin-minerals  1 tablet Oral Daily    enoxaparin  40 mg SubCUTAneous Daily    insulin glargine  0.15 Units/kg SubCUTAneous Nightly    insulin lispro  0.05 Units/kg SubCUTAneous TID WC    insulin lispro  0-6 Units SubCUTAneous TID WC    insulin lispro  0-3 Units SubCUTAneous Nightly       Continuous Infusions:   dextrose      sodium chloride      lactated ringers 100 mL/hr at 10/20/21 2029          Data Review:    Lab Results   Component Value Date    WBC 7.3 10/21/2021    HGB 8.1 (L) 10/21/2021    HCT 23.8 (L) 10/21/2021    MCV 94.5 10/21/2021     10/21/2021     Lab Results   Component Value Date    CREATININE 0.7 (L) 10/21/2021    BUN 17 10/21/2021     (L) 10/21/2021    K 4.2 10/21/2021     10/21/2021    CO2 26 10/21/2021       Hepatic Function Panel:   Lab Results   Component Value Date    ALKPHOS 111 10/21/2021    ALT 43 10/21/2021    AST 72 10/21/2021    PROT 5.3 10/21/2021    BILITOT 0.3 10/21/2021    LABALBU 2.1 10/21/2021         MICRO:  10/15 BC x2 neg   UC >100k Staph aureus mrsa  Antibiotic Interpretation MIESHA Status    nitrofurantoin Sensitive <=16 mcg/mL     oxacillin Resistant >=4 mcg/mL     tetracycline Resistant >=16 mcg/mL     trimethoprim-sulfamethoxazole Sensitive <=10 mcg/mL     vancomycin Sensitive 1 mcg/mL       10/15 Wound culture C striatum, MRSA, E faecalis; GPC/GNR on GS   Staph aureus mrsa   Antibiotic Interpretation MIESHA Status    clindamycin Resistant >=8 mcg/mL     erythromycin Resistant >=8 mcg/mL     oxacillin Resistant >=4 mcg/mL     tetracycline Resistant >=16 mcg/mL     trimethoprim-sulfamethoxazole Sensitive <=10 mcg/mL     vancomycin Sensitive <=0.5 mcg/mL     Enterococcus  faecalis   Antibiotic Interpretation MIESHA Status    ampicillin Sensitive <=2 mcg/mL     vancomycin Sensitive 1 mcg/mL       10/16 MRSA screen +   Surgical culture sacrum NGTD; GS 4+gnr, 1+gpc    Bone culture C striatum, Bacteroides, Clostridium; mixed vahe on GS       IMAGING:  CT Pelvis 10/15/21  Impression   1. Large deep sacral decubitus ulcer with mild adjacent cellulitis.  No   abscess or soft tissue gas. 2. Osseous uncovering of the posterior aspect of the lower sacrum and coccyx   without convincing evidence of osteomyelitis.  If clinically indicated   further evaluation could be obtained with MRI.        Assessment:     Patient Active Problem List    Diagnosis Date Noted    Tic disorder     Osteomyelitis ([facility] 10/16/2021    Chronic anemia 10/16/2021    Severe sepsis (HCC)     Pressure injury of sacral region, unstageable ([address] 10/15/2021    HLD (hyperlipidemia) 09/19/2021    Lumbar stenosis with neurogenic claudication 09/19/2021     Overview Note:     L2-5 decompression, laminectomy; 9-2-21;  Emergency exploration of lumbar laminectomy for epidural fluid collection; 9-6-21        BPH (benign prostatic hyperplasia) 09/19/2021    ROXI (acute kidney injury) [facility] 09/19/2021    Urinary retention 09/19/2021    Localized osteoarthrosis, lower leg 09/18/2014    DM2 (diabetes mellitus, type 2) ([facility]     Hypertension        Hx DM, HTN, HLD, lumbar stenosis, dementia     L2-5 lumbar surg 2/3/00AWX  Complicated by post-op hematoma, return to OR for debridement 9/5/21      Extensive sacral PU  S/p debridement 9/23/21 at [address], culture with+ Enterococcus faecalis, Enterobacter cloacae, 3 anaerobes  Discharged on 9/27/21 on po Augmentin    Readmitted 10/15/21 with encephalopathy, deteriorating sacral wound, leukocytosis   S/p I&D down to bone 10/16/21  Bone culture is positive c/w diagnosis OM   Polymicrobial infection     Otoniel heel pressure ulcers     MRSA nasal colonization      Encephalopathy - improved / resolved    Leukocytosis - resolved     Orofacial dyskinesias   Acute on chronic     No abx allergies     Plan:   Discussed again with wife and spouse   He wants to continue aggressive treatments     Again discussed advanced age, comorbidities, malnutrition, other factors, the prognosis for healing the wound is very poor  With chronic OM, will plan IV abx for up to 6 weeks    Fecal soilage of wound is an issue.   Defer any discussion of divert colostomy to Sgy team   Maintaining the jain is probably in his best interest to promote wound healing     Continue IV vanc and po flagyl   PICC is in place     Recommended Follow-up, Labs or Other Treatments After Discharge:      ID INFUSION   Vancomycin 750mg IV q12 continued through 11/16/21 along with flagyl 500 po TID   Weekly CBC diff, CMP, CRP, vanc trough faxed [phone]   Routine CVC care  See Dr. Rebeca Pryor in 3-4 weeks, call with concerns [phone]   Gm Bethea MD           Nasal Blessing Henson  Contact isolation     D/w wife       Call with further questions         Eugenio Sargent MD  Phone: [phone]   Fax : [phone]

## 2021-10-21 NOTE — PROGRESS NOTES
[facility] GENERAL SURGERY    Surgery Progress Note           POD # 5    PATIENT NAME: Nino Daily     TODAY'S DATE: 10/21/2021    INTERVAL HISTORY:    Pt with back pain, no fevers or chills, no nausea. OBJECTIVE:   VITALS:  /70   Pulse 82   Temp 98.8 °F (37.1 °C) (Axillary)   Resp 22   Ht 6' (1.829 m)   Wt 131 lb 9.8 oz (59.7 kg)   SpO2 97%   BMI 17.85 kg/m²     INTAKE/OUTPUT:    I/O last 3 completed shifts:  In: -   Out: 2400 [Urine:2400]  I/O this shift:  In: 230 [P.O.:230]  Out: -               CONSTITUTIONAL:  awake and alert  ABDOMEN:   normal bowel sounds, soft, non-distended, nontender   INCISION: wound without necrotic tissue, mild fibrinous tissue, bone exposed, no purulence    Data:  CBC: Recent Labs     10/19/21  0631 10/20/21  0454 10/21/21  0443   WBC 7.0 6.9 7.3   HGB 7.6* 7.7* 8.1*   HCT 22.8* 22.7* 23.8*    282 279     BMP:    Recent Labs     10/19/21  0631 10/20/21  0454 10/21/21  0443   * 134* 130*   K 4.1 4.5 4.2   CL 98* 101 101   CO2 25 27 26   BUN 17 16 17   CREATININE 0.8 0.8 0.7*   GLUCOSE 215* 228* 189*     Hepatic:   Recent Labs     10/19/21  0631 10/20/21  0454 10/21/21  0443   AST 30 40* 72*   ALT 22 25 43*   BILITOT 0.3 <0.2 0.3   ALKPHOS 98 98 111     Mag:      Recent Labs     10/19/21  0631 10/20/21  0454 10/21/21  0443   MG 1.70* 1.90 2.00      Phos:   No results for input(s): PHOS in the last 72 hours. INR:   Recent Labs     10/20/21  1729   INR 1.61*         Radiology Review:  NA    ASSESSMENT AND PLAN:  80 y.o. male status post sacral wound debridement  1. Currently no need for further debridement. Continue we to dry dressing changes  2. Discussed with patient and wife low likelihood of healing completely and challenges for healing including malnutrition, osteomyelitis, lack of movement and DM. 3.  Also with blood and small clots in stool during dressing change.  Has external hemorrhoids and prior internal banding a few weeks prior to back operation. Will start anusol but if persists may need GI input as no colonoscopy since 2012.          Electronically signed by Yusuf Enrique MD

## 2021-10-21 NOTE — DISCHARGE INSTR - COC
Continuity of Care Form    Patient Name: Grecia Garzon   :  1934  MRN:  [id number]    Admit date:  10/15/2021  Discharge date:  ***    Code Status Order: DNR-CCA   Advance Directives:      Admitting Physician:  Ranjan Miller MD  PCP: Stephanie Valencia MD    Discharging Nurse: [facility] Unit/Room#: 7197/5178-62  Discharging Unit Phone Number: ***    Emergency Contact:   Extended Emergency Contact Information  Primary Emergency Contact: Brunner,Janet  Address: [address] [place] [address], [place] [address]  Home Phone: [phone]  Relation: Spouse  Secondary Emergency Contact: Oswaldo Mendez  Mobile Phone: [phone]  Relation: Child    Past Surgical History:  Past Surgical History:   Procedure Laterality Date    COLONOSCOPY  2002    diverticulosis    COLONOSCOPY  2012    diverticulosis    EYE SURGERY      micheal cataract    RECTAL SURGERY N/A 10/16/2021    SACRAL WOUND DEBRIDMENT performed by Juan Diego Kidd MD at [place] [address].         Immunization History:   Immunization History   Administered Date(s) Administered   EarnsJULISA Howard, 100mcg/0.5mL 2021, 2021       Active Problems:  Patient Active Problem List   Diagnosis Code    DM2 (diabetes mellitus, type 2) ([address] E11.9    Hypertension I10    Localized osteoarthrosis, lower leg M17.10    HLD (hyperlipidemia) E78.5    Lumbar stenosis with neurogenic claudication M48.062    BPH (benign prostatic hyperplasia) N40.0    ROXI (acute kidney injury) ([address] N17.9    Urinary retention R33.9    Pressure injury of sacral region, unstageable ([address] L89.150    Severe sepsis ([address] A41.9, R65.20    Osteomyelitis ([address] M86.9    Chronic anemia D64.9    Tic disorder F95.9       Isolation/Infection:   Isolation            Contact          Patient Infection Status       Infection Onset Added Last Indicated Last Indicated By Review Planned Expiration Resolved Resolved By    MRSA 10/15/21 10/17/21 10/16/21 MRSA DNA Probe, Nasal        Resolved    COVID-19 Rule Out 10/15/21 10/15/21 10/15/21 COVID-19, Rapid (Ordered)   10/15/21 Rule-Out Test Resulted            Nurse Assessment:  Last Vital Signs: /70   Pulse 82   Temp 98.8 °F (37.1 °C) (Axillary)   Resp 22   Ht 6' (1.829 m)   Wt 131 lb 9.8 oz (59.7 kg)   SpO2 97%   BMI 17.85 kg/m²     Last documented pain score (0-10 scale): Pain Level: 0  Last Weight:   Wt Readings from Last 1 Encounters:   10/20/21 131 lb 9.8 oz (59.7 kg)     Mental Status:  {IP PT MENTAL STATUS:20030}    IV Access:  { CHARLENE IV ACCESS:576871930}    Nursing Mobility/ADLs:  Walking   {CHP DME IGIU:741070825}  Transfer  {CHP DME ANTQ:924903454}  Bathing  {CHP DME QLLD:927497981}  Dressing  {CHP DME RQSV:950167311}  Toileting  {CHP DME ZIAC:192820872}  Feeding  {P DME GONY:158468572}  Med Admin  {P DME ZQID:358859145}  Med Delivery   { CHARLENE MED Delivery:801512111}    Wound Care Documentation and Therapy:  Wound 09/19/21 Sacrum (Active)   Wound Image   10/18/21 1453   Wound Etiology Pressure Stage  4 10/18/21 1453   Dressing Status Clean;Dry; Intact 10/20/21 2024   Wound Cleansed Cleansed with saline 10/18/21 1453   Dressing/Treatment Dry dressing 10/20/21 2024   Offloading for Diabetic Foot Ulcers Other (comment) 10/18/21 1453   Dressing Change Due 10/19/21 10/18/21 1453   Wound Length (cm) 13 cm 10/18/21 1453   Wound Width (cm) 7.5 cm 10/18/21 1453   Wound Depth (cm) 3.5 cm 10/18/21 1453   Wound Surface Area (cm^2) 97.5 cm^2 10/18/21 1453   Change in Wound Size % (l*w) -154.9 10/18/21 1453   Wound Volume (cm^3) 341.25 cm^3 10/18/21 1453   Wound Healing % -8822 10/18/21 1453   Tunneling Position ___ O'Clock 0 10/18/21 1453   Undermining Starts ___ O'Clock 12 10/18/21 1453   Undermining Ends___ O'Clock 12 10/18/21 1453   Undermining Maxium Distance (cm) 2.2 10/18/21 1453   Wound Assessment Exposed structure bone;Exposed structure muscle;Eschar dry;Fibrin;Epithelialization;Eschar moist;Pink/red;Slough 10/18/21 1453   Drainage Amount None 10/20/21 2024   Drainage Description Serosanguinous 10/19/21 2108   Odor None 10/20/21 2024   Cassie-wound Assessment Dry/flaky;Fragile 10/18/21 1453   Margins Unattached edges; Defined edges 10/18/21 1453   Wound Thickness Description not for Pressure Injury Full thickness 10/18/21 1453   Number of days: 31       Wound 09/19/21 Heel Left (Active)   Wound Image   10/18/21 1453   Wound Etiology Pressure Unstageable 10/20/21 2024   Dressing Status Other (Comment) 10/18/21 1453   Wound Cleansed Betadine/povidone iodine 10/20/21 2024   Dressing/Treatment Open to air 10/20/21 2024   Offloading for Diabetic Foot Ulcers Offloading boot 10/20/21 2024   Dressing Change Due 09/19/21 09/19/21 1520   Wound Length (cm) 6 cm 10/18/21 1453   Wound Width (cm) 7 cm 10/18/21 1453   Wound Depth (cm) 0 cm 10/18/21 1453   Wound Surface Area (cm^2) 42 cm^2 10/18/21 1453   Change in Wound Size % (l*w) -200 10/18/21 1453   Wound Volume (cm^3) 0 cm^3 10/18/21 1453   Distance Tunneling (cm) 0 cm 10/18/21 1453   Tunneling Position ___ O'Clock 0 10/18/21 1453   Undermining Starts ___ O'Clock 0 10/18/21 1453   Undermining Ends___ O'Clock 0 10/18/21 1453   Undermining Maxium Distance (cm) 0 10/18/21 1453   Wound Assessment Eschar dry 10/20/21 2024   Drainage Amount None 10/20/21 2024   Drainage Description Black 10/16/21 0430   Odor None 10/20/21 2024   Cassie-wound Assessment Dry/flaky; Intact 10/18/21 1453   Margins Attached edges; Defined edges 10/18/21 1453   Number of days: 31       Wound 09/19/21 Heel Right (Active)   Wound Image   10/18/21 1453   Wound Etiology Pressure Unstageable 10/20/21 2024   Dressing Status Other (Comment) 10/18/21 1453   Wound Cleansed Betadine/povidone iodine 10/20/21 2024   Dressing/Treatment Open to air 10/20/21 2024   Offloading for Diabetic Foot Ulcers Offloading boot 10/20/21 2024   Dressing Change Due 09/23/21 09/22/21 0845   Wound Length (cm) 6.5 cm 10/18/21 1453   Wound Width (cm) 4.5 cm 10/18/21 1453   Wound Depth (cm) 0 cm 10/18/21 1453   Wound Surface Area (cm^2) 29.25 cm^2 10/18/21 1453   Change in Wound Size % (l*w) -30 10/18/21 1453   Wound Volume (cm^3) 0 cm^3 10/18/21 1453   Distance Tunneling (cm) 0 cm 10/18/21 1453   Tunneling Position ___ O'Clock 0 10/18/21 1453   Undermining Starts ___ O'Clock 0 10/18/21 1453   Undermining Ends___ O'Clock 0 10/18/21 1453   Undermining Maxium Distance (cm) 0 10/18/21 1453   Wound Assessment Eschar dry 10/20/21 2024   Drainage Amount None 10/20/21 2024   Odor None 10/20/21 2024   Cassie-wound Assessment Dry/flaky; Intact 10/18/21 1453   Margins Attached edges; Defined edges 10/18/21 1453   Number of days: 31     Wound Care Recommendation:  Bilateral Heels - clean with NS, dry, paint with betadine daily, offloading boots while in bed or up in chair    Sacrum - clean with NS, dry, frame with drape and track to R Hip, cover bone with Xeroform, black foam, cover with drape, attach tracker pad, verify seal, dressing to be changed 3 times a week    Bilateral Heels:                Sacrum:             Elimination:  Continence:   · Bowel: {YES / SX:40137}  · Bladder: {YES / ZJ:22554}  Urinary Catheter: {Urinary Catheter:857459298}   Colostomy/Ileostomy/Ileal Conduit: {YES / JZ:56569}   Stoma: 45mm  Barrier: Current appliance 2 piece flat Coloplast #[id number] with drainable pouch [id number], stoma paste in crease above the stoma    Date of Last BM: ***    Intake/Output Summary (Last 24 hours) at 10/21/2021 1143  Last data filed at 10/21/2021 0900  Gross per 24 hour   Intake 230 ml   Output 1950 ml   Net -1720 ml     I/O last 3 completed shifts:  In: -   Out: 2400 [Urine:2400]    Safety Concerns:     [address] Safety Concerns:687517259}    Impairments/Disabilities:      [address] Impairments/Disabilities:075514786}    Nutrition Therapy:  Current Nutrition Therapy:   [address] Diet List:283117717}    Routes of Feeding: {CHP DME Other Feedings:595489082}  Liquids: {Slp liquid thickness:83455}  Daily Fluid Restriction: {CHP DME Yes amt example:887621604}  Last Modified Barium Swallow with Video (Video Swallowing Test): {Done Not Done QLYI:321715966}    Treatments at the Time of Hospital Discharge:   Respiratory Treatments: ***  Oxygen Therapy:  {Therapy; copd oxygen:42058}  Ventilator:    { CC Vent MHGP:022898276}    Rehab Therapies: {THERAPEUTIC INTERVENTION:4856119990}  Weight Bearing Status/Restrictions: [facility] CC Weight Bearin}  Other Medical Equipment (for information only, NOT a DME order):  {EQUIPMENT:552243920}  Other Treatments: ***    Patient's personal belongings (please select all that are sent with patient):  {CHP DME Belongings:485408114}    RN SIGNATURE:  {Esignature:229270583}    CASE MANAGEMENT/SOCIAL WORK SECTION    Inpatient Status Date: ***    Readmission Risk Assessment Score:  Readmission Risk              Risk of Unplanned Readmission:  24           Discharging to Facility/ [place] [address]   Skilled Nursing   [address] [facility] [address] [place] [facility] [address]   [phone]        / signature: Electronically signed by Shani Ivy RN on 21 at 12:29 PM EDT    PHYSICIAN SECTION    Prognosis: Good    Condition at Discharge: Stable    Rehab Potential (if transferring to Rehab): Good    Recommended Labs or Other Treatments After Discharge:       Recommended Follow-up, Labs or Other Treatments After Discharge:      ID INFUSION   Vancomycin 750mg IV q12 continued through 21 along with flagyl 500 po TID   Weekly CBC diff, CMP, CRP, vanc trough faxed [phone]   Routine CVC care  See Dr. Rai Jeffers in 3-4 weeks, call with concerns [phone]   Sai Stevenson MD    Wound vac to sacrum three times per week;  black foam, 125mmHg continuous pressure       Physician Certification: I certify the above information and transfer of Hudson Dill  is necessary for the continuing treatment of the diagnosis listed and that he requires [facility] for greater 30 days.      Update Admission H&P: No change in H&P    PHYSICIAN SIGNATURE:  Electronically signed by Geovanny Fulton MD on 11/5/21 at 11:50 AM EDT

## 2021-10-21 NOTE — TELEPHONE ENCOUNTER
Patients wife Pat Cassette called to speak with Dr. Samantha Millan. She has questions regarding her husbands infusions. She is asking if they can be stopped. She said he has changed his mind about getting the infusions and he has people he wants to talk to before he 'goes'. Message sent via [employer] to Dr. Samantha Millan.

## 2021-10-21 NOTE — PLAN OF CARE
Problem: Falls - Risk of:  Goal: Will remain free from falls  Description: Will remain free from falls  Outcome: Ongoing     Problem: Skin Integrity:  Goal: Absence of new skin breakdown  Description: Absence of new skin breakdown  Outcome: Ongoing     Problem: Nutrition  Goal: Optimal nutrition therapy  Outcome: Ongoing

## 2021-10-22 LAB
A/G RATIO: 0.6 (ref 1.1–2.2)
ALBUMIN SERPL-MCNC: 1.9 G/DL (ref 3.4–5)
ALP BLD-CCNC: 111 U/L (ref 40–129)
ALT SERPL-CCNC: 38 U/L (ref 10–40)
ANAEROBIC CULTURE: ABNORMAL
ANION GAP SERPL CALCULATED.3IONS-SCNC: 8 MMOL/L (ref 3–16)
AST SERPL-CCNC: 44 U/L (ref 15–37)
ATYPICAL LYMPHOCYTE RELATIVE PERCENT: 1 % (ref 0–6)
BANDED NEUTROPHILS RELATIVE PERCENT: 10 % (ref 0–7)
BASOPHILS ABSOLUTE: 0.1 K/UL (ref 0–0.2)
BASOPHILS RELATIVE PERCENT: 1 %
BILIRUB SERPL-MCNC: 0.3 MG/DL (ref 0–1)
BUN BLDV-MCNC: 17 MG/DL (ref 7–20)
CALCIUM SERPL-MCNC: 7.8 MG/DL (ref 8.3–10.6)
CHLORIDE BLD-SCNC: 95 MMOL/L (ref 99–110)
CO2: 27 MMOL/L (ref 21–32)
CREAT SERPL-MCNC: 0.7 MG/DL (ref 0.8–1.3)
CULTURE SURGICAL: ABNORMAL
EOSINOPHILS ABSOLUTE: 0.3 K/UL (ref 0–0.6)
EOSINOPHILS RELATIVE PERCENT: 5 %
GFR AFRICAN AMERICAN: >60
GFR NON-AFRICAN AMERICAN: >60
GLOBULIN: 3.4 G/DL
GLUCOSE BLD-MCNC: 234 MG/DL (ref 70–99)
GLUCOSE BLD-MCNC: 235 MG/DL (ref 70–99)
GLUCOSE BLD-MCNC: 240 MG/DL (ref 70–99)
GLUCOSE BLD-MCNC: 268 MG/DL (ref 70–99)
GLUCOSE BLD-MCNC: 278 MG/DL (ref 70–99)
GRAM STAIN RESULT: ABNORMAL
HCT VFR BLD CALC: 22.2 % (ref 40.5–52.5)
HCT VFR BLD CALC: 40.4 % (ref 40.5–52.5)
HEMOGLOBIN: 13.5 G/DL (ref 13.5–17.5)
HEMOGLOBIN: 7.6 G/DL (ref 13.5–17.5)
LYMPHOCYTES ABSOLUTE: 1 K/UL (ref 1–5.1)
LYMPHOCYTES RELATIVE PERCENT: 14 %
MAGNESIUM: 2 MG/DL (ref 1.8–2.4)
MCH RBC QN AUTO: 30.8 PG (ref 26–34)
MCHC RBC AUTO-ENTMCNC: 33.5 G/DL (ref 31–36)
MCV RBC AUTO: 92.1 FL (ref 80–100)
MONOCYTES ABSOLUTE: 0.5 K/UL (ref 0–1.3)
MONOCYTES RELATIVE PERCENT: 8 %
MYELOCYTE PERCENT: 2 %
NEUTROPHILS ABSOLUTE: 4.6 K/UL (ref 1.7–7.7)
NEUTROPHILS RELATIVE PERCENT: 59 %
ORGANISM: ABNORMAL
PDW BLD-RTO: 16 % (ref 12.4–15.4)
PERFORMED ON: ABNORMAL
PLATELET # BLD: 206 K/UL (ref 135–450)
PMV BLD AUTO: 7.1 FL (ref 5–10.5)
POLYCHROMASIA: ABNORMAL
POTASSIUM SERPL-SCNC: 4.4 MMOL/L (ref 3.5–5.1)
RBC # BLD: 4.38 M/UL (ref 4.2–5.9)
SODIUM BLD-SCNC: 130 MMOL/L (ref 136–145)
TOTAL PROTEIN: 5.3 G/DL (ref 6.4–8.2)
WBC # BLD: 6.5 K/UL (ref 4–11)

## 2021-10-22 PROCEDURE — 6370000000 HC RX 637 (ALT 250 FOR IP): Performed by: SURGERY

## 2021-10-22 PROCEDURE — 1200000000 HC SEMI PRIVATE

## 2021-10-22 PROCEDURE — 85025 COMPLETE CBC W/AUTO DIFF WBC: CPT

## 2021-10-22 PROCEDURE — 6360000002 HC RX W HCPCS: Performed by: INTERNAL MEDICINE

## 2021-10-22 PROCEDURE — 80053 COMPREHEN METABOLIC PANEL: CPT

## 2021-10-22 PROCEDURE — 6360000002 HC RX W HCPCS: Performed by: SURGERY

## 2021-10-22 PROCEDURE — 99024 POSTOP FOLLOW-UP VISIT: CPT | Performed by: SURGERY

## 2021-10-22 PROCEDURE — 6370000000 HC RX 637 (ALT 250 FOR IP): Performed by: INTERNAL MEDICINE

## 2021-10-22 PROCEDURE — 2580000003 HC RX 258: Performed by: SURGERY

## 2021-10-22 PROCEDURE — 2580000003 HC RX 258: Performed by: INTERNAL MEDICINE

## 2021-10-22 PROCEDURE — APPSS45 APP SPLIT SHARED TIME 31-45 MINUTES: Performed by: CLINICAL NURSE SPECIALIST

## 2021-10-22 PROCEDURE — 85018 HEMOGLOBIN: CPT

## 2021-10-22 PROCEDURE — 85014 HEMATOCRIT: CPT

## 2021-10-22 PROCEDURE — 83735 ASSAY OF MAGNESIUM: CPT

## 2021-10-22 RX ORDER — HYDROCORTISONE ACETATE 25 MG/1
25 SUPPOSITORY RECTAL 2 TIMES DAILY
DISCHARGE
Start: 2021-10-22

## 2021-10-22 RX ORDER — LORAZEPAM 0.5 MG/1
0.5 TABLET ORAL EVERY 8 HOURS PRN
Qty: 15 TABLET | Refills: 0 | DISCHARGE
Start: 2021-10-22 | End: 2021-11-21

## 2021-10-22 RX ADMIN — VANCOMYCIN HYDROCHLORIDE 750 MG: 750 INJECTION, POWDER, LYOPHILIZED, FOR SOLUTION INTRAVENOUS at 09:13

## 2021-10-22 RX ADMIN — MUPIROCIN: 20 OINTMENT TOPICAL at 09:02

## 2021-10-22 RX ADMIN — ENOXAPARIN SODIUM 40 MG: 40 INJECTION SUBCUTANEOUS at 09:02

## 2021-10-22 RX ADMIN — Medication 3 MG: at 20:55

## 2021-10-22 RX ADMIN — SODIUM CHLORIDE 25 ML: 9 INJECTION, SOLUTION INTRAVENOUS at 09:12

## 2021-10-22 RX ADMIN — INSULIN LISPRO 3 UNITS: 100 INJECTION, SOLUTION INTRAVENOUS; SUBCUTANEOUS at 12:03

## 2021-10-22 RX ADMIN — INSULIN LISPRO 3 UNITS: 100 INJECTION, SOLUTION INTRAVENOUS; SUBCUTANEOUS at 17:53

## 2021-10-22 RX ADMIN — LORAZEPAM 0.5 MG: 2 INJECTION INTRAMUSCULAR; INTRAVENOUS at 20:55

## 2021-10-22 RX ADMIN — HYDROMORPHONE HYDROCHLORIDE 0.5 MG: 1 INJECTION, SOLUTION INTRAMUSCULAR; INTRAVENOUS; SUBCUTANEOUS at 06:34

## 2021-10-22 RX ADMIN — MUPIROCIN: 20 OINTMENT TOPICAL at 20:44

## 2021-10-22 RX ADMIN — INSULIN LISPRO 1 UNITS: 100 INJECTION, SOLUTION INTRAVENOUS; SUBCUTANEOUS at 20:37

## 2021-10-22 RX ADMIN — INSULIN LISPRO 2 UNITS: 100 INJECTION, SOLUTION INTRAVENOUS; SUBCUTANEOUS at 09:03

## 2021-10-22 RX ADMIN — SODIUM CHLORIDE, PRESERVATIVE FREE 10 ML: 5 INJECTION INTRAVENOUS at 20:36

## 2021-10-22 RX ADMIN — VANCOMYCIN HYDROCHLORIDE 750 MG: 750 INJECTION, POWDER, LYOPHILIZED, FOR SOLUTION INTRAVENOUS at 20:35

## 2021-10-22 RX ADMIN — INSULIN GLARGINE 8 UNITS: 100 INJECTION, SOLUTION SUBCUTANEOUS at 20:39

## 2021-10-22 ASSESSMENT — PAIN DESCRIPTION - ONSET
ONSET: ON-GOING
ONSET: ON-GOING

## 2021-10-22 ASSESSMENT — PAIN SCALES - PAIN ASSESSMENT IN ADVANCED DEMENTIA (PAINAD)
FACIALEXPRESSION: 0
CONSOLABILITY: 0
NEGVOCALIZATION: 1
FACIALEXPRESSION: 0
TOTALSCORE: 1
BODYLANGUAGE: 0
CONSOLABILITY: 0
BREATHING: 0
BREATHING: 0
TOTALSCORE: 1
NEGVOCALIZATION: 1
BODYLANGUAGE: 0

## 2021-10-22 ASSESSMENT — PAIN DESCRIPTION - DESCRIPTORS
DESCRIPTORS: DISCOMFORT
DESCRIPTORS: DISCOMFORT

## 2021-10-22 ASSESSMENT — PAIN SCALES - GENERAL
PAINLEVEL_OUTOF10: 0
PAINLEVEL_OUTOF10: 0
PAINLEVEL_OUTOF10: 7

## 2021-10-22 ASSESSMENT — PAIN DESCRIPTION - PAIN TYPE
TYPE: SURGICAL PAIN
TYPE: SURGICAL PAIN

## 2021-10-22 ASSESSMENT — PAIN DESCRIPTION - ORIENTATION
ORIENTATION: MID
ORIENTATION: MID

## 2021-10-22 ASSESSMENT — PAIN DESCRIPTION - LOCATION
LOCATION: BUTTOCKS
LOCATION: BUTTOCKS

## 2021-10-22 ASSESSMENT — PAIN DESCRIPTION - FREQUENCY
FREQUENCY: CONTINUOUS
FREQUENCY: CONTINUOUS

## 2021-10-22 NOTE — PROGRESS NOTES
Hospitalist Progress Note    CC: Pressure injury of sacral region, unstageable [facility])    Hospital course:  79yo WM with PMHX sig for lumbar stenosis, urinary retention, osteomyelitis of lumbar spine, chronic anemia, severe prot fatna malnutritions sent from SNF for altered mental status and large sacral wound. h/o lumbar spinal stenosis.  On 9/2/21 Dr. Devin Navarro performed an L2-5 decompression and laminectomy at [address] days later on 9/5 patient was unable to wiggle his toes and had BLE weakness to the point that he required maximum assistance x 2 in order to stand.  An MRI was performed which showed severe spinal canal stenosis due to a new hematoma.  On 9/6 Dr. Tho Freeman took him back to the OR for an urgent exploration and hematoma evacuation and left drains in place.  Patient was discharged to SNF on 9/10.     Patient was then admitted to [facility] on 9/19. [facility] had a large sacral ulcer and ROXI due to urinary retention requiring placement of a jain catheter.  Another L-spine MRI was performed showing an ill-defind fluid/soft tissue causing severe spinal canal stenosis from L1-4, also moderate-severe spinal canal stenosis from L4 - S1.  He was transferred to [facility] on 9/23 for operative management  By Dr. Joselito Heck further operative intervention was necessary on patient's lumbar spine.  He did undergo operative debridement of the sacral ulcer on 9/23.  Tissue culture was positive for enterococcus and enterobacter.  Based on sensitivities he was treated with Augmentin.  He was also treated with a wound vac and discharged back to the SNF on 9/27.     s/p debridement 10/6-cultures growing MRSA, Enterococcus, corynebacterium--  10/22 with large bloody BMs, GI consulted. prognosis poor - met with hospice but declined to enrol.     Admit date: 10/15/2021  Days in hospital:  7    24 Hour Events: c/o hunger and not getting food he likes    Subjective: has chronic tic - feels weak, c/o not finding good things to eat - apparently had large bloody BM last night and two smaller ones today - hgb relatively stable over past few days    ROS:   A comprehensive review of systems was negative except for: pain is tolerable, c/o not enough meat to eat . Objective:    /66   Pulse 88   Temp 97.6 °F (36.4 °C) (Oral)   Resp 24   Ht 6' (1.829 m)   Wt 131 lb (59.4 kg)   SpO2 98%   BMI 17.77 kg/m²     Gen: chronically ill appearing, severely cachectic  HEENT: NC/AT, moist mucous membranes, no oropharyngeal erythema or exudate  Neck: supple, trachea midline, no anterior cervical or SC LAD  Heart:  Normal s1/s2, RRR, no murmurs, gallops, or rubs. no leg edema  Lungs:  diminished bilaterally, no wheeze, no rales, no rhonchi, no crackles, no use of accessory muscles  Abd: bowel sounds present, soft, nontender, nondistended, no masses  Extrem:  No clubbing, cyanosis,  no edema  Skin: no rashes or lesions  Psych: A & O x3  Neuro: grossly intact, moves all four extremities. Chronic facial tic    Assessment:    Principal Problem:    Pressure injury of sacral region, unstageable ([address]  Active Problems:    DM2 (diabetes mellitus, type 2) ([facility])    Lumbar stenosis with neurogenic claudication    Urinary retention    Severe sepsis ([facility])    Osteomyelitis ([facility])    Chronic anemia    Tic disorder  Resolved Problems:    * No resolved hospital problems. *      Plan:  1.  GI bleed - will have GI to see - hold lovenox and start SCDs -   2.  unstageable pressure injury of sacrum with osteomyelitis  3. Sepsis - POA based on severe decubitus on sacrum with osteomyelitis, lactic acidosis, leukocytosis with bandemia, UTI, tachypnea, tachycardia, fever  4. This patient meets with criteria for  severe  malnutrution based on a BMI of  Body mass index is 17.77 kg/m². .  This malnutrition is likely due to chronic infection and disease .   He is hospice appropriate as his wounds will not heal due to his malnutrition      Prognosis:  Poor    Code status:  DNR/CCA - do not intubate    DVT prophylaxis: Lovenox  GI prophylaxis: none  Antibiotic prophylaxis indicated:   no  Diet:  ADULT DIET; Regular  Adult Oral Nutrition Supplement; Diabetic Oral Supplement    Disposition:  SNF    Medications:  Scheduled Meds:   hydrocortisone  25 mg Rectal BID    metroNIDAZOLE  500 mg Oral 3 times per day    mupirocin   Nasal BID    vancomycin  750 mg IntraVENous Q12H    donepezil  10 mg Oral Nightly    atorvastatin  10 mg Oral Nightly    vitamin B complex w/C  1 tablet Oral Daily    therapeutic multivitamin-minerals  1 tablet Oral Daily    insulin glargine  0.15 Units/kg SubCUTAneous Nightly    insulin lispro  0.05 Units/kg SubCUTAneous TID WC    insulin lispro  0-6 Units SubCUTAneous TID WC    insulin lispro  0-3 Units SubCUTAneous Nightly       PRN Meds:  LORazepam, glucose, dextrose, glucagon (rDNA), dextrose, sodium chloride flush, sodium chloride, potassium chloride **OR** potassium alternative oral replacement **OR** potassium chloride, magnesium sulfate, ondansetron **OR** ondansetron, acetaminophen **OR** acetaminophen, melatonin, HYDROmorphone    IV:   dextrose      sodium chloride 25 mL (10/22/21 0912)    [Held by provider] lactated ringers 100 mL/hr at 10/20/21 2029         Intake/Output Summary (Last 24 hours) at 10/22/2021 1540  Last data filed at 10/22/2021 1441  Gross per 24 hour   Intake 1074 ml   Output 2525 ml   Net -1451 ml       Results:  CBC:   Recent Labs     10/21/21  0443 10/21/21  0443 10/21/21  2113 10/22/21  0655 10/22/21  1220   WBC 7.3  --  8.5 6.5  --    HGB 8.1*   < > 8.1* 13.5 7.6*   HCT 23.8*   < > 24.1* 40.4* 22.2*   MCV 94.5  --  92.2 92.1  --      --  313 206  --     < > = values in this interval not displayed.      BMP:   Recent Labs     10/21/21  0443 10/21/21  2113 10/22/21  0655   * 131* 130*   K 4.2 4.9 4.4    96* 95*   CO2 26 28 27   BUN 17 21* 17 CREATININE 0.7* 1.1 0.7*     Mag: No results for input(s): MAG in the last 72 hours. Phos: No results found for: PHOS  No components found for: GLU    LIVER PROFILE:   Recent Labs     10/21/21  0443 10/21/21  2113 10/22/21  0655   AST 72* 53* 44*   ALT 43* 43* 38   BILITOT 0.3 <0.2 0.3   ALKPHOS 111 133* 111     PT/INR:   Recent Labs     10/20/21  1729   PROTIME 18.6*   INR 1.61*     APTT: No results for input(s): APTT in the last 72 hours. UA:No results for input(s): NITRITE, COLORU, PHUR, LABCAST, WBCUA, RBCUA, MUCUS, TRICHOMONAS, YEAST, BACTERIA, CLARITYU, SPECGRAV, LEUKOCYTESUR, UROBILINOGEN, BILIRUBINUR, BLOODU, GLUCOSEU, AMORPHOUS in the last 72 hours.     Invalid input(s): Anibal Germain input(s): ABG  Lab Results   Component Value Date    CALCIUM 7.8 (L) 10/22/2021               Electronically signed by Scooby Armenta MD on 10/22/2021 at 3:40 PM

## 2021-10-22 NOTE — PROGRESS NOTES
Spoke with Kayode oSrto NP regarding pt status. NP aware of elevated RR, 35 and HR . Respirations irregular and crackles noted in upper  BL lungs. NP also aware of very bloody bowel movement and MEWS of a 4. Pt asymptomatic, except slightly anxious and states \"not going to make it. \" NP stopped IVF, ordered hydrocortisone suppository, and labs. PRN ativan given for anxiety.

## 2021-10-22 NOTE — PROGRESS NOTES
Hospitalist Progress Note      PCP: Ileana Zendejas MD    Date of Admission: 10/15/2021    Chief Complaint:       Liu Caruso Altered Mental Status    Shortness of Breath    Wound Check       Hospital Course: \" Radha Dotson is a 80 y.o. male. He was sent from his SNF for altered mental status and a large sacral wound. He was agitated and screaming in the ER. He received IV morphine. He is now very somnolent and difficult to arouse. He stirs and opens his eyes briefly but does not answer questions or follow commands. History was obtained via chart review.     Patient has a h/o lumbar spinal stenosis. On 9/2/21 Dr. Sunita Quiroz performed an L2-5 decompression and laminectomy at [facility].  Three days later on 9/5 patient was unable to wiggle his toes and had BLE weakness to the point that he required maximum assistance x 2 in order to stand.  An MRI was performed which showed severe spinal canal stenosis due to a new hematoma. On 9/6 Dr. Alex Joe took him back to the OR for an urgent exploration and hematoma evacuation and left drains in place. Patient was discharged to SNF on 9/10.     Patient was then admitted to [facility] on 9/19. He had a large sacral ulcer and ROXI due to urinary retention requiring placement of a jain catheter. Another L-spine MRI was performed showing an ill-defind fluid/soft tissue causing severe spinal canal stenosis from L1-4, also moderate-severe spinal canal stenosis from L4 - S1. He was transferred to [facility] on 9/23 for operative management  By Dr. Alex Joe. No further operative intervention was necessary on patient's lumbar spine. He did undergo operative debridement of the sacral ulcer on 9/23. Tissue culture was positive for enterococcus and enterobacter. Based on sensitivities he was treated with Augmentin.   He was also treated with a wound vac and discharged back to the SNF on 9/27.     s/p debridement 10/6-cultures growing MRSA, Enterococcus, corynebacterium--    Subjective: No acute events overnight. Wife at bedside. No new complaints. We discussed at length quality of life, exspectations and realistic goals        Medications:  Reviewed    Infusion Medications    dextrose      sodium chloride      [Held by provider] lactated ringers 100 mL/hr at 10/20/21 2029     Scheduled Medications    hydrocortisone  25 mg Rectal BID    metroNIDAZOLE  500 mg Oral 3 times per day    mupirocin   Nasal BID    vancomycin  750 mg IntraVENous Q12H    donepezil  10 mg Oral Nightly    atorvastatin  10 mg Oral Nightly    vitamin B complex w/C  1 tablet Oral Daily    therapeutic multivitamin-minerals  1 tablet Oral Daily    enoxaparin  40 mg SubCUTAneous Daily    insulin glargine  0.15 Units/kg SubCUTAneous Nightly    insulin lispro  0.05 Units/kg SubCUTAneous TID WC    insulin lispro  0-6 Units SubCUTAneous TID WC    insulin lispro  0-3 Units SubCUTAneous Nightly     PRN Meds: LORazepam, glucose, dextrose, glucagon (rDNA), dextrose, sodium chloride flush, sodium chloride, potassium chloride **OR** potassium alternative oral replacement **OR** potassium chloride, magnesium sulfate, ondansetron **OR** ondansetron, acetaminophen **OR** acetaminophen, melatonin, HYDROmorphone      Intake/Output Summary (Last 24 hours) at 10/21/2021 2031  Last data filed at 10/21/2021 1940  Gross per 24 hour   Intake 230 ml   Output 2100 ml   Net -1870 ml       Physical Exam Performed:    BP (!) 158/85   Pulse 102   Temp 98.2 °F (36.8 °C) (Oral)   Resp (!) 35   Ht 6' (1.829 m)   Wt 131 lb 9.8 oz (59.7 kg)   SpO2 97%   BMI 17.85 kg/m²     General appearance: No apparent distress, appears stated age and cooperative. HEENT: Pupils equal, round, and reactive to light. Conjunctivae/corneas clear. Neck: Supple, with full range of motion. No jugular venous distention. Trachea midline. Respiratory:  Normal respiratory effort.  Clear to auscultation, bilaterally without Rales/Wheezes/Rhonchi. Cardiovascular: Regular rate and rhythm with normal S1/S2 without murmurs, rubs or gallops. Abdomen: Soft, non-tender, non-distended with normal bowel sounds. Musculoskeletal: No clubbing, cyanosis or edema bilaterally. Full range of motion without deformity. Skin: seepictures,   Neurologic:  Neurovascularly intact without any focal sensory/motor deficits. Cranial nerves: II-XII intact, grossly non-focal.  Psychiatric: Alert and oriented, ? [employer]   Capillary Refill: Brisk,3 seconds, normal   Peripheral Pulses: +2 palpable, equal bilaterally       Labs:   Recent Labs     10/19/21  0631 10/20/21  0454 10/21/21  0443   WBC 7.0 6.9 7.3   HGB 7.6* 7.7* 8.1*   HCT 22.8* 22.7* 23.8*    282 279     Recent Labs     10/19/21  0631 10/20/21  0454 10/21/21  0443   * 134* 130*   K 4.1 4.5 4.2   CL 98* 101 101   CO2 25 27 26   BUN 17 16 17   CREATININE 0.8 0.8 0.7*   CALCIUM 7.4* 7.6* 7.8*     Recent Labs     10/19/21  0631 10/20/21  0454 10/21/21  0443   AST 30 40* 72*   ALT 22 25 43*   BILITOT 0.3 <0.2 0.3   ALKPHOS 98 98 111     Recent Labs     10/20/21  1729   INR 1.61*     No results for input(s): Estevan Pears in the last 72 hours. Urinalysis:      Lab Results   Component Value Date    NITRU Negative 10/15/2021    WBCUA 10-20 10/15/2021    BACTERIA 4+ 10/15/2021    RBCUA 5-10 10/15/2021    BLOODU SMALL 10/15/2021    SPECGRAV 1.015 10/15/2021    GLUCOSEU 250 10/15/2021       Radiology:  XR CHEST PORTABLE   Final Result   Status post PICC line placement on the right in good position. Resolving central pulmonary congestion. Mild chronic elevation of the left hemidiaphragm which is unchanged with   slowly resolving bibasilar atelectasis. CT HEAD WO CONTRAST   Final Result      Stable study. No evidence for acute intracranial hemorrhage, territorial   infarction or intracranial mass lesion. Mild chronic microangiopathic ischemic disease.       Mild generalized volume loss. CT PELVIS WO CONTRAST Additional Contrast? None   Final Result   1. Large deep sacral decubitus ulcer with mild adjacent cellulitis. No   abscess or soft tissue gas. 2. Osseous uncovering of the posterior aspect of the lower sacrum and coccyx   without convincing evidence of osteomyelitis. If clinically indicated   further evaluation could be obtained with MRI. XR CHEST PORTABLE   Final Result   Chronic elevation of the left hemidiaphragm which is unchanged with   increasing subsegmental atelectasis or early infiltrate along the left lung   base. 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 Nasrin Sauer .    - s/p debridement 9/23 at [facility], cult + Enterococcus faecalis, Enterobacter cloacae, 3 anaerobesDischarged on 9/27 on po augmentin    -s/p debridement 10/6-cultures growing MRSA, Enterococcus, corynebacterium--follow-up on sensitivities   -Continue care per wound team general surgery    -Antibiotics--- on IV cefepime, vancomycin and Flagyl--management per ID        -Recent spine surgery, with concern for cauda equina syndrome due to postop hematoma     \"on 9/2 Dr. Dk Ac performed an L2-5 decompression and laminectomy at [place] [id number], then postoperatively on 9/5 [facility] noted that the patient was unable to wiggle his toes and had BLE weakness to the point that he required maximum assistance x2 in order to stand.  An MRI was performed which showed severe spinal canal stenosis due to hematoma.  on 9/6 Dr. Neelam Gomez took him back to the OR for an urgent exploration and hematoma evacuation, left drains in place. eventually the patient was discharged to SNF on 9/10. readmitted 9/19-9/22 urinary retention, constipation, and ongoing leg weakness, we obtained a repeat [facility] showed an ill-defind fluid/soft tissue causing severe spinal canal stenosis from L1-4, also moderate-severe spinal canal stenosis from L4 - S1, transferred to [facility] for neurosurgery eval-did not require spinal surgery but had debirdement of decub ulcer  -Acute metabolic encephalopathy. . 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 Supportive care for     -Dementiaon Ariceptcontinue supportive care    -Chronic indwelling Jain-Indwelling jain since 9/19.  Jain replaced this admission 10/15    -DM type II--- continue insulin therapy    -Essential hypertension-stablelisinopril held on addition continue to monitor off meds        DVT Prophylaxis: lovenox   Diet: ADULT DIET;  Regular  Adult Oral Nutrition Supplement; Diabetic Oral Supplement  Code Status: DNR-CCA    PT/OT Eval Status: ongoing     Dispo - uncertain     TERA Zamudio - CNP

## 2021-10-22 NOTE — CONSULTS
Consult [employer]/called to [address] [place] [address] on 10/22/21 at 11:35 AM Marlene García

## 2021-10-22 NOTE — PLAN OF CARE
Problem: Nutrition  Goal: Optimal nutrition therapy  Outcome: Ongoing  Note: Nutrition Problem #1: Increased nutrient needs  Intervention: Food and/or Nutrient Delivery: Continue Current Diet, Continue Oral Nutrition Supplement  Nutritional Goals: Consume 50% or greater of 3 meals per day and ONS during this admission.

## 2021-10-22 NOTE — PLAN OF CARE
Problem: Pain:  Goal: Pain level will decrease  Description: Pain level will decrease  Outcome: Ongoing  Note: Pt will be satisfied with pain control. Pt uses numeric pain rating scale with reassessments after pain med administration. Will continue to monitor progression throughout shift. Problem: Skin Integrity:  Goal: Will show no infection signs and symptoms  Description: Will show no infection signs and symptoms  Outcome: Ongoing  Note: Pt has skin breakdown. Pt will have skin assessments every shift, Q2 turns, heels elevated off of the bed, and friction and shear prevented when possible. Will continue to monitor for signs of skin breakdown and enforce prevention measures.

## 2021-10-22 NOTE — PROGRESS NOTES
[facility] GENERAL SURGERY    Surgery Progress Note           POD # 6    PATIENT NAME: Bear Liz     TODAY'S DATE: 10/22/2021    INTERVAL HISTORY:    Pt says he is cold. OBJECTIVE:   VITALS:  BP (!) 165/71   Pulse 86   Temp 98.3 °F (36.8 °C) (Oral)   Resp 22   Ht 6' (1.829 m)   Wt 131 lb (59.4 kg)   SpO2 97%   BMI 17.77 kg/m²     INTAKE/OUTPUT:    I/O last 3 completed shifts: In: 1 [P.O.:830]  Out: 1725 [[id number]:7032]  I/O this shift:  In: 26 [P.O.:237]  Out: -               CONSTITUTIONAL:  awake and alert  INCISION: wound without necrotic tissue, mild fibrinous tissue with healthy granulation tissue most of the wound, bone exposed, no purulence  With stool during dressing change - this did not have any blood. Data:  CBC:   Recent Labs     10/21/21  0443 10/21/21  2113 10/22/21  0655   WBC 7.3 8.5 6.5   HGB 8.1* 8.1* 13.5   HCT 23.8* 24.1* 40.4*    313 206     BMP:    Recent Labs     10/21/21  0443 10/21/21  2113 10/22/21  0655   * 131* 130*   K 4.2 4.9 4.4    96* 95*   CO2 26 28 27   BUN 17 21* 17   CREATININE 0.7* 1.1 0.7*   GLUCOSE 189* 258* 235*     Hepatic:   Recent Labs     10/21/21  0443 10/21/21  2113 10/22/21  0655   AST 72* 53* 44*   ALT 43* 43* 38   BILITOT 0.3 <0.2 0.3   ALKPHOS 111 133* 111     Mag:      Recent Labs     10/20/21  0454 10/21/21  0443 10/22/21  0655   MG 1.90 2.00 2.00      Phos:   No results for input(s): PHOS in the last 72 hours. INR:   Recent Labs     10/20/21  1729   INR 1.61*         Radiology Review:  NA    ASSESSMENT AND PLAN:  80 y.o. male status post sacral wound debridement    No further debridement required. Continue with local wound care. low likelihood of healing completely and challenges for healing including malnutrition, osteomyelitis, lack of movement and DM.       Electronically signed by TERA Sharma CNP     Surgery Staff    I have examined this patient and read and agree with the note by Karely Hardin, CNP from today. Will follow intermittently. As before, while the wound appears healthy with good granulation, it is still unlikely to fully heal due to large size, location near anus, generalized malnutrition/inability to swallow sufficient calories. Wound would certainly need prolonged Vac management (which would likely only be successful if patient first had a diverting colostomy) and then eventually a skin/muscle flap surgery to cover the defect. Seems unlikely that patient can/should/will be able to get through all those steps required to heal this wound.      Sarahi Abarca MD

## 2021-10-22 NOTE — PROGRESS NOTES
Pt has had two episodes of bloody stool this morning. Will draw another H&H to monitor bleeding, per Dr. Regis Jesus.  GI to see

## 2021-10-22 NOTE — PROGRESS NOTES
Comprehensive Nutrition Assessment    Type and Reason for Visit:  Reassess    Nutrition Recommendations/Plan:   1. Continue full liquid diet per GI - Advance to regular when possible  2. Continue ONS TID  3. Encourage po intakes  4. Monitor po intakes, nutrition adequacy, weights, pertinent labs, BMs    Nutrition Assessment:  Follow up: Pt remains nutritionally compromised d/t increased need from wounds. Pt currently on a regular diet with Glucerna ONS, being changed to full liquids per GI for possible colonoscopy. Po intakes % per EMR. Pt c/o \"nothing good to eat\" per chart. Will continue ONS to help pt meet protein needs. Noted Hospice following, no plans for Hospice yet. Will follow. Malnutrition Assessment:  Malnutrition Status: At risk for malnutrition (Comment)      Estimated Daily Nutrient Needs:  Energy (kcal):  2860-9820 kcals/day; Weight Used for Energy Requirements:  Ideal (81 kg)     Protein (g):   g/day; Weight Used for Protein Requirements:  Ideal (1.0-1.6 g/kg)        Fluid (ml/day):1 ml/kcal      Nutrition Related Findings:  Diarrhea continues, plan for KUB and possible colonoscopy per GI      Wounds:  Pressure Injury, Multiple, Wound Vac (Pressure injury on sacrum and left and right heels.)       Current Nutrition Therapies:    Adult Oral Nutrition Supplement; Diabetic Oral Supplement  ADULT DIET;  Full Liquid    Anthropometric Measures:  · Height: 6' (182.9 cm)  · Current Body Weight: 131 lb (59.4 kg)   · Ideal Body Weight: 178 lbs; % Ideal Body Weight 68 %   · BMI: 17.8  · BMI Categories: Underweight (BMI less than 22) age over 72       Nutrition Diagnosis:   · Increased nutrient needs related to increase demand for energy/nutrients as evidenced by wounds, BMI, weight loss      Nutrition Interventions:   Food and/or Nutrient Delivery:  Continue Current Diet, Continue Oral Nutrition Supplement  Nutrition Education/Counseling:  Education not indicated   Coordination of Nutrition

## 2021-10-22 NOTE — CARE COORDINATION
Chart review- Plan for dc to [facility]. Pt having bloody stool. Consult to GI. Possible dc over weekend. 2:26 PM  CM spoke with [employer] [facility] at [facility]. They are continuing to follow. They will not be able to admit pt over the weekend. She will review again on Monday.

## 2021-10-22 NOTE — CONSULTS
Gastroenterology Consult Note    Patient:   Josy Sanders   YOB: 1934   Facility:   [facility]   Referring/PCP: Betty Holland MD  Date:     10/22/2021  Consultant:   Hannah Peters MD, MD    Subjective: This 80 y.o. male was admitted 10/15/2021 with a diagnosis of \"Decubitus ulcer of sacral region, unstageable ([address] [L89.150]  Severe sepsis ([address] [A41.9, R65.20]  Pressure injury of sacral region, unstageable ([address] [L89.150]\" and is seen in consultation regarding \"diarrhea\". Information was obtained from interview of  the patient, examination of the patient, and review of records. I did  update the past medical, surgical, social and / or family history. Diarrhea moderate for days in colon associated w constipation and hematochezia    Current status  Present  Diet Order: ADULT DIET; Regular  Adult Oral Nutrition Supplement; Diabetic Oral Supplement and he is tolerating diet. Recently, he has experienced no abdominal  Pain and he has not required Intravenous narcotic analgesics. The patient has also experienced no constipation, diarrhea, hematochezia, melena, nausea and vomiting      Prior to Admission medications    Medication Sig Start Date End Date Taking? Authorizing Provider   insulin glargine (LANTUS) 100 UNIT/ML injection vial Inject 10 Units into the skin nightly   Yes Historical Provider, MD   melatonin 3 MG TABS tablet Take 3 mg by mouth nightly as needed   Yes Historical Provider, MD   oxybutynin (DITROPAN-XL) 10 MG extended release tablet Take 10 mg by mouth daily   Yes Historical Provider, MD   oxyCODONE 5 MG capsule Take 5 mg by mouth every 3 hours as needed for Pain.    Yes Historical Provider, MD   polyethylene glycol (GLYCOLAX) 17 g packet Take 17 g by mouth daily as needed for Constipation   Yes Historical Provider, MD   Chromium Picolinate 1000 MCG TABS Take 1,000 mcg by mouth daily   Yes Historical Provider, MD   cyanocobalamin 1000 MCG tablet Take 1,000 mcg by mouth daily   Yes Historical Provider, MD   amLODIPine (NORVASC) 10 MG tablet Take 10 mg by mouth daily   Yes Historical Provider, MD   Multiple Vitamins-Minerals (THERAPEUTIC MULTIVITAMIN-MINERALS) tablet Take 1 tablet by mouth daily   Yes Historical Provider, MD   donepezil (ARICEPT) 10 MG tablet Take 10 mg by mouth nightly   Yes Historical Provider, MD   senna-docusate ([place]) 8.6-50 MG per tablet Take 1 tablet by mouth 2 times daily   Yes Historical Provider, MD   b complex vitamins capsule Take 1 capsule by mouth daily   Yes Historical Provider, MD   methocarbamol (ROBAXIN) 500 MG tablet Take 500 mg by mouth every 6 hours as needed   Yes Historical Provider, MD   lisinopril (PRINIVIL;ZESTRIL) 20 MG tablet Take 20 mg by mouth nightly    Yes Historical Provider, MD   simvastatin (ZOCOR) 10 MG tablet Take 10 mg by mouth nightly. Yes Historical Provider, MD   hydrALAZINE (APRESOLINE) 20 MG/ML injection Infuse 25 mg intravenously every 8 hours as needed (for hypertension SBP >160)    Historical Provider, MD   docusate sodium (COLACE) 100 MG capsule Take 100 mg by mouth 2 times daily    Historical Provider, MD   tamsulosin (FLOMAX) 0.4 MG capsule Take 1 capsule by mouth daily 9/22/21   Misti Moss MD   zinc sulfate (ZINCATE) 220 (50 Zn) MG capsule Take 220 mg by mouth daily    Historical Provider, MD   acetaminophen (TYLENOL) 500 MG tablet Take 500 mg by mouth every 4 hours as needed     Historical Provider, MD   sitagliptan (JANUVIA) 100 MG tablet Take 100 mg by mouth daily. Historical Provider, MD   glipiZIDE (GLUCOTROL XL) 10 MG CR tablet Take 10 mg by mouth daily.     Historical Provider, MD      Scheduled Medications:    hydrocortisone  25 mg Rectal BID    metroNIDAZOLE  500 mg Oral 3 times per day    mupirocin   Nasal BID    vancomycin  750 mg IntraVENous Q12H    donepezil  10 mg Oral Nightly    atorvastatin  10 mg Oral Nightly    vitamin B complex w/C  1 tablet Oral Daily    therapeutic multivitamin-minerals  1 tablet Oral Daily    enoxaparin  40 mg SubCUTAneous Daily    insulin glargine  0.15 Units/kg SubCUTAneous Nightly    insulin lispro  0.05 Units/kg SubCUTAneous TID     insulin lispro  0-6 Units SubCUTAneous TID     insulin lispro  0-3 Units SubCUTAneous Nightly     Infusions:    dextrose      sodium chloride 25 mL (10/22/21 0912)    [Held by provider] lactated ringers 100 mL/hr at 10/20/21 2029     PRN Medications: LORazepam, glucose, dextrose, glucagon (rDNA), dextrose, sodium chloride flush, sodium chloride, potassium chloride **OR** potassium alternative oral replacement **OR** potassium chloride, magnesium sulfate, ondansetron **OR** ondansetron, acetaminophen **OR** acetaminophen, melatonin, HYDROmorphone  Allergies:    Allergies   Allergen Reactions    Iodine Itching and Swelling       Past Medical History:   Diagnosis Date    Arthritis     Cancer [facility]     skin    Dry eyes, bilateral     Hyperlipidemia     Hypertension     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    MRSA (methicillin resistant staph aureus) culture positive 10/15/2021    urine    MRSA (methicillin resistant staph aureus) culture positive 10/15/2021    anus    Osteoporosis 01/01/2009    Type II or unspecified type diabetes mellitus without mention of complication, not stated as uncontrolled     Urinary incontinence      Past Surgical History:   Procedure Laterality Date    COLONOSCOPY  2002    diverticulosis    COLONOSCOPY  11/19/2012    diverticulosis    EYE SURGERY      micheal cataract    RECTAL SURGERY N/A 10/16/2021    SACRAL WOUND DEBRIDMENT performed by Suzon Denver, MD at [place] [address].         Social:   Social History     Tobacco Use    Smoking status: Never Smoker    Smokeless tobacco: Never Used   Substance Use Topics    Alcohol use: No     Family:   Family History   Problem Relation Age of Onset    Heart Disease Mother     Cancer Sister 39        breast       ROS: Pertinent items are noted in HPI.     Objective:   Vital Signs:  Temp (24hrs), Av.4 °F (36.9 °C), Min:97.8 °F (36.6 °C), Max:99 °F (90.0 °C)     Systolic (86NCZ), FKI:468 , Min:129 , WSW:018      Diastolic (18QER), CZQ:98, Min:54, Max:85     Pulse  Av  Min: 83  Max: 102  BP (!) 165/71   Pulse 86   Temp 98.3 °F (36.8 °C) (Oral)   Resp 22   Ht 6' (1.829 m)   Wt 131 lb (59.4 kg)   SpO2 97%   BMI 17.77 kg/m²      Physical Exam:   BP (!) 165/71   Pulse 86   Temp 98.3 °F (36.8 °C) (Oral)   Resp 22   Ht 6' (1.829 m)   Wt 131 lb (59.4 kg)   SpO2 97%   BMI 17.77 kg/m²   General appearance: appears stated age  Lungs: clear to auscultation bilaterally  Chest wall: no tenderness  Heart: regular rate and rhythm, S1, S2 normal, no murmur, click, rub or gallop  Abdomen: soft, non-tender; bowel sounds normal; no masses,  no organomegaly  Extremities: extremities normal, atraumatic, no cyanosis or edema  Skin: Skin color, texture, turgor normal. No rashes or lesions  Neurologic: Grossly normal    Lab and Imaging Review   Recent Labs     10/20/21  0454 10/20/21  0454 10/20/21  1729 10/21/21  0443 10/21/21  2113 10/22/21  0655 10/22/21  1220   WBC 6.9   < >  --  7.3 8.5 6.5  --    HGB 7.7*  --   --  8.1* 8.1* 13.5 7.6*   MCV 92.9   < >  --  94.5 92.2 92.1  --       < >  --  279 313 206  --    INR  --   --  1.61*  --   --   --   --    *   < >  --  130* 131* 130*  --    K 4.5   < >  --  4.2 4.9 4.4  --       < >  --  101 96* 95*  --    CO2 27   < >  --  26 28 27  --    BUN 16   < >  --  17 21* 17  --    CREATININE 0.8   < >  --  0.7* 1.1 0.7*  --    GLUCOSE 228*   < >  --  189* 258* 235*  --    CALCIUM 7.6*   < >  --  7.8* 7.7* 7.8*  --    PROT 4.8*   < >  --  5.3* 5.3* 5.3*  --    LABALBU 1.9*   < >  --  2.1* 2.1* 1.9*  --    AST 40*   < >  --  72* 53* 44*  --    ALT 25   < >  --  43* 43* 38  --    ALKPHOS 98   < > --  111 133* 111  --    BILITOT <0.2   < >  --  0.3 <0.2 0.3  --    MG 1.90  --   --  2.00  --  2.00  --     < > = values in this interval not displayed. Assessment:     Patient Active Problem List    Diagnosis Date Noted    Tic disorder     Osteomyelitis ([facility] 10/16/2021    Chronic anemia 10/16/2021    Severe sepsis (HCC)     Pressure injury of sacral region, unstageable ([facility] 10/15/2021    HLD (hyperlipidemia) 09/19/2021    Lumbar stenosis with neurogenic claudication 09/19/2021    BPH (benign prostatic hyperplasia) 09/19/2021    ROXI (acute kidney injury) ([facility] 09/19/2021    Urinary retention 09/19/2021    Localized osteoarthrosis, lower leg 09/18/2014    DM2 (diabetes mellitus, type 2) ([facility]     Hypertension      79 yo w HTN, DM, spinal stenosis and a large sacral ulcer, debrided, who has been having diarrhea that could be overflow given the h/o constipation, associated w hematochezia. H/H 8/22. Plan:   1. Will order a KUB to assess stool burden  2. If evidence of constipation, will need a laxative regimen before the diarrhea would resolve  3. Will also need a colonoscopy on Monday if diarrhea or hematochezia do not resolve  4.  Will change diet from regular to full liquid for now    Dominique Mayes MD       [phone]) [phone]

## 2021-10-23 ENCOUNTER — APPOINTMENT (OUTPATIENT)
Dept: GENERAL RADIOLOGY | Age: 86
DRG: 853 | End: 2021-10-23
Payer: MEDICARE

## 2021-10-23 LAB
A/G RATIO: 0.6 (ref 1.1–2.2)
ABO/RH: NORMAL
ALBUMIN SERPL-MCNC: 2 G/DL (ref 3.4–5)
ALP BLD-CCNC: 99 U/L (ref 40–129)
ALT SERPL-CCNC: 33 U/L (ref 10–40)
ANION GAP SERPL CALCULATED.3IONS-SCNC: 5 MMOL/L (ref 3–16)
ANISOCYTOSIS: ABNORMAL
ANTIBODY SCREEN: NORMAL
AST SERPL-CCNC: 30 U/L (ref 15–37)
ATYPICAL LYMPHOCYTE RELATIVE PERCENT: 1 % (ref 0–6)
BANDED NEUTROPHILS RELATIVE PERCENT: 6 % (ref 0–7)
BASOPHILIC STIPPLING: ABNORMAL
BASOPHILS ABSOLUTE: 0 K/UL (ref 0–0.2)
BASOPHILS RELATIVE PERCENT: 0 %
BILIRUB SERPL-MCNC: 0.3 MG/DL (ref 0–1)
BUN BLDV-MCNC: 16 MG/DL (ref 7–20)
CALCIUM SERPL-MCNC: 7.9 MG/DL (ref 8.3–10.6)
CHLORIDE BLD-SCNC: 97 MMOL/L (ref 99–110)
CO2: 29 MMOL/L (ref 21–32)
CREAT SERPL-MCNC: <0.5 MG/DL (ref 0.8–1.3)
EOSINOPHILS ABSOLUTE: 0.4 K/UL (ref 0–0.6)
EOSINOPHILS RELATIVE PERCENT: 5 %
GFR AFRICAN AMERICAN: >60
GFR NON-AFRICAN AMERICAN: >60
GLOBULIN: 3.1 G/DL
GLUCOSE BLD-MCNC: 185 MG/DL (ref 70–99)
GLUCOSE BLD-MCNC: 220 MG/DL (ref 70–99)
GLUCOSE BLD-MCNC: 242 MG/DL (ref 70–99)
GLUCOSE BLD-MCNC: 309 MG/DL (ref 70–99)
GLUCOSE BLD-MCNC: 328 MG/DL (ref 70–99)
HCT VFR BLD CALC: 21.1 % (ref 40.5–52.5)
HCT VFR BLD CALC: 23.6 % (ref 40.5–52.5)
HEMOGLOBIN: 7.4 G/DL (ref 13.5–17.5)
HEMOGLOBIN: 7.9 G/DL (ref 13.5–17.5)
HYPOCHROMIA: ABNORMAL
LYMPHOCYTES ABSOLUTE: 1.5 K/UL (ref 1–5.1)
LYMPHOCYTES RELATIVE PERCENT: 18 %
MACROCYTES: ABNORMAL
MAGNESIUM: 2.1 MG/DL (ref 1.8–2.4)
MCH RBC QN AUTO: 31.7 PG (ref 26–34)
MCHC RBC AUTO-ENTMCNC: 34.9 G/DL (ref 31–36)
MCV RBC AUTO: 90.9 FL (ref 80–100)
METAMYELOCYTES RELATIVE PERCENT: 1 %
MICROCYTES: ABNORMAL
MONOCYTES ABSOLUTE: 0.2 K/UL (ref 0–1.3)
MONOCYTES RELATIVE PERCENT: 3 %
NEUTROPHILS ABSOLUTE: 5.8 K/UL (ref 1.7–7.7)
NEUTROPHILS RELATIVE PERCENT: 66 %
PDW BLD-RTO: 15.8 % (ref 12.4–15.4)
PERFORMED ON: ABNORMAL
PLATELET # BLD: 298 K/UL (ref 135–450)
PLATELET SLIDE REVIEW: ADEQUATE
PMV BLD AUTO: 6.8 FL (ref 5–10.5)
POIKILOCYTES: ABNORMAL
POLYCHROMASIA: ABNORMAL
POTASSIUM SERPL-SCNC: 4.4 MMOL/L (ref 3.5–5.1)
RBC # BLD: 2.32 M/UL (ref 4.2–5.9)
ROULEAUX: ABNORMAL
SLIDE REVIEW: ABNORMAL
SODIUM BLD-SCNC: 131 MMOL/L (ref 136–145)
TOTAL PROTEIN: 5.1 G/DL (ref 6.4–8.2)
WBC # BLD: 8 K/UL (ref 4–11)

## 2021-10-23 PROCEDURE — 6370000000 HC RX 637 (ALT 250 FOR IP): Performed by: INTERNAL MEDICINE

## 2021-10-23 PROCEDURE — 83735 ASSAY OF MAGNESIUM: CPT

## 2021-10-23 PROCEDURE — 1200000000 HC SEMI PRIVATE

## 2021-10-23 PROCEDURE — 2580000003 HC RX 258: Performed by: SURGERY

## 2021-10-23 PROCEDURE — 99232 SBSQ HOSP IP/OBS MODERATE 35: CPT | Performed by: INTERNAL MEDICINE

## 2021-10-23 PROCEDURE — 6360000002 HC RX W HCPCS: Performed by: INTERNAL MEDICINE

## 2021-10-23 PROCEDURE — 74018 RADEX ABDOMEN 1 VIEW: CPT

## 2021-10-23 PROCEDURE — 85025 COMPLETE CBC W/AUTO DIFF WBC: CPT

## 2021-10-23 PROCEDURE — 86850 RBC ANTIBODY SCREEN: CPT

## 2021-10-23 PROCEDURE — 85014 HEMATOCRIT: CPT

## 2021-10-23 PROCEDURE — 86900 BLOOD TYPING SEROLOGIC ABO: CPT

## 2021-10-23 PROCEDURE — 85018 HEMOGLOBIN: CPT

## 2021-10-23 PROCEDURE — 6370000000 HC RX 637 (ALT 250 FOR IP): Performed by: SURGERY

## 2021-10-23 PROCEDURE — 80053 COMPREHEN METABOLIC PANEL: CPT

## 2021-10-23 PROCEDURE — 86901 BLOOD TYPING SEROLOGIC RH(D): CPT

## 2021-10-23 PROCEDURE — 2580000003 HC RX 258: Performed by: INTERNAL MEDICINE

## 2021-10-23 RX ORDER — NICOTINE POLACRILEX 4 MG
15 LOZENGE BUCCAL PRN
Status: DISCONTINUED | OUTPATIENT
Start: 2021-10-23 | End: 2021-10-23 | Stop reason: SDUPTHER

## 2021-10-23 RX ORDER — DEXTROSE MONOHYDRATE 25 G/50ML
12.5 INJECTION, SOLUTION INTRAVENOUS PRN
Status: DISCONTINUED | OUTPATIENT
Start: 2021-10-23 | End: 2021-10-23 | Stop reason: SDUPTHER

## 2021-10-23 RX ORDER — DEXTROSE MONOHYDRATE 50 MG/ML
100 INJECTION, SOLUTION INTRAVENOUS PRN
Status: DISCONTINUED | OUTPATIENT
Start: 2021-10-23 | End: 2021-10-23 | Stop reason: SDUPTHER

## 2021-10-23 RX ADMIN — SODIUM CHLORIDE 25 ML: 9 INJECTION, SOLUTION INTRAVENOUS at 20:18

## 2021-10-23 RX ADMIN — INSULIN LISPRO 4 UNITS: 100 INJECTION, SOLUTION INTRAVENOUS; SUBCUTANEOUS at 12:01

## 2021-10-23 RX ADMIN — VANCOMYCIN HYDROCHLORIDE 750 MG: 750 INJECTION, POWDER, LYOPHILIZED, FOR SOLUTION INTRAVENOUS at 11:40

## 2021-10-23 RX ADMIN — VANCOMYCIN HYDROCHLORIDE 750 MG: 750 INJECTION, POWDER, LYOPHILIZED, FOR SOLUTION INTRAVENOUS at 20:19

## 2021-10-23 RX ADMIN — MUPIROCIN: 20 OINTMENT TOPICAL at 21:16

## 2021-10-23 RX ADMIN — SODIUM CHLORIDE, PRESERVATIVE FREE 10 ML: 5 INJECTION INTRAVENOUS at 20:16

## 2021-10-23 RX ADMIN — MUPIROCIN: 20 OINTMENT TOPICAL at 14:09

## 2021-10-23 RX ADMIN — INSULIN GLARGINE 8 UNITS: 100 INJECTION, SOLUTION SUBCUTANEOUS at 21:17

## 2021-10-23 ASSESSMENT — PAIN SCALES - PAIN ASSESSMENT IN ADVANCED DEMENTIA (PAINAD)
TOTALSCORE: 1
NEGVOCALIZATION: 1
FACIALEXPRESSION: 0
BREATHING: 0
FACIALEXPRESSION: 0
TOTALSCORE: 1
BREATHING: 0
TOTALSCORE: 1
BODYLANGUAGE: 0
BODYLANGUAGE: 0
BREATHING: 0
BREATHING: 0
TOTALSCORE: 1
BREATHING: 0
NEGVOCALIZATION: 1
FACIALEXPRESSION: 0
BREATHING: 0
FACIALEXPRESSION: 0
TOTALSCORE: 1
FACIALEXPRESSION: 0
BODYLANGUAGE: 0
CONSOLABILITY: 0
CONSOLABILITY: 0
BODYLANGUAGE: 0
CONSOLABILITY: 0
BREATHING: 0
FACIALEXPRESSION: 0
CONSOLABILITY: 0
BODYLANGUAGE: 0
BREATHING: 0
FACIALEXPRESSION: 0
BODYLANGUAGE: 0
NEGVOCALIZATION: 1
CONSOLABILITY: 0
TOTALSCORE: 1
CONSOLABILITY: 0
FACIALEXPRESSION: 0
NEGVOCALIZATION: 1
BODYLANGUAGE: 0
TOTALSCORE: 1
NEGVOCALIZATION: 1
BODYLANGUAGE: 0
BREATHING: 0
NEGVOCALIZATION: 1
TOTALSCORE: 1
NEGVOCALIZATION: 1
NEGVOCALIZATION: 1
CONSOLABILITY: 0
FACIALEXPRESSION: 0
BODYLANGUAGE: 0
CONSOLABILITY: 0
TOTALSCORE: 1
NEGVOCALIZATION: 1
BODYLANGUAGE: 0
FACIALEXPRESSION: 0
NEGVOCALIZATION: 1
NEGVOCALIZATION: 1
CONSOLABILITY: 0
BODYLANGUAGE: 0
CONSOLABILITY: 0
TOTALSCORE: 1
NEGVOCALIZATION: 1
TOTALSCORE: 1
BODYLANGUAGE: 0
FACIALEXPRESSION: 0
BREATHING: 0
FACIALEXPRESSION: 0
TOTALSCORE: 1

## 2021-10-23 ASSESSMENT — PAIN SCALES - GENERAL: PAINLEVEL_OUTOF10: 0

## 2021-10-23 NOTE — PLAN OF CARE
Problem: Pain:  Goal: Pain level will decrease  Description: Pain level will decrease  10/23/2021 0223 by Venida Apgar, RN  Outcome: Ongoing  10/22/2021 1329 by Cornell Oakley RN  Outcome: Ongoing  Note: Pt will be satisfied with pain control. Pt uses numeric pain rating scale with reassessments after pain med administration. Will continue to monitor progression throughout shift.     Goal: Control of acute pain  Description: Control of acute pain  Outcome: Ongoing  Goal: Control of chronic pain  Description: Control of chronic pain  Outcome: Ongoing

## 2021-10-23 NOTE — PROGRESS NOTES
Hospitalist Progress Note      PCP: Marlin Julien MD    Date of Admission: 10/15/2021    Chief Complaint: Pressure injury sacrum    Hospital Course:     81yo male with PMHX sig for lumbar stenosis, urinary retention, osteomyelitis of lumbar spine, chronic anemia, severe prot fanta malnutritions sent to [facility] on 10/15/2021 from SNF for altered mental status and large sacral wound. Of pertinence:   On 9/2/21 Dr. Kishor Farrell performed an L2-5 decompression and laminectomy at [address] days later on 9/5 patient was unable to wiggle his toes and had BLE weakness to the point that he required maximum assistance x 2 in order to stand.  An MRI was performed which showed severe spinal canal stenosis due to a new hematoma.  On 9/6 Dr. Emory Davila took him back to the OR for an urgent exploration and hematoma evacuation and left drains in place.  Patient was discharged to SNF on 9/10.     Patient was then admitted to [facility] on 9/19. [facility] had a large sacral ulcer and ROXI due to urinary retention requiring placement of a jain catheter.  Another L-spine MRI was performed showing an ill-defind fluid/soft tissue causing severe spinal canal stenosis from L1-4, also moderate-severe spinal canal stenosis from L4 - S1.  He was transferred to [facility] on 9/23 for operative management  By Dr. Charissa Monae further operative intervention was necessary on patient's lumbar spine.  He did undergo operative debridement of the sacral ulcer on 9/23.  Tissue culture was positive for enterococcus and enterobacter.  Based on sensitivities he was treated with Augmentin.  He was also treated with a wound vac and discharged back to the SNF on 9/27.     s/p debridement 10/6-cultures growing MRSA, Enterococcus, corynebacterium--    On 10/15/2021 he was sent from the SNF for altered mental status and large sacral wound. He was very agitated and screaming in the ED.   During this admission he was seen by general surgery, and he did go for sacral wound debridement on 10/16/2021. He has been seen by infectious disease in consultation. Felt he has chronic osteomyelitis and plan is for IV antibiotics for up to 6 weeks. Currently being treated with IV vancomycin and oral Flagyl. He does have a PICC line in place. 10/22 noted with large bloody BMs, GI consulted. Possible colonoscopy on Monday. prognosis poor - met with hospice but declined to enrol.         Subjective: He is lying in bed looks weak and tired.   He denies any pain no shortness of breath      Medications:  Reviewed    Infusion Medications    dextrose      sodium chloride 25 mL (10/22/21 0912)    [Held by provider] lactated ringers 100 mL/hr at 10/20/21 2029     Scheduled Medications    hydrocortisone  25 mg Rectal BID    metroNIDAZOLE  500 mg Oral 3 times per day    mupirocin   Nasal BID    vancomycin  750 mg IntraVENous Q12H    donepezil  10 mg Oral Nightly    atorvastatin  10 mg Oral Nightly    vitamin B complex w/C  1 tablet Oral Daily    therapeutic multivitamin-minerals  1 tablet Oral Daily    insulin glargine  0.15 Units/kg SubCUTAneous Nightly    insulin lispro  0.05 Units/kg SubCUTAneous TID WC    insulin lispro  0-6 Units SubCUTAneous TID WC    insulin lispro  0-3 Units SubCUTAneous Nightly     PRN Meds: LORazepam, glucose, dextrose, glucagon (rDNA), dextrose, sodium chloride flush, sodium chloride, potassium chloride **OR** potassium alternative oral replacement **OR** potassium chloride, magnesium sulfate, ondansetron **OR** ondansetron, acetaminophen **OR** acetaminophen, melatonin, HYDROmorphone      Intake/Output Summary (Last 24 hours) at 10/23/2021 0826  Last data filed at 10/23/2021 9019  Gross per 24 hour   Intake 654 ml   Output 2875 ml   Net -2221 ml       Physical Exam Performed:    BP (!) 144/74   Pulse 80   Temp 98.9 °F (37.2 °C) (Axillary)   Resp 24   Ht 6' (1.829 m)   Wt 131 lb 12.8 oz (59.8 kg)   SpO2 97%   BMI 17.88 kg/m²     General appearance: chronically ill appearing, severely cachectic  HEENT: Pupils equal, round, and reactive to light. Conjunctivae/corneas clear. Neck: Supple,  No jugular venous distention. Trachea midline. Respiratory:  Bilateral breath sounds,  without Rales/Wheezes/Rhonchi. No use of accessory muscles noted  Cardiovascular: Regular rate and rhythm with normal S1/S2 . Abdomen: Soft, non-tender, non-distended with normal bowel sounds. Musculoskeletal: No clubbing, cyanosis, trace lower extremity edema bilaterally. Full range of motion without deformity. Neurologic:  grossly intact, moves all four extremities. Chronic facial tic  Psychiatric: Alert and oriented, thought content appropriate, normal insight        Labs:   Recent Labs     10/21/21  2113 10/21/21  2113 10/22/21  0655 10/22/21  1220 10/23/21  0450   WBC 8.5  --  6.5  --  8.0   HGB 8.1*   < > 13.5 7.6* 7.4*   HCT 24.1*   < > 40.4* 22.2* 21.1*     --  206  --  298    < > = values in this interval not displayed. Recent Labs     10/21/21  2113 10/22/21  0655 10/23/21  0450   * 130* 131*   K 4.9 4.4 4.4   CL 96* 95* 97*   CO2 28 27 29   BUN 21* 17 16   CREATININE 1.1 0.7* <0.5*   CALCIUM 7.7* 7.8* 7.9*     Recent Labs     10/21/21  2113 10/22/21  0655 10/23/21  0450   AST 53* 44* 30   ALT 43* 38 33   BILITOT <0.2 0.3 0.3   ALKPHOS 133* 111 99     Recent Labs     10/20/21  1729   INR 1.61*     No results for input(s): CKTOTAL, TROPONINI in the last 72 hours. Urinalysis:      Lab Results   Component Value Date    NITRU Negative 10/15/2021    WBCUA 10-20 10/15/2021    BACTERIA 4+ 10/15/2021    RBCUA 5-10 10/15/2021    BLOODU SMALL 10/15/2021    SPECGRAV 1.015 10/15/2021    GLUCOSEU 250 10/15/2021       Radiology:  XR CHEST PORTABLE   Final Result   Status post PICC line placement on the right in good position. Resolving central pulmonary congestion.       Mild chronic elevation of the left hemidiaphragm which is unchanged with   slowly resolving bibasilar atelectasis. CT HEAD WO CONTRAST   Final Result      Stable study. No evidence for acute intracranial hemorrhage, territorial   infarction or intracranial mass lesion. Mild chronic microangiopathic ischemic disease. Mild generalized volume loss. CT PELVIS WO CONTRAST Additional Contrast? None   Final Result   1. Large deep sacral decubitus ulcer with mild adjacent cellulitis. No   abscess or soft tissue gas. 2. Osseous uncovering of the posterior aspect of the lower sacrum and coccyx   without convincing evidence of osteomyelitis. If clinically indicated   further evaluation could be obtained with MRI. XR CHEST PORTABLE   Final Result   Chronic elevation of the left hemidiaphragm which is unchanged with   increasing subsegmental atelectasis or early infiltrate along the left lung   base. Assessment/Plan:    Active Hospital Problems    Diagnosis     Tic disorder [F95.9]     Osteomyelitis ([address] [M86.9]     Chronic anemia [D64.9]     Severe sepsis ([address] [A41.9, R65.20]     Pressure injury of sacral region, unstageable ([address] [L89.150]     Lumbar stenosis with neurogenic claudication [M48.062]     Urinary retention [R33.9]     DM2 (diabetes mellitus, type 2) ([address] [E11.9]      GI bleed :  blood was reported in stool. Gastroenterology has been consulted and possibly for colonoscopy on Monday. H/H has been trending lower, will continue to follow closely transfuse if needed. No active bleeding reported today. Hold Lovenox. 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 ..    - s/p debridement 9/23 at [facility], cult + Enterococcus faecalis, Enterobacter cloacae, 3 anaerobesDischarged on 9/27 on po augmentin    -s/p debridement 10/6-cultures growing MRSA, Enterococcus, corynebacterium--follow-up on sensitivities   -Continue care per wound team general surgery  Infectious disease is following in consultation.   Will need 6 weeks of antibiotic therapy.    -Antibiotics--- on IV vancomycin and oral Flagyl.        -Recent spine surgery, with concern for cauda equina syndrome due to postop hematoma     \"on 9/2 Dr. Yonathan Catherine performed an L2-5 decompression and laminectomy at [place] [address], then postoperatively [facility] noted that the patient was unable to wiggle his toes and had BLE weakness to the point that he required maximum assistance x2 in order to stand.  An MRI was performed which showed severe spinal canal stenosis due to hematoma.  on 9/6 Dr. Denton Ignacio took him back to the OR for an urgent exploration and hematoma evacuation, left drains in place. eventually the patient was discharged to SNF on 9/10. readmitted 9/19-9/22 urinary retention, constipation, and ongoing leg weakness, we obtained a repeat MRI on this admission to [facility] 22 showed an ill-defind fluid/soft tissue causing severe spinal canal stenosis from L1-4, also moderate-severe spinal canal stenosis from L4 - S1, transferred to [facility] for neurosurgery eval-did not require spinal surgery but had debirdement of decub ulcer  -Acute metabolic encephalopathy. . Presented with increased confusion from baseline,Likely due to ongoing infection --continue supportive care. Mental status has improved, he is alert and cooperative at this time. 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    Supportive care      -Dementiaon Ariceptcontinue supportive care     -Chronic indwelling Jain-Indwelling jain since 9/19.  Jain replaced this admission 10/15     -DM type II--- continue insulin therapy follow sugars adjust as needed     -Essential hypertension-stablelisinopril held on addition continue to monitor off meds           DVT Prophylaxis: SCDs, Lovenox being held due to possible GI bleed  Diet: Adult Oral Nutrition Supplement; Diabetic Oral Supplement  ADULT DIET;  Full Liquid  Code Status: DNR-CCA        Dispo -SNF when stable    Dano Mullen MD

## 2021-10-23 NOTE — PROGRESS NOTES
[address]      [facility] [address]  [address] [place] [address]  Phone: [phone]   [phone]  [address] [place] [address],  [address] [place] [address] Ave  [place], [address] [place] [address]  Phone: [phone]    HPI: Ashley Wiggins is a(n)87 y.o. male with medical history of hypertension, diabetes mellitus type 2, constipation, chronic anemia, severe protein calorie malnutrition, spinal stenosis and large sacral ulcer status post debridement admitted for work-up and treatment for   Decubitus ulcer of sacral region, unstageable ([address] [L89.150]  Severe sepsis ([address] [A41.9, R65.20]  Pressure injury of sacral region, unstageable ([address] Mick Garcia. We are following for rectal bleeding and diarrhea    Patient was seen and examined about 10:45 a.m. Denies abdominal pain, nausea, vomiting, fever, chills, constipation, diarrhea, hematochezia or melenic stools.      Current Hospital Schedued Meds   hydrocortisone  25 mg Rectal BID    metroNIDAZOLE  500 mg Oral 3 times per day    mupirocin   Nasal BID    vancomycin  750 mg IntraVENous Q12H    donepezil  10 mg Oral Nightly    atorvastatin  10 mg Oral Nightly    vitamin B complex w/C  1 tablet Oral Daily    therapeutic multivitamin-minerals  1 tablet Oral Daily    insulin glargine  0.15 Units/kg SubCUTAneous Nightly    insulin lispro  0.05 Units/kg SubCUTAneous TID WC    insulin lispro  0-6 Units SubCUTAneous TID WC    insulin lispro  0-3 Units SubCUTAneous Nightly     Current Hospital IV Meds   dextrose      sodium chloride 25 mL (10/22/21 0912)    [Held by provider] lactated ringers 100 mL/hr at 10/20/21 2029     Current Hospital Prn Meds  LORazepam, glucose, dextrose, glucagon (rDNA), dextrose, sodium chloride flush, sodium chloride, potassium chloride **OR** potassium alternative oral replacement **OR** potassium chloride, magnesium sulfate, ondansetron **OR** ondansetron, acetaminophen **OR** acetaminophen, melatonin, HYDROmorphone    I/O last 3 completed shifts: In: 291 [P. O.:654]  Out: 2875 [Urine:2875]  BP (!) 144/74   Pulse 80   Temp 98.9 °F (37.2 °C) (Axillary)   Resp 24   Ht 6' (1.829 m)   Wt 131 lb 12.8 oz (59.8 kg)   SpO2 97%   BMI 17.88 kg/m²   BMs: 5  Wt Readings from Last 3 Encounters:   10/23/21 131 lb 12.8 oz (59.8 kg)   09/21/21 126 lb 5.2 oz (57.3 kg)   01/22/20 130 lb (59 kg)       Physical Exam:  VITAL SIGNS: BP (!) 144/74   Pulse 80   Temp 98.9 °F (37.2 °C) (Axillary)   Resp 24   Ht 6' (1.829 m)   Wt 131 lb 12.8 oz (59.8 kg)   SpO2 97%   BMI 17.88 kg/m²   Wt Readings from Last 3 Encounters:   10/23/21 131 lb 12.8 oz (59.8 kg)   09/21/21 126 lb 5.2 oz (57.3 kg)   01/22/20 130 lb (59 kg)     Constitutional: Elderly male. No acute distress, Non-toxic appearance. HENT: Normocephalic, Atraumatic, Bilateral external ears normal, Oropharynx moist, No oral exudates, Nose normal.   Eyes: Conjunctiva normal, No discharge. Neck: Normal range of motion, No tenderness. Cardiovascular: Normal heart rate, Normal rhythm,   Thorax & Lungs: Normal breath sounds, No respiratory distress  Abdomen: normal bowel sounds, soft, non tender, non distended, no hernias   Rectal:  Deferred. Skin: Warm, Dry, No erythema,  Neurologic: Alert & oriented x 3    Lab Results   Component Value Date    ALT 33 10/23/2021    AST 30 10/23/2021    ALKPHOS 99 10/23/2021    PROT 5.1 (L) 10/23/2021    LABALBU 2.0 (L) 10/23/2021    INR 1.61 (H) 10/20/2021     Lab Results   Component Value Date    WBC 8.0 10/23/2021    HGB 7.4 (L) 10/23/2021    HCT 21.1 (L) 10/23/2021    MCV 90.9 10/23/2021     10/23/2021     Lab Results   Component Value Date     10/23/2021    K 4.4 10/23/2021    K 4.8 10/15/2021    CL 97 10/23/2021    CO2 29 10/23/2021    BUN 16 10/23/2021     Lab Results   Component Value Date    CREATININE <0.5 (L) 10/23/2021       A/P  1.   Diarrhea and rectal bleeding likely due to overflow in setting of constipation    Plan:  Hemoglobin and hematocrit remained stable at 7.4 g/dL/21.1%  Monitor hemoglobin hematocrit and transfuse to maintain hemoglobin >7 g/dL  Ordered KUB to assess stool burden  Plan for colonoscopy on Monday if persistent diarrhea/hematochezia per Dr. Jarrett Zhu. GI to follow      Principal Problem:    Pressure injury of sacral region, unstageable ([address]  Active Problems:    DM2 (diabetes mellitus, type 2) ([address]    Lumbar stenosis with neurogenic claudication    Urinary retention    Severe sepsis (HCC)    Osteomyelitis (HCC)    Chronic anemia    Tic disorder  Resolved Problems:    * No resolved hospital problems. *    Patient Active Problem List   Diagnosis Code    DM2 (diabetes mellitus, type 2) ([address] E11.9    Hypertension I10    Localized osteoarthrosis, lower leg M17.10    HLD (hyperlipidemia) E78.5    Lumbar stenosis with neurogenic claudication M48.062    BPH (benign prostatic hyperplasia) N40.0    ROXI (acute kidney injury) ([facility] N17.9    Urinary retention R33.9    Pressure injury of sacral region, unstageable ([address] L89.150    Severe sepsis (HCC) A41.9, R65.20    Osteomyelitis (HCC) M86.9    Chronic anemia D64.9    Tic disorder F95.9       Thank you for involving [facility]) Gastroenterology in the hospital care of David Fisher. For further questions or concerns, we can best be reached through perfect serv.         Hollie Ward MD 10/23/21 9:15 AM EDT

## 2021-10-23 NOTE — PROGRESS NOTES
Perfect serve Dr. Bee De Los Santos: BS constantly high. (missed AM dosing) , lunch 309. Currently Humalog 3u and low sliding scale ordered. Would you like it changed to medium sliding scale? Thanks.

## 2021-10-24 LAB
A/G RATIO: 0.6 (ref 1.1–2.2)
ALBUMIN SERPL-MCNC: 2.1 G/DL (ref 3.4–5)
ALP BLD-CCNC: 114 U/L (ref 40–129)
ALT SERPL-CCNC: 34 U/L (ref 10–40)
ANION GAP SERPL CALCULATED.3IONS-SCNC: 6 MMOL/L (ref 3–16)
ANISOCYTOSIS: ABNORMAL
AST SERPL-CCNC: 32 U/L (ref 15–37)
ATYPICAL LYMPHOCYTE RELATIVE PERCENT: 2 % (ref 0–6)
BANDED NEUTROPHILS RELATIVE PERCENT: 18 % (ref 0–7)
BASOPHILS ABSOLUTE: 0.1 K/UL (ref 0–0.2)
BASOPHILS RELATIVE PERCENT: 1 %
BILIRUB SERPL-MCNC: 0.3 MG/DL (ref 0–1)
BUN BLDV-MCNC: 13 MG/DL (ref 7–20)
CALCIUM SERPL-MCNC: 8 MG/DL (ref 8.3–10.6)
CHLORIDE BLD-SCNC: 94 MMOL/L (ref 99–110)
CO2: 29 MMOL/L (ref 21–32)
CREAT SERPL-MCNC: <0.5 MG/DL (ref 0.8–1.3)
EOSINOPHILS ABSOLUTE: 0 K/UL (ref 0–0.6)
EOSINOPHILS RELATIVE PERCENT: 0 %
GFR AFRICAN AMERICAN: >60
GFR NON-AFRICAN AMERICAN: >60
GLOBULIN: 3.4 G/DL
GLUCOSE BLD-MCNC: 177 MG/DL (ref 70–99)
GLUCOSE BLD-MCNC: 198 MG/DL (ref 70–99)
GLUCOSE BLD-MCNC: 226 MG/DL (ref 70–99)
GLUCOSE BLD-MCNC: 264 MG/DL (ref 70–99)
GLUCOSE BLD-MCNC: 335 MG/DL (ref 70–99)
HCT VFR BLD CALC: 23.3 % (ref 40.5–52.5)
HEMOGLOBIN: 8 G/DL (ref 13.5–17.5)
LYMPHOCYTES ABSOLUTE: 2.5 K/UL (ref 1–5.1)
LYMPHOCYTES RELATIVE PERCENT: 21 %
MACROCYTES: ABNORMAL
MAGNESIUM: 2.1 MG/DL (ref 1.8–2.4)
MCH RBC QN AUTO: 31.5 PG (ref 26–34)
MCHC RBC AUTO-ENTMCNC: 34.2 G/DL (ref 31–36)
MCV RBC AUTO: 91.9 FL (ref 80–100)
MICROCYTES: ABNORMAL
MONOCYTES ABSOLUTE: 1.2 K/UL (ref 0–1.3)
MONOCYTES RELATIVE PERCENT: 11 %
MYELOCYTE PERCENT: 2 %
NEUTROPHILS ABSOLUTE: 7.1 K/UL (ref 1.7–7.7)
NEUTROPHILS RELATIVE PERCENT: 45 %
PDW BLD-RTO: 16.4 % (ref 12.4–15.4)
PERFORMED ON: ABNORMAL
PLATELET # BLD: 370 K/UL (ref 135–450)
PMV BLD AUTO: 6.7 FL (ref 5–10.5)
POLYCHROMASIA: ABNORMAL
POTASSIUM SERPL-SCNC: 4.7 MMOL/L (ref 3.5–5.1)
RBC # BLD: 2.54 M/UL (ref 4.2–5.9)
SODIUM BLD-SCNC: 129 MMOL/L (ref 136–145)
TOTAL PROTEIN: 5.5 G/DL (ref 6.4–8.2)
WBC # BLD: 10.9 K/UL (ref 4–11)

## 2021-10-24 PROCEDURE — 2580000003 HC RX 258: Performed by: SURGERY

## 2021-10-24 PROCEDURE — 6370000000 HC RX 637 (ALT 250 FOR IP): Performed by: INTERNAL MEDICINE

## 2021-10-24 PROCEDURE — 80053 COMPREHEN METABOLIC PANEL: CPT

## 2021-10-24 PROCEDURE — 83735 ASSAY OF MAGNESIUM: CPT

## 2021-10-24 PROCEDURE — 2580000003 HC RX 258: Performed by: INTERNAL MEDICINE

## 2021-10-24 PROCEDURE — 6370000000 HC RX 637 (ALT 250 FOR IP): Performed by: SURGERY

## 2021-10-24 PROCEDURE — 1200000000 HC SEMI PRIVATE

## 2021-10-24 PROCEDURE — 6360000002 HC RX W HCPCS: Performed by: INTERNAL MEDICINE

## 2021-10-24 PROCEDURE — 85025 COMPLETE CBC W/AUTO DIFF WBC: CPT

## 2021-10-24 PROCEDURE — 99232 SBSQ HOSP IP/OBS MODERATE 35: CPT | Performed by: INTERNAL MEDICINE

## 2021-10-24 RX ORDER — INSULIN GLARGINE 100 [IU]/ML
12 INJECTION, SOLUTION SUBCUTANEOUS NIGHTLY
Status: DISCONTINUED | OUTPATIENT
Start: 2021-10-24 | End: 2021-11-05 | Stop reason: HOSPADM

## 2021-10-24 RX ORDER — AMLODIPINE BESYLATE 5 MG/1
5 TABLET ORAL DAILY
Status: DISCONTINUED | OUTPATIENT
Start: 2021-10-24 | End: 2021-11-05 | Stop reason: HOSPADM

## 2021-10-24 RX ADMIN — MUPIROCIN: 20 OINTMENT TOPICAL at 08:45

## 2021-10-24 RX ADMIN — VANCOMYCIN HYDROCHLORIDE 750 MG: 750 INJECTION, POWDER, LYOPHILIZED, FOR SOLUTION INTRAVENOUS at 20:36

## 2021-10-24 RX ADMIN — VANCOMYCIN HYDROCHLORIDE 750 MG: 750 INJECTION, POWDER, LYOPHILIZED, FOR SOLUTION INTRAVENOUS at 10:35

## 2021-10-24 RX ADMIN — SODIUM CHLORIDE, PRESERVATIVE FREE 10 ML: 5 INJECTION INTRAVENOUS at 20:28

## 2021-10-24 RX ADMIN — SODIUM CHLORIDE 25 ML: 9 INJECTION, SOLUTION INTRAVENOUS at 20:34

## 2021-10-24 ASSESSMENT — PAIN SCALES - PAIN ASSESSMENT IN ADVANCED DEMENTIA (PAINAD)
FACIALEXPRESSION: 0
CONSOLABILITY: 0
BREATHING: 0
BODYLANGUAGE: 0
NEGVOCALIZATION: 1
CONSOLABILITY: 0
BREATHING: 0
BODYLANGUAGE: 0
FACIALEXPRESSION: 0
TOTALSCORE: 1
NEGVOCALIZATION: 1
TOTALSCORE: 1

## 2021-10-24 ASSESSMENT — PAIN SCALES - GENERAL
PAINLEVEL_OUTOF10: 0
PAINLEVEL_OUTOF10: 0

## 2021-10-24 NOTE — PROGRESS NOTES
Hospitalist Progress Note      PCP: Dayanara Gonzalez MD    Date of Admission: 10/15/2021    Chief Complaint:   Pressure injury sacrum    Hospital Course:     He has had a prolonged and complex course. 79yo male with PMHX sig for lumbar stenosis, urinary retention, osteomyelitis of lumbar spine, chronic anemia, severe prot fanta malnutritions sent to [facility] on 10/15/2021 from SNF for altered mental status and large sacral wound.       Of pertinence:   On 9/2/21 Dr. Enoch Mckenzie performed an L2-5 decompression and laminectomy at [address] days later on 9/5 patient was unable to wiggle his toes and had BLE weakness to the point that he required maximum assistance x 2 in order to stand.  An MRI was performed which showed severe spinal canal stenosis due to a new hematoma.  On 9/6 Dr. Richard Espinoza took him back to the OR for an urgent exploration and hematoma evacuation and left drains in place.  Patient was discharged to SNF on 9/10.     Patient was then admitted to [facility] on 9/19. Lurdes Mcclellan had a large sacral ulcer and ROXI due to urinary retention requiring placement of a jain catheter.  Another L-spine MRI was performed showing an ill-defind fluid/soft tissue causing severe spinal canal stenosis from L1-4, also moderate-severe spinal canal stenosis from L4 - S1.  He was transferred to [facility] on 9/23 for operative management  By Dr. Thomas Pouch further operative intervention was necessary on patient's lumbar spine.  He did undergo operative debridement of the sacral ulcer on 9/23.  Tissue culture was positive for enterococcus and enterobacter.  Based on sensitivities he was treated with Augmentin.  He was also treated with a wound vac and discharged back to the SNF on 9/27.     s/p debridement 10/6-cultures growing MRSA, Enterococcus, corynebacterium--     On 10/15/2021 he to [facility] ED  from the SNF for altered mental status and large sacral wound.   He was very agitated and screaming in the ED.  During this admission he was seen by general surgery, and he did go for sacral wound debridement on 10/16/2021. He has been seen by infectious disease in consultation. Felt he has chronic osteomyelitis and plan is for IV antibiotics for up to 6 weeks. Currently being treated with IV vancomycin and oral Flagyl. He does have a PICC line in place.     10/22 noted with large bloody BMs, GI consulted. Tentative plan currently is for colonoscopy on Monday.       prognosis poor - met with hospice but declined to enrol.           Subjective: Sitting up in bed having breakfast.  He is awake has some mild confusion but is easily reoriented. He is pleasant and cooperative to the best visibility. He denies any complaints at this time.       Medications:  Reviewed    Infusion Medications    dextrose      sodium chloride 25 mL (10/23/21 2018)    [Held by provider] lactated ringers 100 mL/hr at 10/20/21 2029     Scheduled Medications    insulin glargine  12 Units SubCUTAneous Nightly    amLODIPine  5 mg Oral Daily    insulin lispro  0-12 Units SubCUTAneous TID WC    insulin lispro  0-6 Units SubCUTAneous Nightly    hydrocortisone  25 mg Rectal BID    metroNIDAZOLE  500 mg Oral 3 times per day    mupirocin   Nasal BID    vancomycin  750 mg IntraVENous Q12H    donepezil  10 mg Oral Nightly    atorvastatin  10 mg Oral Nightly    vitamin B complex w/C  1 tablet Oral Daily    therapeutic multivitamin-minerals  1 tablet Oral Daily    insulin lispro  0.05 Units/kg SubCUTAneous TID WC     PRN Meds: LORazepam, glucose, dextrose, glucagon (rDNA), dextrose, sodium chloride flush, sodium chloride, potassium chloride **OR** potassium alternative oral replacement **OR** potassium chloride, magnesium sulfate, ondansetron **OR** ondansetron, acetaminophen **OR** acetaminophen, melatonin, HYDROmorphone      Intake/Output Summary (Last 24 hours) at 10/24/2021 0910  Last data filed at 10/24/2021 0416  Gross per 24 hour Intake 120 ml   Output 1850 ml   Net -1730 ml       Physical Exam Performed:    BP (!) 158/80   Pulse 86   Temp 98.1 °F (36.7 °C) (Oral)   Resp 24   Ht 6' (1.829 m)   Wt 131 lb 12.8 oz (59.8 kg)   SpO2 96%   BMI 17.88 kg/m²     General appearance: chronically ill appearing, severely cachectic  HEENT: Pupils equal, round, and reactive to light. Conjunctivae/corneas clear. Neck: Supple,  No jugular venous distention. Trachea midline. Respiratory:  Bilateral breath sounds, without Rales/Wheezes/Rhonchi. No use of accessory muscles noted  Cardiovascular: Regular rate and rhythm with normal S1/S2 . Abdomen: Soft, non-tender, non-distended with normal bowel sounds. Musculoskeletal: No clubbing, cyanosis, trace lower extremity edema bilaterally. Full range of motion without deformity. Neurologic:  grossly intact, moves all four extremities.  Chronic facial tic  Psychiatric: Alert and oriented, thought content appropriate, normal insight. Labs:   Recent Labs     10/22/21  0655 10/22/21  1220 10/23/21  0450 10/23/21  1840 10/24/21  0648   WBC 6.5  --  8.0  --  10.9   HGB 13.5   < > 7.4* 7.9* 8.0*   HCT 40.4*   < > 21.1* 23.6* 23.3*     --  298  --  370    < > = values in this interval not displayed. Recent Labs     10/22/21  0655 10/23/21  0450 10/24/21  0648   * 131* 129*   K 4.4 4.4 4.7   CL 95* 97* 94*   CO2 27 29 29   BUN 17 16 13   CREATININE 0.7* <0.5* <0.5*   CALCIUM 7.8* 7.9* 8.0*     Recent Labs     10/22/21  0655 10/23/21  0450 10/24/21  0648   AST 44* 30 32   ALT 38 33 34   BILITOT 0.3 0.3 0.3   ALKPHOS 111 99 114     No results for input(s): INR in the last 72 hours. No results for input(s): Maia Horn in the last 72 hours.     Urinalysis:      Lab Results   Component Value Date    NITRU Negative 10/15/2021    WBCUA 10-20 10/15/2021    BACTERIA 4+ 10/15/2021    RBCUA 5-10 10/15/2021    BLOODU SMALL 10/15/2021    SPECGRAV 1.015 10/15/2021    GLUCOSEU 250 10/15/2021 Radiology:  XR ABDOMEN (KUB) (SINGLE AP VIEW)   Final Result   Nonspecific abdominal bowel gas pattern with mild residual stool throughout   colon. XR CHEST PORTABLE   Final Result   Status post PICC line placement on the right in good position. Resolving central pulmonary congestion. Mild chronic elevation of the left hemidiaphragm which is unchanged with   slowly resolving bibasilar atelectasis. CT HEAD WO CONTRAST   Final Result      Stable study. No evidence for acute intracranial hemorrhage, territorial   infarction or intracranial mass lesion. Mild chronic microangiopathic ischemic disease. Mild generalized volume loss. CT PELVIS WO CONTRAST Additional Contrast? None   Final Result   1. Large deep sacral decubitus ulcer with mild adjacent cellulitis. No   abscess or soft tissue gas. 2. Osseous uncovering of the posterior aspect of the lower sacrum and coccyx   without convincing evidence of osteomyelitis. If clinically indicated   further evaluation could be obtained with MRI. XR CHEST PORTABLE   Final Result   Chronic elevation of the left hemidiaphragm which is unchanged with   increasing subsegmental atelectasis or early infiltrate along the left lung   base. Assessment/Plan:    Active Hospital Problems    Diagnosis     Tic disorder [F95.9]     Osteomyelitis ([address] [M86.9]     Chronic anemia [D64.9]     Severe sepsis ([address] [A41.9, R65.20]     Pressure injury of sacral region, unstageable ([address] [L89.150]     Lumbar stenosis with neurogenic claudication [M48.062]     Urinary retention [R33.9]     DM2 (diabetes mellitus, type 2) ([address] [E11.9]      GI bleed :  blood was reported in stool. Gastroenterology has been consulted and for colonoscopy on Monday to further evaluate. H/H is stable this morning. Will continue to follow closely, transfuse if needed. No active bleeding reported today.   Holding Lovenox.     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 ..    - s/p debridement 9/23 at [facility], cult + Enterococcus faecalis, Enterobacter cloacae, 3 anaerobesDischarged on 9/27 on po augmentin    -s/p debridement 10/6-cultures growing MRSA, Enterococcus, corynebacterium-   -Continue care per wound team general surgery  Infectious disease is following in consultation. Will need 6 weeks of antibiotic therapy.    -Antibiotics--- on IV vancomycin and oral Flagyl.        -Recent spine surgery, with concern for cauda equina syndrome due to postop hematoma     \"on 9/2 Dr. Kira Gresham performed an L2-5 decompression and laminectomy at [place] [address], then postoperatively on 9/5 [facility] noted that the patient was unable to wiggle his toes and had BLE weakness to the point that he required maximum assistance x2 in order to stand.  An MRI was performed which showed severe spinal canal stenosis due to hematoma.  on 9/6 Dr. Zita Nichols took him back to the OR for an urgent exploration and hematoma evacuation, left drains in place. eventually the patient was discharged to SNF on 9/10. readmitted 9/19-9/22 urinary retention, constipation, and ongoing leg weakness, we obtained a repeat MRI on this admission to [facility] [address] showed an ill-defind fluid/soft tissue causing severe spinal canal stenosis from L1-4, also moderate-severe spinal canal stenosis from L4 - S1, transferred to [facility] for neurosurgery eval-did not require spinal surgery but had debirdement of decub ulcer  -Acute metabolic encephalopathy. . Presented with increased confusion from baseline,Likely due to ongoing infection --continue supportive care. Mental status has improved, he is alert and cooperative at this time. Suspect he is close to his baseline status.   No family present during my visits this weekend and so unable to confirm this.     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 Supportive care      -Dementiaon Ariceptcontinue supportive care     -Chronic indwelling Jain-Indwelling jain since 9/19.  Jain replaced this admission 10/15     -DM type II--- continue insulin therapy follow sugars adjust as needed     -Essential hypertension-stablelisinopril held on admit   Blood pressure noted to be running a little high I have started low-dose Norvasc today and will follow BP.        DVT Prophylaxis: SCDs, Lovenox being held due to possible GI bleed  Diet: Adult Oral Nutrition Supplement; Diabetic Oral Supplement  ADULT DIET;  Full Liquid  Code Status: DNR-CCA      Dispo - SNF when stable    Mell Shafer MD

## 2021-10-24 NOTE — PROGRESS NOTES
Perfect serve Dr. Natalie Hill: Patient's family concerned patient will be unable to complete prep. They would like to postpone colonoscopy until after Dr. Natanael Mares assesses him tomorrow.

## 2021-10-24 NOTE — PROGRESS NOTES
Perfect serve Dr. Jose Araya: Doubtful patient can tolerate 4,000ml GoLytely prep. Would you consider different bowel prep? Massive stage 4 ulcer on coccyx, would you like rectal tube for prep?  Thanks :)

## 2021-10-24 NOTE — PLAN OF CARE
Problem: Pain:  Goal: Pain level will decrease  Description: Pain level will decrease  Outcome: Ongoing     Problem: Falls - Risk of:  Goal: Will remain free from falls  Description: Will remain free from falls  Outcome: Ongoing     Problem: Skin Integrity:  Goal: Will show no infection signs and symptoms  Description: Will show no infection signs and symptoms  Outcome: Ongoing     Problem: Nutrition  Goal: Optimal nutrition therapy  Outcome: Ongoing

## 2021-10-24 NOTE — PROGRESS NOTES
[address]     [address]  [address] [place] [address]  Phone: [phone]   NTB:[phone]  [address] [place] [address],  [address] [place] [address] Ave  [place], [address] [place] [address]  Phone: [phone]    HPI: Kike Guo is a(n)87 y.o. male with medical history of hypertension, diabetes mellitus type 2, constipation, chronic anemia, severe protein calorie malnutrition, spinal stenosis and large sacral ulcer status post debridement  admitted for work-up and treatment for   Decubitus ulcer of sacral region, unstageable ([address] [L89.150]  Severe sepsis ([address] [A41.9, R65.20]  Pressure injury of sacral region, unstageable ([address] Brain Crumbly. We are following for rectal bleeding and diarrhea. Patient was seen and examined about 11:40 a.m. Denies abdominal pain, nausea, vomiting, fever, chills, constipation, diarrhea, hematochezia or melenic stools.      Current Hospital Schedued Meds   insulin glargine  12 Units SubCUTAneous Nightly    amLODIPine  5 mg Oral Daily    insulin lispro  0-12 Units SubCUTAneous TID WC    insulin lispro  0-6 Units SubCUTAneous Nightly    hydrocortisone  25 mg Rectal BID    metroNIDAZOLE  500 mg Oral 3 times per day    mupirocin   Nasal BID    vancomycin  750 mg IntraVENous Q12H    donepezil  10 mg Oral Nightly    atorvastatin  10 mg Oral Nightly    vitamin B complex w/C  1 tablet Oral Daily    therapeutic multivitamin-minerals  1 tablet Oral Daily    insulin lispro  0.05 Units/kg SubCUTAneous TID [facility] IV Meds   dextrose      sodium chloride 25 mL (10/23/21 2018)    [Held by provider] lactated ringers 100 mL/hr at 10/20/21 2029     Current Hospital Prn Meds  LORazepam, glucose, dextrose, glucagon (rDNA), dextrose, sodium chloride flush, sodium chloride, potassium chloride **OR** potassium alternative oral replacement **OR** potassium chloride, magnesium sulfate, ondansetron **OR** ondansetron, acetaminophen **OR** acetaminophen, melatonin, constipation     Plan:  Hemoglobin and hematocrit remained stable at 8 g/dL  Monitor hemoglobin hematocrit and transfuse to maintain hemoglobin >7 g/dL  KUB 10/23 - mild residual stool throughout colon. Plan for colonoscopy tomorrow 10/25;      GI to follow    Principal Problem:    Pressure injury of sacral region, unstageable ([address]  Active Problems:    DM2 (diabetes mellitus, type 2) ([address]    Lumbar stenosis with neurogenic claudication    Urinary retention    Severe sepsis (HCC)    Osteomyelitis (HCC)    Chronic anemia    Tic disorder  Resolved Problems:    * No resolved hospital problems. *    Patient Active Problem List   Diagnosis Code    DM2 (diabetes mellitus, type 2) ([address] E11.9    Hypertension I10    Localized osteoarthrosis, lower leg M17.10    HLD (hyperlipidemia) E78.5    Lumbar stenosis with neurogenic claudication M48.062    BPH (benign prostatic hyperplasia) N40.0    ROXI (acute kidney injury) ([address] N17.9    Urinary retention R33.9    Pressure injury of sacral region, unstageable ([address] L89.150    Severe sepsis (HCC) A41.9, R65.20    Osteomyelitis (HCC) M86.9    Chronic anemia D64.9    Tic disorder F95.9       Thank you for involving [facility]) Gastroenterology in the hospital care of Micah Pryor. For further questions or concerns, we can best be reached through perfect serv.         Suellen Ruth MD 10/24/21 9:32 AM EDT

## 2021-10-25 LAB
A/G RATIO: 0.6 (ref 1.1–2.2)
ALBUMIN SERPL-MCNC: 2 G/DL (ref 3.4–5)
ALP BLD-CCNC: 119 U/L (ref 40–129)
ALT SERPL-CCNC: 34 U/L (ref 10–40)
ANION GAP SERPL CALCULATED.3IONS-SCNC: 6 MMOL/L (ref 3–16)
ANISOCYTOSIS: ABNORMAL
AST SERPL-CCNC: 36 U/L (ref 15–37)
BANDED NEUTROPHILS RELATIVE PERCENT: 13 % (ref 0–7)
BASOPHILS ABSOLUTE: 0.2 K/UL (ref 0–0.2)
BASOPHILS RELATIVE PERCENT: 2 %
BILIRUB SERPL-MCNC: 0.4 MG/DL (ref 0–1)
BUN BLDV-MCNC: 13 MG/DL (ref 7–20)
CALCIUM SERPL-MCNC: 8 MG/DL (ref 8.3–10.6)
CHLORIDE BLD-SCNC: 96 MMOL/L (ref 99–110)
CO2: 29 MMOL/L (ref 21–32)
CREAT SERPL-MCNC: <0.5 MG/DL (ref 0.8–1.3)
EOSINOPHILS ABSOLUTE: 0 K/UL (ref 0–0.6)
EOSINOPHILS RELATIVE PERCENT: 0 %
GFR AFRICAN AMERICAN: >60
GFR NON-AFRICAN AMERICAN: >60
GLOBULIN: 3.3 G/DL
GLUCOSE BLD-MCNC: 168 MG/DL (ref 70–99)
GLUCOSE BLD-MCNC: 189 MG/DL (ref 70–99)
GLUCOSE BLD-MCNC: 220 MG/DL (ref 70–99)
GLUCOSE BLD-MCNC: 274 MG/DL (ref 70–99)
GLUCOSE BLD-MCNC: 322 MG/DL (ref 70–99)
HCT VFR BLD CALC: 23.2 % (ref 40.5–52.5)
HEMOGLOBIN: 7.8 G/DL (ref 13.5–17.5)
LYMPHOCYTES ABSOLUTE: 3.9 K/UL (ref 1–5.1)
LYMPHOCYTES RELATIVE PERCENT: 35 %
MAGNESIUM: 2.2 MG/DL (ref 1.8–2.4)
MCH RBC QN AUTO: 31 PG (ref 26–34)
MCHC RBC AUTO-ENTMCNC: 33.7 G/DL (ref 31–36)
MCV RBC AUTO: 92 FL (ref 80–100)
METAMYELOCYTES RELATIVE PERCENT: 1 %
MONOCYTES ABSOLUTE: 0.7 K/UL (ref 0–1.3)
MONOCYTES RELATIVE PERCENT: 6 %
MYELOCYTE PERCENT: 1 %
NEUTROPHILS ABSOLUTE: 6.3 K/UL (ref 1.7–7.7)
NEUTROPHILS RELATIVE PERCENT: 42 %
PDW BLD-RTO: 16.2 % (ref 12.4–15.4)
PERFORMED ON: ABNORMAL
PLATELET # BLD: 400 K/UL (ref 135–450)
PMV BLD AUTO: 6.7 FL (ref 5–10.5)
POIKILOCYTES: ABNORMAL
POTASSIUM SERPL-SCNC: 4.5 MMOL/L (ref 3.5–5.1)
RBC # BLD: 2.52 M/UL (ref 4.2–5.9)
ROULEAUX: ABNORMAL
SODIUM BLD-SCNC: 131 MMOL/L (ref 136–145)
TOTAL PROTEIN: 5.3 G/DL (ref 6.4–8.2)
WBC # BLD: 11.1 K/UL (ref 4–11)

## 2021-10-25 PROCEDURE — 83735 ASSAY OF MAGNESIUM: CPT

## 2021-10-25 PROCEDURE — 2580000003 HC RX 258: Performed by: INTERNAL MEDICINE

## 2021-10-25 PROCEDURE — 6370000000 HC RX 637 (ALT 250 FOR IP): Performed by: INTERNAL MEDICINE

## 2021-10-25 PROCEDURE — 6370000000 HC RX 637 (ALT 250 FOR IP): Performed by: SURGERY

## 2021-10-25 PROCEDURE — 80053 COMPREHEN METABOLIC PANEL: CPT

## 2021-10-25 PROCEDURE — 85025 COMPLETE CBC W/AUTO DIFF WBC: CPT

## 2021-10-25 PROCEDURE — 6360000002 HC RX W HCPCS: Performed by: INTERNAL MEDICINE

## 2021-10-25 PROCEDURE — 1200000000 HC SEMI PRIVATE

## 2021-10-25 PROCEDURE — 2580000003 HC RX 258: Performed by: SURGERY

## 2021-10-25 PROCEDURE — 99231 SBSQ HOSP IP/OBS SF/LOW 25: CPT | Performed by: SURGERY

## 2021-10-25 RX ADMIN — BISACODYL 20 MG: 5 TABLET, COATED ORAL at 15:20

## 2021-10-25 RX ADMIN — SODIUM CHLORIDE, PRESERVATIVE FREE 10 ML: 5 INJECTION INTRAVENOUS at 20:27

## 2021-10-25 RX ADMIN — VANCOMYCIN HYDROCHLORIDE 750 MG: 750 INJECTION, POWDER, LYOPHILIZED, FOR SOLUTION INTRAVENOUS at 09:40

## 2021-10-25 RX ADMIN — SODIUM CHLORIDE 25 ML: 9 INJECTION, SOLUTION INTRAVENOUS at 09:39

## 2021-10-25 RX ADMIN — SODIUM CHLORIDE 25 ML: 9 INJECTION, SOLUTION INTRAVENOUS at 20:26

## 2021-10-25 RX ADMIN — VANCOMYCIN HYDROCHLORIDE 750 MG: 750 INJECTION, POWDER, LYOPHILIZED, FOR SOLUTION INTRAVENOUS at 20:27

## 2021-10-25 ASSESSMENT — PAIN SCALES - GENERAL
PAINLEVEL_OUTOF10: 0

## 2021-10-25 ASSESSMENT — PAIN SCALES - WONG BAKER: WONGBAKER_NUMERICALRESPONSE: 0

## 2021-10-25 NOTE — CARE COORDINATION
Chart reviewed, LOS 10days. IM and GenSurg involved, GI following. [facility] can accept when ready for discharged to skilled care. CM will continue to follow pt's progress and coordinate discharge arrangements as appropriate.   Neal Lutz, ACM-RN

## 2021-10-25 NOTE — PROGRESS NOTES
Perfect serve sent to [place], [place]:    10/25/21 7:29 PM   [address] or Facility: [facility] From: Mario Marrow RE: Jamel Parkeri 1934 RM: [id number]     Pt has stage 4 wound on coccyx, started having blood BM over the weekend and plan for coloscopy tomorrow am. Pt is having extremely loose BM, constant. Can I get orders for rectal tube? To help prevent further infection of wound.  Thanks so much     Need Callback: NO CALLBACK REQ C5 MED SURG / [address]

## 2021-10-25 NOTE — PROGRESS NOTES
Hospitalist Progress Note      PCP: Makenzie Gonzalez MD    Date of Admission: 10/15/2021    Chief Complaint:   Pressure injury sacrum    Hospital Course:     He has had a prolonged and complex course. 81yo male with PMHX sig for lumbar stenosis, urinary retention, osteomyelitis of lumbar spine, chronic anemia, severe prot fanta malnutritions sent to [facility] on 10/15/2021 from [facility] for altered mental status and large sacral wound.       Of pertinence:   On 9/2/21 Dr. Raysa Paige performed an L2-5 decompression and laminectomy at [address] days later on 9/5 patient was unable to wiggle his toes and had BLE weakness to the point that he required maximum assistance x 2 in order to stand.  An MRI was performed which showed severe spinal canal stenosis due to a new hematoma.  On 9/6 Dr. Nanda Mejía took him back to the OR for an urgent exploration and hematoma evacuation and left drains in place.  Patient was discharged to SNF on 9/10.     Patient was then admitted to [facility] on 9/19. [facility] had a large sacral ulcer and ROXI due to urinary retention requiring placement of a jain catheter.  Another L-spine MRI was performed showing an ill-defind fluid/soft tissue causing severe spinal canal stenosis from L1-4, also moderate-severe spinal canal stenosis from L4 - S1.  He was transferred to [address] [place] [address] on 9/23 for operative management  By Dr. Rich Newman further operative intervention was necessary on patient's lumbar spine.  He did undergo operative debridement of the sacral ulcer on 9/23.  Tissue culture was positive for enterococcus and enterobacter.  Based on sensitivities he was treated with Augmentin.  He was also treated with a wound vac and discharged back to the SNF on 9/27.     s/p debridement 10/6-cultures growing MRSA, Enterococcus, corynebacterium--     On 10/15/2021 he to [facility] ED  from the SNF for altered mental status and large sacral wound.   He was very agitated and screaming in the ED.  During this admission he was seen by general surgery, and he did go for sacral wound debridement on 10/16/2021. He has been seen by infectious disease in consultation. Felt he has chronic osteomyelitis and plan is for IV antibiotics for up to 6 weeks. Currently being treated with IV vancomycin and oral Flagyl. He does have a PICC line in place.     10/22 noted with large bloody BMs, GI consulted. Tentative plan currently is for colonoscopy on Monday.     prognosis poor - met with hospice but declined to enrol. Subjective: Sitting up in bed having breakfast.  He is awake has some mild confusion but is easily reoriented. He is pleasant and cooperative to the best visibility. He denies any complaints at this time.     Medications:  Reviewed    Infusion Medications    dextrose      sodium chloride 25 mL (10/25/21 0989)    [Held by provider] lactated ringers 100 mL/hr at 10/20/21 2029     Scheduled Medications    insulin glargine  12 Units SubCUTAneous Nightly    amLODIPine  5 mg Oral Daily    polyethylene glycol  4,000 mL Oral Once    insulin lispro  0-12 Units SubCUTAneous TID WC    insulin lispro  0-6 Units SubCUTAneous Nightly    hydrocortisone  25 mg Rectal BID    metroNIDAZOLE  500 mg Oral 3 times per day    vancomycin  750 mg IntraVENous Q12H    donepezil  10 mg Oral Nightly    atorvastatin  10 mg Oral Nightly    vitamin B complex w/C  1 tablet Oral Daily    therapeutic multivitamin-minerals  1 tablet Oral Daily    insulin lispro  0.05 Units/kg SubCUTAneous TID      PRN Meds: LORazepam, glucose, dextrose, glucagon (rDNA), dextrose, sodium chloride flush, sodium chloride, potassium chloride **OR** potassium alternative oral replacement **OR** potassium chloride, magnesium sulfate, ondansetron **OR** ondansetron, acetaminophen **OR** acetaminophen, melatonin, HYDROmorphone      Intake/Output Summary (Last 24 hours) at 10/25/2021 1536  Last data filed at 10/25/2021 1524  Gross per 24 hour   Intake    Output 3375 ml   Net -3375 ml       Physical Exam Performed:    BP (!) 157/74   Pulse 86   Temp 98.8 °F (37.1 °C) (Axillary)   Resp 16   Ht 6' (1.829 m)   Wt 128 lb 3.2 oz (58.2 kg)   SpO2 94%   BMI 17.39 kg/m²     General appearance: chronically ill appearing, severely cachectic  HEENT: Pupils equal, round, and reactive to light. Conjunctivae/corneas clear. Neck: Supple,  No jugular venous distention. Trachea midline. Respiratory:  Bilateral breath sounds, without Rales/Wheezes/Rhonchi. No use of accessory muscles noted  Cardiovascular: Regular rate and rhythm with normal S1/S2 . Abdomen: Soft, non-tender, non-distended with normal bowel sounds. Musculoskeletal: No clubbing, cyanosis, trace lower extremity edema bilaterally. Full range of motion without deformity. Neurologic:  grossly intact, moves all four extremities.  Chronic facial tic  Psychiatric: Alert and oriented, thought content appropriate, normal insight. Labs:   Recent Labs     10/23/21  0450 10/23/21  0450 10/23/21  1840 10/24/21  0648 10/25/21  0606   WBC 8.0  --   --  10.9 11.1*   HGB 7.4*   < > 7.9* 8.0* 7.8*   HCT 21.1*   < > 23.6* 23.3* 23.2*     --   --  370 400    < > = values in this interval not displayed. Recent Labs     10/23/21  0450 10/24/21  0648 10/25/21  0606   * 129* 131*   K 4.4 4.7 4.5   CL 97* 94* 96*   CO2 29 29 29   BUN 16 13 13   CREATININE <0.5* <0.5* <0.5*   CALCIUM 7.9* 8.0* 8.0*     Recent Labs     10/23/21  0450 10/24/21  0648 10/25/21  0606   AST 30 32 36   ALT 33 34 34   BILITOT 0.3 0.3 0.4   ALKPHOS 99 114 119     No results for input(s): INR in the last 72 hours. No results for input(s): Jackie Trimble in the last 72 hours.     Urinalysis:      Lab Results   Component Value Date    NITRU Negative 10/15/2021    WBCUA 10-20 10/15/2021    BACTERIA 4+ 10/15/2021    RBCUA 5-10 10/15/2021    BLOODU SMALL 10/15/2021    SPECGRAV 1.015 10/15/2021    GLUCOSEU 250 10/15/2021 Radiology:  XR ABDOMEN (KUB) (SINGLE AP VIEW)   Final Result   Nonspecific abdominal bowel gas pattern with mild residual stool throughout   colon. XR CHEST PORTABLE   Final Result   Status post PICC line placement on the right in good position. Resolving central pulmonary congestion. Mild chronic elevation of the left hemidiaphragm which is unchanged with   slowly resolving bibasilar atelectasis. CT HEAD WO CONTRAST   Final Result      Stable study. No evidence for acute intracranial hemorrhage, territorial   infarction or intracranial mass lesion. Mild chronic microangiopathic ischemic disease. Mild generalized volume loss. CT PELVIS WO CONTRAST Additional Contrast? None   Final Result   1. Large deep sacral decubitus ulcer with mild adjacent cellulitis. No   abscess or soft tissue gas. 2. Osseous uncovering of the posterior aspect of the lower sacrum and coccyx   without convincing evidence of osteomyelitis. If clinically indicated   further evaluation could be obtained with MRI. XR CHEST PORTABLE   Final Result   Chronic elevation of the left hemidiaphragm which is unchanged with   increasing subsegmental atelectasis or early infiltrate along the left lung   base. Assessment/Plan:    Active Hospital Problems    Diagnosis     Tic disorder [F95.9]     Osteomyelitis ([address] [M86.9]     Chronic anemia [D64.9]     Severe sepsis ([address] [A41.9, R65.20]     Pressure injury of sacral region, unstageable ([address] [L89.150]     Lumbar stenosis with neurogenic claudication [M48.062]     Urinary retention [R33.9]     DM2 (diabetes mellitus, type 2) ([address] [E11.9]      GI bleed :  blood was reported in stool. Gastroenterology has been consulted and for colonoscopy on Monday to further evaluate. H/H is stable this morning. Will continue to follow closely, transfuse if needed. No active bleeding reported today.   Holding Lovenox.     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 ..    - s/p debridement 9/23 at [facility], cult + Enterococcus faecalis, Enterobacter cloacae, 3 anaerobesDischarged on 9/27 on po augmentin    -s/p debridement 10/6-cultures growing MRSA, Enterococcus, corynebacterium-   -Continue care per wound team general surgery  Infectious disease is following in consultation. Will need 6 weeks of antibiotic therapy.    -Antibiotics--- on IV vancomycin and oral Flagyl.        -Recent spine surgery, with concern for cauda equina syndrome due to postop hematoma     \"on 9/2 Dr. Clara Pride performed an L2-5 decompression and laminectomy at [place] [address], then postoperatively on 9/5 [facility] noted that the patient was unable to wiggle his toes and had BLE weakness to the point that he required maximum assistance x2 in order to stand.  An MRI was performed which showed severe spinal canal stenosis due to hematoma.  on 9/6 Dr. Jared Paniagua took him back to the OR for an urgent exploration and hematoma evacuation, left drains in place. eventually the patient was discharged to SNF on 9/10. readmitted 9/19-9/22 urinary retention, constipation, and ongoing leg weakness, we obtained a repeat MRI on this admission to [facility] [address] showed an ill-defind fluid/soft tissue causing severe spinal canal stenosis from L1-4, also moderate-severe spinal canal stenosis from L4 - S1, transferred to [facility] for neurosurgery eval-did not require spinal surgery but had debirdement of decub ulcer  -Acute metabolic encephalopathy. . Presented with increased confusion from baseline,Likely due to ongoing infection --continue supportive care. Mental status has improved, he is alert and cooperative at this time. Suspect he is close to his baseline status.   No family present during my visits this weekend and so unable to confirm this.     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 Supportive care      -Dementiaon Ariceptcontinue supportive care     -Chronic indwelling Jain-Indwelling jain since 9/19.  Jain replaced this admission 10/15     -DM type II--- continue insulin therapy follow sugars adjust as needed     -Essential hypertension-stablelisinopril held on admit   Blood pressure noted to be running a little high I have started low-dose Norvasc today and will follow BP.        DVT Prophylaxis: SCDs, Lovenox being held due to possible GI bleed  Diet: ADULT DIET;  Full Liquid  ADULT ORAL NUTRITION SUPPLEMENT; Breakfast, Dinner, Lunch; Standard High Calorie/High Protein Oral Supplement  Code Status: DNR-CCA      Dispo - s/p wound debridement, SNF when stable    Tati Chris MD

## 2021-10-25 NOTE — PROGRESS NOTES
[facility] GENERAL SURGERY    Surgery Progress Note           POD # 9    PATIENT NAME: Hillary Aguilar     TODAY'S DATE: 10/25/2021    INTERVAL HISTORY:    Afebrile, hemodynamically stable. No acute events overnight. Unable to tolerate prep overnight. Per RN, pt has 2 BM without blood. OBJECTIVE:   VITALS:  BP (!) 157/74   Pulse 86   Temp 98.8 °F (37.1 °C) (Axillary)   Resp 16   Ht 6' (1.829 m)   Wt 128 lb 3.2 oz (58.2 kg)   SpO2 94%   BMI 17.39 kg/m²     INTAKE/OUTPUT:    I/O last 3 completed shifts:  In: -   Out: 2250 [Urine:2250]  I/O this shift:  In: -   Out: 700 [Urine:700]              CONSTITUTIONAL:  awake and alert  INCISION: wound without necrotic tissue, fibrinous tissue with healthy granulation tissue most of the wound, bone exposed, no purulence  No stool with dressing change this AM.             Data:  CBC:   Recent Labs     10/23/21  0450 10/23/21  0450 10/23/21  1840 10/24/21  0648 10/25/21  0606   WBC 8.0  --   --  10.9 11.1*   HGB 7.4*   < > 7.9* 8.0* 7.8*   HCT 21.1*   < > 23.6* 23.3* 23.2*     --   --  370 400    < > = values in this interval not displayed. BMP:    Recent Labs     10/23/21  0450 10/24/21  0648 10/25/21  0606   * 129* 131*   K 4.4 4.7 4.5   CL 97* 94* 96*   CO2 29 29 29   BUN 16 13 13   CREATININE <0.5* <0.5* <0.5*   GLUCOSE 185* 177* 168*     Hepatic:   Recent Labs     10/23/21  0450 10/24/21  0648 10/25/21  0606   AST 30 32 36   ALT 33 34 34   BILITOT 0.3 0.3 0.4   ALKPHOS 99 114 119     Mag:      Recent Labs     10/23/21  0450 10/24/21  0648 10/25/21  0606   MG 2.10 2.10 2.20      Phos:   No results for input(s): PHOS in the last 72 hours. INR:   No results for input(s): INR in the last 72 hours. Radiology Review:  NA    ASSESSMENT AND PLAN:  80 y.o. male status post sacral wound debridement  - No further debridement required.  Continue with local wound care with wet to dry and abx per ID rec  - Pt will eventually will need diverting colostomy if family wants aggressive care. Low likelihood of healing completely and challenges for healing including malnutrition, osteomyelitis, lack of movement and DM.  - H/H stable this AM, no rectal bleeding overnight. GI was planning of csope however pt did not take his prep      Abdon Villeda MD  General Surgery Resident  10/25/21 8:58 AM  [phone]       Patient seen and agree with above. Continue wet to dry dressings. abx continued.     Norris Morrow MD

## 2021-10-26 ENCOUNTER — ANESTHESIA (OUTPATIENT)
Dept: ENDOSCOPY | Age: 86
DRG: 853 | End: 2021-10-26
Payer: MEDICARE

## 2021-10-26 ENCOUNTER — ANESTHESIA EVENT (OUTPATIENT)
Dept: ENDOSCOPY | Age: 86
DRG: 853 | End: 2021-10-26
Payer: MEDICARE

## 2021-10-26 VITALS — SYSTOLIC BLOOD PRESSURE: 85 MMHG | OXYGEN SATURATION: 97 % | DIASTOLIC BLOOD PRESSURE: 48 MMHG

## 2021-10-26 LAB
A/G RATIO: 0.7 (ref 1.1–2.2)
ALBUMIN SERPL-MCNC: 2.1 G/DL (ref 3.4–5)
ALP BLD-CCNC: 109 U/L (ref 40–129)
ALT SERPL-CCNC: 34 U/L (ref 10–40)
ANION GAP SERPL CALCULATED.3IONS-SCNC: 4 MMOL/L (ref 3–16)
ANISOCYTOSIS: ABNORMAL
AST SERPL-CCNC: 34 U/L (ref 15–37)
BANDED NEUTROPHILS RELATIVE PERCENT: 7 % (ref 0–7)
BASOPHILS ABSOLUTE: 0 K/UL (ref 0–0.2)
BASOPHILS RELATIVE PERCENT: 0 %
BILIRUB SERPL-MCNC: 0.3 MG/DL (ref 0–1)
BUN BLDV-MCNC: 11 MG/DL (ref 7–20)
CALCIUM SERPL-MCNC: 7.9 MG/DL (ref 8.3–10.6)
CHLORIDE BLD-SCNC: 97 MMOL/L (ref 99–110)
CO2: 29 MMOL/L (ref 21–32)
CREAT SERPL-MCNC: <0.5 MG/DL (ref 0.8–1.3)
EOSINOPHILS ABSOLUTE: 0.1 K/UL (ref 0–0.6)
EOSINOPHILS RELATIVE PERCENT: 1 %
GFR AFRICAN AMERICAN: >60
GFR NON-AFRICAN AMERICAN: >60
GLOBULIN: 3.2 G/DL
GLUCOSE BLD-MCNC: 170 MG/DL (ref 70–99)
GLUCOSE BLD-MCNC: 173 MG/DL (ref 70–99)
GLUCOSE BLD-MCNC: 175 MG/DL (ref 70–99)
GLUCOSE BLD-MCNC: 177 MG/DL (ref 70–99)
GLUCOSE BLD-MCNC: 235 MG/DL (ref 70–99)
HCT VFR BLD CALC: 22.5 % (ref 40.5–52.5)
HEMOGLOBIN: 7.7 G/DL (ref 13.5–17.5)
LYMPHOCYTES ABSOLUTE: 1.7 K/UL (ref 1–5.1)
LYMPHOCYTES RELATIVE PERCENT: 19 %
MACROCYTES: ABNORMAL
MAGNESIUM: 2 MG/DL (ref 1.8–2.4)
MCH RBC QN AUTO: 32.2 PG (ref 26–34)
MCHC RBC AUTO-ENTMCNC: 34.1 G/DL (ref 31–36)
MCV RBC AUTO: 94.5 FL (ref 80–100)
METAMYELOCYTES RELATIVE PERCENT: 1 %
MONOCYTES ABSOLUTE: 0.6 K/UL (ref 0–1.3)
MONOCYTES RELATIVE PERCENT: 7 %
MYELOCYTE PERCENT: 2 %
NEUTROPHILS ABSOLUTE: 6.5 K/UL (ref 1.7–7.7)
NEUTROPHILS RELATIVE PERCENT: 61 %
PDW BLD-RTO: 16.9 % (ref 12.4–15.4)
PERFORMED ON: ABNORMAL
PLATELET # BLD: 377 K/UL (ref 135–450)
PMV BLD AUTO: 6.2 FL (ref 5–10.5)
POLYCHROMASIA: ABNORMAL
POTASSIUM SERPL-SCNC: 4.1 MMOL/L (ref 3.5–5.1)
PROMYELOCYTES PERCENT: 2 %
RBC # BLD: 2.38 M/UL (ref 4.2–5.9)
SARS-COV-2, NAAT: NOT DETECTED
SODIUM BLD-SCNC: 130 MMOL/L (ref 136–145)
TOTAL PROTEIN: 5.3 G/DL (ref 6.4–8.2)
VANCOMYCIN TROUGH: 19.7 UG/ML (ref 10–20)
WBC # BLD: 8.9 K/UL (ref 4–11)

## 2021-10-26 PROCEDURE — 80053 COMPREHEN METABOLIC PANEL: CPT

## 2021-10-26 PROCEDURE — 6360000002 HC RX W HCPCS: Performed by: INTERNAL MEDICINE

## 2021-10-26 PROCEDURE — 83735 ASSAY OF MAGNESIUM: CPT

## 2021-10-26 PROCEDURE — 80202 ASSAY OF VANCOMYCIN: CPT

## 2021-10-26 PROCEDURE — 6370000000 HC RX 637 (ALT 250 FOR IP): Performed by: SURGERY

## 2021-10-26 PROCEDURE — 6360000002 HC RX W HCPCS: Performed by: NURSE ANESTHETIST, CERTIFIED REGISTERED

## 2021-10-26 PROCEDURE — 2709999900 HC NON-CHARGEABLE SUPPLY: Performed by: INTERNAL MEDICINE

## 2021-10-26 PROCEDURE — 2580000003 HC RX 258: Performed by: INTERNAL MEDICINE

## 2021-10-26 PROCEDURE — 99231 SBSQ HOSP IP/OBS SF/LOW 25: CPT | Performed by: SURGERY

## 2021-10-26 PROCEDURE — 3609010300 HC COLONOSCOPY W/BIOPSY SINGLE/MULTIPLE: Performed by: INTERNAL MEDICINE

## 2021-10-26 PROCEDURE — 3700000001 HC ADD 15 MINUTES (ANESTHESIA): Performed by: INTERNAL MEDICINE

## 2021-10-26 PROCEDURE — 7100000010 HC PHASE II RECOVERY - FIRST 15 MIN: Performed by: INTERNAL MEDICINE

## 2021-10-26 PROCEDURE — 0DJD8ZZ INSPECTION OF LOWER INTESTINAL TRACT, VIA NATURAL OR ARTIFICIAL OPENING ENDOSCOPIC: ICD-10-PCS | Performed by: INTERNAL MEDICINE

## 2021-10-26 PROCEDURE — 3700000000 HC ANESTHESIA ATTENDED CARE: Performed by: INTERNAL MEDICINE

## 2021-10-26 PROCEDURE — 1200000000 HC SEMI PRIVATE

## 2021-10-26 PROCEDURE — 6370000000 HC RX 637 (ALT 250 FOR IP): Performed by: INTERNAL MEDICINE

## 2021-10-26 PROCEDURE — 7100000011 HC PHASE II RECOVERY - ADDTL 15 MIN: Performed by: INTERNAL MEDICINE

## 2021-10-26 PROCEDURE — 88305 TISSUE EXAM BY PATHOLOGIST: CPT

## 2021-10-26 PROCEDURE — 85025 COMPLETE CBC W/AUTO DIFF WBC: CPT

## 2021-10-26 PROCEDURE — 87635 SARS-COV-2 COVID-19 AMP PRB: CPT

## 2021-10-26 PROCEDURE — 2580000003 HC RX 258: Performed by: SURGERY

## 2021-10-26 PROCEDURE — 2500000003 HC RX 250 WO HCPCS: Performed by: NURSE ANESTHETIST, CERTIFIED REGISTERED

## 2021-10-26 RX ORDER — PROPOFOL 10 MG/ML
INJECTION, EMULSION INTRAVENOUS PRN
Status: DISCONTINUED | OUTPATIENT
Start: 2021-10-26 | End: 2021-10-26 | Stop reason: SDUPTHER

## 2021-10-26 RX ORDER — LIDOCAINE HYDROCHLORIDE 20 MG/ML
INJECTION, SOLUTION INFILTRATION; PERINEURAL PRN
Status: DISCONTINUED | OUTPATIENT
Start: 2021-10-26 | End: 2021-10-26 | Stop reason: SDUPTHER

## 2021-10-26 RX ADMIN — SODIUM CHLORIDE, PRESERVATIVE FREE 10 ML: 5 INJECTION INTRAVENOUS at 22:23

## 2021-10-26 RX ADMIN — VANCOMYCIN HYDROCHLORIDE 750 MG: 750 INJECTION, POWDER, LYOPHILIZED, FOR SOLUTION INTRAVENOUS at 13:01

## 2021-10-26 RX ADMIN — VANCOMYCIN HYDROCHLORIDE 750 MG: 750 INJECTION, POWDER, LYOPHILIZED, FOR SOLUTION INTRAVENOUS at 22:32

## 2021-10-26 RX ADMIN — SODIUM CHLORIDE 25 ML: 9 INJECTION, SOLUTION INTRAVENOUS at 22:28

## 2021-10-26 RX ADMIN — PROPOFOL 50 MG: 10 INJECTION, EMULSION INTRAVENOUS at 11:01

## 2021-10-26 RX ADMIN — LORAZEPAM 0.5 MG: 2 INJECTION INTRAMUSCULAR; INTRAVENOUS at 03:56

## 2021-10-26 RX ADMIN — LIDOCAINE HYDROCHLORIDE 80 MG: 20 INJECTION, SOLUTION INFILTRATION; PERINEURAL at 11:01

## 2021-10-26 ASSESSMENT — PAIN SCALES - PAIN ASSESSMENT IN ADVANCED DEMENTIA (PAINAD)
CONSOLABILITY: 0
NEGVOCALIZATION: 1
BREATHING: 0
FACIALEXPRESSION: 0
BREATHING: 0
CONSOLABILITY: 0
BODYLANGUAGE: 0
BREATHING: 0
BREATHING: 0
TOTALSCORE: 1
NEGVOCALIZATION: 1
CONSOLABILITY: 0
NEGVOCALIZATION: 1
FACIALEXPRESSION: 0
TOTALSCORE: 1
TOTALSCORE: 1
BODYLANGUAGE: 0
TOTALSCORE: 1
BODYLANGUAGE: 0
TOTALSCORE: 1
BODYLANGUAGE: 0
CONSOLABILITY: 0
BREATHING: 0
NEGVOCALIZATION: 1
FACIALEXPRESSION: 0
FACIALEXPRESSION: 0
BODYLANGUAGE: 0
CONSOLABILITY: 0
NEGVOCALIZATION: 1
FACIALEXPRESSION: 0

## 2021-10-26 ASSESSMENT — PAIN DESCRIPTION - LOCATION: LOCATION: BUTTOCKS

## 2021-10-26 ASSESSMENT — PAIN SCALES - GENERAL
PAINLEVEL_OUTOF10: 0

## 2021-10-26 ASSESSMENT — PAIN DESCRIPTION - PAIN TYPE
TYPE: SURGICAL PAIN

## 2021-10-26 ASSESSMENT — PAIN SCALES - WONG BAKER
WONGBAKER_NUMERICALRESPONSE: 0

## 2021-10-26 NOTE — PROGRESS NOTES
Pt resting comfortably. Planning for colonoscopy later this AM.  Per RN, having frequent loose BMs which require dressing change to sacral wound. Will recheck wound tomorrow.   As before, will have to make decision with family at some point as to whether full aggressive wound care plan will pursued - will then need diverting colostomy    DTW

## 2021-10-26 NOTE — PROGRESS NOTES
Speech Language Pathology    SLP acknowledged order for evaluation, reviewed pt's chart. Pt currently NPO for colonoscopy this date. ST to continue to follow and re-attempt eval at a later time.     Danyell Charles M.S. [id number] [facility]  Speech-language pathologist  [phone]  Speech Desk Phone: [phone]

## 2021-10-26 NOTE — PROGRESS NOTES
Progress Note  Date:10/26/2021       Room:[facility]0545-  Patient Name:Carl A Darylene Freund     YOB: 1934     Age:87 y.o. Subjective    Subjective:  Symptoms:  Stable. Pain:  He reports no pain. Review of Systems  Objective         Vitals Last 24 Hours:  TEMPERATURE:  Temp  Av.4 °F (36.9 °C)  Min: 97.6 °F (36.4 °C)  Max: 99.1 °F (37.3 °C)  RESPIRATIONS RANGE: Resp  Av.2  Min: 16  Max: 20  PULSE OXIMETRY RANGE: SpO2  Av.4 %  Min: 94 %  Max: 98 %  PULSE RANGE: Pulse  Av  Min: 86  Max: 103  BLOOD PRESSURE RANGE: Systolic (40DKK), TWT:500 , Min:128 , ZOD:247   ; Diastolic (66NUM), OBK:45, Min:69, Max:74    I/O (24Hr): Intake/Output Summary (Last 24 hours) at 10/26/2021 0535  Last data filed at 10/26/2021 0301  Gross per 24 hour   Intake 240 ml   Output 2550 ml   Net -2310 ml     Objective:  General Appearance: In no acute distress and not in pain. Vital signs: (most recent): Blood pressure (!) 147/70, pulse 103, temperature 97.6 °F (36.4 °C), temperature source Oral, resp. rate 16, height 6' (1.829 m), weight 128 lb 3.2 oz (58.2 kg), SpO2 94 %. Heart: Normal rate. Regular rhythm. S1 normal and S2 normal.    Abdomen: Abdomen is soft. Bowel sounds are normal.   There is no abdominal tenderness. Labs/Imaging/Diagnostics    Labs:  CBC:  Recent Labs     10/23/21  1840 10/24/21  0648 10/25/21  0606   WBC  --  10.9 11.1*   RBC  --  2.54* 2.52*   HGB 7.9* 8.0* 7.8*   HCT 23.6* 23.3* 23.2*   MCV  --  91.9 92.0   RDW  --  16.4* 16.2*   PLT  --  370 400     CHEMISTRIES:  Recent Labs     10/24/21  0648 10/25/21  0606   * 131*   K 4.7 4.5   CL 94* 96*   CO2 29 29   BUN 13 13   CREATININE <0.5* <0.5*   GLUCOSE 177* 168*   MG 2.10 2.20     PT/INR:No results for input(s): PROTIME, INR in the last 72 hours. APTT:No results for input(s): APTT in the last 72 hours.   LIVER PROFILE:  Recent Labs     10/24/21  0648 10/25/21  0606   AST 32 36   ALT 34 34   BILITOT 0.3 0.4 ALKPHOS 114 119       Imaging Last 24 Hours:  No results found. Assessment//Plan           Hospital Problems         Last Modified POA    * (Principal) Pressure injury of sacral region, unstageable ([address] 10/22/2021 Yes    DM2 (diabetes mellitus, type 2) ([address] 10/15/2021 Yes    Lumbar stenosis with neurogenic claudication 10/15/2021 Yes    Overview Signed 9/19/2021  4:10 AM by Anel Burris MD     L2-5 decompression, laminectomy; 9-2-21;  Emergency exploration of lumbar laminectomy for epidural fluid collection; 9-6-21           Urinary retention 10/15/2021 Yes    Severe sepsis ([address] 10/16/2021 Yes    Osteomyelitis ([address] 10/16/2021 Yes    Chronic anemia 10/16/2021 Yes    Tic disorder 10/19/2021 Yes        Assessment & Plan  81 yo w HTN, DM, spinal stenosis and a large sacral ulcer, debrided, who has been having diarrhea w h/o constipation, associated w hematochezia. H/H 8/22. He refused prep last night. Is malnourished.     Plan:   1. Will only give Dulcolax tabs.   2. Colonoscopy in am     Lala Marte MD       (O) [phone]    Electronically signed by Lala Marte MD on 10/26/21 at 5:35 AM EDT

## 2021-10-26 NOTE — ANESTHESIA POSTPROCEDURE EVALUATION
Department of Anesthesiology  Postprocedure Note    Patient: Magaly Cazares  MRN: [id number]  YOB: 1934  Date of evaluation: 10/26/2021  Time:  12:59 PM     Procedure Summary     Date: 10/26/21 Room / Location: [address] [facility]    Anesthesia Start: 7036 Anesthesia Stop: 7687    Procedure: COLONOSCOPY WITH BIOPSY (N/A ) Diagnosis: (rectal bleeding and diarrhea)    Surgeons: Marlene Lai MD Responsible Provider: Emma Thacker MD    Anesthesia Type: MAC ASA Status: 4          Anesthesia Type: MAC    Heraclio Phase I: Heraclio Score: 10    Heraclio Phase II: Heraclio Score: 10    Last vitals: Reviewed and per EMR flowsheets.        Anesthesia Post Evaluation    Comments: Postoperative Anesthesia Note    Name:    Magaly Cazares  MRN:      [id number]    Patient Vitals in the past 12 hrs:  10/26/21 1200, BP:(!) 144/60, Pulse:83, Resp:16, SpO2:97 %  10/26/21 1154, BP:139/62, Pulse:84, Resp:18, SpO2:97 %  10/26/21 1140, BP:137/66, Pulse:85, Resp:18, SpO2:96 %  10/26/21 1135, BP:(!) 98/54, Pulse:88, Resp:18, SpO2:100 %  10/26/21 1130, BP:(!) 101/53, Pulse:79, Resp:18, SpO2:100 %  10/26/21 1123, BP:(!) 108/53, Pulse:81, Resp:18, SpO2:100 %  10/26/21 0805, BP:(!) 156/73, Temp:98.6 °F (37 °C), Temp src:Oral, Pulse:90, Resp:16, SpO2:100 %  10/26/21 0301, BP:(!) 147/70, Temp:97.6 °F (36.4 °C), Temp src:Oral, Pulse:103, Resp:16, SpO2:94 %     LABS:    CBC  Lab Results       Component                Value               Date/Time                  WBC                      8.9                 10/26/2021 06:25 AM        HGB                      7.7 (L)             10/26/2021 06:25 AM        HCT                      22.5 (L)            10/26/2021 06:25 AM        PLT                      377                 10/26/2021 06:25 AM   RENAL  Lab Results       Component                Value               Date/Time                  NA                       130 (L)             10/26/2021 06:25 AM        K 4.1                 10/26/2021 06:25 AM        K                        4.8                 10/15/2021 05:53 PM        CL                       97 (L)              10/26/2021 06:25 AM        CO2                      29                  10/26/2021 06:25 AM        BUN                      11                  10/26/2021 06:25 AM        CREATININE               <0.5 (L)            10/26/2021 06:25 AM        GLUCOSE                  175 (H)             10/26/2021 06:25 AM   COAGS  Lab Results       Component                Value               Date/Time                  PROTIME                  18.6 (H)            10/20/2021 05:29 PM        INR                      1.61 (H)            10/20/2021 05:29 PM     Intake & Output: In: 740 (P.O.:240; I.V.:500)  Out: 1075 (Urine:1075)    Nausea & Vomiting:  No    Level of Consciousness:  Awake    Pain Assessment:  Adequate analgesia    Anesthesia Complications:  No apparent anesthetic complications    SUMMARY      Vital signs stable  OK to discharge from Stage I post anesthesia care.   Care transferred from Anesthesiology department on discharge from perioperative area

## 2021-10-26 NOTE — PLAN OF CARE
Nutrition Problem #1: Increased nutrient needs  Intervention: Food and/or Nutrient Delivery: Continue Current Diet, Continue Oral Nutrition Supplement  Nutritional Goals: Consume 50% or greater of 3 meals per day and ONS during this admission.

## 2021-10-26 NOTE — PLAN OF CARE
Palliative Care Progress Note  Palliative Care Admit date:  10/19/21    Advance Directives:  DNRCC-A in place    Plan of care/goals:  Continuing to follow, s/p debridement. Plan:  Can engage pt/spouse/son in f/u discussion re: ongoing wishes &, to surgeons point, if they wish to pursue aggressive wound care and need for diverting colostomy.   Will d/w hospitalist      Reason for consult:    ___ Advance Care Planning  ___ Transition of Care Planning  ___ Psychosocial/Spiritual Support  ___ Symptom Management                                                                                                                                                Chelsey Bingham, RN

## 2021-10-26 NOTE — CARE COORDINATION
Writer received call from [place], she has been following pt's status in Epic and is now unsure if they will be able to accept; however, agreed to keep following, discharge plans remain unknown at this time. AMIRA, Suzi and IM involved.   BEBETO Torres-CLEMENTE

## 2021-10-26 NOTE — PROGRESS NOTES
Comprehensive Nutrition Assessment    Type and Reason for Visit:  Reassess    Nutrition Recommendations/Plan:   1. Continue carb control diet  2. Add double protein to meals  3. Continue glucerna TID  4. Monitor nutrition adequacy, pertinent labs, bowel habits, wt changes, and clinical progress    Nutrition Assessment:  Follow up: S/p colonscopy today. Pt with PO intakes of % of meals. Previously on full liquid diet. Pt's family member in room at time of visit, reports that pt does not like desserts. Would like if patient could choose his meals, RD to change to selective. Pt enjoys [place], will continue. No N/V. Will continue to monitor. Malnutrition Assessment:  Malnutrition Status: At risk for malnutrition (Comment)    Context:  Chronic Illness     Findings of the 6 clinical characteristics of malnutrition:  Energy Intake:  Mild decrease in energy intake (Comment)  Weight Loss:  1 - Mild weight loss (specify amount and time period)     Body Fat Loss:  1 - Mild body fat loss     Muscle Mass Loss:  1 - Mild muscle mass loss    Fluid Accumulation:  7 - Severe Extremities    Estimated Daily Nutrient Needs:  Energy (kcal):  4556-1718 kcals/day; Weight Used for Energy Requirements:  Ideal (81 kg)     Protein (g):   g/day; Weight Used for Protein Requirements:  Ideal (1.0-1.6 g/kg)        Method Used for Fluid Requirements:  1 ml/kcal      Nutrition Related Findings:  + 3 BM today, s/p colonscopy today. Wounds:  Pressure Injury, Multiple, Wound Vac (Pressure injury on sacrum and left and right heels.)       Current Nutrition Therapies:    ADULT DIET;  Regular; 5 carb choices (75 gm/meal)  ADULT ORAL NUTRITION SUPPLEMENT; AM Snack, PM Snack, HS Snack; Diabetic Oral Supplement    Anthropometric Measures:  · Height: 6' (182.9 cm)  · Current Body Weight: 131 lb (59.4 kg)   · Ideal Body Weight: 178 lbs; % Ideal Body Weight 68 %   · BMI: 17.8   · BMI Categories: Underweight (BMI less than 22) age over

## 2021-10-26 NOTE — H&P
Pre-sedation Assessment    History and Physical / Pre-Sedation Assessment  Patient:  Micah Pryor   :   1934     Intended Procedure: colonoscopy      HPI: 79 yo w HTN, DM, spinal stenosis and a large sacral ulcer, debrided, who has been having diarrhea w h/o constipation, associated w hematochezia. H/H . He refused prep last night.  Is malnourished.     Current Facility-Administered Medications   Medication Dose Route Frequency Provider Last Rate Last Admin    insulin glargine (LANTUS) injection vial 12 Units  12 Units SubCUTAneous Nightly Stanley Gibbs MD   12 Units at 10/25/21 2029    amLODIPine (NORVASC) tablet 5 mg  5 mg Oral Daily Stanley Gibbs MD   5 mg at 10/26/21 1019    polyethylene glycol (GoLYTELY) solution 4,000 mL  4,000 mL Oral Once Suellen Ruth MD        insulin lispro (HUMALOG) injection vial 0-12 Units  0-12 Units SubCUTAneous TID WC Stanley Gibbs MD   8 Units at 10/25/21 1746    insulin lispro (HUMALOG) injection vial 0-6 Units  0-6 Units SubCUTAneous Nightly Stanley Gibbs MD   3 Units at 10/25/21 2030    hydrocortisone (ANUSOL-HC) suppository 25 mg  25 mg Rectal BID Jyoti Car MD   25 mg at 10/26/21 6714    metroNIDAZOLE (FLAGYL) tablet 500 mg  500 mg Oral 3 times per day Dany Whitmore MD   500 mg at 10/26/21 9637    LORazepam (ATIVAN) injection 0.5 mg  0.5 mg IntraVENous Q6H PRN Angelita Ponce MD   0.5 mg at 10/26/21 0356    vancomycin (VANCOCIN) 750 mg in dextrose 5 % 250 mL IVPB  750 mg IntraVENous Q12H Theodore Holguin MD   Stopped at 10/25/21 2133    donepezil (ARICEPT) tablet 10 mg  10 mg Oral Nightly Jyoti Car MD   10 mg at 10/25/21 2028    atorvastatin (LIPITOR) tablet 10 mg  10 mg Oral Nightly Jyoti Car MD   10 mg at 10/25/21 2029    vitamin B complex w/C 1 tablet  1 tablet Oral Daily Jyoti Car MD   1 tablet at 10/24/21 1034    therapeutic multivitamin-minerals 1 tablet  1 tablet Oral Daily Danilo Gonzales Royal Citlali MD   1 tablet at 10/24/21 1034    glucose (GLUTOSE) 40 % oral gel 15 g  15 g Oral PRN Mark Baxter MD        dextrose 50 % IV solution  12.5 g IntraVENous PRN Mark Baxter MD        glucagon (rDNA) injection 1 mg  1 mg IntraMUSCular PRN Mark Baxter MD        dextrose 5 % solution  100 mL/hr IntraVENous PRN Mark Baxter MD        sodium chloride flush 0.9 % injection 10 mL  10 mL IntraVENous PRN Mark Baxter MD   10 mL at 10/25/21 2027    0.9 % sodium chloride infusion  25 mL IntraVENous ISAURAN Mark Baxter  mL/hr at 10/25/21 2026 25 mL at 10/25/21 2026    potassium chloride (KLOR-CON M) extended release tablet 40 mEq  40 mEq Oral PRN Mark Baxter MD        Or    potassium bicarb-citric acid (EFFER-K) effervescent tablet 40 mEq  40 mEq Oral PRN Mark Baxter MD        Or    potassium chloride 10 mEq/100 mL IVPB (Peripheral Line)  10 mEq IntraVENous PRN Mark Baxter MD        magnesium sulfate 1000 mg in dextrose 5% 100 mL IVPB  1,000 mg IntraVENous PRN Mark Baxter MD        ondansetron (ZOFRAN-ODT) disintegrating tablet 4 mg  4 mg Oral Q8H ISAURAN Mark Baxter MD        Or    [facility]dansetron [facility] injection 4 mg  4 mg IntraVENous Q6H PRN Mark Baxter MD   4 mg at 10/19/21 0806    acetaminophen (TYLENOL) tablet 650 mg  650 mg Oral Q6H PRN Mark Baxter MD   650 mg at 10/18/21 1155    Or    acetaminophen (TYLENOL) suppository 650 mg  650 mg Rectal Q6H PRN Mark Baxter MD        insulin lispro (HUMALOG) injection vial 3 Units  0.05 Units/kg SubCUTAneous TID ARIELLE Baxter MD   3 Units at 10/25/21 1745    melatonin tablet 3 mg  3 mg Oral Nightly PRASHWIN Baxter MD   3 mg at 10/22/21 2055    HYDROmorphone (DILAUDID) injection 0.5 mg  0.5 mg IntraVENous Q3H PRN Marianna [place] procedure. The patient understands and agrees to proceed.   Yes    Isiah White MD       [phone]          Isiah White MD  11:05 AM 10/26/2021

## 2021-10-26 NOTE — ANESTHESIA PRE PROCEDURE
Department of Anesthesiology  Preprocedure Note       Name:  Holli Situ   Age:  80 y.o.  :  1934                                          MRN:  [id number]         Date:  10/26/2021      Surgeon: Abdirahman Query):  Anaya Taylor MD    Procedure: Procedure(s):  COLONOSCOPY DIAGNOSTIC    Medications prior to admission:   Prior to Admission medications    Medication Sig Start Date End Date Taking? Authorizing Provider   insulin glargine (LANTUS) 100 UNIT/ML injection vial Inject 10 Units into the skin nightly   Yes Historical Provider, MD   melatonin 3 MG TABS tablet Take 3 mg by mouth nightly as needed   Yes Historical Provider, MD   oxybutynin (DITROPAN-XL) 10 MG extended release tablet Take 10 mg by mouth daily   Yes Historical Provider, MD   oxyCODONE 5 MG capsule Take 5 mg by mouth every 3 hours as needed for Pain.    Yes Historical Provider, MD   polyethylene glycol (GLYCOLAX) 17 g packet Take 17 g by mouth daily as needed for Constipation   Yes Historical Provider, MD   Chromium Picolinate 1000 MCG TABS Take 1,000 mcg by mouth daily   Yes Historical Provider, MD   cyanocobalamin 1000 MCG tablet Take 1,000 mcg by mouth daily   Yes Historical Provider, MD   amLODIPine (NORVASC) 10 MG tablet Take 10 mg by mouth daily   Yes Historical Provider, MD   Multiple Vitamins-Minerals (THERAPEUTIC MULTIVITAMIN-MINERALS) tablet Take 1 tablet by mouth daily   Yes Historical Provider, MD   donepezil (ARICEPT) 10 MG tablet Take 10 mg by mouth nightly   Yes Historical Provider, MD   senna-docusate (PERICOLACE) 8.6-50 MG per tablet Take 1 tablet by mouth 2 times daily   Yes Historical Provider, MD   b complex vitamins capsule Take 1 capsule by mouth daily   Yes Historical Provider, MD   methocarbamol (ROBAXIN) 500 MG tablet Take 500 mg by mouth every 6 hours as needed   Yes Historical Provider, MD   lisinopril (PRINIVIL;ZESTRIL) 20 MG tablet Take 20 mg by mouth nightly    Yes Historical Provider, MD   simvastatin (ZOCOR) 10 MG tablet Take 10 mg by mouth nightly. Yes Historical Provider, MD   hydrALAZINE (APRESOLINE) 20 MG/ML injection Infuse 25 mg intravenously every 8 hours as needed (for hypertension SBP >160)    Historical Provider, MD   docusate sodium (COLACE) 100 MG capsule Take 100 mg by mouth 2 times daily    Historical Provider, MD   tamsulosin (FLOMAX) 0.4 MG capsule Take 1 capsule by mouth daily 9/22/21   Emma Guy MD   zinc sulfate (ZINCATE) 220 (50 Zn) MG capsule Take 220 mg by mouth daily    Historical Provider, MD   acetaminophen (TYLENOL) 500 MG tablet Take 500 mg by mouth every 4 hours as needed     Historical Provider, MD   sitagliptan (JANUVIA) 100 MG tablet Take 100 mg by mouth daily. Historical Provider, MD   glipiZIDE (GLUCOTROL XL) 10 MG CR tablet Take 10 mg by mouth daily.     Historical Provider, MD       Current medications:    Current Facility-Administered Medications   Medication Dose Route Frequency Provider Last Rate Last Admin    insulin glargine (LANTUS) injection vial 12 Units  12 Units SubCUTAneous Nightly Santos Troncoso MD   12 Units at 10/25/21 2029    amLODIPine (NORVASC) tablet 5 mg  5 mg Oral Daily Santos Troncoso MD   5 mg at 10/26/21 1019    polyethylene glycol (GoLYTELY) solution 4,000 mL  4,000 mL Oral Once Scarlet Fuentes MD        insulin lispro (HUMALOG) injection vial 0-12 Units  0-12 Units SubCUTAneous TID WC Santos Troncoso MD   8 Units at 10/25/21 1746    insulin lispro (HUMALOG) injection vial 0-6 Units  0-6 Units SubCUTAneous Nightly Santos Troncoso MD   3 Units at 10/25/21 2030    hydrocortisone (ANUSOL-HC) suppository 25 mg  25 mg Rectal BID Margot Spann MD   25 mg at 10/26/21 0826    metroNIDAZOLE (FLAGYL) tablet 500 mg  500 mg Oral 3 times per day Michael Milian MD   500 mg at 10/26/21 1167    LORazepam (ATIVAN) injection 0.5 mg  0.5 mg IntraVENous Q6H PRN Vira Donaldson MD   0.5 mg at 10/26/21 0356    vancomycin (VANCOCIN) 750 mg in dextrose 5 % 250 mL Shine Wilcox MD   650 mg at 10/18/21 1155    Or    acetaminophen (TYLENOL) suppository 650 mg  650 mg Rectal Q6H PRN Kali Morales MD        insulin lispro (HUMALOG) injection vial 3 Units  0.05 Units/kg SubCUTAneous TID WC Kali Morales MD   3 Units at 10/25/21 1745    melatonin tablet 3 mg  3 mg Oral Nightly PRN Kali Morales MD   3 mg at 10/22/21 2055    HYDROmorphone (DILAUDID) injection 0.5 mg  0.5 mg IntraVENous Q3H PRN Kali Morales MD   0.5 mg at 10/22/21 4190    [Held by provider] lactated ringers infusion   IntraVENous Continuous Kali Morales  mL/hr at 10/20/21 2029 New Bag at 10/20/21 2029       Allergies:     Allergies   Allergen Reactions    Iodine Itching and Swelling       Problem List:    Patient Active Problem List   Diagnosis Code    DM2 (diabetes mellitus, type 2) ([facility] [address] E11.9    Hypertension I10    Localized osteoarthrosis, lower leg M17.10    HLD (hyperlipidemia) E78.5    Lumbar stenosis with neurogenic claudication M48.062    BPH (benign prostatic hyperplasia) N40.0    ROXI (acute kidney injury) ([address] N17.9    Urinary retention R33.9    Pressure injury of sacral region, unstageable ([address] L89.150    Severe sepsis (HCC) A41.9, R65.20    Osteomyelitis ([address] M86.9    Chronic anemia D64.9    Tic disorder F95.9       Past Medical History:        Diagnosis Date    Arthritis     Cancer ([address]     skin    Dry eyes, bilateral     Hyperlipidemia     Hypertension     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    MRSA (methicillin resistant staph aureus) culture positive 10/15/2021    urine    MRSA (methicillin resistant staph aureus) culture positive 10/15/2021    anus    Osteoporosis 01/01/2009    Type II or unspecified type diabetes mellitus without mention of complication, not stated as uncontrolled     Urinary incontinence        Past Surgical History:        Procedure Laterality Date    COLONOSCOPY  2002    diverticulosis    COLONOSCOPY  11/19/2012    diverticulosis    EYE SURGERY      micheal cataract    RECTAL SURGERY N/A 10/16/2021    SACRAL WOUND DEBRIDMENT performed by Kenyatta Cervantes MD at [address] History:    Social History     Tobacco Use    Smoking status: Never Smoker    Smokeless tobacco: Never Used   Substance Use Topics    Alcohol use: No                                Counseling given: Not Answered      Vital Signs (Current):   Vitals:    10/25/21 1920 10/25/21 2300 10/26/21 0301 10/26/21 0805   BP: (!) 149/69 134/74 (!) 147/70 (!) 156/73   Pulse: 100 93 103 90   Resp: 20 16 16 16   Temp: 99.1 °F (37.3 °C) 98.2 °F (36.8 °C) 97.6 °F (36.4 °C) 98.6 °F (37 °C)   TempSrc: Oral Oral Oral Oral   SpO2: 98% 95% 94% 100%   Weight:       Height:                                                  BP Readings from Last 3 Encounters:   10/26/21 (!) 156/73   10/16/21 (!) 144/60   09/22/21 119/63       NPO Status: Time of last liquid consumption: 2300                        Time of last solid consumption: 2300                        Date of last liquid consumption: 10/25/21                        Date of last solid food consumption: 10/25/21    BMI:   Wt Readings from Last 3 Encounters:   10/25/21 128 lb 3.2 oz (58.2 kg)   09/21/21 126 lb 5.2 oz (57.3 kg)   01/22/20 130 lb (59 kg)     Body mass index is 17.39 kg/m².     CBC:   Lab Results   Component Value Date    WBC 8.9 10/26/2021    RBC 2.38 10/26/2021    HGB 7.7 10/26/2021    HCT 22.5 10/26/2021    MCV 94.5 10/26/2021    RDW 16.9 10/26/2021     10/26/2021       CMP:   Lab Results   Component Value Date     10/26/2021    K 4.1 10/26/2021    K 4.8 10/15/2021    CL 97 10/26/2021    CO2 29 10/26/2021    BUN 11 10/26/2021    CREATININE <0.5 10/26/2021    GFRAA >60 10/26/2021    AGRATIO 0.7 10/26/2021    LABGLOM >60 10/26/2021    GLUCOSE 175 10/26/2021    PROT 5.3 10/26/2021 CALCIUM 7.9 10/26/2021    BILITOT 0.3 10/26/2021    ALKPHOS 109 10/26/2021    AST 34 10/26/2021    ALT 34 10/26/2021       POC Tests:   Recent Labs     10/26/21  0805   POCGLU 177*       Coags:   Lab Results   Component Value Date    PROTIME 18.6 10/20/2021    INR 1.61 10/20/2021       HCG (If Applicable): No results found for: PREGTESTUR, PREGSERUM, HCG, HCGQUANT     ABGs: No results found for: PHART, PO2ART, PUS6LDX, CIG3AGY, BEART, B7IENIOT     Type & Screen (If Applicable):  No results found for: LABABO, LABRH    Drug/Infectious Status (If Applicable):  No results found for: HIV, HEPCAB    COVID-19 Screening (If Applicable):   Lab Results   Component Value Date    COVID19 Not Detected 10/26/2021           Anesthesia Evaluation  Patient summary reviewed and Nursing notes reviewed no history of anesthetic complications:   Airway: Mallampati: III     Neck ROM: limited   Dental:          Pulmonary:                              Cardiovascular:    (+) hypertension:,                   Neuro/Psych:   (+) psychiatric history:            GI/Hepatic/Renal:             Endo/Other:    (+) Diabetes, . Abdominal:             Vascular: Other Findings:             Anesthesia Plan      MAC     ASA 4     (Medications & allergies reviewed  All available lab & EKG data reviewed)  Induction: intravenous. Anesthetic plan and risks discussed with patient. Plan discussed with CRNA.                   Naveen Conteh MD   10/26/2021

## 2021-10-27 LAB
A/G RATIO: 0.7 (ref 1.1–2.2)
ALBUMIN SERPL-MCNC: 2.1 G/DL (ref 3.4–5)
ALP BLD-CCNC: 99 U/L (ref 40–129)
ALT SERPL-CCNC: 33 U/L (ref 10–40)
ANION GAP SERPL CALCULATED.3IONS-SCNC: 7 MMOL/L (ref 3–16)
ANISOCYTOSIS: ABNORMAL
AST SERPL-CCNC: 34 U/L (ref 15–37)
ATYPICAL LYMPHOCYTE RELATIVE PERCENT: 6 % (ref 0–6)
BANDED NEUTROPHILS RELATIVE PERCENT: 8 % (ref 0–7)
BASOPHILS ABSOLUTE: 0 K/UL (ref 0–0.2)
BASOPHILS RELATIVE PERCENT: 0 %
BILIRUB SERPL-MCNC: 0.3 MG/DL (ref 0–1)
BUN BLDV-MCNC: 12 MG/DL (ref 7–20)
CALCIUM SERPL-MCNC: 7.8 MG/DL (ref 8.3–10.6)
CHLORIDE BLD-SCNC: 94 MMOL/L (ref 99–110)
CO2: 26 MMOL/L (ref 21–32)
CREAT SERPL-MCNC: 0.6 MG/DL (ref 0.8–1.3)
EOSINOPHILS ABSOLUTE: 0.1 K/UL (ref 0–0.6)
EOSINOPHILS RELATIVE PERCENT: 1 %
GFR AFRICAN AMERICAN: >60
GFR NON-AFRICAN AMERICAN: >60
GLOBULIN: 3.2 G/DL
GLUCOSE BLD-MCNC: 104 MG/DL (ref 70–99)
GLUCOSE BLD-MCNC: 113 MG/DL (ref 70–99)
GLUCOSE BLD-MCNC: 202 MG/DL (ref 70–99)
GLUCOSE BLD-MCNC: 206 MG/DL (ref 70–99)
GLUCOSE BLD-MCNC: 244 MG/DL (ref 70–99)
HCT VFR BLD CALC: 23.3 % (ref 40.5–52.5)
HEMOGLOBIN: 7.9 G/DL (ref 13.5–17.5)
HYPOCHROMIA: ABNORMAL
LYMPHOCYTES ABSOLUTE: 2.6 K/UL (ref 1–5.1)
LYMPHOCYTES RELATIVE PERCENT: 22 %
MACROCYTES: ABNORMAL
MAGNESIUM: 1.8 MG/DL (ref 1.8–2.4)
MCH RBC QN AUTO: 31.8 PG (ref 26–34)
MCHC RBC AUTO-ENTMCNC: 33.8 G/DL (ref 31–36)
MCV RBC AUTO: 94 FL (ref 80–100)
MONOCYTES ABSOLUTE: 0.7 K/UL (ref 0–1.3)
MONOCYTES RELATIVE PERCENT: 8 %
MYELOCYTE PERCENT: 1 %
NEUTROPHILS ABSOLUTE: 5.8 K/UL (ref 1.7–7.7)
NEUTROPHILS RELATIVE PERCENT: 54 %
PDW BLD-RTO: 17.1 % (ref 12.4–15.4)
PERFORMED ON: ABNORMAL
PLATELET # BLD: 389 K/UL (ref 135–450)
PMV BLD AUTO: 6 FL (ref 5–10.5)
POLYCHROMASIA: ABNORMAL
POTASSIUM SERPL-SCNC: 4 MMOL/L (ref 3.5–5.1)
RBC # BLD: 2.48 M/UL (ref 4.2–5.9)
SODIUM BLD-SCNC: 127 MMOL/L (ref 136–145)
TOTAL PROTEIN: 5.3 G/DL (ref 6.4–8.2)
WBC # BLD: 9.2 K/UL (ref 4–11)

## 2021-10-27 PROCEDURE — 80053 COMPREHEN METABOLIC PANEL: CPT

## 2021-10-27 PROCEDURE — 83735 ASSAY OF MAGNESIUM: CPT

## 2021-10-27 PROCEDURE — 6360000002 HC RX W HCPCS: Performed by: INTERNAL MEDICINE

## 2021-10-27 PROCEDURE — 99231 SBSQ HOSP IP/OBS SF/LOW 25: CPT | Performed by: INTERNAL MEDICINE

## 2021-10-27 PROCEDURE — 6370000000 HC RX 637 (ALT 250 FOR IP): Performed by: INTERNAL MEDICINE

## 2021-10-27 PROCEDURE — 6370000000 HC RX 637 (ALT 250 FOR IP): Performed by: SURGERY

## 2021-10-27 PROCEDURE — 2580000003 HC RX 258: Performed by: INTERNAL MEDICINE

## 2021-10-27 PROCEDURE — 99024 POSTOP FOLLOW-UP VISIT: CPT | Performed by: SURGERY

## 2021-10-27 PROCEDURE — 6360000002 HC RX W HCPCS: Performed by: SURGERY

## 2021-10-27 PROCEDURE — 2580000003 HC RX 258: Performed by: SURGERY

## 2021-10-27 PROCEDURE — 85025 COMPLETE CBC W/AUTO DIFF WBC: CPT

## 2021-10-27 PROCEDURE — 1200000000 HC SEMI PRIVATE

## 2021-10-27 RX ADMIN — SODIUM CHLORIDE, PRESERVATIVE FREE 10 ML: 5 INJECTION INTRAVENOUS at 08:58

## 2021-10-27 RX ADMIN — VANCOMYCIN HYDROCHLORIDE 750 MG: 750 INJECTION, POWDER, LYOPHILIZED, FOR SOLUTION INTRAVENOUS at 09:03

## 2021-10-27 RX ADMIN — VANCOMYCIN HYDROCHLORIDE 750 MG: 750 INJECTION, POWDER, LYOPHILIZED, FOR SOLUTION INTRAVENOUS at 20:50

## 2021-10-27 RX ADMIN — HYDROMORPHONE HYDROCHLORIDE 0.5 MG: 1 INJECTION, SOLUTION INTRAMUSCULAR; INTRAVENOUS; SUBCUTANEOUS at 21:42

## 2021-10-27 RX ADMIN — Medication 3 MG: at 01:27

## 2021-10-27 ASSESSMENT — PAIN SCALES - GENERAL
PAINLEVEL_OUTOF10: 0
PAINLEVEL_OUTOF10: 4
PAINLEVEL_OUTOF10: 0

## 2021-10-27 ASSESSMENT — PAIN SCALES - PAIN ASSESSMENT IN ADVANCED DEMENTIA (PAINAD)
TOTALSCORE: 0
BODYLANGUAGE: 0
CONSOLABILITY: 0
FACIALEXPRESSION: 0
BODYLANGUAGE: 0
CONSOLABILITY: 0
BREATHING: 0
NEGVOCALIZATION: 0
NEGVOCALIZATION: 0
BREATHING: 0
NEGVOCALIZATION: 0
FACIALEXPRESSION: 0
TOTALSCORE: 0
TOTALSCORE: 0
BREATHING: 0
BREATHING: 0
BODYLANGUAGE: 0
BREATHING: 0
BODYLANGUAGE: 0
NEGVOCALIZATION: 0
FACIALEXPRESSION: 0
CONSOLABILITY: 0
BODYLANGUAGE: 0
FACIALEXPRESSION: 0
FACIALEXPRESSION: 0
CONSOLABILITY: 0
FACIALEXPRESSION: 0
BODYLANGUAGE: 0
CONSOLABILITY: 0
NEGVOCALIZATION: 0
TOTALSCORE: 0
TOTALSCORE: 0
BREATHING: 0
TOTALSCORE: 0
CONSOLABILITY: 0
NEGVOCALIZATION: 0

## 2021-10-27 ASSESSMENT — PAIN DESCRIPTION - LOCATION
LOCATION: BUTTOCKS

## 2021-10-27 ASSESSMENT — PAIN SCALES - WONG BAKER
WONGBAKER_NUMERICALRESPONSE: 0

## 2021-10-27 ASSESSMENT — PAIN DESCRIPTION - ORIENTATION: ORIENTATION: MID

## 2021-10-27 ASSESSMENT — PAIN DESCRIPTION - PAIN TYPE
TYPE: SURGICAL PAIN

## 2021-10-27 NOTE — PROGRESS NOTES
Progress Note  Date:10/27/2021       Room:[facility]0545-  Patient Name:Juan Alberto Flores     YOB: 1934     Age:87 y.o. Subjective    Subjective:  Symptoms:  Improved. Pain:  He reports no pain. Review of Systems  Objective         Vitals Last 24 Hours:  TEMPERATURE:  Temp  Av.1 °F (36.7 °C)  Min: 97.5 °F (36.4 °C)  Max: 98.7 °F (37.1 °C)  RESPIRATIONS RANGE: Resp  Av.4  Min: 16  Max: 18  PULSE OXIMETRY RANGE: SpO2  Av.2 %  Min: 97 %  Max: 100 %  PULSE RANGE: Pulse  Av.6  Min: 85  Max: 96  BLOOD PRESSURE RANGE: Systolic (12PQG), HGA:680 , Min:129 , ANJ:808   ; Diastolic (20VXO), KOJ:10, Min:66, Max:73    I/O (24Hr): Intake/Output Summary (Last 24 hours) at 10/27/2021 1218  Last data filed at 10/27/2021 0643  Gross per 24 hour   Intake    Output 1440 ml   Net -1440 ml     Objective:  General Appearance: In no acute distress and not in pain. Vital signs: (most recent): Blood pressure 129/66, pulse 90, temperature 97.8 °F (36.6 °C), temperature source Oral, resp. rate 16, height 6' (1.829 m), weight 128 lb 3.2 oz (58.2 kg), SpO2 98 %. Abdomen: Abdomen is soft. Bowel sounds are normal.   There is no abdominal tenderness. Labs/Imaging/Diagnostics    Labs:  CBC:  Recent Labs     10/25/21  0606 10/26/21  0625 10/27/21  0806   WBC 11.1* 8.9 9.2   RBC 2.52* 2.38* 2.48*   HGB 7.8* 7.7* 7.9*   HCT 23.2* 22.5* 23.3*   MCV 92.0 94.5 94.0   RDW 16.2* 16.9* 17.1*    377 389     CHEMISTRIES:  Recent Labs     10/25/21  0606 10/26/21  0625 10/27/21  0806   * 130* 127*   K 4.5 4.1 4.0   CL 96* 97* 94*   CO2 29 29 26   BUN 13 11 12   CREATININE <0.5* <0.5* 0.6*   GLUCOSE 168* 175* 104*   MG 2.20 2.00 1.80     PT/INR:No results for input(s): PROTIME, INR in the last 72 hours. APTT:No results for input(s): APTT in the last 72 hours.   LIVER PROFILE:  Recent Labs     10/25/21  0606 10/26/21  0625 10/27/21  0806   AST 36 34 34   ALT 34 34 33   BILITOT 0.4 0.3 0.3 ALKPHOS 119 109 99       Imaging Last 24 Hours:  No results found. Assessment//Plan           Hospital Problems         Last Modified POA    * (Principal) Pressure injury of sacral region, unstageable ([address] 10/22/2021 Yes    DM2 (diabetes mellitus, type 2) ([address] 10/15/2021 Yes    Lumbar stenosis with neurogenic claudication 10/15/2021 Yes    Overview Signed 9/19/2021  4:10 AM by Lesly Amezcua MD     L2-5 decompression, laminectomy; 9-2-21;  Emergency exploration of lumbar laminectomy for epidural fluid collection; 9-6-21           Urinary retention 10/15/2021 Yes    Severe sepsis ([address] 10/16/2021 Yes    Osteomyelitis ([address] 10/16/2021 Yes    Chronic anemia 10/16/2021 Yes    Tic disorder 10/19/2021 Yes        Assessment & Plan  79 yo w HTN, DM, spinal stenosis and a large sacral ulcer, debrided, who has been having diarrhea w h/o constipation, associated w hematochezia. H/H 8/22. Is malnourished. Colonoscopy 10/26 revealed a normal colon/TI except internal hemorrhoids w distal rectal ulceration, probably stercoral, not biopsied. His diarrhea/bleeding resolved since the colonoscopy.     Plan:   1. Supportive care  2.  Would avoid constipation going forward     Champ Shield, MD Minda Dandy) [phone]    Electronically signed by Harinder Lynch MD on 10/27/21 at 12:18 PM EDT

## 2021-10-27 NOTE — PROGRESS NOTES
Infectious Disease Follow up Notes    CC :   Infected sacral ulcer      Antibiotics:   Flagyl 500 po TID   vanc 750 q12    Admit Date:   10/15/2021  Hospital Day: 13    Subjective:   He remains afebrile on RA   Feels comfortable  No new concerns     Objective:     Patient Vitals for the past 8 hrs:   BP Temp Temp src Pulse Resp SpO2   10/27/21 0856 129/66 97.8 °F (36.6 °C) Oral 90 16 98 %       EXAM:  General:  Alert, NAD   Frail, pale     LUNGS:   Non-labored breathing       EXT: No focal rash        LINE: PICC WAQAS Avilez in place        Scheduled Meds:   insulin glargine  12 Units SubCUTAneous Nightly    amLODIPine  5 mg Oral Daily    polyethylene glycol  4,000 mL Oral Once    insulin lispro  0-12 Units SubCUTAneous TID WC    insulin lispro  0-6 Units SubCUTAneous Nightly    hydrocortisone  25 mg Rectal BID    metroNIDAZOLE  500 mg Oral 3 times per day    vancomycin  750 mg IntraVENous Q12H    donepezil  10 mg Oral Nightly    atorvastatin  10 mg Oral Nightly    vitamin B complex w/C  1 tablet Oral Daily    therapeutic multivitamin-minerals  1 tablet Oral Daily    insulin lispro  0.05 Units/kg SubCUTAneous TID        Continuous Infusions:   dextrose      sodium chloride 25 mL (10/26/21 2228)    [Held by provider] lactated ringers 100 mL/hr at 10/20/21 2029          Data Review:    Lab Results   Component Value Date    WBC 9.2 10/27/2021    HGB 7.9 (L) 10/27/2021    HCT 23.3 (L) 10/27/2021    MCV 94.0 10/27/2021     10/27/2021     Lab Results   Component Value Date    CREATININE 0.6 (L) 10/27/2021    BUN 12 10/27/2021     (L) 10/27/2021    K 4.0 10/27/2021    CL 94 (L) 10/27/2021    CO2 26 10/27/2021       Hepatic Function Panel:   Lab Results   Component Value Date    ALKPHOS 99 10/27/2021    ALT 33 10/27/2021    AST 34 10/27/2021    PROT 5.3 10/27/2021    BILITOT 0.3 10/27/2021    LABALBU 2.1 10/27/2021 osteoarthrosis, lower leg 09/18/2014    DM2 (diabetes mellitus, type 2) [facility] [address] [address]     Hypertension        Hx DM, HTN, HLD, lumbar stenosis, dementia     L2-5 lumbar surg 7/3/08WQG  Complicated by post-op hematoma, return to OR for debridement 9/5/21      Extensive sacral PU  S/p debridement 9/23/21 at [address], culture with+ Enterococcus faecalis, Enterobacter cloacae, 3 anaerobes  Discharged on 9/27/21 on po Augmentin    Readmitted 10/15/21 with encephalopathy, deteriorating sacral wound, leukocytosis   S/p I&D down to bone 10/16/21  Bone culture is positive c/w diagnosis OM   Polymicrobial infection     Otoniel heel pressure ulcers     MRSA nasal colonization   Had 5d nasal mupirocin      No abx allergies     Plan:   Continue IV vanc, po flagyl for sacral OM   CHARLENE completed     Discussion of divert colostomy to Sgy team     Maintaining the jain is probably in his best interest to promote wound healing       Recommended Follow-up, Labs or Other Treatments After Discharge:      ID INFUSION   Vancomycin 750mg IV q12 continued through 11/16/21 along with flagyl 500 po TID   Weekly CBC diff, CMP, CRP, vanc trough faxed [phone]   Routine CVC care  See Dr. Adrienne Woods in 3-4 weeks, call with concerns [phone]   Shyla Billingsley MD           D/w son at bedside   I will sign off         Liz Madrigal MD  Phone: [phone]   Fax : [phone]

## 2021-10-27 NOTE — PROGRESS NOTES
Discussed with patient and wife that size and location of sacral wound would typically recommend diverting colostomy to prevent repeat soilage of wound and thus enhance chances of healing and reduce likelihood of needing further debridements. Also discussed that this procedure also has risks and given his other comorbidities the wound may not heal and could require further debridements despite this. They plan to think about the options and wish for me to discuss with them and their son tomorrow.     Petey Pierson MD

## 2021-10-27 NOTE — PROGRESS NOTES
Speech Language Pathology  Per discussion with RN, pt is tolerating a Regular, thin liquid diet with no concerns for dysphagia. Pt was seen by speech at the end of September and recommended a Regular, thin diet with no further ST services warranted. Please discontinue order per protocol. If concerns for dysphagia, please re-consult. Thank you.    Zia Fisher M.A, CCC-SLP/ CBIS     '

## 2021-10-28 LAB
A/G RATIO: 0.7 (ref 1.1–2.2)
ALBUMIN SERPL-MCNC: 2.3 G/DL (ref 3.4–5)
ALP BLD-CCNC: 103 U/L (ref 40–129)
ALT SERPL-CCNC: 32 U/L (ref 10–40)
ANION GAP SERPL CALCULATED.3IONS-SCNC: 10 MMOL/L (ref 3–16)
ANISOCYTOSIS: ABNORMAL
AST SERPL-CCNC: 32 U/L (ref 15–37)
ATYPICAL LYMPHOCYTE RELATIVE PERCENT: 1 % (ref 0–6)
BANDED NEUTROPHILS RELATIVE PERCENT: 10 % (ref 0–7)
BASOPHILS ABSOLUTE: 0 K/UL (ref 0–0.2)
BASOPHILS RELATIVE PERCENT: 0 %
BILIRUB SERPL-MCNC: <0.2 MG/DL (ref 0–1)
BUN BLDV-MCNC: 12 MG/DL (ref 7–20)
CALCIUM SERPL-MCNC: 7.8 MG/DL (ref 8.3–10.6)
CHLORIDE BLD-SCNC: 95 MMOL/L (ref 99–110)
CO2: 26 MMOL/L (ref 21–32)
CREAT SERPL-MCNC: <0.5 MG/DL (ref 0.8–1.3)
EOSINOPHILS ABSOLUTE: 0.5 K/UL (ref 0–0.6)
EOSINOPHILS RELATIVE PERCENT: 5 %
GFR AFRICAN AMERICAN: >60
GFR NON-AFRICAN AMERICAN: >60
GLOBULIN: 3.3 G/DL
GLUCOSE BLD-MCNC: 127 MG/DL (ref 70–99)
GLUCOSE BLD-MCNC: 219 MG/DL (ref 70–99)
GLUCOSE BLD-MCNC: 334 MG/DL (ref 70–99)
GLUCOSE BLD-MCNC: 68 MG/DL (ref 70–99)
GLUCOSE BLD-MCNC: 71 MG/DL (ref 70–99)
HCT VFR BLD CALC: 23.9 % (ref 40.5–52.5)
HEMOGLOBIN: 8.1 G/DL (ref 13.5–17.5)
LYMPHOCYTES ABSOLUTE: 1.8 K/UL (ref 1–5.1)
LYMPHOCYTES RELATIVE PERCENT: 18 %
MAGNESIUM: 1.8 MG/DL (ref 1.8–2.4)
MCH RBC QN AUTO: 31.8 PG (ref 26–34)
MCHC RBC AUTO-ENTMCNC: 34 G/DL (ref 31–36)
MCV RBC AUTO: 93.8 FL (ref 80–100)
METAMYELOCYTES RELATIVE PERCENT: 4 %
MONOCYTES ABSOLUTE: 0.5 K/UL (ref 0–1.3)
MONOCYTES RELATIVE PERCENT: 5 %
NEUTROPHILS ABSOLUTE: 6.9 K/UL (ref 1.7–7.7)
NEUTROPHILS RELATIVE PERCENT: 57 %
PDW BLD-RTO: 16.7 % (ref 12.4–15.4)
PERFORMED ON: ABNORMAL
PERFORMED ON: NORMAL
PLATELET # BLD: 478 K/UL (ref 135–450)
PMV BLD AUTO: 5.9 FL (ref 5–10.5)
POTASSIUM SERPL-SCNC: 3.6 MMOL/L (ref 3.5–5.1)
RBC # BLD: 2.55 M/UL (ref 4.2–5.9)
SODIUM BLD-SCNC: 131 MMOL/L (ref 136–145)
TOTAL PROTEIN: 5.6 G/DL (ref 6.4–8.2)
VANCOMYCIN TROUGH: 18.8 UG/ML (ref 10–20)
WBC # BLD: 9.7 K/UL (ref 4–11)

## 2021-10-28 PROCEDURE — 2580000003 HC RX 258: Performed by: SURGERY

## 2021-10-28 PROCEDURE — 6370000000 HC RX 637 (ALT 250 FOR IP): Performed by: SURGERY

## 2021-10-28 PROCEDURE — 99024 POSTOP FOLLOW-UP VISIT: CPT | Performed by: SURGERY

## 2021-10-28 PROCEDURE — 6360000002 HC RX W HCPCS: Performed by: INTERNAL MEDICINE

## 2021-10-28 PROCEDURE — 2580000003 HC RX 258: Performed by: INTERNAL MEDICINE

## 2021-10-28 PROCEDURE — 36592 COLLECT BLOOD FROM PICC: CPT

## 2021-10-28 PROCEDURE — 80053 COMPREHEN METABOLIC PANEL: CPT

## 2021-10-28 PROCEDURE — 80202 ASSAY OF VANCOMYCIN: CPT

## 2021-10-28 PROCEDURE — 85025 COMPLETE CBC W/AUTO DIFF WBC: CPT

## 2021-10-28 PROCEDURE — 1200000000 HC SEMI PRIVATE

## 2021-10-28 PROCEDURE — 6360000002 HC RX W HCPCS: Performed by: SURGERY

## 2021-10-28 PROCEDURE — 83735 ASSAY OF MAGNESIUM: CPT

## 2021-10-28 PROCEDURE — 6370000000 HC RX 637 (ALT 250 FOR IP): Performed by: INTERNAL MEDICINE

## 2021-10-28 RX ADMIN — SODIUM CHLORIDE, PRESERVATIVE FREE 10 ML: 5 INJECTION INTRAVENOUS at 21:24

## 2021-10-28 RX ADMIN — VANCOMYCIN HYDROCHLORIDE 750 MG: 750 INJECTION, POWDER, LYOPHILIZED, FOR SOLUTION INTRAVENOUS at 08:33

## 2021-10-28 RX ADMIN — HYDROMORPHONE HYDROCHLORIDE 0.5 MG: 1 INJECTION, SOLUTION INTRAMUSCULAR; INTRAVENOUS; SUBCUTANEOUS at 22:19

## 2021-10-28 RX ADMIN — VANCOMYCIN HYDROCHLORIDE 750 MG: 750 INJECTION, POWDER, LYOPHILIZED, FOR SOLUTION INTRAVENOUS at 21:25

## 2021-10-28 ASSESSMENT — PAIN SCALES - GENERAL
PAINLEVEL_OUTOF10: 0
PAINLEVEL_OUTOF10: 4

## 2021-10-28 NOTE — PROGRESS NOTES
Progress Note  Date:10/28/2021       Room:[facility]0545-  Patient Name:Juan Alberto Garcia     YOB: 1934     Age:87 y.o. Subjective    Subjective:  Symptoms:  Stable. Pain:  He reports no pain. Review of Systems  Objective         Vitals Last 24 Hours:  TEMPERATURE:  Temp  Av.3 °F (36.8 °C)  Min: 97.6 °F (36.4 °C)  Max: 98.9 °F (37.2 °C)  RESPIRATIONS RANGE: Resp  Av  Min: 16  Max: 16  PULSE OXIMETRY RANGE: SpO2  Av %  Min: 96 %  Max: 98 %  PULSE RANGE: Pulse  Av.5  Min: 83  Max: 104  BLOOD PRESSURE RANGE: Systolic (72OBT), JVV:297 , Min:129 , AXT:561   ; Diastolic (47KIK), LJU:56, Min:62, Max:76    I/O (24Hr): Intake/Output Summary (Last 24 hours) at 10/28/2021 1105  Last data filed at 10/28/2021 0530  Gross per 24 hour   Intake    Output 1250 ml   Net -1250 ml     Objective:  General Appearance: In no acute distress and not in pain. Vital signs: (most recent): Blood pressure (!) 165/76, pulse 86, temperature 98.6 °F (37 °C), temperature source Oral, resp. rate 16, height 6' (1.829 m), weight 125 lb 11.2 oz (57 kg), SpO2 97 %. Abdomen: Abdomen is soft. Bowel sounds are normal.   There is no abdominal tenderness. Labs/Imaging/Diagnostics    Labs:  CBC:  Recent Labs     10/26/21  0625 10/27/21  0806 10/28/21  0500   WBC 8.9 9.2 9.7   RBC 2.38* 2.48* 2.55*   HGB 7.7* 7.9* 8.1*   HCT 22.5* 23.3* 23.9*   MCV 94.5 94.0 93.8   RDW 16.9* 17.1* 16.7*    389 478*     CHEMISTRIES:  Recent Labs     10/26/21  0625 10/27/21  0806 10/28/21  0500   * 127* 131*   K 4.1 4.0 3.6   CL 97* 94* 95*   CO2 29 26 26   BUN 11 12 12   CREATININE <0.5* 0.6* <0.5*   GLUCOSE 175* 104* 68*   MG 2.00 1.80 1.80     PT/INR:No results for input(s): PROTIME, INR in the last 72 hours. APTT:No results for input(s): APTT in the last 72 hours.   LIVER PROFILE:  Recent Labs     10/26/21  0625 10/27/21  0806 10/28/21  0500   AST 34 34 32   ALT 34 33 32   BILITOT 0.3 0.3 <0.2 ALKPHOS 109 99 103       Imaging Last 24 Hours:  No results found. Assessment//Plan           Hospital Problems         Last Modified POA    * (Principal) Pressure injury of sacral region, unstageable ([address] 10/22/2021 Yes    DM2 (diabetes mellitus, type 2) ([address] 10/15/2021 Yes    Lumbar stenosis with neurogenic claudication 10/15/2021 Yes    Overview Signed 9/19/2021  4:10 AM by Michael Myers MD     L2-5 decompression, laminectomy; 9-2-21;  Emergency exploration of lumbar laminectomy for epidural fluid collection; 9-6-21           Urinary retention 10/15/2021 Yes    Severe sepsis ([address] 10/16/2021 Yes    Osteomyelitis ([address] 10/16/2021 Yes    Chronic anemia 10/16/2021 Yes    Tic disorder 10/19/2021 Yes        Assessment & Plan  79 yo w HTN, DM, spinal stenosis and a large sacral ulcer, debrided, who has been having diarrhea w h/o constipation, associated w hematochezia. H/H 8/22. Is malnourished. Colonoscopy 10/26 revealed a normal colon/TI except internal hemorrhoids w distal rectal ulceration, probably stercoral, not biopsied. His diarrhea/bleeding resolved since the colonoscopy.   - Supportive care  - Needs to have daily bm's (consider Miralax)    Charles Love MD       (O) [phone]         Electronically signed by Charles Love MD on 10/28/21 at 11:05 AM EDT

## 2021-10-28 NOTE — PROGRESS NOTES
ED.  During this admission he was seen by general surgery, and he did go for sacral wound debridement on 10/16/2021. He has been seen by infectious disease in consultation. Felt he has chronic osteomyelitis and plan is for IV antibiotics for up to 6 weeks. Currently being treated with IV vancomycin and oral Flagyl. He does have a PICC line in place.     10/22 noted with large bloody BMs, GI consulted. Tentative plan currently is for colonoscopy on Monday.     prognosis poor - met with hospice but declined to enrol. Subjective:  Patient son and wife at bedside, discussed plan of care, patient is awake. He is pleasant and cooperative. He denies any complaints at this time.     Medications:  Reviewed    Infusion Medications    dextrose      sodium chloride 25 mL (10/26/21 2228)    [Held by provider] lactated ringers 100 mL/hr at 10/20/21 2029     Scheduled Medications    insulin glargine  12 Units SubCUTAneous Nightly    amLODIPine  5 mg Oral Daily    polyethylene glycol  4,000 mL Oral Once    insulin lispro  0-12 Units SubCUTAneous TID WC    insulin lispro  0-6 Units SubCUTAneous Nightly    hydrocortisone  25 mg Rectal BID    metroNIDAZOLE  500 mg Oral 3 times per day    vancomycin  750 mg IntraVENous Q12H    donepezil  10 mg Oral Nightly    atorvastatin  10 mg Oral Nightly    vitamin B complex w/C  1 tablet Oral Daily    therapeutic multivitamin-minerals  1 tablet Oral Daily    insulin lispro  0.05 Units/kg SubCUTAneous TID WC     PRN Meds: LORazepam, glucose, dextrose, glucagon (rDNA), dextrose, sodium chloride flush, sodium chloride, potassium chloride **OR** potassium alternative oral replacement **OR** potassium chloride, magnesium sulfate, ondansetron **OR** ondansetron, acetaminophen **OR** acetaminophen, melatonin, HYDROmorphone      Intake/Output Summary (Last 24 hours) at 10/27/2021 2010  Last data filed at 10/27/2021 1521  Gross per 24 hour   Intake    Output 1170 ml   Net -1170 ml Physical Exam Performed:    BP (!) 153/66   Pulse 83   Temp 97.6 °F (36.4 °C) (Oral)   Resp 16   Ht 6' (1.829 m)   Wt 128 lb 3.2 oz (58.2 kg)   SpO2 96%   BMI 17.39 kg/m²     General appearance: chronically ill appearing, severely cachectic  HEENT: Pupils equal, round, and reactive to light. Conjunctivae/corneas clear. Neck: Supple,  No jugular venous distention. Trachea midline. Respiratory:  Bilateral breath sounds, without Rales/Wheezes/Rhonchi. No use of accessory muscles noted  Cardiovascular: Regular rate and rhythm with normal S1/S2 . Abdomen: Soft, non-tender, non-distended with normal bowel sounds. Musculoskeletal: No clubbing, cyanosis, trace lower extremity edema bilaterally. Full range of motion without deformity. Neurologic:  grossly intact, moves all four extremities.  Chronic facial tic  Psychiatric: Alert and oriented x 3, thought content appropriate, normal insight. Labs:   Recent Labs     10/25/21  0606 10/26/21  0625 10/27/21  0806   WBC 11.1* 8.9 9.2   HGB 7.8* 7.7* 7.9*   HCT 23.2* 22.5* 23.3*    377 389     Recent Labs     10/25/21  0606 10/26/21  0625 10/27/21  0806   * 130* 127*   K 4.5 4.1 4.0   CL 96* 97* 94*   CO2 29 29 26   BUN 13 11 12   CREATININE <0.5* <0.5* 0.6*   CALCIUM 8.0* 7.9* 7.8*     Recent Labs     10/25/21  0606 10/26/21  0625 10/27/21  0806   AST 36 34 34   ALT 34 34 33   BILITOT 0.4 0.3 0.3   ALKPHOS 119 109 99     No results for input(s): INR in the last 72 hours. No results for input(s): Kobe Shackle in the last 72 hours. Urinalysis:      Lab Results   Component Value Date    NITRU Negative 10/15/2021    WBCUA 10-20 10/15/2021    BACTERIA 4+ 10/15/2021    RBCUA 5-10 10/15/2021    BLOODU SMALL 10/15/2021    SPECGRAV 1.015 10/15/2021    GLUCOSEU 250 10/15/2021       Radiology:  XR ABDOMEN (KUB) (SINGLE AP VIEW)   Final Result   Nonspecific abdominal bowel gas pattern with mild residual stool throughout   colon.          XR CHEST PORTABLE   Final Result   Status post PICC line placement on the right in good position. Resolving central pulmonary congestion. Mild chronic elevation of the left hemidiaphragm which is unchanged with   slowly resolving bibasilar atelectasis. CT HEAD WO CONTRAST   Final Result      Stable study. No evidence for acute intracranial hemorrhage, territorial   infarction or intracranial mass lesion. Mild chronic microangiopathic ischemic disease. Mild generalized volume loss. CT PELVIS WO CONTRAST Additional Contrast? None   Final Result   1. Large deep sacral decubitus ulcer with mild adjacent cellulitis. No   abscess or soft tissue gas. 2. Osseous uncovering of the posterior aspect of the lower sacrum and coccyx   without convincing evidence of osteomyelitis. If clinically indicated   further evaluation could be obtained with MRI. XR CHEST PORTABLE   Final Result   Chronic elevation of the left hemidiaphragm which is unchanged with   increasing subsegmental atelectasis or early infiltrate along the left lung   base. Assessment/Plan:    Active Hospital Problems    Diagnosis     Tic disorder [F95.9]     Osteomyelitis ([address] [M86.9]     Chronic anemia [D64.9]     Severe sepsis ([address] [A41.9, R65.20]     Pressure injury of sacral region, unstageable ([address] [L89.150]     Lumbar stenosis with neurogenic claudication [M48.062]     Urinary retention [R33.9]     DM2 (diabetes mellitus, type 2) ([address] [E11.9]      GI bleed :  blood was reported in stool. Gastroenterology has been consulted and for colonoscopy on Monday to further evaluate. H/H is stable this morning. Will continue to follow closely, transfuse if needed. No active bleeding reported today. Holding Lovenox.     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 ..    - s/p debridement 9/23 at [facility], cult + Enterococcus faecalis, Enterobacter cloacae, 3 anaerobesDischarged on 9/27 on po augmentin    -s/p debridement 10/6-cultures growing MRSA, Enterococcus, corynebacterium-   -Continue care per wound team general surgery  Infectious disease is following in consultation. Will need 6 weeks of antibiotic therapy.    -Antibiotics--- on IV vancomycin and oral Flagyl.        -Recent spine surgery, with concern for cauda equina syndrome due to postop hematoma     \"on 9/2 Dr. Vidya Benton performed an L2-5 decompression and laminectomy at [facility], then postoperatively on 9/5 [facility] noted that the patient was unable to wiggle his toes and had BLE weakness to the point that he required maximum assistance x2 in order to stand.  An MRI was performed which showed severe spinal canal stenosis due to hematoma.  on 9/6 Dr. Patrica Fulton took him back to the OR for an urgent exploration and hematoma evacuation, left drains in place. eventually the patient was discharged to SNF on 9/10. readmitted 9/19-9/22 urinary retention, constipation, and ongoing leg weakness, we obtained a repeat MRI on this admission to [facility] 22 showed an ill-defind fluid/soft tissue causing severe spinal canal stenosis from L1-4, also moderate-severe spinal canal stenosis from L4 - S1, transferred to [facility] for neurosurgery eval-did not require spinal surgery but had debirdement of decub ulcer  -Acute metabolic encephalopathy. . Presented with increased confusion from baseline,Likely due to ongoing infection --continue supportive care. Mental status has improved, he is alert and cooperative at this time. Suspect he is close to his baseline status. No family present during my visits this weekend and so unable to confirm this.     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    Supportive care      -Dementiaon Ariceptcontinue supportive care     -Chronic indwelling Jain-Indwelling jain since 9/19.  Jain replaced this admission 10/15     -DM type II--- continue insulin therapy follow sugars adjust as needed     -Essential hypertension-stablelisinopril held on admit   Blood pressure noted to be running a little high I have started low-dose Norvasc today and will follow BP.        DVT Prophylaxis: SCDs, Lovenox being held due to possible GI bleed  Diet: ADULT DIET;  Regular; 5 carb choices (75 gm/meal)  ADULT ORAL NUTRITION SUPPLEMENT; AM Snack, PM Snack, HS Snack; Diabetic Oral Supplement  Code Status: DNR-CCA      Dispo - s/p wound debridement, SNF when stable, GS and GI to discuss longterm plan of care with patient and family to make final decision regarding diverting colostomy and overall poor healing potential of the wounds    Sky Caruso MD

## 2021-10-28 NOTE — SIGNIFICANT EVENT
Called and discussed with the patient son. Initially called patient's wife, no response. Then called patient's son and discussed family wishes. According to the discussion with patient's son, Mr. Shahab Leung, \"my father wants to live and I believe he( patient)  wants to undergo the procedure and wants to take the risk for any surgeries\" also patient son reported patient's wife is also likely interested in getting surgical procedures if needed to prolong life. Patient's son said they are not interested in talking to palliative care/hospice team as they have already spoken to them in the past few days. They are interested more in talking to general surgery for their opinion. I informed patient and son that I will communicate with general surgery team for discussion of risk benefits of any procedures and next proposed plan from surgical team.    General Surgery team perfectserved regarding above discussion and requested to call patient family/son.

## 2021-10-28 NOTE — PROGRESS NOTES
[facility] GENERAL SURGERY    Surgery Progress Note           PO    PATIENT NAME: Claudette Miner     TODAY'S DATE: 10/28/2021    INTERVAL HISTORY:    Pt alert. OBJECTIVE:   VITALS:  BP (!) 165/76   Pulse 86   Temp 98.6 °F (37 °C) (Oral)   Resp 16   Ht 6' (1.829 m)   Wt 125 lb 11.2 oz (57 kg)   SpO2 97%   BMI 17.05 kg/m²     INTAKE/OUTPUT:    I/O last 3 completed shifts:  In: -   Out: 1250 [Urine:1250]  No intake/output data recorded. CONSTITUTIONAL:  awake and alert  ABDOMEN:   normal bowel sounds, soft, non-distended, non-tender   INCISION: dressing dry    Data:  CBC: Recent Labs     10/26/21  0625 10/27/21  0806 10/28/21  0500   WBC 8.9 9.2 9.7   HGB 7.7* 7.9* 8.1*   HCT 22.5* 23.3* 23.9*    389 478*     BMP:    Recent Labs     10/26/21  0625 10/27/21  0806 10/28/21  0500   * 127* 131*   K 4.1 4.0 3.6   CL 97* 94* 95*   CO2 29 26 26   BUN 11 12 12   CREATININE <0.5* 0.6* <0.5*   GLUCOSE 175* 104* 68*     Hepatic:   Recent Labs     10/26/21  0625 10/27/21  0806 10/28/21  0500   AST 34 34 32   ALT 34 33 32   BILITOT 0.3 0.3 <0.2   ALKPHOS 109 99 103     Mag:      Recent Labs     10/26/21  0625 10/27/21  0806 10/28/21  0500   MG 2.00 1.80 1.80      Phos:   No results for input(s): PHOS in the last 72 hours. INR: No results for input(s): INR in the last 72 hours. Radiology Review:  NA    ASSESSMENT AND PLAN:  80 y.o. male status post sacral wound debridement  1. Discussed as outlined yesterday with family and patient. They will consider the options and let us know if they decide on diverting ostomy. In case this is decided on will make npo at [place].          Electronically signed by Killian Murillo MD

## 2021-10-28 NOTE — PROGRESS NOTES
Hospitalist Progress Note      PCP: Chago Knott MD    Date of Admission: 10/15/2021    Chief Complaint:   Pressure injury sacrum    Hospital Course:     He has had a prolonged and complex course. 79yo male with PMHX sig for lumbar stenosis, urinary retention, osteomyelitis of lumbar spine, chronic anemia, severe prot fanta malnutritions sent to [facility] on 10/15/2021 from SNF for altered mental status and large sacral wound.       Of pertinence:   On 9/2/21 Dr. Jimenez Lugo performed an L2-5 decompression and laminectomy at [address] days later on 9/5 patient was unable to wiggle his toes and had BLE weakness to the point that he required maximum assistance x 2 in order to stand.  An MRI was performed which showed severe spinal canal stenosis due to a new hematoma.  On 9/6 Dr. Andreea Lara took him back to the OR for an urgent exploration and hematoma evacuation and left drains in place.  Patient was discharged to SNF on 9/10.     Patient was then admitted to [facility] on 9/19. [facility] had a large sacral ulcer and ROXI due to urinary retention requiring placement of a jain catheter.  Another L-spine MRI was performed showing an ill-defind fluid/soft tissue causing severe spinal canal stenosis from L1-4, also moderate-severe spinal canal stenosis from L4 - S1.  He was transferred to [facility] on 9/23 for operative management  By Dr. Garfield Renner further operative intervention was necessary on patient's lumbar spine.  He did undergo operative debridement of the sacral ulcer on 9/23.  Tissue culture was positive for enterococcus and enterobacter.  Based on sensitivities he was treated with Augmentin.  He was also treated with a wound vac and discharged back to the SNF on 9/27.     s/p debridement 10/6-cultures growing MRSA, Enterococcus, corynebacterium--     On 10/15/2021 he to [facility] ED  from the SNF for altered mental status and large sacral wound.   He was very agitated and screaming in the ED.  During this admission he was seen by general surgery, and he did go for sacral wound debridement on 10/16/2021. He has been seen by infectious disease in consultation. Felt he has chronic osteomyelitis and plan is for IV antibiotics for up to 6 weeks. Currently being treated with IV vancomycin and oral Flagyl. He does have a PICC line in place.     10/22 noted with large bloody BMs, GI consulted. Tentative plan currently is for colonoscopy on Monday.     prognosis poor - met with hospice but declined to enrol. Family interested in aggressive management    Subjective:  Patient son and wife at bedside, discussed plan of care, patient is awake. He is pleasant and cooperative. He denies any complaints at this time.     Medications:  Reviewed    Infusion Medications    dextrose      sodium chloride 25 mL (10/26/21 2228)    [Held by provider] lactated ringers 100 mL/hr at 10/20/21 2029     Scheduled Medications    insulin glargine  12 Units SubCUTAneous Nightly    amLODIPine  5 mg Oral Daily    polyethylene glycol  4,000 mL Oral Once    insulin lispro  0-12 Units SubCUTAneous TID WC    insulin lispro  0-6 Units SubCUTAneous Nightly    hydrocortisone  25 mg Rectal BID    metroNIDAZOLE  500 mg Oral 3 times per day    vancomycin  750 mg IntraVENous Q12H    donepezil  10 mg Oral Nightly    atorvastatin  10 mg Oral Nightly    vitamin B complex w/C  1 tablet Oral Daily    therapeutic multivitamin-minerals  1 tablet Oral Daily    insulin lispro  0.05 Units/kg SubCUTAneous TID WC     PRN Meds: LORazepam, glucose, dextrose, glucagon (rDNA), dextrose, sodium chloride flush, sodium chloride, potassium chloride **OR** potassium alternative oral replacement **OR** potassium chloride, magnesium sulfate, ondansetron **OR** ondansetron, acetaminophen **OR** acetaminophen, melatonin, HYDROmorphone      Intake/Output Summary (Last 24 hours) at 10/28/2021 1520  Last data filed at 10/28/2021 0530  Gross per 24 hour Intake    Output 1250 ml   Net -1250 ml       Physical Exam Performed:    BP (!) 165/76   Pulse 86   Temp 98.6 °F (37 °C) (Oral)   Resp 16   Ht 6' (1.829 m)   Wt 125 lb 11.2 oz (57 kg)   SpO2 97%   BMI 17.05 kg/m²     General appearance: chronically ill appearing, severely cachectic, NAD  HEENT: Pupils equal, round, and reactive to light. Conjunctivae/corneas clear. Neck: Supple,  No jugular venous distention. Trachea midline. Respiratory:  Bilateral breath sounds, without Rales/Wheezes/Rhonchi. No use of accessory muscles noted  Cardiovascular: Regular rate and rhythm with normal S1/S2 . Abdomen: Soft, non-tender, non-distended with normal bowel sounds. Musculoskeletal: No clubbing, cyanosis, trace lower extremity edema bilaterally. Full range of motion without deformity. Neurologic:  grossly intact, moves all four extremities.  Chronic facial tic  Psychiatric: Alert and oriented x 3, thought content appropriate, normal insight. Labs:   Recent Labs     10/26/21  0625 10/27/21  0806 10/28/21  0500   WBC 8.9 9.2 9.7   HGB 7.7* 7.9* 8.1*   HCT 22.5* 23.3* 23.9*    389 478*     Recent Labs     10/26/21  0625 10/27/21  0806 10/28/21  0500   * 127* 131*   K 4.1 4.0 3.6   CL 97* 94* 95*   CO2 29 26 26   BUN 11 12 12   CREATININE <0.5* 0.6* <0.5*   CALCIUM 7.9* 7.8* 7.8*     Recent Labs     10/26/21  0625 10/27/21  0806 10/28/21  0500   AST 34 34 32   ALT 34 33 32   BILITOT 0.3 0.3 <0.2   ALKPHOS 109 99 103     No results for input(s): INR in the last 72 hours. No results for input(s): Prentis Revels in the last 72 hours.     Urinalysis:      Lab Results   Component Value Date    NITRU Negative 10/15/2021    WBCUA 10-20 10/15/2021    BACTERIA 4+ 10/15/2021    RBCUA 5-10 10/15/2021    BLOODU SMALL 10/15/2021    SPECGRAV 1.015 10/15/2021    GLUCOSEU 250 10/15/2021       Radiology:  XR ABDOMEN (KUB) (SINGLE AP VIEW)   Final Result   Nonspecific abdominal bowel gas pattern with mild residual stool throughout   colon. XR CHEST PORTABLE   Final Result   Status post PICC line placement on the right in good position. Resolving central pulmonary congestion. Mild chronic elevation of the left hemidiaphragm which is unchanged with   slowly resolving bibasilar atelectasis. CT HEAD WO CONTRAST   Final Result      Stable study. No evidence for acute intracranial hemorrhage, territorial   infarction or intracranial mass lesion. Mild chronic microangiopathic ischemic disease. Mild generalized volume loss. CT PELVIS WO CONTRAST Additional Contrast? None   Final Result   1. Large deep sacral decubitus ulcer with mild adjacent cellulitis. No   abscess or soft tissue gas. 2. Osseous uncovering of the posterior aspect of the lower sacrum and coccyx   without convincing evidence of osteomyelitis. If clinically indicated   further evaluation could be obtained with MRI. XR CHEST PORTABLE   Final Result   Chronic elevation of the left hemidiaphragm which is unchanged with   increasing subsegmental atelectasis or early infiltrate along the left lung   base. Assessment/Plan:    Active Hospital Problems    Diagnosis     Tic disorder [F95.9]     Osteomyelitis ([address] [M86.9]     Chronic anemia [D64.9]     Severe sepsis ([address] [A41.9, R65.20]     Pressure injury of sacral region, unstageable ([address] [L89.150]     Lumbar stenosis with neurogenic claudication [M48.062]     Urinary retention [R33.9]     DM2 (diabetes mellitus, type 2) ([address] [E11.9]      GI bleed :  blood was reported in stool. Gastroenterology has been consulted and for colonoscopy on Monday to further evaluate. H/H is stable this morning. Will continue to follow closely, transfuse if needed. No active bleeding reported today. Holding Lovenox.     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 ..    - s/p debridement 9/23 at [facility], cult + Enterococcus faecalis, Supportive care      -Dementiaon Ariceptcontinue supportive care     -Chronic indwelling Jain-Indwelling jain since 9/19.  Jain replaced this admission 10/15     -DM type II--- continue insulin therapy follow sugars adjust as needed     -Essential hypertension-stablelisinopril held on admit   Blood pressure noted to be running a little high I have started low-dose Norvasc today and will follow BP.        DVT Prophylaxis: SCDs, Lovenox being held due to possible GI bleed  Diet: ADULT DIET;  Regular; 5 carb choices (75 gm/meal)  ADULT ORAL NUTRITION SUPPLEMENT; AM Snack, PM Snack, HS Snack; Diabetic Oral Supplement  Code Status: DNR-CCA      Dispo -  Discussed with family , patient family seems to be interested in talking to general surgery team directly to discuss details and make decision from this standpoint    Elvis Donovan MD

## 2021-10-29 LAB
A/G RATIO: 0.7 (ref 1.1–2.2)
ALBUMIN SERPL-MCNC: 2.5 G/DL (ref 3.4–5)
ALP BLD-CCNC: 113 U/L (ref 40–129)
ALT SERPL-CCNC: 32 U/L (ref 10–40)
ANION GAP SERPL CALCULATED.3IONS-SCNC: 8 MMOL/L (ref 3–16)
ANISOCYTOSIS: ABNORMAL
AST SERPL-CCNC: 28 U/L (ref 15–37)
BANDED NEUTROPHILS RELATIVE PERCENT: 17 % (ref 0–7)
BASOPHILS ABSOLUTE: 0 K/UL (ref 0–0.2)
BASOPHILS RELATIVE PERCENT: 0 %
BILIRUB SERPL-MCNC: 0.3 MG/DL (ref 0–1)
BUN BLDV-MCNC: 12 MG/DL (ref 7–20)
CALCIUM SERPL-MCNC: 8.1 MG/DL (ref 8.3–10.6)
CHLORIDE BLD-SCNC: 94 MMOL/L (ref 99–110)
CO2: 28 MMOL/L (ref 21–32)
CREAT SERPL-MCNC: <0.5 MG/DL (ref 0.8–1.3)
EOSINOPHILS ABSOLUTE: 0.3 K/UL (ref 0–0.6)
EOSINOPHILS RELATIVE PERCENT: 4 %
GFR AFRICAN AMERICAN: >60
GFR NON-AFRICAN AMERICAN: >60
GLUCOSE BLD-MCNC: 146 MG/DL (ref 70–99)
GLUCOSE BLD-MCNC: 149 MG/DL (ref 70–99)
GLUCOSE BLD-MCNC: 151 MG/DL (ref 70–99)
GLUCOSE BLD-MCNC: 180 MG/DL (ref 70–99)
GLUCOSE BLD-MCNC: 283 MG/DL (ref 70–99)
HCT VFR BLD CALC: 25.2 % (ref 40.5–52.5)
HEMOGLOBIN: 8.7 G/DL (ref 13.5–17.5)
HYPOCHROMIA: ABNORMAL
LYMPHOCYTES ABSOLUTE: 2.6 K/UL (ref 1–5.1)
LYMPHOCYTES RELATIVE PERCENT: 30 %
MACROCYTES: ABNORMAL
MAGNESIUM: 2 MG/DL (ref 1.8–2.4)
MCH RBC QN AUTO: 32.1 PG (ref 26–34)
MCHC RBC AUTO-ENTMCNC: 34.4 G/DL (ref 31–36)
MCV RBC AUTO: 93.3 FL (ref 80–100)
MONOCYTES ABSOLUTE: 0.8 K/UL (ref 0–1.3)
MONOCYTES RELATIVE PERCENT: 9 %
MYELOCYTE PERCENT: 1 %
NEUTROPHILS ABSOLUTE: 5 K/UL (ref 1.7–7.7)
NEUTROPHILS RELATIVE PERCENT: 39 %
OVALOCYTES: ABNORMAL
PDW BLD-RTO: 17.9 % (ref 12.4–15.4)
PERFORMED ON: ABNORMAL
PLATELET # BLD: 480 K/UL (ref 135–450)
PMV BLD AUTO: 6.2 FL (ref 5–10.5)
POIKILOCYTES: ABNORMAL
POLYCHROMASIA: ABNORMAL
POTASSIUM SERPL-SCNC: 4 MMOL/L (ref 3.5–5.1)
RBC # BLD: 2.7 M/UL (ref 4.2–5.9)
SODIUM BLD-SCNC: 130 MMOL/L (ref 136–145)
TOTAL PROTEIN: 5.9 G/DL (ref 6.4–8.2)
WBC # BLD: 8.7 K/UL (ref 4–11)

## 2021-10-29 PROCEDURE — 99231 SBSQ HOSP IP/OBS SF/LOW 25: CPT | Performed by: SURGERY

## 2021-10-29 PROCEDURE — 1200000000 HC SEMI PRIVATE

## 2021-10-29 PROCEDURE — 83735 ASSAY OF MAGNESIUM: CPT

## 2021-10-29 PROCEDURE — 6370000000 HC RX 637 (ALT 250 FOR IP): Performed by: SURGERY

## 2021-10-29 PROCEDURE — 2580000003 HC RX 258: Performed by: INTERNAL MEDICINE

## 2021-10-29 PROCEDURE — 6360000002 HC RX W HCPCS: Performed by: INTERNAL MEDICINE

## 2021-10-29 PROCEDURE — 6370000000 HC RX 637 (ALT 250 FOR IP): Performed by: INTERNAL MEDICINE

## 2021-10-29 PROCEDURE — 85025 COMPLETE CBC W/AUTO DIFF WBC: CPT

## 2021-10-29 PROCEDURE — 80053 COMPREHEN METABOLIC PANEL: CPT

## 2021-10-29 PROCEDURE — 2580000003 HC RX 258: Performed by: SURGERY

## 2021-10-29 RX ORDER — LABETALOL HYDROCHLORIDE 5 MG/ML
10 INJECTION, SOLUTION INTRAVENOUS EVERY 4 HOURS PRN
Status: DISCONTINUED | OUTPATIENT
Start: 2021-10-29 | End: 2021-11-05 | Stop reason: HOSPADM

## 2021-10-29 RX ADMIN — VANCOMYCIN HYDROCHLORIDE 750 MG: 750 INJECTION, POWDER, LYOPHILIZED, FOR SOLUTION INTRAVENOUS at 08:50

## 2021-10-29 RX ADMIN — SODIUM CHLORIDE, PRESERVATIVE FREE 10 ML: 5 INJECTION INTRAVENOUS at 20:38

## 2021-10-29 RX ADMIN — VANCOMYCIN HYDROCHLORIDE 750 MG: 750 INJECTION, POWDER, LYOPHILIZED, FOR SOLUTION INTRAVENOUS at 20:40

## 2021-10-29 ASSESSMENT — PAIN SCALES - GENERAL: PAINLEVEL_OUTOF10: 0

## 2021-10-29 NOTE — PROGRESS NOTES
Progress Note  Date:10/29/2021       Room:[facility]0545-01  Patient Name:Juan Alberto Willis     YOB: 1934     Age:87 y.o. Subjective    Subjective:  Symptoms:  Stable. Pain:  He reports no pain. Review of Systems  Objective         Vitals Last 24 Hours:  TEMPERATURE:  Temp  Av °F (36.7 °C)  Min: 97.7 °F (36.5 °C)  Max: 98.5 °F (36.9 °C)  RESPIRATIONS RANGE: Resp  Av  Min: 16  Max: 20  PULSE OXIMETRY RANGE: SpO2  Av.8 %  Min: 97 %  Max: 99 %  PULSE RANGE: Pulse  Av.6  Min: 82  Max: 112  BLOOD PRESSURE RANGE: Systolic (91TUM), GDH:993 , Min:137 , PFS:849   ; Diastolic (86BFH), RZS:21, Min:69, Max:89    I/O (24Hr): Intake/Output Summary (Last 24 hours) at 10/29/2021 1650  Last data filed at 10/29/2021 1614  Gross per 24 hour   Intake 300 ml   Output 2275 ml   Net -1975 ml     Objective:  General Appearance: In no acute distress and not in pain. Vital signs: (most recent): Blood pressure (!) 180/89, pulse 112, temperature 97.9 °F (36.6 °C), temperature source Oral, resp. rate 18, height 6' (1.829 m), weight 125 lb 11.2 oz (57 kg), SpO2 99 %. Abdomen: Abdomen is soft. Bowel sounds are normal.   There is no abdominal tenderness. Labs/Imaging/Diagnostics    Labs:  CBC:  Recent Labs     10/27/21  0806 10/28/21  0500 10/29/21  0645   WBC 9.2 9.7 8.7   RBC 2.48* 2.55* 2.70*   HGB 7.9* 8.1* 8.7*   HCT 23.3* 23.9* 25.2*   MCV 94.0 93.8 93.3   RDW 17.1* 16.7* 17.9*    478* 480*     CHEMISTRIES:  Recent Labs     10/27/21  0806 10/28/21  0500 10/29/21  0645   * 131* 130*   K 4.0 3.6 4.0   CL 94* 95* 94*   CO2 26 26 28   BUN 12 12 12   CREATININE 0.6* <0.5* <0.5*   GLUCOSE 104* 68* 149*   MG 1.80 1.80 2.00     PT/INR:No results for input(s): PROTIME, INR in the last 72 hours. APTT:No results for input(s): APTT in the last 72 hours.   LIVER PROFILE:  Recent Labs     10/27/21  0806 10/28/21  0500 10/29/21  0645   AST 34 32 28   ALT 33 32 32   BILITOT 0.3 <0.2 0.3 ALKPHOS 99 103 113       Imaging Last 24 Hours:  No results found. Assessment//Plan           Hospital Problems         Last Modified POA    * (Principal) Pressure injury of sacral region, unstageable ([address] 10/22/2021 Yes    DM2 (diabetes mellitus, type 2) ([address] 10/15/2021 Yes    Lumbar stenosis with neurogenic claudication 10/15/2021 Yes    Overview Signed 9/19/2021  4:10 AM by Lois Cruz MD     L2-5 decompression, laminectomy; 9-2-21;  Emergency exploration of lumbar laminectomy for epidural fluid collection; 9-6-21           Urinary retention 10/15/2021 Yes    Severe sepsis ([address] 10/16/2021 Yes    Osteomyelitis ([address] 10/16/2021 Yes    Chronic anemia 10/16/2021 Yes    Tic disorder 10/19/2021 Yes        Assessment & Plan  81 yo w HTN, DM, spinal stenosis and a large sacral ulcer, debrided, who has been having diarrhea w h/o constipation, associated w hematochezia. H/H 8/22. Is malnourished. Colonoscopy 10/26 revealed a normal colon/TI except internal hemorrhoids w distal rectal ulceration, probably stercoral, not biopsied, but w neg. random colon Bx's. His diarrhea/bleeding transiently resolved after the colonoscopy.     - Supportive care  - Needs to have daily bm's (consider Miralax)     Vinicio Kim MD       (O) [phone]  Electronically signed by Vinicio Kim MD on 10/29/21 at 4:50 PM EDT

## 2021-10-29 NOTE — PROGRESS NOTES
Pt and Wife have decided that they want the Ostomy. Called Dr. Fleta Lennox office to page about timing.

## 2021-10-29 NOTE — PROGRESS NOTES
Spoke with pt wife about procedure. Pt wife stated that it is up to the patient. Then went on a tangent about how the neighbor wanted to come up to the hospital but they wouldn't let him in. Then went back to her son and her are still discussing the osotomy.  She then finished up with \"cant I just finish my breakfast and talk to you later when I get there\"

## 2021-10-29 NOTE — PROGRESS NOTES
Hospitalist Progress Note      PCP: Bella Sim MD    Date of Admission: 10/15/2021    Chief Complaint:   Pressure injury sacrum    Hospital Course:     He has had a prolonged and complex course. 81yo male with PMHX sig for lumbar stenosis, urinary retention, osteomyelitis of lumbar spine, chronic anemia, severe prot fanta malnutritions sent to [facility] on 10/15/2021 from SNF for altered mental status and large sacral wound.       Of pertinence:   On 9/2/21 Dr. Serafin Coronado performed an L2-5 decompression and laminectomy at [address] days later on 9/5 patient was unable to wiggle his toes and had BLE weakness to the point that he required maximum assistance x 2 in order to stand.  An MRI was performed which showed severe spinal canal stenosis due to a new hematoma.  On 9/6 Dr. Oksana Dunbar took him back to the OR for an urgent exploration and hematoma evacuation and left drains in place.  Patient was discharged to SNF on 9/10.     Patient was then admitted to [facility] on 9/19. [facility] had a large sacral ulcer and ROXI due to urinary retention requiring placement of a jain catheter.  Another L-spine MRI was performed showing an ill-defind fluid/soft tissue causing severe spinal canal stenosis from L1-4, also moderate-severe spinal canal stenosis from L4 - S1.  He was transferred to [facility] on 9/23 for operative management  By Dr. Falguni Gambino further operative intervention was necessary on patient's lumbar spine.  He did undergo operative debridement of the sacral ulcer on 9/23.  Tissue culture was positive for enterococcus and enterobacter.  Based on sensitivities he was treated with Augmentin.  He was also treated with a wound vac and discharged back to the SNF on 9/27.     s/p debridement 10/6-cultures growing MRSA, Enterococcus, corynebacterium--     On 10/15/2021 he to [facility] ED  from the SNF for altered mental status and large sacral wound.   He was very agitated and screaming in the ED.  During this admission he was seen by general surgery, and he did go for sacral wound debridement on 10/16/2021. He has been seen by infectious disease in consultation. Felt he has chronic osteomyelitis and plan is for IV antibiotics for up to 6 weeks. Currently being treated with IV vancomycin and oral Flagyl. He does have a PICC line in place.     10/22 noted with large bloody BMs, GI consulted. Tentative plan currently is for colonoscopy on Monday.     prognosis poor - met with hospice but declined to enrol. Family interested in aggressive management    Subjective:  Seen and evaluated at the bedside, discussed plan of care, patient is awake. He is pleasant and cooperative. He denies any complaints at this time.     Medications:  Reviewed    Infusion Medications    dextrose      sodium chloride 25 mL (10/26/21 2228)    [Held by provider] lactated ringers 100 mL/hr at 10/20/21 2029     Scheduled Medications    insulin glargine  12 Units SubCUTAneous Nightly    amLODIPine  5 mg Oral Daily    polyethylene glycol  4,000 mL Oral Once    insulin lispro  0-12 Units SubCUTAneous TID WC    insulin lispro  0-6 Units SubCUTAneous Nightly    hydrocortisone  25 mg Rectal BID    metroNIDAZOLE  500 mg Oral 3 times per day    vancomycin  750 mg IntraVENous Q12H    donepezil  10 mg Oral Nightly    atorvastatin  10 mg Oral Nightly    vitamin B complex w/C  1 tablet Oral Daily    therapeutic multivitamin-minerals  1 tablet Oral Daily    insulin lispro  0.05 Units/kg SubCUTAneous TID WC     PRN Meds: LORazepam, glucose, dextrose, glucagon (rDNA), dextrose, sodium chloride flush, sodium chloride, potassium chloride **OR** potassium alternative oral replacement **OR** potassium chloride, magnesium sulfate, ondansetron **OR** ondansetron, acetaminophen **OR** acetaminophen, melatonin, HYDROmorphone      Intake/Output Summary (Last 24 hours) at 10/29/2021 1942  Last data filed at 10/29/2021 1614  Gross per 24 hour Intake 300 ml   Output 2275 ml   Net -1975 ml       Physical Exam Performed:    BP (!) 180/89   Pulse 112   Temp 97.9 °F (36.6 °C) (Oral)   Resp 18   Ht 6' (1.829 m)   Wt 125 lb 11.2 oz (57 kg)   SpO2 99%   BMI 17.05 kg/m²     General appearance: NAD chronically ill appearing, severely cachectic,  HEENT: Pupils equal, round, and reactive to light. Conjunctivae/corneas clear. Neck: Supple,  No jugular venous distention. Trachea midline. Respiratory:  Bilateral breath sounds, without Rales/Wheezes/Rhonchi. No use of accessory muscles noted  Cardiovascular: Regular rate and rhythm with normal S1/S2 . Abdomen: Soft, non-tender, non-distended with normal bowel sounds. Musculoskeletal: No clubbing, cyanosis, trace lower extremity edema bilaterally. Full range of motion without deformity. Neurologic:  grossly intact, moves all four extremities.  Chronic facial tic  Psychiatric: Alert and oriented x 3, thought content appropriate, normal insight. Labs:   Recent Labs     10/27/21  0806 10/28/21  0500 10/29/21  0645   WBC 9.2 9.7 8.7   HGB 7.9* 8.1* 8.7*   HCT 23.3* 23.9* 25.2*    478* 480*     Recent Labs     10/27/21  0806 10/28/21  0500 10/29/21  0645   * 131* 130*   K 4.0 3.6 4.0   CL 94* 95* 94*   CO2 26 26 28   BUN 12 12 12   CREATININE 0.6* <0.5* <0.5*   CALCIUM 7.8* 7.8* 8.1*     Recent Labs     10/27/21  0806 10/28/21  0500 10/29/21  0645   AST 34 32 28   ALT 33 32 32   BILITOT 0.3 <0.2 0.3   ALKPHOS 99 103 113     No results for input(s): INR in the last 72 hours. No results for input(s): [place] in the last 72 hours.     Urinalysis:      Lab Results   Component Value Date    NITRU Negative 10/15/2021    WBCUA 10-20 10/15/2021    BACTERIA 4+ 10/15/2021    RBCUA 5-10 10/15/2021    BLOODU SMALL 10/15/2021    SPECGRAV 1.015 10/15/2021    GLUCOSEU 250 10/15/2021       Radiology:  XR ABDOMEN (KUB) (SINGLE AP VIEW)   Final Result   Nonspecific abdominal bowel gas pattern with mild residual stool throughout   colon. XR CHEST PORTABLE   Final Result   Status post PICC line placement on the right in good position. Resolving central pulmonary congestion. Mild chronic elevation of the left hemidiaphragm which is unchanged with   slowly resolving bibasilar atelectasis. CT HEAD WO CONTRAST   Final Result      Stable study. No evidence for acute intracranial hemorrhage, territorial   infarction or intracranial mass lesion. Mild chronic microangiopathic ischemic disease. Mild generalized volume loss. CT PELVIS WO CONTRAST Additional Contrast? None   Final Result   1. Large deep sacral decubitus ulcer with mild adjacent cellulitis. No   abscess or soft tissue gas. 2. Osseous uncovering of the posterior aspect of the lower sacrum and coccyx   without convincing evidence of osteomyelitis. If clinically indicated   further evaluation could be obtained with MRI. XR CHEST PORTABLE   Final Result   Chronic elevation of the left hemidiaphragm which is unchanged with   increasing subsegmental atelectasis or early infiltrate along the left lung   base. Assessment/Plan:    Active Hospital Problems    Diagnosis     Tic disorder [F95.9]     Osteomyelitis ([address] [M86.9]     Chronic anemia [D64.9]     Severe sepsis ([address] [A41.9, R65.20]     Pressure injury of sacral region, unstageable ([address] [L89.150]     Lumbar stenosis with neurogenic claudication [M48.062]     Urinary retention [R33.9]     DM2 (diabetes mellitus, type 2) ([address] [E11.9]      GI bleed :  blood was reported in stool. Gastroenterology has been consulted and for colonoscopy on Monday to further evaluate. H/H is stable this morning. Will continue to follow closely, transfuse if needed. No active bleeding reported today. Holding Lovenox.     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 ..    - s/p debridement 9/23 at [facility], cult + Enterococcus faecalis, Enterobacter cloacae, 3 anaerobesDischarged on 9/27 on po augmentin    -s/p debridement 10/6-cultures growing MRSA, Enterococcus, corynebacterium-   -Continue care per wound team general surgery  Infectious disease is following in consultation. Will need 6 weeks of antibiotic therapy.    -Antibiotics--- on IV vancomycin and oral Flagyl.        -Recent spine surgery, with concern for cauda equina syndrome due to postop hematoma     \"on 9/2 Dr. Alvin Galvan performed an L2-5 decompression and laminectomy at [place] [address], then postoperatively on 9/5 [facility] noted that the patient was unable to wiggle his toes and had BLE weakness to the point that he required maximum assistance x2 in order to stand.  An MRI was performed which showed severe spinal canal stenosis due to hematoma.  on 9/6 Dr. Renae Valencia took him back to the OR for an urgent exploration and hematoma evacuation, left drains in place. eventually the patient was discharged to SNF on 9/10. readmitted 9/19-9/22 urinary retention, constipation, and ongoing leg weakness, we obtained a repeat MRI on this admission to [facility] 22 showed an ill-defind fluid/soft tissue causing severe spinal canal stenosis from L1-4, also moderate-severe spinal canal stenosis from L4 - S1, transferred to [facility] for neurosurgery eval-did not require spinal surgery but had debirdement of decub ulcer  -Acute metabolic encephalopathy. . Presented with increased confusion from baseline,Likely due to ongoing infection --continue supportive care. Mental status has improved, he is alert and cooperative at this time. Suspect he is close to his baseline status. No family present during my visits this weekend and so unable to confirm this.     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 Supportive care      -Dementiaon Ariceptcontinue supportive care     -Chronic indwelling Jain-Indwelling jain since 9/19.  Jain replaced this admission 10/15     -DM type II--- continue insulin therapy follow sugars adjust as needed     -Essential hypertension-stablelisinopril held on admit   Blood pressure noted to be running a little high I have started low-dose Norvasc today and will follow BP.        DVT Prophylaxis: SCDs, Lovenox being held due to possible GI bleed  Diet: ADULT DIET;  Regular; 5 carb choices (75 gm/meal)  ADULT ORAL NUTRITION SUPPLEMENT; AM Snack, PM Snack, HS Snack; Diabetic Oral Supplement  ADULT ORAL NUTRITION SUPPLEMENT; AM Snack, PM Snack, HS Snack; Diabetic Oral Supplement  Code Status: DNR-CCA      Dispo -  Discussed with family , patient family seems to be interested in diverting colostomy, awaiting procedure today  Rebeca Iqbal MD

## 2021-10-29 NOTE — CARE COORDINATION
CM had long conversation with son Rene Patel [phone]. Updated that pt and wife are still considering Ostomy. Bi Adames is aware and discussed with Mom that [facility] can NOT accept pt and another SNF choice is needed. Bi Adames will speak to his Mom but for now would like referrals to [facility]. Referrals faxed. CM folowing-Teganmame Diallo RN           ADDENDUM 1600: spoke to Emma Alberts with [facility] and they are not able to meet pt's needs and cannot accept. Met with pt and spouse at bedside to discuss SNF choices and denials. No call back at this time on referral to [facility] with Josie [phone]. Wife now wanting referral also to [facility]. Referral faxed to [facility]. Writer also notes that [facility] met with pt and wife again today and stated that pt is wanting aggressive care. CM following-Tegan Diallo RN       ADDENDUM 1621: writer spoke to [facility] with [facility] and they can accept pt. Updated wife at bedside and she is in agreement with [facility]. However [facility] is within minutes of her house.  CM following-Tegan Diallo RN

## 2021-10-29 NOTE — PROGRESS NOTES
Surgery group was in to see pt, informed of open area found on right inner buttocks area near groin, informed of 2 dressing changes with stool getting on dressings

## 2021-10-29 NOTE — PROGRESS NOTES
Received call from OR stating that Dr. Anaya Joya can not do procedure today. Procedure will be done next week.

## 2021-10-30 ENCOUNTER — APPOINTMENT (OUTPATIENT)
Dept: GENERAL RADIOLOGY | Age: 86
DRG: 853 | End: 2021-10-30
Payer: MEDICARE

## 2021-10-30 LAB
A/G RATIO: 0.7 (ref 1.1–2.2)
ALBUMIN SERPL-MCNC: 2.4 G/DL (ref 3.4–5)
ALP BLD-CCNC: 114 U/L (ref 40–129)
ALT SERPL-CCNC: 30 U/L (ref 10–40)
ANION GAP SERPL CALCULATED.3IONS-SCNC: 7 MMOL/L (ref 3–16)
ANISOCYTOSIS: ABNORMAL
AST SERPL-CCNC: 29 U/L (ref 15–37)
BANDED NEUTROPHILS RELATIVE PERCENT: 5 % (ref 0–7)
BASOPHILS ABSOLUTE: 0 K/UL (ref 0–0.2)
BASOPHILS RELATIVE PERCENT: 0 %
BILIRUB SERPL-MCNC: 0.3 MG/DL (ref 0–1)
BUN BLDV-MCNC: 13 MG/DL (ref 7–20)
CALCIUM SERPL-MCNC: 8.1 MG/DL (ref 8.3–10.6)
CHLORIDE BLD-SCNC: 97 MMOL/L (ref 99–110)
CO2: 29 MMOL/L (ref 21–32)
CREAT SERPL-MCNC: 0.6 MG/DL (ref 0.8–1.3)
EOSINOPHILS ABSOLUTE: 0.1 K/UL (ref 0–0.6)
EOSINOPHILS RELATIVE PERCENT: 2 %
GFR AFRICAN AMERICAN: >60
GFR NON-AFRICAN AMERICAN: >60
GLUCOSE BLD-MCNC: 138 MG/DL (ref 70–99)
GLUCOSE BLD-MCNC: 165 MG/DL (ref 70–99)
GLUCOSE BLD-MCNC: 179 MG/DL (ref 70–99)
GLUCOSE BLD-MCNC: 204 MG/DL (ref 70–99)
GLUCOSE BLD-MCNC: 271 MG/DL (ref 70–99)
HCT VFR BLD CALC: 25.6 % (ref 40.5–52.5)
HEMOGLOBIN: 8.8 G/DL (ref 13.5–17.5)
LYMPHOCYTES ABSOLUTE: 2.1 K/UL (ref 1–5.1)
LYMPHOCYTES RELATIVE PERCENT: 30 %
MACROCYTES: ABNORMAL
MAGNESIUM: 2 MG/DL (ref 1.8–2.4)
MCH RBC QN AUTO: 32.2 PG (ref 26–34)
MCHC RBC AUTO-ENTMCNC: 34.3 G/DL (ref 31–36)
MCV RBC AUTO: 93.9 FL (ref 80–100)
METAMYELOCYTES RELATIVE PERCENT: 6 %
MONOCYTES ABSOLUTE: 0.5 K/UL (ref 0–1.3)
MONOCYTES RELATIVE PERCENT: 7 %
NEUTROPHILS ABSOLUTE: 4.3 K/UL (ref 1.7–7.7)
NEUTROPHILS RELATIVE PERCENT: 50 %
PDW BLD-RTO: 17.8 % (ref 12.4–15.4)
PERFORMED ON: ABNORMAL
PLATELET # BLD: 440 K/UL (ref 135–450)
PLATELET SLIDE REVIEW: ADEQUATE
PMV BLD AUTO: 6 FL (ref 5–10.5)
POIKILOCYTES: ABNORMAL
POLYCHROMASIA: ABNORMAL
POTASSIUM SERPL-SCNC: 4.1 MMOL/L (ref 3.5–5.1)
RBC # BLD: 2.73 M/UL (ref 4.2–5.9)
SODIUM BLD-SCNC: 133 MMOL/L (ref 136–145)
TOTAL PROTEIN: 5.8 G/DL (ref 6.4–8.2)
WBC # BLD: 7 K/UL (ref 4–11)

## 2021-10-30 PROCEDURE — 36592 COLLECT BLOOD FROM PICC: CPT

## 2021-10-30 PROCEDURE — 2580000003 HC RX 258: Performed by: SURGERY

## 2021-10-30 PROCEDURE — 71045 X-RAY EXAM CHEST 1 VIEW: CPT

## 2021-10-30 PROCEDURE — 1200000000 HC SEMI PRIVATE

## 2021-10-30 PROCEDURE — 2580000003 HC RX 258: Performed by: INTERNAL MEDICINE

## 2021-10-30 PROCEDURE — 6370000000 HC RX 637 (ALT 250 FOR IP): Performed by: INTERNAL MEDICINE

## 2021-10-30 PROCEDURE — 92610 EVALUATE SWALLOWING FUNCTION: CPT

## 2021-10-30 PROCEDURE — 80053 COMPREHEN METABOLIC PANEL: CPT

## 2021-10-30 PROCEDURE — 83735 ASSAY OF MAGNESIUM: CPT

## 2021-10-30 PROCEDURE — 6370000000 HC RX 637 (ALT 250 FOR IP): Performed by: SURGERY

## 2021-10-30 PROCEDURE — 85025 COMPLETE CBC W/AUTO DIFF WBC: CPT

## 2021-10-30 PROCEDURE — 6360000002 HC RX W HCPCS: Performed by: INTERNAL MEDICINE

## 2021-10-30 RX ADMIN — VANCOMYCIN HYDROCHLORIDE 750 MG: 750 INJECTION, POWDER, LYOPHILIZED, FOR SOLUTION INTRAVENOUS at 21:00

## 2021-10-30 RX ADMIN — SODIUM CHLORIDE, PRESERVATIVE FREE 10 ML: 5 INJECTION INTRAVENOUS at 20:59

## 2021-10-30 RX ADMIN — VANCOMYCIN HYDROCHLORIDE 750 MG: 750 INJECTION, POWDER, LYOPHILIZED, FOR SOLUTION INTRAVENOUS at 07:56

## 2021-10-30 NOTE — PROGRESS NOTES
Perfect serve Dr. Mamta Uribe: portable chest xray was done this AM, would you like it repeated? Xray didn't show any acute processes. thanks.

## 2021-10-30 NOTE — PLAN OF CARE
Problem: Pain:  Description: Pain management should include both nonpharmacologic and pharmacologic interventions. Goal: Pain level will decrease  Description: Pain level will decrease  Outcome: Ongoing     Problem: Pain:  Description: Pain management should include both nonpharmacologic and pharmacologic interventions.   Goal: Control of acute pain  Description: Control of acute pain  Outcome: Ongoing     Problem: Falls - Risk of:  Goal: Will remain free from falls  Description: Will remain free from falls  Outcome: Ongoing

## 2021-10-30 NOTE — PLAN OF CARE
SLP Evaluation Completed.  All note are found in [place], [place], [place]  Speech-Language Pathologist  Phone: [phone]  Speech Desk: [phone]

## 2021-10-30 NOTE — PROGRESS NOTES
Speech Language Pathology  Facility/Department: [place] [address] - MED SURG/ORTHO   CLINICAL BEDSIDE SWALLOW EVALUATION    NAME: Micah Pryor  : 1934  MRN: [id number]    ADMISSION DATE: 10/15/2021  ADMITTING DIAGNOSIS: has DM2 (diabetes mellitus, type 2) ([facility] Hypertension; Localized osteoarthrosis, lower leg; HLD (hyperlipidemia); Lumbar stenosis with neurogenic claudication; BPH (benign prostatic hyperplasia); ROXI (acute kidney injury) ([facility] Urinary retention; Pressure injury of sacral region, unstageable ([facility] Severe sepsis ([facility] Osteomyelitis ([email or web address] Chronic anemia; and Tic disorder on their problem list.  ONSET DATE: 10/15/21    Recent Chest Xray/CT of Chest: (10/30/21)  Impression   No acute process. Date of Eval: 10/30/2021  Evaluating Therapist: LIZETTE Segovia    Current Diet level:  Current Diet : Regular  Current Liquid Diet : Thin    Primary Complaint  Patient Complaint: n/a    Pain:  Pain Assessment  Pain Assessment: 0-10  Pain Level: 0  Gill-Baker Pain Rating: No hurt  Patient's Stated Pain Goal: No pain  Pain Type: Surgical pain  Pain Location: Buttocks  Pain Orientation: Mid  Pain Descriptors: Discomfort  Pain Frequency: Continuous  Pain Onset: On-going  Non-Pharmaceutical Pain Intervention(s): Rest, Repositioned (dressing changed )  Response to Pain Intervention: Patient Satisfied  Multiple Pain Sites: No  PAINAD (Pain Assessment in Advance Dementia)  Breathing: normal  Negative Vocalization: none  Facial Expression: smiling or inexpressive  Body Language: relaxed  Consolability: no need to console  PAINAD Score: 0    Reason for Referral  Micah Pryor was referred for a bedside swallow evaluation to assess the efficiency of his swallow function, identify signs and symptoms of aspiration and make recommendations regarding safe dietary consistencies, effective compensatory strategies, and safe eating environment.     Impression  Dysphagia Diagnosis: Swallow function appears grossly intact  Dysphagia Outcome Severity Scale: Level 6: Within functional limits/Modified independence     Treatment Plan  Requires SLP Intervention: Yes  Duration/Frequency of Treatment: 1-2 x week for LOS  D/C Recommendations: To be determined       Recommended Diet and Intervention  Diet Solids Recommendation: Regular  Liquid Consistency Recommendation: Thin  Recommended Form of Meds: Whole with water (Give whole in puree if difficulty is encountered)  Recommendations: Dysphagia treatment  Therapeutic Interventions: Patient/Family education;Diet tolerance monitoring; Therapeutic PO trials with SLP    Compensatory Swallowing Strategies  Compensatory Swallowing Strategies: Alternate solids and liquids;Upright as possible for all oral intake;Assist feed;Remain upright for 30-45 minutes after meals;Small bites/sips    Treatment/Goals  Short-term Goals  Timeframe for Short-term Goals: 14 days (11/13/21)  Long-term Goals  Timeframe for Long-term Goals: 14 days (11/13/21)  Goal 1: The pt will consistently tolerate his safest and least restrictive diet  Dysphagia Goals: The patient will tolerate recommended diet without observed clinical signs of aspiration; The patient/caregiver will demonstrate understanding of compensatory strategies for improved swallowing safety. General  Chart Reviewed: Yes  Subjective  Subjective: The pt had a flat affect throughout but was able to participate. His wife was present during the evaluation  Behavior/Cognition: Cooperative; Alert  Respiratory Status: Room air  O2 Device: None (Room air)  Communication Observation: Functional  Follows Directions: Simple  Dentition: Adequate  Patient Positioning: Upright in bed  Baseline Vocal Quality: Weak  Prior Dysphagia History: Yes; The pt was seen on 9/21/21 for a bedside swallowing evaluation. a regular diet and thin liquids was recommended  Consistencies Administered:  All     Vision/Hearing  Vision  Vision: Impaired  Hearing  Hearing: Exceptions to [facility]  Hearing Exceptions: Hard of hearing/hearing concerns    Oral Motor Deficits  Oral/Motor  Oral Motor: Within functional limits    Oral Phase Dysfunction  Oral Phase  Oral Phase: WNL     Indicators of Pharyngeal Phase Dysfunction   Pharyngeal Phase  Pharyngeal Phase: WNL    Prognosis  Prognosis  Prognosis for safe diet advancement: good  Barriers to reach goals: behavior  Individuals consulted  Consulted and agree with results and recommendations: Patient;RN;Family member  Family member consulted: pt's wife    Vitals/labs:   SpO2: 97%  RR: 24-28  HR: 92     Education  Patient Education: education given re:results and recommendations  Patient Education Response: Verbalizes understanding  Safety Devices in place: Yes  Type of devices: Left in bed; All fall risk precautions in place; Bed alarm in place;Call light within reach;Nurse notified       Therapy Time  SLP Individual Minutes  Time In: 5485  Time Out: ELEANOR Chappell 115  Minutes: [address] [place] [address] Pathologist  Phone: [phone]  Speech Desk: [phone]   10/30/2021 5:02 PM

## 2021-10-30 NOTE — PROGRESS NOTES
Perfect serve Dr. Cyndee Cueto: noticed coughing/clearing throat after sips of water with AM meds. low grade fever overnight 99. would you like chest xray?  Would you like SLP eval? thanks :)

## 2021-10-30 NOTE — PROGRESS NOTES
Progress Note  Date:10/30/2021       Room:[facility]0545-01  Patient Name:Carl A Brunner     YOB: 1934     Age:87 y.o. Subjective    Subjective:  Symptoms:  Stable. Pain:  He reports no pain. Review of Systems  Objective         Vitals Last 24 Hours:  TEMPERATURE:  Temp  Av.6 °F (37 °C)  Min: 97.7 °F (36.5 °C)  Max: 99.3 °F (37.4 °C)  RESPIRATIONS RANGE: Resp  Av.2  Min: 18  Max: 20  PULSE OXIMETRY RANGE: SpO2  Av %  Min: 97 %  Max: 99 %  PULSE RANGE: Pulse  Av.4  Min: 92  Max: 118  BLOOD PRESSURE RANGE: Systolic (61OMP), YMU:759 , Min:151 , GZX:485   ; Diastolic (74FGC), FAU:68, Min:69, Max:89    I/O (24Hr): Intake/Output Summary (Last 24 hours) at 10/30/2021 0625  Last data filed at 10/30/2021 0446  Gross per 24 hour   Intake 350 ml   Output 1650 ml   Net -1300 ml     Objective:  General Appearance:  Not in pain and in no acute distress. Vital signs: (most recent): Blood pressure (!) 172/79, pulse 103, temperature 98.9 °F (37.2 °C), temperature source Axillary, resp. rate 20, height 6' (1.829 m), weight 125 lb 11.2 oz (57 kg), SpO2 98 %. Abdomen: Abdomen is soft. Bowel sounds are normal.   There is no abdominal tenderness. Labs/Imaging/Diagnostics    Labs:  CBC:  Recent Labs     10/28/21  0500 10/29/21  0645 10/30/21  0450   WBC 9.7 8.7 7.0   RBC 2.55* 2.70* 2.73*   HGB 8.1* 8.7* 8.8*   HCT 23.9* 25.2* 25.6*   MCV 93.8 93.3 93.9   RDW 16.7* 17.9* 17.8*   * 480* 440     CHEMISTRIES:  Recent Labs     10/28/21  0500 10/29/21  0645 10/30/21  0450   * 130* 133*   K 3.6 4.0 4.1   CL 95* 94* 97*   CO2 26 28 29   BUN 12 12 13   CREATININE <0.5* <0.5* 0.6*   GLUCOSE 68* 149* 179*   MG 1.80 2.00 2.00     PT/INR:No results for input(s): PROTIME, INR in the last 72 hours. APTT:No results for input(s): APTT in the last 72 hours.   LIVER PROFILE:  Recent Labs     10/28/21  0500 10/29/21  0645 10/30/21  0450   AST 32 28 29   ALT 32 32 30   BILITOT <0.2

## 2021-10-31 ENCOUNTER — APPOINTMENT (OUTPATIENT)
Dept: GENERAL RADIOLOGY | Age: 86
DRG: 853 | End: 2021-10-31
Payer: MEDICARE

## 2021-10-31 LAB
A/G RATIO: 0.8 (ref 1.1–2.2)
ALBUMIN SERPL-MCNC: 2.4 G/DL (ref 3.4–5)
ALP BLD-CCNC: 97 U/L (ref 40–129)
ALT SERPL-CCNC: 25 U/L (ref 10–40)
ANION GAP SERPL CALCULATED.3IONS-SCNC: 8 MMOL/L (ref 3–16)
ANISOCYTOSIS: ABNORMAL
AST SERPL-CCNC: 27 U/L (ref 15–37)
BANDED NEUTROPHILS RELATIVE PERCENT: 14 % (ref 0–7)
BASOPHILIC STIPPLING: ABNORMAL
BASOPHILS ABSOLUTE: 0 K/UL (ref 0–0.2)
BASOPHILS RELATIVE PERCENT: 0 %
BILIRUB SERPL-MCNC: 0.3 MG/DL (ref 0–1)
BUN BLDV-MCNC: 14 MG/DL (ref 7–20)
CALCIUM SERPL-MCNC: 7.9 MG/DL (ref 8.3–10.6)
CHLORIDE BLD-SCNC: 94 MMOL/L (ref 99–110)
CO2: 27 MMOL/L (ref 21–32)
CREAT SERPL-MCNC: 0.6 MG/DL (ref 0.8–1.3)
EOSINOPHILS ABSOLUTE: 0.1 K/UL (ref 0–0.6)
EOSINOPHILS RELATIVE PERCENT: 1 %
GFR AFRICAN AMERICAN: >60
GFR NON-AFRICAN AMERICAN: >60
GLUCOSE BLD-MCNC: 129 MG/DL (ref 70–99)
GLUCOSE BLD-MCNC: 136 MG/DL (ref 70–99)
GLUCOSE BLD-MCNC: 147 MG/DL (ref 70–99)
GLUCOSE BLD-MCNC: 157 MG/DL (ref 70–99)
GLUCOSE BLD-MCNC: 216 MG/DL (ref 70–99)
HCT VFR BLD CALC: 25.9 % (ref 40.5–52.5)
HEMOGLOBIN: 8.7 G/DL (ref 13.5–17.5)
HYPOCHROMIA: ABNORMAL
LYMPHOCYTES ABSOLUTE: 3.7 K/UL (ref 1–5.1)
LYMPHOCYTES RELATIVE PERCENT: 36 %
MACROCYTES: ABNORMAL
MAGNESIUM: 1.8 MG/DL (ref 1.8–2.4)
MCH RBC QN AUTO: 31.1 PG (ref 26–34)
MCHC RBC AUTO-ENTMCNC: 33.5 G/DL (ref 31–36)
MCV RBC AUTO: 92.8 FL (ref 80–100)
MICROCYTES: ABNORMAL
MONOCYTES ABSOLUTE: 0.3 K/UL (ref 0–1.3)
MONOCYTES RELATIVE PERCENT: 3 %
MYELOCYTE PERCENT: 1 %
NEUTROPHILS ABSOLUTE: 6.2 K/UL (ref 1.7–7.7)
NEUTROPHILS RELATIVE PERCENT: 45 %
OVALOCYTES: ABNORMAL
PDW BLD-RTO: 17.9 % (ref 12.4–15.4)
PERFORMED ON: ABNORMAL
PLATELET # BLD: 421 K/UL (ref 135–450)
PLATELET SLIDE REVIEW: ADEQUATE
PMV BLD AUTO: 6.2 FL (ref 5–10.5)
POIKILOCYTES: ABNORMAL
POLYCHROMASIA: ABNORMAL
POTASSIUM SERPL-SCNC: 4.2 MMOL/L (ref 3.5–5.1)
RBC # BLD: 2.79 M/UL (ref 4.2–5.9)
SLIDE REVIEW: ABNORMAL
SODIUM BLD-SCNC: 129 MMOL/L (ref 136–145)
TOTAL PROTEIN: 5.6 G/DL (ref 6.4–8.2)
WBC # BLD: 10.3 K/UL (ref 4–11)

## 2021-10-31 PROCEDURE — 2580000003 HC RX 258: Performed by: INTERNAL MEDICINE

## 2021-10-31 PROCEDURE — 6360000002 HC RX W HCPCS: Performed by: SURGERY

## 2021-10-31 PROCEDURE — 83735 ASSAY OF MAGNESIUM: CPT

## 2021-10-31 PROCEDURE — 6370000000 HC RX 637 (ALT 250 FOR IP): Performed by: INTERNAL MEDICINE

## 2021-10-31 PROCEDURE — 6370000000 HC RX 637 (ALT 250 FOR IP): Performed by: SURGERY

## 2021-10-31 PROCEDURE — 85025 COMPLETE CBC W/AUTO DIFF WBC: CPT

## 2021-10-31 PROCEDURE — 74018 RADEX ABDOMEN 1 VIEW: CPT

## 2021-10-31 PROCEDURE — 2580000003 HC RX 258: Performed by: SURGERY

## 2021-10-31 PROCEDURE — 6360000002 HC RX W HCPCS: Performed by: INTERNAL MEDICINE

## 2021-10-31 PROCEDURE — 1200000000 HC SEMI PRIVATE

## 2021-10-31 PROCEDURE — 80053 COMPREHEN METABOLIC PANEL: CPT

## 2021-10-31 RX ADMIN — HYDROMORPHONE HYDROCHLORIDE 0.5 MG: 1 INJECTION, SOLUTION INTRAMUSCULAR; INTRAVENOUS; SUBCUTANEOUS at 21:38

## 2021-10-31 RX ADMIN — VANCOMYCIN HYDROCHLORIDE 750 MG: 750 INJECTION, POWDER, LYOPHILIZED, FOR SOLUTION INTRAVENOUS at 21:38

## 2021-10-31 RX ADMIN — VANCOMYCIN HYDROCHLORIDE 750 MG: 750 INJECTION, POWDER, LYOPHILIZED, FOR SOLUTION INTRAVENOUS at 10:11

## 2021-10-31 RX ADMIN — Medication 3 MG: at 21:38

## 2021-10-31 RX ADMIN — SODIUM CHLORIDE, PRESERVATIVE FREE 10 ML: 5 INJECTION INTRAVENOUS at 21:38

## 2021-10-31 ASSESSMENT — PAIN SCALES - GENERAL
PAINLEVEL_OUTOF10: 10
PAINLEVEL_OUTOF10: 10

## 2021-10-31 NOTE — PROGRESS NOTES
Hospitalist Progress Note      PCP: Jami Choudhary MD    Date of Admission: 10/15/2021    Chief Complaint:   Pressure injury sacrum    Hospital Course:     He has had a prolonged and complex course. 79yo male with PMHX sig for lumbar stenosis, urinary retention, osteomyelitis of lumbar spine, chronic anemia, severe prot fanta malnutritions sent to [facility] on 10/15/2021 from SNF for altered mental status and large sacral wound.       Of pertinence:   On 9/2/21 Dr. Yolanda Colin performed an L2-5 decompression and laminectomy at [address] days later on 9/5 patient was unable to wiggle his toes and had BLE weakness to the point that he required maximum assistance x 2 in order to stand.  An MRI was performed which showed severe spinal canal stenosis due to a new hematoma.  On 9/6 Dr. Wanda Mcmahon took him back to the OR for an urgent exploration and hematoma evacuation and left drains in place.  Patient was discharged to SNF on 9/10.     Patient was then admitted to [facility] on 9/19. Lopez Rodriguez had a large sacral ulcer and ROXI due to urinary retention requiring placement of a jain catheter.  Another L-spine MRI was performed showing an ill-defind fluid/soft tissue causing severe spinal canal stenosis from L1-4, also moderate-severe spinal canal stenosis from L4 - S1.  He was transferred to [facility] on 9/23 for operative management  By Dr. Abelardo Habermann further operative intervention was necessary on patient's lumbar spine.  He did undergo operative debridement of the sacral ulcer on 9/23.  Tissue culture was positive for enterococcus and enterobacter.  Based on sensitivities he was treated with Augmentin.  He was also treated with a wound vac and discharged back to the SNF on 9/27.     s/p debridement 10/6-cultures growing MRSA, Enterococcus, corynebacterium--     On 10/15/2021 he to [facility] ED  from the SNF for altered mental status and large sacral wound.   He was very agitated and screaming in the ED.  During this admission he was seen by general surgery, and he did go for sacral wound debridement on 10/16/2021. He has been seen by infectious disease in consultation. Felt he has chronic osteomyelitis and plan is for IV antibiotics for up to 6 weeks. Currently being treated with IV vancomycin and oral Flagyl. He does have a PICC line in place.     10/22 noted with large bloody BMs, GI consulted. Tentative plan currently is for colonoscopy on Monday.     prognosis poor - met with hospice but declined to enrol. Family interested in aggressive management    Subjective: No acute events overnight, seen and evaluated at the bedside, discussed plan of care, patient is awake. No chest pain, shortness of breath nausea vomiting, able to tolerate diet, he denies any complaints at this time.     Medications:  Reviewed    Infusion Medications    dextrose      sodium chloride 25 mL (10/26/21 2228)    [Held by provider] lactated ringers 100 mL/hr at 10/20/21 2029     Scheduled Medications    insulin glargine  12 Units SubCUTAneous Nightly    amLODIPine  5 mg Oral Daily    polyethylene glycol  4,000 mL Oral Once    insulin lispro  0-12 Units SubCUTAneous TID WC    insulin lispro  0-6 Units SubCUTAneous Nightly    hydrocortisone  25 mg Rectal BID    metroNIDAZOLE  500 mg Oral 3 times per day    vancomycin  750 mg IntraVENous Q12H    donepezil  10 mg Oral Nightly    atorvastatin  10 mg Oral Nightly    vitamin B complex w/C  1 tablet Oral Daily    therapeutic multivitamin-minerals  1 tablet Oral Daily    insulin lispro  0.05 Units/kg SubCUTAneous TID      PRN Meds: labetalol, LORazepam, glucose, dextrose, glucagon (rDNA), dextrose, sodium chloride flush, sodium chloride, potassium chloride **OR** potassium alternative oral replacement **OR** potassium chloride, magnesium sulfate, ondansetron **OR** ondansetron, acetaminophen **OR** acetaminophen, melatonin, HYDROmorphone      Intake/Output Summary (Last 24 hours) at 10/31/2021 1947  Last data filed at 10/31/2021 1604  Gross per 24 hour   Intake    Output 1025 ml   Net -1025 ml       Physical Exam Performed:    /63   Pulse 97   Temp 98.3 °F (36.8 °C) (Oral)   Resp 18   Ht 6' (1.829 m)   Wt 125 lb 11.2 oz (57 kg)   SpO2 98%   BMI 17.05 kg/m²     General appearance: NAD   HEENT: Pupils equal, round, and reactive to light. Conjunctivae/corneas clear. Neck: Supple,  No jugular venous distention. Trachea midline. Respiratory:  Bilateral breath sounds, without Rales/Wheezes/Rhonchi. No use of accessory muscles noted  Cardiovascular: Regular rate and rhythm with normal S1/S2 . Abdomen: Soft, non-tender, non-distended with normal bowel sounds. Musculoskeletal: No clubbing, cyanosis, trace lower extremity edema bilaterally. Full range of motion without deformity. Neurologic:  grossly intact, moves all four extremities.  Chronic facial tic  Psychiatric: Alert and oriented x 3, thought content appropriate, normal insight. Labs:   Recent Labs     10/29/21  0645 10/30/21  0450 10/31/21  0622   WBC 8.7 7.0 10.3   HGB 8.7* 8.8* 8.7*   HCT 25.2* 25.6* 25.9*   * 440 421     Recent Labs     10/29/21  0645 10/30/21  0450 10/31/21  0622   * 133* 129*   K 4.0 4.1 4.2   CL 94* 97* 94*   CO2 28 29 27   BUN 12 13 14   CREATININE <0.5* 0.6* 0.6*   CALCIUM 8.1* 8.1* 7.9*     Recent Labs     10/29/21  0645 10/30/21  0450 10/31/21  0622   AST 28 29 27   ALT 32 30 25   BILITOT 0.3 0.3 0.3   ALKPHOS 113 114 97     No results for input(s): INR in the last 72 hours. No results for input(s): Jackie Trimble in the last 72 hours.     Urinalysis:      Lab Results   Component Value Date    NITRU Negative 10/15/2021    WBCUA 10-20 10/15/2021    BACTERIA 4+ 10/15/2021    RBCUA 5-10 10/15/2021    BLOODU SMALL 10/15/2021    SPECGRAV 1.015 10/15/2021    GLUCOSEU 250 10/15/2021       Radiology:  XR ABDOMEN (KUB) (SINGLE AP VIEW)   Final Result   Resolving constipation with a non-specific gas pattern. XR CHEST PORTABLE   Final Result   No acute process. XR ABDOMEN (KUB) (SINGLE AP VIEW)   Final Result   Nonspecific abdominal bowel gas pattern with mild residual stool throughout   colon. XR CHEST PORTABLE   Final Result   Status post PICC line placement on the right in good position. Resolving central pulmonary congestion. Mild chronic elevation of the left hemidiaphragm which is unchanged with   slowly resolving bibasilar atelectasis. CT HEAD WO CONTRAST   Final Result      Stable study. No evidence for acute intracranial hemorrhage, territorial   infarction or intracranial mass lesion. Mild chronic microangiopathic ischemic disease. Mild generalized volume loss. CT PELVIS WO CONTRAST Additional Contrast? None   Final Result   1. Large deep sacral decubitus ulcer with mild adjacent cellulitis. No   abscess or soft tissue gas. 2. Osseous uncovering of the posterior aspect of the lower sacrum and coccyx   without convincing evidence of osteomyelitis. If clinically indicated   further evaluation could be obtained with MRI. XR CHEST PORTABLE   Final Result   Chronic elevation of the left hemidiaphragm which is unchanged with   increasing subsegmental atelectasis or early infiltrate along the left lung   base. XR CHEST 1 VIEW    (Results Pending)           Assessment/Plan:    Active Hospital Problems    Diagnosis     Tic disorder [F95.9]     Osteomyelitis ([address] [M86.9]     Chronic anemia [D64.9]     Severe sepsis ([address] [A41.9, R65.20]     Pressure injury of sacral region, unstageable ([address] [L89.150]     Lumbar stenosis with neurogenic claudication [M48.062]     Urinary retention [R33.9]     DM2 (diabetes mellitus, type 2) ([address] [E11.9]      GI bleed :  blood was reported in stool. Gastroenterology has been consulted and for colonoscopy on Monday to further evaluate.   H/H is stable this morning. Will continue to follow closely, transfuse if needed. No active bleeding reported today. Holding Lovenox.     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 ..    - s/p debridement 9/23 at [facility], cult + Enterococcus faecalis, Enterobacter cloacae, 3 anaerobesDischarged on 9/27 on po augmentin    -s/p debridement 10/6-cultures growing MRSA, Enterococcus, corynebacterium-   -Continue care per wound team general surgery  Infectious disease is following in consultation. Will need 6 weeks of antibiotic therapy.    -Antibiotics--- on IV vancomycin and oral Flagyl.        -Recent spine surgery, with concern for cauda equina syndrome due to postop hematoma     \"on 9/2 Dr. Eric Hammond performed an L2-5 decompression and laminectomy at [place] [address], then postoperatively on 9/5 [facility] noted that the patient was unable to wiggle his toes and had BLE weakness to the point that he required maximum assistance x2 in order to stand.  An MRI was performed which showed severe spinal canal stenosis due to hematoma.  on 9/6 Dr. Macarena Benjamin took him back to the OR for an urgent exploration and hematoma evacuation, left drains in place. eventually the patient was discharged to SNF on 9/10. readmitted 9/19-9/22 urinary retention, constipation, and ongoing leg weakness, we obtained a repeat MRI on this admission to [facility] showed an ill-defind fluid/soft tissue causing severe spinal canal stenosis from L1-4, also moderate-severe spinal canal stenosis from L4 - S1, transferred to [facility] for neurosurgery eval-did not require spinal surgery but had debirdement of decub ulcer  -Acute metabolic encephalopathy. . Presented with increased confusion from baseline,Likely due to ongoing infection --continue supportive care. Mental status has improved, he is alert and cooperative at this time. Suspect he is close to his baseline status.   No family present during my visits this weekend and so unable to confirm this.     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 Supportive care      -Dementiaon Ariceptcontinue supportive care     -Chronic indwelling Jain-Indwelling jain since 9/19.  Jain replaced this admission 10/15     -DM type II--- continue insulin therapy follow sugars adjust as needed     -Essential hypertension-stablelisinopril held on admit   Blood pressure noted to be running a little high I have started low-dose Norvasc today and will follow BP.        DVT Prophylaxis: SCDs, Lovenox being held due to possible GI bleed  Diet: ADULT DIET;  Regular; 5 carb choices (75 gm/meal)  ADULT ORAL NUTRITION SUPPLEMENT; AM Snack, PM Snack, HS Snack; Diabetic Oral Supplement  ADULT ORAL NUTRITION SUPPLEMENT; AM Snack, PM Snack, HS Snack; Diabetic Oral Supplement  Code Status: DNR-CCA      Dispo -   patient family seems to be interested in diverting colostomy, awaiting final general surgery recommendations  Tisha Benavidez MD

## 2021-10-31 NOTE — PROGRESS NOTES
ALKPHOS 113 114 97       Imaging Last 24 Hours:  XR CHEST PORTABLE    Result Date: 10/30/2021  EXAMINATION: ONE XRAY VIEW OF THE CHEST 10/30/2021 9:05 am COMPARISON: 10/20/2021 HISTORY: ORDERING SYSTEM PROVIDED HISTORY: possible aspiration pneumonia TECHNOLOGIST PROVIDED HISTORY: Reason for exam:->possible aspiration pneumonia Reason for Exam: possible aspiration pneumonia FINDINGS: Right-sided PICC remains in place. The lungs are without acute focal process. There is no effusion or pneumothorax. The cardiomediastinal silhouette is stable. The osseous structures are stable. Unchanged elevated left hemidiaphragm. No acute process. Assessment//Plan           Hospital Problems         Last Modified POA    * (Principal) Pressure injury of sacral region, unstageable ([address] 10/22/2021 Yes    DM2 (diabetes mellitus, type 2) ([address] 10/15/2021 Yes    Lumbar stenosis with neurogenic claudication 10/15/2021 Yes    Overview Signed 9/19/2021  4:10 AM by Angelica Ye MD     L2-5 decompression, laminectomy; 9-2-21;  Emergency exploration of lumbar laminectomy for epidural fluid collection; 9-6-21           Urinary retention 10/15/2021 Yes    Severe sepsis ([address] 10/16/2021 Yes    Osteomyelitis ([address] 10/16/2021 Yes    Chronic anemia 10/16/2021 Yes    Tic disorder 10/19/2021 Yes        Assessment & Plan  79 yo w HTN, DM, spinal stenosis and a large sacral ulcer, debrided, who has been having diarrhea w h/o constipation, associated w hematochezia. H/H 8/22. Is malnourished. Colonoscopy 10/26 revealed a normal colon/TI except internal hemorrhoids w distal rectal ulceration, probably stercoral, not biopsied, but w neg.  random colon Bx's. His diarrhea/bleeding transiently resolved after the colonoscopy.     - Supportive care  - Needs to have daily bm's  - Consider a KUB to evaluate stool burden and R/O overflow diarrhea- Will consider Cholestyramine after the KUB  - Diversion colostomy is planned due to the persistent diarrhea in order to help the healing process of his decub ulcer     MD Moy Triana) [phone]  Electronically signed by Renae Raymundo MD on 10/31/21 at 2:23 PM EDT

## 2021-10-31 NOTE — PROGRESS NOTES
Hospitalist Progress Note      PCP: Ileana Zendejas MD    Date of Admission: 10/15/2021    Chief Complaint:   Pressure injury sacrum    Hospital Course:     He has had a prolonged and complex course. 81yo male with PMHX sig for lumbar stenosis, urinary retention, osteomyelitis of lumbar spine, chronic anemia, severe prot fanta malnutritions sent to [facility] on 10/15/2021 from SNF for altered mental status and large sacral wound.       Of pertinence:   On 9/2/21 Dr. Sunita Quiroz performed an L2-5 decompression and laminectomy at [address] days later on 9/5 patient was unable to wiggle his toes and had BLE weakness to the point that he required maximum assistance x 2 in order to stand.  An MRI was performed which showed severe spinal canal stenosis due to a new hematoma.  On 9/6 Dr. Alex Joe took him back to the OR for an urgent exploration and hematoma evacuation and left drains in place.  Patient was discharged to SNF on 9/10.     Patient was then admitted to [facility] on 9/19. [facility] had a large sacral ulcer and ROXI due to urinary retention requiring placement of a jain catheter.  Another L-spine MRI was performed showing an ill-defind fluid/soft tissue causing severe spinal canal stenosis from L1-4, also moderate-severe spinal canal stenosis from L4 - S1.  He was transferred to [facility] on 9/23 for operative management  By Dr. Elisa Dowell further operative intervention was necessary on patient's lumbar spine.  He did undergo operative debridement of the sacral ulcer on 9/23.  Tissue culture was positive for enterococcus and enterobacter.  Based on sensitivities he was treated with Augmentin.  He was also treated with a wound vac and discharged back to the SNF on 9/27.     s/p debridement 10/6-cultures growing MRSA, Enterococcus, corynebacterium--     On 10/15/2021 he to [facility] ED  from the SNF for altered mental status and large sacral wound.   He was very agitated and screaming in the ED.  During this admission he was seen by general surgery, and he did go for sacral wound debridement on 10/16/2021. He has been seen by infectious disease in consultation. Felt he has chronic osteomyelitis and plan is for IV antibiotics for up to 6 weeks. Currently being treated with IV vancomycin and oral Flagyl. He does have a PICC line in place.     10/22 noted with large bloody BMs, GI consulted. Tentative plan currently is for colonoscopy on Monday.     prognosis poor - met with hospice but declined to enrol. Family interested in aggressive management    Subjective: Seen and evaluated at the bedside, patient is having lunch, no acute events overnight, discussed plan of care, patient is awake. No chest pain, shortness of breath nausea vomiting, able to tolerate diet, he denies any complaints at this time.     Medications:  Reviewed    Infusion Medications    dextrose      sodium chloride 25 mL (10/26/21 2228)    [Held by provider] lactated ringers 100 mL/hr at 10/20/21 2029     Scheduled Medications    insulin glargine  12 Units SubCUTAneous Nightly    amLODIPine  5 mg Oral Daily    polyethylene glycol  4,000 mL Oral Once    insulin lispro  0-12 Units SubCUTAneous TID WC    insulin lispro  0-6 Units SubCUTAneous Nightly    hydrocortisone  25 mg Rectal BID    metroNIDAZOLE  500 mg Oral 3 times per day    vancomycin  750 mg IntraVENous Q12H    donepezil  10 mg Oral Nightly    atorvastatin  10 mg Oral Nightly    vitamin B complex w/C  1 tablet Oral Daily    therapeutic multivitamin-minerals  1 tablet Oral Daily    insulin lispro  0.05 Units/kg SubCUTAneous TID      PRN Meds: labetalol, LORazepam, glucose, dextrose, glucagon (rDNA), dextrose, sodium chloride flush, sodium chloride, potassium chloride **OR** potassium alternative oral replacement **OR** potassium chloride, magnesium sulfate, ondansetron **OR** ondansetron, acetaminophen **OR** acetaminophen, melatonin, HYDROmorphone      Intake/Output Summary (Last 24 hours) at 10/31/2021 1949  Last data filed at 10/31/2021 1604  Gross per 24 hour   Intake    Output 1025 ml   Net -1025 ml       Physical Exam Performed:    /63   Pulse 97   Temp 98.3 °F (36.8 °C) (Oral)   Resp 18   Ht 6' (1.829 m)   Wt 125 lb 11.2 oz (57 kg)   SpO2 98%   BMI 17.05 kg/m²     General appearance: NAD, much more alert and awake today  HEENT: Pupils equal, round, and reactive to light. Conjunctivae/corneas clear. Neck: Supple,  No jugular venous distention. Trachea midline. Respiratory:  Bilateral breath sounds, without Rales/Wheezes/Rhonchi. No use of accessory muscles noted  Cardiovascular: Regular rate and rhythm with normal S1/S2 . Abdomen: Soft, non-tender, non-distended with normal bowel sounds. Musculoskeletal: No clubbing, cyanosis, trace lower extremity edema bilaterally. Full range of motion without deformity. Neurologic:  grossly intact, moves all four extremities.  Chronic facial tic  Psychiatric: Alert and oriented x 3, thought content appropriate, normal insight. Labs:   Recent Labs     10/29/21  0645 10/30/21  0450 10/31/21  0622   WBC 8.7 7.0 10.3   HGB 8.7* 8.8* 8.7*   HCT 25.2* 25.6* 25.9*   * 440 421     Recent Labs     10/29/21  0645 10/30/21  0450 10/31/21  0622   * 133* 129*   K 4.0 4.1 4.2   CL 94* 97* 94*   CO2 28 29 27   BUN 12 13 14   CREATININE <0.5* 0.6* 0.6*   CALCIUM 8.1* 8.1* 7.9*     Recent Labs     10/29/21  0645 10/30/21  0450 10/31/21  0622   AST 28 29 27   ALT 32 30 25   BILITOT 0.3 0.3 0.3   ALKPHOS 113 114 97     No results for input(s): INR in the last 72 hours. No results for input(s): Kathyrn Belch in the last 72 hours.     Urinalysis:      Lab Results   Component Value Date    NITRU Negative 10/15/2021    WBCUA 10-20 10/15/2021    BACTERIA 4+ 10/15/2021    RBCUA 5-10 10/15/2021    BLOODU SMALL 10/15/2021    SPECGRAV 1.015 10/15/2021    GLUCOSEU 250 10/15/2021 Radiology:  XR ABDOMEN (KUB) (SINGLE AP VIEW)   Final Result   Resolving constipation with a non-specific gas pattern. XR CHEST PORTABLE   Final Result   No acute process. XR ABDOMEN (KUB) (SINGLE AP VIEW)   Final Result   Nonspecific abdominal bowel gas pattern with mild residual stool throughout   colon. XR CHEST PORTABLE   Final Result   Status post PICC line placement on the right in good position. Resolving central pulmonary congestion. Mild chronic elevation of the left hemidiaphragm which is unchanged with   slowly resolving bibasilar atelectasis. CT HEAD WO CONTRAST   Final Result      Stable study. No evidence for acute intracranial hemorrhage, territorial   infarction or intracranial mass lesion. Mild chronic microangiopathic ischemic disease. Mild generalized volume loss. CT PELVIS WO CONTRAST Additional Contrast? None   Final Result   1. Large deep sacral decubitus ulcer with mild adjacent cellulitis. No   abscess or soft tissue gas. 2. Osseous uncovering of the posterior aspect of the lower sacrum and coccyx   without convincing evidence of osteomyelitis. If clinically indicated   further evaluation could be obtained with MRI. XR CHEST PORTABLE   Final Result   Chronic elevation of the left hemidiaphragm which is unchanged with   increasing subsegmental atelectasis or early infiltrate along the left lung   base.          XR CHEST 1 VIEW    (Results Pending)           Assessment/Plan:    Active Hospital Problems    Diagnosis     Tic disorder [F95.9]     Osteomyelitis ([address] [M86.9]     Chronic anemia [D64.9]     Severe sepsis ([address] [A41.9, R65.20]     Pressure injury of sacral region, unstageable ([address] [L89.150]     Lumbar stenosis with neurogenic claudication [M48.062]     Urinary retention [R33.9]     DM2 (diabetes mellitus, type 2) ([address] [E11.9]      GI bleed: blood was reported in stool  Diversion colostomy is planned due to the persistent diarrhea in order to help the healing process of his decub ulcer  GI, GS following     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 Isabel Red .  - s/p debridement 9/23 at [facility], cult + Enterococcus faecalis, Enterobacter cloacae, 3 anaerobes    - s/p debridement 10/6-cultures growing MRSA, Enterococcus, corynebacterium-  - Will need 6 weeks of antibiotic therapy. - Antibiotics--- on IV vancomycin and oral Flagyl.        -Recent spine surgery, with concern for cauda equina syndrome due to postop hematoma     \"on 9/2 Dr. Alvin Galvan performed an L2-5 decompression and laminectomy at [place] [address], then postoperatively on 9/5 [facility] noted that the patient was unable to wiggle his toes and had BLE weakness to the point that he required maximum assistance x2 in order to stand.  An MRI was performed which showed severe spinal canal stenosis due to hematoma.  on 9/6 Dr. Renae Valencia took him back to the OR for an urgent exploration and hematoma evacuation, left drains in place. eventually the patient was discharged to SNF on 9/10. 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    Supportive care      - Dementiaon Ariceptcontinue supportive care     - Chronic indwelling Jain-Indwelling jain since 9/19.  Jain replaced this admission 10/15     - DM type II--- continue insulin therapy follow sugars adjust as needed     - Essential hypertension-stablelisinopril held on admit   Blood pressure noted to be running a little high I have started low-dose Norvasc today and will follow BP.      DVT Prophylaxis: SCDs, Lovenox being held due to possible GI bleed  Diet: ADULT DIET;  Regular; 5 carb choices (75 gm/meal)  ADULT ORAL NUTRITION SUPPLEMENT; AM Snack, PM Snack, HS Snack; Diabetic Oral Supplement  ADULT ORAL NUTRITION SUPPLEMENT; AM Snack, PM Snack, HS Snack; Diabetic Oral Supplement  Code Status: DNR-CCA      Dispo - patient family seems to be interested in diverting colostomy, awaiting final general surgery recommendations, Diversion colostomy is planned due to the persistent diarrhea in order to help the healing process of his decub ulcer    Emelyn Tony MD

## 2021-11-01 ENCOUNTER — APPOINTMENT (OUTPATIENT)
Dept: GENERAL RADIOLOGY | Age: 86
DRG: 853 | End: 2021-11-01
Payer: MEDICARE

## 2021-11-01 LAB
A/G RATIO: 0.8 (ref 1.1–2.2)
ALBUMIN SERPL-MCNC: 2.2 G/DL (ref 3.4–5)
ALP BLD-CCNC: 88 U/L (ref 40–129)
ALT SERPL-CCNC: 21 U/L (ref 10–40)
ANION GAP SERPL CALCULATED.3IONS-SCNC: 5 MMOL/L (ref 3–16)
ANISOCYTOSIS: ABNORMAL
AST SERPL-CCNC: 21 U/L (ref 15–37)
BANDED NEUTROPHILS RELATIVE PERCENT: 3 % (ref 0–7)
BASOPHILS ABSOLUTE: 0 K/UL (ref 0–0.2)
BASOPHILS RELATIVE PERCENT: 0 %
BILIRUB SERPL-MCNC: 0.3 MG/DL (ref 0–1)
BUN BLDV-MCNC: 17 MG/DL (ref 7–20)
CALCIUM SERPL-MCNC: 7.8 MG/DL (ref 8.3–10.6)
CHLORIDE BLD-SCNC: 99 MMOL/L (ref 99–110)
CO2: 30 MMOL/L (ref 21–32)
CREAT SERPL-MCNC: 0.6 MG/DL (ref 0.8–1.3)
EOSINOPHILS ABSOLUTE: 0.3 K/UL (ref 0–0.6)
EOSINOPHILS RELATIVE PERCENT: 5 %
GFR AFRICAN AMERICAN: >60
GFR NON-AFRICAN AMERICAN: >60
GLUCOSE BLD-MCNC: 102 MG/DL (ref 70–99)
GLUCOSE BLD-MCNC: 105 MG/DL (ref 70–99)
GLUCOSE BLD-MCNC: 169 MG/DL (ref 70–99)
GLUCOSE BLD-MCNC: 184 MG/DL (ref 70–99)
GLUCOSE BLD-MCNC: 237 MG/DL (ref 70–99)
HCT VFR BLD CALC: 22.5 % (ref 40.5–52.5)
HEMOGLOBIN: 7.7 G/DL (ref 13.5–17.5)
LYMPHOCYTES ABSOLUTE: 1.6 K/UL (ref 1–5.1)
LYMPHOCYTES RELATIVE PERCENT: 24 %
MACROCYTES: ABNORMAL
MAGNESIUM: 1.9 MG/DL (ref 1.8–2.4)
MCH RBC QN AUTO: 31.7 PG (ref 26–34)
MCHC RBC AUTO-ENTMCNC: 34.1 G/DL (ref 31–36)
MCV RBC AUTO: 92.9 FL (ref 80–100)
METAMYELOCYTES RELATIVE PERCENT: 3 %
MONOCYTES ABSOLUTE: 0.5 K/UL (ref 0–1.3)
MONOCYTES RELATIVE PERCENT: 8 %
MYELOCYTE PERCENT: 1 %
NEUTROPHILS ABSOLUTE: 4.2 K/UL (ref 1.7–7.7)
NEUTROPHILS RELATIVE PERCENT: 56 %
PDW BLD-RTO: 17.9 % (ref 12.4–15.4)
PERFORMED ON: ABNORMAL
PLATELET # BLD: 319 K/UL (ref 135–450)
PLATELET SLIDE REVIEW: ADEQUATE
PMV BLD AUTO: 6.1 FL (ref 5–10.5)
POLYCHROMASIA: ABNORMAL
POTASSIUM SERPL-SCNC: 3.7 MMOL/L (ref 3.5–5.1)
RBC # BLD: 2.42 M/UL (ref 4.2–5.9)
SODIUM BLD-SCNC: 134 MMOL/L (ref 136–145)
TOTAL PROTEIN: 5.1 G/DL (ref 6.4–8.2)
WBC # BLD: 6.7 K/UL (ref 4–11)

## 2021-11-01 PROCEDURE — 6360000002 HC RX W HCPCS: Performed by: SURGERY

## 2021-11-01 PROCEDURE — 6360000002 HC RX W HCPCS: Performed by: INTERNAL MEDICINE

## 2021-11-01 PROCEDURE — 6370000000 HC RX 637 (ALT 250 FOR IP): Performed by: SURGERY

## 2021-11-01 PROCEDURE — 6370000000 HC RX 637 (ALT 250 FOR IP): Performed by: INTERNAL MEDICINE

## 2021-11-01 PROCEDURE — 2580000003 HC RX 258: Performed by: SURGERY

## 2021-11-01 PROCEDURE — 2580000003 HC RX 258: Performed by: INTERNAL MEDICINE

## 2021-11-01 PROCEDURE — 80053 COMPREHEN METABOLIC PANEL: CPT

## 2021-11-01 PROCEDURE — 1200000000 HC SEMI PRIVATE

## 2021-11-01 PROCEDURE — 83735 ASSAY OF MAGNESIUM: CPT

## 2021-11-01 PROCEDURE — 85025 COMPLETE CBC W/AUTO DIFF WBC: CPT

## 2021-11-01 RX ORDER — CHOLESTYRAMINE 4 G/9G
1 POWDER, FOR SUSPENSION ORAL
Status: DISCONTINUED | OUTPATIENT
Start: 2021-11-01 | End: 2021-11-05 | Stop reason: HOSPADM

## 2021-11-01 RX ADMIN — VANCOMYCIN HYDROCHLORIDE 750 MG: 750 INJECTION, POWDER, LYOPHILIZED, FOR SOLUTION INTRAVENOUS at 21:19

## 2021-11-01 RX ADMIN — VANCOMYCIN HYDROCHLORIDE 750 MG: 750 INJECTION, POWDER, LYOPHILIZED, FOR SOLUTION INTRAVENOUS at 08:59

## 2021-11-01 RX ADMIN — HYDROMORPHONE HYDROCHLORIDE 0.5 MG: 1 INJECTION, SOLUTION INTRAMUSCULAR; INTRAVENOUS; SUBCUTANEOUS at 23:48

## 2021-11-01 RX ADMIN — Medication 3 MG: at 23:45

## 2021-11-01 RX ADMIN — SODIUM CHLORIDE, PRESERVATIVE FREE 10 ML: 5 INJECTION INTRAVENOUS at 08:54

## 2021-11-01 ASSESSMENT — PAIN SCALES - GENERAL
PAINLEVEL_OUTOF10: 0
PAINLEVEL_OUTOF10: 7
PAINLEVEL_OUTOF10: 0

## 2021-11-01 ASSESSMENT — PAIN SCALES - WONG BAKER
WONGBAKER_NUMERICALRESPONSE: 0
WONGBAKER_NUMERICALRESPONSE: 0

## 2021-11-01 NOTE — PROGRESS NOTES
Hospitalist Progress Note      PCP: Reji Nayak MD    Date of Admission: 10/15/2021    Chief Complaint:     Hospital Course: 81yo male with PMHX sig for lumbar stenosis, urinary retention, osteomyelitis of lumbar spine, chronic anemia, severe prot fanta malnutritions sent to [facility] on 10/15/2021 from SNF for altered mental status and large sacral wound.    On 9/2/21 Dr. María Shipman performed an L2-5 decompression and laminectomy at [address] days later on 9/5 patient was unable to wiggle his toes and had BLE weakness to the point that he required maximum assistance x 2 in order to stand.  An MRI was performed which showed severe spinal canal stenosis due to a new hematoma.  On 9/6 Dr. Britney Rios took him back to the OR for an urgent exploration and hematoma evacuation and left drains in place.  Patient was discharged to SNF on 9/10.     Patient was then admitted to [facility] on 9/19. [facility] had a large sacral ulcer and ROXI due to urinary retention requiring placement of a jain catheter.  Another L-spine MRI was performed showing an ill-defind fluid/soft tissue causing severe spinal canal stenosis from L1-4, also moderate-severe spinal canal stenosis from L4 - S1.  He was transferred to [facility] on 9/23 for operative management  By Dr. Armida Suazo further operative intervention was necessary on patient's lumbar spine.  He did undergo operative debridement of the sacral ulcer on 9/23.  Tissue culture was positive for enterococcus and enterobacter.  Based on sensitivities he was treated with Augmentin.  He was also treated with a wound vac and discharged back to the SNF on 9/27.     s/p debridement 10/6-cultures growing MRSA, Enterococcus, corynebacterium--     On 10/15/2021 he to [facility] ED  from the SNF for altered mental status and large sacral wound.  He was very agitated and screaming in the ED.   During this admission he was seen by general surgery, and he did go for sacral wound debridement on 10/16/2021. He has been seen by infectious disease in consultation. Felt he has chronic osteomyelitis and plan is for IV antibiotics for up to 6 weeks.  Currently being treated with IV vancomycin and oral Flagyl. He does have a PICC line in place.     10/22 noted with large bloody BMs, GI consulted. Tentative plan currently is for colonoscopy on Monday.     prognosis poor - met with hospice but declined to enrol. Family interested in aggressive [id number] yo w HTN, DM, spinal stenosis and a large sacral ulcer, debrided, who has been having diarrhea w h/o constipation, associated w hematochezia. H/H 8/22. Is malnourished. Colonoscopy 10/26 revealed a normal colon/TI except internal hemorrhoids w distal rectal ulceration, probably stercoral, not biopsied, but w neg. random colon Bx's. His diarrhea recurred after the colonoscopy, w KUB neg for constipation.   GI following questran added . Diverting colostomy planned to help healing of decub due to persistent diarrhea      Subjective:  He was facetiming with family .  wie or RN feeds him he didn't eat much breakfast. ,       Medications:  Reviewed    Infusion Medications    dextrose      sodium chloride 25 mL (10/26/21 2228)    [Held by provider] lactated ringers 100 mL/hr at 10/20/21 2029     Scheduled Medications    cholestyramine  1 packet Oral TID AC    insulin glargine  12 Units SubCUTAneous Nightly    amLODIPine  5 mg Oral Daily    polyethylene glycol  4,000 mL Oral Once    insulin lispro  0-12 Units SubCUTAneous TID WC    insulin lispro  0-6 Units SubCUTAneous Nightly    hydrocortisone  25 mg Rectal BID    metroNIDAZOLE  500 mg Oral 3 times per day    vancomycin  750 mg IntraVENous Q12H    donepezil  10 mg Oral Nightly    atorvastatin  10 mg Oral Nightly    vitamin B complex w/C  1 tablet Oral Daily    therapeutic multivitamin-minerals  1 tablet Oral Daily    insulin lispro  0.05 Units/kg SubCUTAneous TID WC     PRN Meds: labetalol, LORazepam, glucose, dextrose, glucagon (rDNA), dextrose, sodium chloride flush, sodium chloride, potassium chloride **OR** potassium alternative oral replacement **OR** potassium chloride, magnesium sulfate, ondansetron **OR** ondansetron, acetaminophen **OR** acetaminophen, melatonin, HYDROmorphone      Intake/Output Summary (Last 24 hours) at 11/1/2021 1738  Last data filed at 11/1/2021 1234  Gross per 24 hour   Intake 0 ml   Output 1150 ml   Net -1150 ml       Physical Exam Performed:    /63   Pulse 89   Temp 98.9 °F (37.2 °C) (Oral)   Resp 20   Ht 6' (1.829 m)   Wt 125 lb 11.2 oz (57 kg)   SpO2 98%   BMI 17.05 kg/m²     General appearance: No  Distress, frontal balded  HEENT: Pupils equal, round, and reactive to light. Neck: Supple, with full range of motion. No jugular venous distention. Trachea midline. Respiratory:  Normal  effort. Clear to auscultation,   Cardiovascular:  S1/S2 RRR without murmurs, rubs or gallops. Abdomen: Soft, non-tender, non-distended with normal bowel sounds. Musculoskeletal: No clubbing, cyanosis or edema foot drop boots heel offloading no pitting, can barely wiggle the feet   Skin: Skin color pale , texture, turgor normal.  No rashes or lesions.   Neurologic: Cranial nerves: II-XII intactnot focal but he twitches and tics to the face both hands very shaky   Psychiatric: Alert and oriented, thought content appropriate, normal insight  Peripheral Pulses: +2 palpable, equal bilaterally   GGU jain cl yellow   Did not roll pt to examine wound per wife it is terrible     Labs:   Recent Labs     10/30/21  0450 10/31/21  0622 11/01/21  0536   WBC 7.0 10.3 6.7   HGB 8.8* 8.7* 7.7*   HCT 25.6* 25.9* 22.5*    421 319     Recent Labs     10/30/21  0450 10/31/21  0622 11/01/21  0536   * 129* 134*   K 4.1 4.2 3.7   CL 97* 94* 99   CO2 29 27 30   BUN 13 14 17   CREATININE 0.6* 0.6* 0.6*   CALCIUM 8.1* 7.9* 7.8*     Recent Labs     10/30/21  8417 10/31/21  0622 11/01/21  0536   AST 29 27 21   ALT 30 25 21   BILITOT 0.3 0.3 0.3   ALKPHOS 114 97 88     No results for input(s): INR in the last 72 hours. No results for input(s): Lidia Horsfall in the last 72 hours. Urinalysis:      Lab Results   Component Value Date    NITRU Negative 10/15/2021    WBCUA 10-20 10/15/2021    BACTERIA 4+ 10/15/2021    RBCUA 5-10 10/15/2021    BLOODU SMALL 10/15/2021    SPECGRAV 1.015 10/15/2021    GLUCOSEU 250 10/15/2021       Radiology:  XR ABDOMEN (KUB) (SINGLE AP VIEW)   Final Result   Resolving constipation with a non-specific gas pattern. XR CHEST PORTABLE   Final Result   No acute process. XR ABDOMEN (KUB) (SINGLE AP VIEW)   Final Result   Nonspecific abdominal bowel gas pattern with mild residual stool throughout   colon. XR CHEST PORTABLE   Final Result   Status post PICC line placement on the right in good position. Resolving central pulmonary congestion. Mild chronic elevation of the left hemidiaphragm which is unchanged with   slowly resolving bibasilar atelectasis. CT HEAD WO CONTRAST   Final Result      Stable study. No evidence for acute intracranial hemorrhage, territorial   infarction or intracranial mass lesion. Mild chronic microangiopathic ischemic disease. Mild generalized volume loss. CT PELVIS WO CONTRAST Additional Contrast? None   Final Result   1. Large deep sacral decubitus ulcer with mild adjacent cellulitis. No   abscess or soft tissue gas. 2. Osseous uncovering of the posterior aspect of the lower sacrum and coccyx   without convincing evidence of osteomyelitis. If clinically indicated   further evaluation could be obtained with MRI. XR CHEST PORTABLE   Final Result   Chronic elevation of the left hemidiaphragm which is unchanged with   increasing subsegmental atelectasis or early infiltrate along the left lung   base.                  Assessment/Plan:    C/Chai Fair 0220 Problems    Diagnosis     Tic disorder [F95.9]     Osteomyelitis ([facility] [M86.9]     Chronic anemia [D64.9]     Severe sepsis ([facility] [A41.9, R65.20]     Pressure injury of sacral region, unstageable ([facility] [L89.150]     Lumbar stenosis with neurogenic claudication [M48.062]     Urinary retention [R33.9]     DM2 (diabetes mellitus, type 2) [facility] [E11.9]      Continue wound care  dieician seeing for malunutrion /diet supplements  Questran trial for the diarrhea  Family and notes talking about diverting colostomy ? General surgery consulted     DVT Prophylaxis: scd  Diet: ADULT DIET; Regular; 5 carb choices (75 gm/meal)  Diet NPO Exceptions are: Sips of Water with Meds  ADULT ORAL NUTRITION SUPPLEMENT; Breakfast, Lunch, Dinner; Diabetic Oral Supplement  Code Status: DNR-CCA    PT/OT Eval Status: poorly mobile. I dont see PT OT notes?       Dispo - will need SNF     Abel Hays MD

## 2021-11-01 NOTE — CARE COORDINATION
CM met with pt and wife at bedside to discuss SNF placement again. Referral placed to [facility] last week and they accepted. Spoke to Vikash pineda with [facility] and they are following. Wife wanting a f/u on referral to [facility] again with Josie in admissions.  Per GI, \"Diversion colostomy is planned due to the persistent diarrhea in order to help the healing process of his decub ulcer\" CM following-Tegan Diallo RN

## 2021-11-01 NOTE — PROGRESS NOTES
Comprehensive Nutrition Assessment    Type and Reason for Visit:  Reassess    Nutrition Recommendations/Plan:   1. Continue carb control diet  2. Continue Glucerna ONS  3. Encourage po intakes  4. Monitor po intakes, nutrition adequacy, weights, pertinent labs, BMs    Nutrition Assessment:  Follow up: Pt reports that his appetite is \"not too good\". Currently on a carb control diet with Glucerna ONS. Po intakes % generally per EMR. Pt could not remember if he had breakfast this am. Per GI note, diversion colostomy is planned due to the persistent diarrhea in order to help the healing process of his decub ulcer. Pt reports that he is favorable to Glucerna and he has been drinking most of them. Encouraged po intakes. Malnutrition Assessment:  Malnutrition Status: At risk for malnutrition (Comment)      Estimated Daily Nutrient Needs:  Energy (kcal):  0821-1037 kcals/day; Weight Used for Energy Requirements:  Ideal (81 kg)     Protein (g):   g/day; Weight Used for Protein Requirements:  Ideal (1.0-1.6 g/kg)        Fluid (ml/day):  1 ml/kcal      Nutrition Related Findings:  BM x5 on 10/31      Wounds:  Pressure Injury, Multiple, Wound Vac (Pressure injury on sacrum and left and right heels.)       Current Nutrition Therapies:    ADULT DIET;  Regular; 5 carb choices (75 gm/meal)  ADULT ORAL NUTRITION SUPPLEMENT; AM Snack, PM Snack, HS Snack; Diabetic Oral Supplement  ADULT ORAL NUTRITION SUPPLEMENT; AM Snack, PM Snack, HS Snack; Diabetic Oral Supplement  Diet NPO Exceptions are: Sips of Water with Meds    Anthropometric Measures:  · Height: 6' (182.9 cm)  · Current Body Weight: 125 lb 11.2 oz (57 kg)   · Ideal Body Weight: 178 lbs; % Ideal Body Weight 68 %   · BMI: 17   · BMI Categories: Underweight (BMI less than 22) age over 72       Nutrition Diagnosis:   · Increased nutrient needs related to increase demand for energy/nutrients as evidenced by wounds, BMI, weight loss      Nutrition Interventions:   Food and/or Nutrient Delivery:  Continue Current Diet, Continue Oral Nutrition Supplement  Nutrition Education/Counseling:  Education not indicated   Coordination of Nutrition Care:  Continue to monitor while inpatient    Goals:  Consume 50% or greater of 3 meals per day and ONS during this admission.        Nutrition Monitoring and Evaluation:   Behavioral-Environmental Outcomes:  None Identified   Food/Nutrient Intake Outcomes:  Food and Nutrient Intake, Supplement Intake  Physical Signs/Symptoms Outcomes:  Biochemical Data, Diarrhea, Skin, Weight     Discharge Planning:    Continue current diet, Continue Oral Nutrition Supplement     Electronically signed by Jaun Hernandez RD, LD on 11/1/21 at 2:10 PM EDT    Contact: [phone]

## 2021-11-01 NOTE — PROGRESS NOTES
Progress Note  Date:2021       Room:45/0545-01  Patient Name:Juan Alberto Ventura     YOB: 1934     Age:87 y.o. Subjective    Subjective:  Pain:  He reports no pain. Review of Systems  Objective         Vitals Last 24 Hours:  TEMPERATURE:  Temp  Av.5 °F (36.9 °C)  Min: 97.9 °F (36.6 °C)  Max: 99 °F (37.2 °C)  RESPIRATIONS RANGE: Resp  Av.6  Min: 18  Max: 20  PULSE OXIMETRY RANGE: SpO2  Av.6 %  Min: 90 %  Max: 98 %  PULSE RANGE: Pulse  Av.2  Min: 90  Max: 103  BLOOD PRESSURE RANGE: Systolic (92OGW), POY:023 , Min:129 , TVB:686   ; Diastolic (56VPW), DGI:26, Min:63, Max:70    I/O (24Hr): Intake/Output Summary (Last 24 hours) at 2021 1345  Last data filed at 2021 1234  Gross per 24 hour   Intake 0 ml   Output 1400 ml   Net -1400 ml     Objective:  General Appearance:  Not in pain. Vital signs: (most recent): Blood pressure 129/65, pulse 90, temperature 99 °F (37.2 °C), temperature source Oral, resp. rate 20, height 6' (1.829 m), weight 125 lb 11.2 oz (57 kg), SpO2 95 %. Heart: Normal rate. Regular rhythm. S1 normal and S2 normal.    Abdomen: Abdomen is soft. Bowel sounds are normal.   There is no abdominal tenderness. Labs/Imaging/Diagnostics    Labs:  CBC:  Recent Labs     10/30/21  0450 10/31/21  0622 11/01/21  0536   WBC 7.0 10.3 6.7   RBC 2.73* 2.79* 2.42*   HGB 8.8* 8.7* 7.7*   HCT 25.6* 25.9* 22.5*   MCV 93.9 92.8 92.9   RDW 17.8* 17.9* 17.9*    421 319     CHEMISTRIES:  Recent Labs     10/30/21  0450 10/31/21  0622 11/01/21  0536   * 129* 134*   K 4.1 4.2 3.7   CL 97* 94* 99   CO2 29 27 30   BUN 13 14 17   CREATININE 0.6* 0.6* 0.6*   GLUCOSE 179* 136* 102*   MG 2.00 1.80 1.90     PT/INR:No results for input(s): PROTIME, INR in the last 72 hours. APTT:No results for input(s): APTT in the last 72 hours.   LIVER PROFILE:  Recent Labs     10/30/21  0450 10/31/21  0622 21  0536   AST 29 27 21   ALT 30 25 21   BILITOT 0.3 0.3 0.3   ALKPHOS 114 97 88       Imaging Last 24 Hours:  XR ABDOMEN (KUB) (SINGLE AP VIEW)    Result Date: 10/31/2021  EXAMINATION: ONE SUPINE XRAY VIEW(S) OF THE ABDOMEN 10/31/2021 4:42 pm COMPARISON: 10/23/2021 HISTORY: ORDERING SYSTEM PROVIDED HISTORY: diarrhea. Evaluate stool burden. TECHNOLOGIST PROVIDED HISTORY: Reason for exam:->diarrhea. Evaluate stool burden. Reason for Exam: constipation Acuity: Acute Type of Exam: Initial FINDINGS: The gas pattern is unremarkable. There is no significant feces in the colon. No renal stones are seen. There is no free air. The bones are intact. There is a catheter projecting in the along the bladder inferiorly which is unchanged. Resolving constipation with a non-specific gas pattern. Assessment//Plan           Hospital Problems         Last Modified POA    * (Principal) Pressure injury of sacral region, unstageable ([address] 10/22/2021 Yes    DM2 (diabetes mellitus, type 2) ([address] 10/15/2021 Yes    Lumbar stenosis with neurogenic claudication 10/15/2021 Yes    Overview Signed 9/19/2021  4:10 AM by Hollie Marmolejo MD     L2-5 decompression, laminectomy; 9-2-21;  Emergency exploration of lumbar laminectomy for epidural fluid collection; 9-6-21           Urinary retention 10/15/2021 Yes    Severe sepsis ([address] 10/16/2021 Yes    Osteomyelitis ([address] 10/16/2021 Yes    Chronic anemia 10/16/2021 Yes    Tic disorder 10/19/2021 Yes        Assessment & Plan  81 yo w HTN, DM, spinal stenosis and a large sacral ulcer, debrided, who has been having diarrhea w h/o constipation, associated w hematochezia. H/H 8/22. Is malnourished. Colonoscopy 10/26 revealed a normal colon/TI except internal hemorrhoids w distal rectal ulceration, probably stercoral, not biopsied, but w neg.  random colon Bx's. His diarrhea recurred after the colonoscopy, w KUB neg for constipation.     - Supportive care  - Will start Questran  - Diversion colostomy is planned due to the persistent diarrhea in order to help the healing process of his decub ulcer     MD Koki Bolanos) [phone]  Electronically signed by Severo Daly MD on 11/1/21 at 1:45 PM EDT

## 2021-11-01 NOTE — PLAN OF CARE
Problem: Pain:  Goal: Pain level will decrease  Description: Pain level will decrease  Outcome: Ongoing  Goal: Control of acute pain  Description: Control of acute pain  Outcome: Ongoing  Goal: Control of chronic pain  Description: Control of chronic pain  Outcome: Ongoing     Problem: Falls - Risk of:  Goal: Will remain free from falls  Description: Will remain free from falls  Outcome: Ongoing  Goal: Absence of physical injury  Description: Absence of physical injury  Outcome: Ongoing     Problem: Skin Integrity:  Goal: Will show no infection signs and symptoms  Description: Will show no infection signs and symptoms  Outcome: Ongoing  Goal: Absence of new skin breakdown  Description: Absence of new skin breakdown  Outcome: Ongoing     Problem: Nutrition  Goal: Optimal nutrition therapy  11/1/2021 1411 by Alvaro Gerard RD, TWILA  Outcome: Ongoing  Note: Nutrition Problem #1: Increased nutrient needs  Intervention: Food and/or Nutrient Delivery: Continue Current Diet, Continue Oral Nutrition Supplement  Nutritional Goals: Consume 50% or greater of 3 meals per day and ONS during this admission. Problem: Falls - Risk of:  Goal: Will remain free from falls  Description: Will remain free from falls  Outcome: Ongoing     Problem: Skin Integrity:  Goal: Will show no infection signs and symptoms  Description: Will show no infection signs and symptoms  Outcome: Ongoing     Problem: Nutrition  Goal: Optimal nutrition therapy  11/1/2021 1411 by Alvaro Gerard RD, TWILA  Outcome: Ongoing  Note: Nutrition Problem #1: Increased nutrient needs  Intervention: Food and/or Nutrient Delivery: Continue Current Diet, Continue Oral Nutrition Supplement  Nutritional Goals: Consume 50% or greater of 3 meals per day and ONS during this admission.

## 2021-11-02 ENCOUNTER — ANESTHESIA (OUTPATIENT)
Dept: OPERATING ROOM | Age: 86
DRG: 853 | End: 2021-11-02
Payer: MEDICARE

## 2021-11-02 ENCOUNTER — ANESTHESIA EVENT (OUTPATIENT)
Dept: OPERATING ROOM | Age: 86
DRG: 853 | End: 2021-11-02
Payer: MEDICARE

## 2021-11-02 VITALS
RESPIRATION RATE: 10 BRPM | SYSTOLIC BLOOD PRESSURE: 127 MMHG | DIASTOLIC BLOOD PRESSURE: 69 MMHG | OXYGEN SATURATION: 100 %

## 2021-11-02 LAB
A/G RATIO: 0.7 (ref 1.1–2.2)
ALBUMIN SERPL-MCNC: 2.2 G/DL (ref 3.4–5)
ALP BLD-CCNC: 83 U/L (ref 40–129)
ALT SERPL-CCNC: 23 U/L (ref 10–40)
ANION GAP SERPL CALCULATED.3IONS-SCNC: 6 MMOL/L (ref 3–16)
AST SERPL-CCNC: 22 U/L (ref 15–37)
BANDED NEUTROPHILS RELATIVE PERCENT: 25 % (ref 0–7)
BASOPHILS ABSOLUTE: 0 K/UL (ref 0–0.2)
BASOPHILS RELATIVE PERCENT: 0 %
BILIRUB SERPL-MCNC: 0.3 MG/DL (ref 0–1)
BUN BLDV-MCNC: 16 MG/DL (ref 7–20)
CALCIUM SERPL-MCNC: 7.8 MG/DL (ref 8.3–10.6)
CHLORIDE BLD-SCNC: 96 MMOL/L (ref 99–110)
CO2: 28 MMOL/L (ref 21–32)
CREAT SERPL-MCNC: 0.7 MG/DL (ref 0.8–1.3)
EOSINOPHILS ABSOLUTE: 0.3 K/UL (ref 0–0.6)
EOSINOPHILS RELATIVE PERCENT: 4 %
GFR AFRICAN AMERICAN: >60
GFR NON-AFRICAN AMERICAN: >60
GLUCOSE BLD-MCNC: 107 MG/DL (ref 70–99)
GLUCOSE BLD-MCNC: 112 MG/DL (ref 70–99)
GLUCOSE BLD-MCNC: 82 MG/DL (ref 70–99)
GLUCOSE BLD-MCNC: 90 MG/DL (ref 70–99)
GLUCOSE BLD-MCNC: 90 MG/DL (ref 70–99)
GLUCOSE BLD-MCNC: 91 MG/DL (ref 70–99)
HCT VFR BLD CALC: 24.8 % (ref 40.5–52.5)
HEMOGLOBIN: 8.3 G/DL (ref 13.5–17.5)
LYMPHOCYTES ABSOLUTE: 1.7 K/UL (ref 1–5.1)
LYMPHOCYTES RELATIVE PERCENT: 25 %
MAGNESIUM: 1.9 MG/DL (ref 1.8–2.4)
MCH RBC QN AUTO: 31.5 PG (ref 26–34)
MCHC RBC AUTO-ENTMCNC: 33.5 G/DL (ref 31–36)
MCV RBC AUTO: 94.1 FL (ref 80–100)
MONOCYTES ABSOLUTE: 0.6 K/UL (ref 0–1.3)
MONOCYTES RELATIVE PERCENT: 9 %
NEUTROPHILS ABSOLUTE: 4.3 K/UL (ref 1.7–7.7)
NEUTROPHILS RELATIVE PERCENT: 37 %
PDW BLD-RTO: 17.9 % (ref 12.4–15.4)
PERFORMED ON: ABNORMAL
PERFORMED ON: ABNORMAL
PERFORMED ON: NORMAL
PLATELET # BLD: 319 K/UL (ref 135–450)
PMV BLD AUTO: 5.9 FL (ref 5–10.5)
POTASSIUM SERPL-SCNC: 4.1 MMOL/L (ref 3.5–5.1)
RBC # BLD: 2.63 M/UL (ref 4.2–5.9)
SODIUM BLD-SCNC: 130 MMOL/L (ref 136–145)
TOTAL PROTEIN: 5.3 G/DL (ref 6.4–8.2)
WBC # BLD: 6.9 K/UL (ref 4–11)

## 2021-11-02 PROCEDURE — 6370000000 HC RX 637 (ALT 250 FOR IP): Performed by: SURGERY

## 2021-11-02 PROCEDURE — 3600000014 HC SURGERY LEVEL 4 ADDTL 15MIN: Performed by: SURGERY

## 2021-11-02 PROCEDURE — 3600000004 HC SURGERY LEVEL 4 BASE: Performed by: SURGERY

## 2021-11-02 PROCEDURE — 7100000000 HC PACU RECOVERY - FIRST 15 MIN: Performed by: SURGERY

## 2021-11-02 PROCEDURE — 1200000000 HC SEMI PRIVATE

## 2021-11-02 PROCEDURE — 6360000002 HC RX W HCPCS: Performed by: INTERNAL MEDICINE

## 2021-11-02 PROCEDURE — 7100000001 HC PACU RECOVERY - ADDTL 15 MIN: Performed by: SURGERY

## 2021-11-02 PROCEDURE — 2580000003 HC RX 258: Performed by: SURGERY

## 2021-11-02 PROCEDURE — 0D1N4Z4 BYPASS SIGMOID COLON TO CUTANEOUS, PERCUTANEOUS ENDOSCOPIC APPROACH: ICD-10-PCS | Performed by: SURGERY

## 2021-11-02 PROCEDURE — 2500000003 HC RX 250 WO HCPCS: Performed by: NURSE ANESTHETIST, CERTIFIED REGISTERED

## 2021-11-02 PROCEDURE — 2700000000 HC OXYGEN THERAPY PER DAY

## 2021-11-02 PROCEDURE — 3700000001 HC ADD 15 MINUTES (ANESTHESIA): Performed by: SURGERY

## 2021-11-02 PROCEDURE — 2709999900 HC NON-CHARGEABLE SUPPLY: Performed by: SURGERY

## 2021-11-02 PROCEDURE — 2580000003 HC RX 258: Performed by: NURSE ANESTHETIST, CERTIFIED REGISTERED

## 2021-11-02 PROCEDURE — 44188 LAP COLOSTOMY: CPT | Performed by: SURGERY

## 2021-11-02 PROCEDURE — 6370000000 HC RX 637 (ALT 250 FOR IP): Performed by: INTERNAL MEDICINE

## 2021-11-02 PROCEDURE — 2500000003 HC RX 250 WO HCPCS: Performed by: SURGERY

## 2021-11-02 PROCEDURE — 3700000000 HC ANESTHESIA ATTENDED CARE: Performed by: SURGERY

## 2021-11-02 PROCEDURE — 80053 COMPREHEN METABOLIC PANEL: CPT

## 2021-11-02 PROCEDURE — 6360000002 HC RX W HCPCS: Performed by: NURSE ANESTHETIST, CERTIFIED REGISTERED

## 2021-11-02 PROCEDURE — 2580000003 HC RX 258: Performed by: INTERNAL MEDICINE

## 2021-11-02 PROCEDURE — 2720000010 HC SURG SUPPLY STERILE: Performed by: SURGERY

## 2021-11-02 PROCEDURE — 6360000002 HC RX W HCPCS: Performed by: SURGERY

## 2021-11-02 PROCEDURE — 83735 ASSAY OF MAGNESIUM: CPT

## 2021-11-02 PROCEDURE — 94761 N-INVAS EAR/PLS OXIMETRY MLT: CPT

## 2021-11-02 PROCEDURE — 85025 COMPLETE CBC W/AUTO DIFF WBC: CPT

## 2021-11-02 PROCEDURE — 6360000002 HC RX W HCPCS: Performed by: ANESTHESIOLOGY

## 2021-11-02 RX ORDER — ONDANSETRON 2 MG/ML
INJECTION INTRAMUSCULAR; INTRAVENOUS PRN
Status: DISCONTINUED | OUTPATIENT
Start: 2021-11-02 | End: 2021-11-02 | Stop reason: SDUPTHER

## 2021-11-02 RX ORDER — FENTANYL CITRATE 50 UG/ML
25 INJECTION, SOLUTION INTRAMUSCULAR; INTRAVENOUS EVERY 5 MIN PRN
Status: DISCONTINUED | OUTPATIENT
Start: 2021-11-02 | End: 2021-11-02

## 2021-11-02 RX ORDER — MEPERIDINE HYDROCHLORIDE 50 MG/ML
12.5 INJECTION INTRAMUSCULAR; INTRAVENOUS; SUBCUTANEOUS EVERY 5 MIN PRN
Status: DISCONTINUED | OUTPATIENT
Start: 2021-11-02 | End: 2021-11-02

## 2021-11-02 RX ORDER — HYDRALAZINE HYDROCHLORIDE 20 MG/ML
5 INJECTION INTRAMUSCULAR; INTRAVENOUS EVERY 10 MIN PRN
Status: DISCONTINUED | OUTPATIENT
Start: 2021-11-02 | End: 2021-11-02

## 2021-11-02 RX ORDER — PHENYLEPHRINE HCL IN 0.9% NACL 1 MG/10 ML
SYRINGE (ML) INTRAVENOUS PRN
Status: DISCONTINUED | OUTPATIENT
Start: 2021-11-02 | End: 2021-11-02 | Stop reason: SDUPTHER

## 2021-11-02 RX ORDER — BUPIVACAINE HYDROCHLORIDE AND EPINEPHRINE 2.5; 5 MG/ML; UG/ML
INJECTION, SOLUTION EPIDURAL; INFILTRATION; INTRACAUDAL; PERINEURAL PRN
Status: DISCONTINUED | OUTPATIENT
Start: 2021-11-02 | End: 2021-11-02 | Stop reason: ALTCHOICE

## 2021-11-02 RX ORDER — OXYCODONE HYDROCHLORIDE AND ACETAMINOPHEN 5; 325 MG/1; MG/1
1 TABLET ORAL PRN
Status: DISCONTINUED | OUTPATIENT
Start: 2021-11-02 | End: 2021-11-02

## 2021-11-02 RX ORDER — SODIUM CHLORIDE, SODIUM LACTATE, POTASSIUM CHLORIDE, CALCIUM CHLORIDE 600; 310; 30; 20 MG/100ML; MG/100ML; MG/100ML; MG/100ML
INJECTION, SOLUTION INTRAVENOUS CONTINUOUS PRN
Status: DISCONTINUED | OUTPATIENT
Start: 2021-11-02 | End: 2021-11-02 | Stop reason: SDUPTHER

## 2021-11-02 RX ORDER — LIDOCAINE HYDROCHLORIDE 20 MG/ML
INJECTION, SOLUTION INFILTRATION; PERINEURAL PRN
Status: DISCONTINUED | OUTPATIENT
Start: 2021-11-02 | End: 2021-11-02 | Stop reason: SDUPTHER

## 2021-11-02 RX ORDER — PROPOFOL 10 MG/ML
INJECTION, EMULSION INTRAVENOUS PRN
Status: DISCONTINUED | OUTPATIENT
Start: 2021-11-02 | End: 2021-11-02 | Stop reason: SDUPTHER

## 2021-11-02 RX ORDER — ONDANSETRON 2 MG/ML
4 INJECTION INTRAMUSCULAR; INTRAVENOUS
Status: DISCONTINUED | OUTPATIENT
Start: 2021-11-02 | End: 2021-11-02

## 2021-11-02 RX ORDER — ROCURONIUM BROMIDE 10 MG/ML
INJECTION, SOLUTION INTRAVENOUS PRN
Status: DISCONTINUED | OUTPATIENT
Start: 2021-11-02 | End: 2021-11-02 | Stop reason: SDUPTHER

## 2021-11-02 RX ORDER — FENTANYL CITRATE 50 UG/ML
INJECTION, SOLUTION INTRAMUSCULAR; INTRAVENOUS PRN
Status: DISCONTINUED | OUTPATIENT
Start: 2021-11-02 | End: 2021-11-02 | Stop reason: SDUPTHER

## 2021-11-02 RX ORDER — OXYCODONE HYDROCHLORIDE AND ACETAMINOPHEN 5; 325 MG/1; MG/1
2 TABLET ORAL PRN
Status: DISCONTINUED | OUTPATIENT
Start: 2021-11-02 | End: 2021-11-02

## 2021-11-02 RX ORDER — PROMETHAZINE HYDROCHLORIDE 25 MG/ML
6.25 INJECTION, SOLUTION INTRAMUSCULAR; INTRAVENOUS
Status: DISCONTINUED | OUTPATIENT
Start: 2021-11-02 | End: 2021-11-02

## 2021-11-02 RX ADMIN — Medication 3 MG: at 21:17

## 2021-11-02 RX ADMIN — PROPOFOL 25 MG: 10 INJECTION, EMULSION INTRAVENOUS at 17:33

## 2021-11-02 RX ADMIN — Medication 200 MCG: at 16:28

## 2021-11-02 RX ADMIN — SODIUM CHLORIDE 25 ML: 9 INJECTION, SOLUTION INTRAVENOUS at 09:01

## 2021-11-02 RX ADMIN — ONDANSETRON 4 MG: 2 INJECTION INTRAMUSCULAR; INTRAVENOUS at 16:24

## 2021-11-02 RX ADMIN — HYDROMORPHONE HYDROCHLORIDE 0.5 MG: 1 INJECTION, SOLUTION INTRAMUSCULAR; INTRAVENOUS; SUBCUTANEOUS at 08:49

## 2021-11-02 RX ADMIN — LIDOCAINE HYDROCHLORIDE 60 MG: 20 INJECTION, SOLUTION INFILTRATION; PERINEURAL at 16:16

## 2021-11-02 RX ADMIN — SODIUM CHLORIDE, PRESERVATIVE FREE 10 ML: 5 INJECTION INTRAVENOUS at 21:13

## 2021-11-02 RX ADMIN — Medication 200 MCG: at 16:25

## 2021-11-02 RX ADMIN — Medication 200 MCG: at 16:37

## 2021-11-02 RX ADMIN — VANCOMYCIN HYDROCHLORIDE 750 MG: 750 INJECTION, POWDER, LYOPHILIZED, FOR SOLUTION INTRAVENOUS at 09:07

## 2021-11-02 RX ADMIN — HYDROMORPHONE HYDROCHLORIDE 0.25 MG: 1 INJECTION, SOLUTION INTRAMUSCULAR; INTRAVENOUS; SUBCUTANEOUS at 18:25

## 2021-11-02 RX ADMIN — SODIUM CHLORIDE, PRESERVATIVE FREE 10 ML: 5 INJECTION INTRAVENOUS at 08:49

## 2021-11-02 RX ADMIN — Medication 100 MCG: at 17:10

## 2021-11-02 RX ADMIN — SODIUM CHLORIDE, SODIUM LACTATE, POTASSIUM CHLORIDE, AND CALCIUM CHLORIDE: .6; .31; .03; .02 INJECTION, SOLUTION INTRAVENOUS at 16:11

## 2021-11-02 RX ADMIN — FENTANYL CITRATE 25 MCG: 50 INJECTION INTRAMUSCULAR; INTRAVENOUS at 17:36

## 2021-11-02 RX ADMIN — FENTANYL CITRATE 25 MCG: 50 INJECTION INTRAMUSCULAR; INTRAVENOUS at 16:41

## 2021-11-02 RX ADMIN — PROPOFOL 100 MG: 10 INJECTION, EMULSION INTRAVENOUS at 16:17

## 2021-11-02 RX ADMIN — ROCURONIUM BROMIDE 30 MG: 10 SOLUTION INTRAVENOUS at 16:17

## 2021-11-02 RX ADMIN — PROPOFOL 25 MG: 10 INJECTION, EMULSION INTRAVENOUS at 17:23

## 2021-11-02 RX ADMIN — PROPOFOL 25 MG: 10 INJECTION, EMULSION INTRAVENOUS at 17:19

## 2021-11-02 RX ADMIN — SUGAMMADEX 200 MG: 100 INJECTION, SOLUTION INTRAVENOUS at 17:37

## 2021-11-02 RX ADMIN — FENTANYL CITRATE 25 MCG: 50 INJECTION INTRAMUSCULAR; INTRAVENOUS at 16:47

## 2021-11-02 ASSESSMENT — PULMONARY FUNCTION TESTS
PIF_VALUE: 16
PIF_VALUE: 13
PIF_VALUE: 15
PIF_VALUE: 29
PIF_VALUE: 17
PIF_VALUE: 29
PIF_VALUE: 14
PIF_VALUE: 14
PIF_VALUE: 28
PIF_VALUE: 17
PIF_VALUE: 17
PIF_VALUE: 16
PIF_VALUE: 17
PIF_VALUE: 28
PIF_VALUE: 15
PIF_VALUE: 15
PIF_VALUE: 25
PIF_VALUE: 1
PIF_VALUE: 26
PIF_VALUE: 15
PIF_VALUE: 17
PIF_VALUE: 1
PIF_VALUE: 28
PIF_VALUE: 16
PIF_VALUE: 15
PIF_VALUE: 15
PIF_VALUE: 6
PIF_VALUE: 0
PIF_VALUE: 15
PIF_VALUE: 17
PIF_VALUE: 2
PIF_VALUE: 29
PIF_VALUE: 15
PIF_VALUE: 16
PIF_VALUE: 28
PIF_VALUE: 25
PIF_VALUE: 14
PIF_VALUE: 14
PIF_VALUE: 0
PIF_VALUE: 29
PIF_VALUE: 15
PIF_VALUE: 17
PIF_VALUE: 16
PIF_VALUE: 25
PIF_VALUE: 17
PIF_VALUE: 16
PIF_VALUE: 17
PIF_VALUE: 27
PIF_VALUE: 15
PIF_VALUE: 17
PIF_VALUE: 17
PIF_VALUE: 14
PIF_VALUE: 15
PIF_VALUE: 1
PIF_VALUE: 5
PIF_VALUE: 15
PIF_VALUE: 14
PIF_VALUE: 16
PIF_VALUE: 17
PIF_VALUE: 20
PIF_VALUE: 14
PIF_VALUE: 13
PIF_VALUE: 1
PIF_VALUE: 15
PIF_VALUE: 14
PIF_VALUE: 17
PIF_VALUE: 29
PIF_VALUE: 15
PIF_VALUE: 17
PIF_VALUE: 14
PIF_VALUE: 13
PIF_VALUE: 15
PIF_VALUE: 1
PIF_VALUE: 1
PIF_VALUE: 15
PIF_VALUE: 15
PIF_VALUE: 26
PIF_VALUE: 18
PIF_VALUE: 25
PIF_VALUE: 15
PIF_VALUE: 25
PIF_VALUE: 14
PIF_VALUE: 15
PIF_VALUE: 24
PIF_VALUE: 28
PIF_VALUE: 2
PIF_VALUE: 19

## 2021-11-02 ASSESSMENT — PAIN SCALES - GENERAL
PAINLEVEL_OUTOF10: 4
PAINLEVEL_OUTOF10: 0

## 2021-11-02 ASSESSMENT — PAIN DESCRIPTION - PAIN TYPE: TYPE: SURGICAL PAIN

## 2021-11-02 ASSESSMENT — PAIN SCALES - WONG BAKER: WONGBAKER_NUMERICALRESPONSE: 0

## 2021-11-02 ASSESSMENT — PAIN DESCRIPTION - DESCRIPTORS: DESCRIPTORS: DISCOMFORT

## 2021-11-02 ASSESSMENT — PAIN DESCRIPTION - LOCATION: LOCATION: ABDOMEN

## 2021-11-02 NOTE — PROGRESS NOTES
Pt back to room 545. Assessment complete, colostomy in place, stoma red and round. VSS, denies pain. Call light near, all safety measures in place.

## 2021-11-02 NOTE — PROGRESS NOTES
Hospitalist Progress Note      PCP: Valentin Lan MD    Date of Admission: 10/15/2021    Chief Complaint: altered mental status, SOB, wound check     Hospital Course: 88yo male with PMHX sig for lumbar stenosis, urinary retention, osteomyelitis of lumbar spine, chronic anemia, severe prot fanta malnutritions sent to [facility] on 10/15/2021 from SNF for altered mental status and large sacral wound.    On 9/2/21 Dr. Wanrer Philippe performed an L2-5 decompression and laminectomy at [address] days later on 9/5 patient was unable to wiggle his toes and had BLE weakness to the point that he required maximum assistance x 2 in order to stand.  An MRI was performed which showed severe spinal canal stenosis due to a new hematoma.  On 9/6 Dr. Rashaad Lewis took him back to the OR for an urgent exploration and hematoma evacuation and left drains in place.  Patient was discharged to SNF on 9/10.     Patient was then admitted to [facility] on 9/19. Oscar Pelletier had a large sacral ulcer and ROXI due to urinary retention requiring placement of a jain catheter.  Another L-spine MRI was performed showing an ill-defind fluid/soft tissue causing severe spinal canal stenosis from L1-4, also moderate-severe spinal canal stenosis from L4 - S1.  He was transferred to [facility] on 9/23 for operative management  By Dr. Tobias Chavez further operative intervention was necessary on patient's lumbar spine.  He did undergo operative debridement of the sacral ulcer on 9/23.  Tissue culture was positive for enterococcus and enterobacter.  Based on sensitivities he was treated with Augmentin.  He was also treated with a wound vac and discharged back to the SNF on 9/27.     s/p debridement 10/6-cultures growing MRSA, Enterococcus, corynebacterium--     10/15/2021 to [facility] ED  from SNF for altered mental status and large sacral wound.  He was very agitated and screaming in the ED.   During this admission he was seen by general surgery, and he did go for sacral wound debridement on 10/16/2021. He has been seen by infectious disease in consultation. Felt he has chronic osteomyelitis and plan is for IV antibiotics for up to 6 weeks.  Currently being treated with IV vancomycin and oral Flagyl. He does have a PICC line in place.     10/22 noted with large bloody BMs, GI consulted. colonoscopy 10/26 normal colon , internal hemorrhoids w distal rectal ulceration probably stercoral not biopsied but random biopsies colon neg.      prognosis poor - met with hospice but declined to enroll. Family interested in aggressive management. Is malnourished.  His diarrhea recurred after the colonoscopy, w KUB neg for constipation. GI following questran added . Diverting colostomy-lap colostomy 11/2/21 to help healing of decub due to persistent diarrhea      Subjective:  Flat affect constant tic to face responds appropriately denied pain.  Muscularly wasted All other systems neg       Medications:  Reviewed    Infusion Medications    dextrose      sodium chloride 25 mL (11/02/21 0901)    [Held by provider] lactated ringers 100 mL/hr at 10/20/21 2029     Scheduled Medications    [START ON 11/3/2021] vancomycin  1,000 mg IntraVENous Q24H    cholestyramine  1 packet Oral TID AC    insulin glargine  12 Units SubCUTAneous Nightly    amLODIPine  5 mg Oral Daily    polyethylene glycol  4,000 mL Oral Once    insulin lispro  0-12 Units SubCUTAneous TID WC    insulin lispro  0-6 Units SubCUTAneous Nightly    hydrocortisone  25 mg Rectal BID    metroNIDAZOLE  500 mg Oral 3 times per day    donepezil  10 mg Oral Nightly    atorvastatin  10 mg Oral Nightly    vitamin B complex w/C  1 tablet Oral Daily    therapeutic multivitamin-minerals  1 tablet Oral Daily    insulin lispro  0.05 Units/kg SubCUTAneous TID WC     PRN Meds: labetalol, LORazepam, glucose, dextrose, glucagon (rDNA), dextrose, sodium chloride flush, sodium chloride, potassium chloride **OR** potassium alternative oral replacement **OR** potassium chloride, magnesium sulfate, ondansetron **OR** ondansetron, acetaminophen **OR** acetaminophen, melatonin, HYDROmorphone      Intake/Output Summary (Last 24 hours) at 11/2/2021 1529  Last data filed at 11/2/2021 0957  Gross per 24 hour   Intake 360 ml   Output 1375 ml   Net -1015 ml       Physical Exam Performed:    BP (!) 160/82   Pulse 90   Temp 98.6 °F (37 °C) (Temporal)   Resp 18   Ht 6' (1.829 m)   Wt 125 lb 11.2 oz (57 kg)   SpO2 98%   BMI 17.05 kg/m²     General appearance: No  Distress, frontal balded  HEENT: PERRL flat affect constant tics to face   Neck: Supple  No jugular venous distention. keeps neck somewhat hyperextended Trachea midline. Respiratory:  Normal  effort. Clear to auscultation,   Cardiovascular:  S1/S2 RRR without murmurs, rubs or gallops. Abdomen: Soft, non-tender, non-distended with normal bowel sounds. Musculoskeletal: No clubbing, cyanosis or edema foot drop boots heel offloading no pitting  Skin: Skin color pale   Buttock large ulcer with packing across buttock/sacrum   Neurologic: Cranial nerves: II-XII intact not focal but he twitches and tics to the face both hands very shaky   Psychiatric: Alert and oriented  Peripheral Pulses: +2 palpable, equal bilaterally    jain cl yellow       Labs:   Recent Labs     10/31/21  0622 11/01/21  0536 11/02/21  0707   WBC 10.3 6.7 6.9   HGB 8.7* 7.7* 8.3*   HCT 25.9* 22.5* 24.8*    319 319     Recent Labs     10/31/21  0622 11/01/21  0536 11/02/21  0707   * 134* 130*   K 4.2 3.7 4.1   CL 94* 99 96*   CO2 27 30 28   BUN 14 17 16   CREATININE 0.6* 0.6* 0.7*   CALCIUM 7.9* 7.8* 7.8*     Recent Labs     10/31/21  0622 11/01/21  0536 11/02/21  0707   AST 27 21 22   ALT 25 21 23   BILITOT 0.3 0.3 0.3   ALKPHOS 97 88 83     No results for input(s): INR in the last 72 hours. No results for input(s): [place] in the last 72 hours.     Urinalysis:      Lab Results   Component Value Date    NITRU Negative 10/15/2021    WBCUA 10-20 10/15/2021    BACTERIA 4+ 10/15/2021    RBCUA 5-10 10/15/2021    BLOODU SMALL 10/15/2021    SPECGRAV 1.015 10/15/2021    GLUCOSEU 250 10/15/2021       Radiology:  XR ABDOMEN (KUB) (SINGLE AP VIEW)   Final Result   Resolving constipation with a non-specific gas pattern. XR CHEST PORTABLE   Final Result   No acute process. XR ABDOMEN (KUB) (SINGLE AP VIEW)   Final Result   Nonspecific abdominal bowel gas pattern with mild residual stool throughout   colon. XR CHEST PORTABLE   Final Result   Status post PICC line placement on the right in good position. Resolving central pulmonary congestion. Mild chronic elevation of the left hemidiaphragm which is unchanged with   slowly resolving bibasilar atelectasis. CT HEAD WO CONTRAST   Final Result      Stable study. No evidence for acute intracranial hemorrhage, territorial   infarction or intracranial mass lesion. Mild chronic microangiopathic ischemic disease. Mild generalized volume loss. CT PELVIS WO CONTRAST Additional Contrast? None   Final Result   1. Large deep sacral decubitus ulcer with mild adjacent cellulitis. No   abscess or soft tissue gas. 2. Osseous uncovering of the posterior aspect of the lower sacrum and coccyx   without convincing evidence of osteomyelitis. If clinically indicated   further evaluation could be obtained with MRI. XR CHEST PORTABLE   Final Result   Chronic elevation of the left hemidiaphragm which is unchanged with   increasing subsegmental atelectasis or early infiltrate along the left lung   base.                  Assessment/Plan:    Active Hospital Problems    Diagnosis     Tic disorder [F95.9]     Osteomyelitis ([address] [M86.9]     Chronic anemia [D64.9]     Severe sepsis ([address] [A41.9, R65.20]     Pressure injury of sacral region, unstageable ([address] [L89.150]     Lumbar stenosis with neurogenic claudication [C40.313]     Urinary retention [R33.9]     DM2 (diabetes mellitus, type 2) ([facility] [E11.9]      Severe sepsis-now resolved   Osteomyelitis   Sacral pressure ulcer w infection  S/p debridement 9/23/21 at [address], culture with+ Enterococcus faecalis, Enterobacter cloacae, 3 anaerobes  Discharged on 9/27/21 on po Augmentin  Readmitted 10/15/21 with encephalopathy, deteriorating sacral wound, leukocytosis   S/p I&D down to bone 10/16/21  Bone culture is positive c/w diagnosis OM   Polymicrobial infection     cx MRSA(contact iso) E faecalis   Bone cx C striatum bacteroides  Clostridium    -Continue wound care  -will need IV abx course flagyl, vanco 750mg IV Q12hr through 11/16    Chronic diarrhea  Questran trial   Diverting colostomy     Lumbar stenosis w claudication   -L2-5 decompression, laminectomy; 9-2-21;  Emergency exploration of lumbar laminectomy for epidural fluid collection; 9-6-21 postop hematoma        DM type 2  lantus 12 units HS  Med dose lispro SS  Well controlled     Stercoral ulcer  Hematochezia -resolved  Colonoscopy 10/26 nl colon   -avoid constipation     Severe protein calorie malnutrition   -dietician seeing for malunutrion /diet supplements    Tic disorder  MRSA nasal colonization -txd 5 day mupiricin nasal     Urine retention   Keep jain for wound healing as well     DVT Prophylaxis: scd  Diet: Diet NPO Exceptions are: Sips of Water with Meds  Code Status: DNR-CCA    PT/OT Eval Status: poorly mobile.   If not ordered PT OT postop     Dispo - will need SNF     Rayne Lombardo MD

## 2021-11-02 NOTE — PROGRESS NOTES
Patient admitted from OR to PACU. Bedside report received. Patient immediately hooked up to heart monitor. Edmond Lau RN

## 2021-11-02 NOTE — ANESTHESIA PRE PROCEDURE
Department of Anesthesiology  Preprocedure Note       Name:  Radha Dotson   Age:  80 y.o.  :  1934                                          MRN:  [id number]         Date:  2021      Surgeon:  Lidia Del Rio MD    Procedure:  LAPAROSCOPIC COLOSTOMY    HPI:  79yo male with PMHX sig for lumbar stenosis, urinary retention, osteomyelitis of lumbar spine, chronic anemia, severe prot fanta malnutritions sent to [facility] on 10/15/2021 from SNF for altered mental status and large sacral wound. On 21 Dr. Sunita Quiroz performed an L2-5 decompression and laminectomy at [facility].  Three days later on  patient was unable to wiggle his toes and had BLE weakness to the point that he required maximum assistance x 2 in order to stand. An MRI was performed which showed severe spinal canal stenosis due to a new hematoma. On  Dr. Alex Joe took him back to the OR for an urgent exploration and hematoma evacuation and left drains in place. Patient was discharged to SNF on 9/10. EKG:  15-OCT-2021  Sinus tachycardia 112; Incomplete right bundle branch block; Nonspecific ST abnormality.  No previous ECGs available    Medications prior to admission:    insulin glargine (LANTUS) 100 UNIT/ML injection vial Inject 10 Units into the skin nightly   melatonin 3 MG TABS tablet Take 3 mg by mouth nightly as needed   oxybutynin (DITROPAN-XL) 10 MG extended release tablet Take 10 mg by mouth daily   oxyCODONE 5 MG capsule Take 5 mg by mouth every 3 hours as needed for Pain.   polyethylene glycol (GLYCOLAX) 17 g packet Take 17 g by mouth daily as needed for Constipation   Chromium Picolinate 1000 MCG TABS Take 1,000 mcg by mouth daily   cyanocobalamin 1000 MCG tablet Take 1,000 mcg by mouth daily   amLODIPine (NORVASC) 10 MG tablet Take 10 mg by mouth daily   Multiple Vitamins-Minerals (THERAPEUTIC MULTIVITAMIN-MINERALS) tablet Take 1 tablet by mouth daily   donepezil (ARICEPT) 10 MG tablet Take 10 mg by mouth nightly   senna-docusate (PERICOLACE) 8.6-50 MG per tablet Take 1 tablet by mouth 2 times daily   b complex vitamins capsule Take 1 capsule by mouth daily   methocarbamol (ROBAXIN) 500 MG tablet Take 500 mg by mouth every 6 hours as needed   lisinopril (PRINIVIL;ZESTRIL) 20 MG tablet Take 20 mg by mouth nightly    simvastatin (ZOCOR) 10 MG tablet Take 10 mg by mouth nightly. hydrALAZINE (APRESOLINE) 20 MG/ML injection Infuse 25 mg intravenously every 8 hours as needed (for hypertension SBP >160)   docusate sodium (COLACE) 100 MG capsule Take 100 mg by mouth 2 times daily   tamsulosin (FLOMAX) 0.4 MG capsule Take 1 capsule by mouth daily   zinc sulfate (ZINCATE) 220 (50 Zn) MG capsule Take 220 mg by mouth daily   acetaminophen (TYLENOL) 500 MG tablet Take 500 mg by mouth every 4 hours as needed    sitagliptan (JANUVIA) 100 MG tablet Take 100 mg by mouth daily. glipiZIDE (GLUCOTROL XL) 10 MG CR tablet Take 10 mg by mouth daily.      Current medications:     [START ON 11/3/2021] vancomycin 1000 mg IVPB in  D5W  1,000 mg IntraVENous Q24H    cholestyramine (QUESTRAN) packet 4 g  1 packet Oral TID AC    labetalol (NORMODYNE;TRANDATE) injection 10 mg  10 mg IntraVENous Q4H PRN    insulin glargine (LANTUS) injection vial 12 Units  12 Units SubCUTAneous Nightly    amLODIPine (NORVASC) tablet 5 mg  5 mg Oral Daily    polyethylene glycol (GoLYTELY) solution 4,000 mL  4,000 mL Oral Once    insulin lispro (HUMALOG) injection vial 0-12 Units  0-12 Units SubCUTAneous TID WC    insulin lispro (HUMALOG) injection vial 0-6 Units  0-6 Units SubCUTAneous Nightly    hydrocortisone (ANUSOL-HC) suppository 25 mg  25 mg Rectal BID    metroNIDAZOLE (FLAGYL) tablet 500 mg  500 mg Oral 3 times per day    LORazepam (ATIVAN) injection 0.5 mg  0.5 mg IntraVENous Q6H PRN    donepezil (ARICEPT) tablet 10 mg  10 mg Oral Nightly    atorvastatin (LIPITOR) tablet 10 mg  10 mg Oral Nightly    vitamin B complex w/C 1 tablet  1 tablet Oral Daily    therapeutic multivitamin-minerals 1 tablet  1 tablet Oral Daily    glucose (GLUTOSE) 40 % oral gel 15 g  15 g Oral PRN    dextrose 50 % IV solution  12.5 g IntraVENous PRN    glucagon (rDNA) injection 1 mg  1 mg IntraMUSCular PRN    dextrose 5 % solution  100 mL/hr IntraVENous PRN    sodium chloride flush 0.9 % injection 10 mL  10 mL IntraVENous PRN    potassium chloride (KLOR-CON M) extended release tablet 40 mEq  40 mEq Oral PRN       potassium bicarb-citric acid (EFFER-K) effervescent tablet 40 mEq  40 mEq Oral PRN       potassium chloride 10 mEq/100 mL IVPB (Peripheral Line)  10 mEq IntraVENous PRN    magnesium sulfate 1000 mg in dextrose 5% 100 mL IVPB  1,000 mg IntraVENous PRN    ondansetron (ZOFRAN-ODT) disintegrating tablet 4 mg  4 mg Oral Q8H PRN       ondansetron (ZOFRAN) injection 4 mg  4 mg IntraVENous Q6H PRN    acetaminophen (TYLENOL) tablet 650 mg  650 mg Oral Q6H PRN       acetaminophen (TYLENOL) suppository 650 mg  650 mg Rectal Q6H PRN    insulin lispro (HUMALOG) injection vial 3 Units  0.05 Units/kg SubCUTAneous TID WC    melatonin tablet 3 mg  3 mg Oral Nightly PRN    HYDROmorphone (DILAUDID) injection 0.5 mg  0.5 mg IntraVENous Q3H PRN    [Held by provider] lactated ringers infusion   IntraVENous Continuous     Allergies:     Iodine Itching and Swelling     Problem List:     DM2 (diabetes mellitus, type 2) ([facility])    Hypertension    Localized osteoarthrosis, lower leg    HLD (hyperlipidemia)    Lumbar stenosis with neurogenic claudication    BPH (benign prostatic hyperplasia)    ROXI (acute kidney injury) ([facility])    Urinary retention    Pressure injury of sacral region, unstageable (HCC)    Severe sepsis (HCC)    Osteomyelitis (HCC)    Chronic anemia    Tic disorder     Past Medical History:     Arthritis     Cancer (HCC)     skin    Dry eyes, bilateral     Hyperlipidemia     Hypertension     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    MRSA (methicillin resistant staph aureus) culture positive 10/15/2021    urine    MRSA (methicillin resistant staph aureus) culture positive 10/15/2021    anus    Osteoporosis 01/01/2009    Type II or unspecified type diabetes mellitus without mention of complication, not stated as uncontrolled     Urinary incontinence      Past Surgical History:     COLONOSCOPY  10/26/2021    Dr Yaakov Abarca    COLONOSCOPY N/A 10/26/2021    COLONOSCOPY WITH BIOPSY performed by Marlene Lai MD at [place] [address]      micheal cataract    RECTAL SURGERY N/A 10/16/2021    SACRAL WOUND DEBRIDMENT performed by Winnie Moscoso MD at [address] [place] [address] History:    Tobacco Use    Smoking status: Never Smoker    Smokeless tobacco: Never Used   Substance Use Topics    Alcohol use: No                                Counseling given: Not Answered    Vital Signs (Current):    11/02/21 0253 11/02/21 0745 11/02/21 0939 11/02/21 1115   BP: (!) 147/74 132/63  (!) 146/67   Pulse: 90 86 86 77   Resp: 18 18  18   Temp: 98.8 °F (37.1 °C) 98 °F (36.7 °C)  98 °F (36.7 °C)   TempSrc: Oral Oral  Oral   SpO2: 97% 96%  98%     Height: 6' (1.829 m)  (10/16/21) Weight: 125 lb 11.2 oz (57 kg)  (10/28/21)   BMI: 17.05           BP Readings from Last 3 Encounters:   11/02/21 (!) 146/67   10/26/21 (!) 85/48   10/16/21 (!) 144/60     NPO Status: Time of last liquid consumption: 2300                        Time of last solid consumption: 2300                        Date of last liquid consumption: 10/25/21                        Date of last solid food consumption: 10/25/21    BMI:   Wt Readings from Last 3 Encounters:   10/28/21 125 lb 11.2 oz (57 kg)   09/21/21 126 lb 5.2 oz (57.3 kg)   01/22/20 130 lb (59 kg)     Body mass index is 17.05 kg/m².     CBC:    WBC 6.9 11/02/2021    HGB 8.3 11/02/2021    HCT 24.8 11/02/2021     11/02/2021     CMP:     11/02/2021    K 4.1 11/02/2021    CL 96 11/02/2021    CO2 28 11/02/2021    BUN 16 11/02/2021    CREATININE 0.7 11/02/2021    GLUCOSE 82 11/02/2021    PROT 5.3 11/02/2021    CALCIUM 7.8 11/02/2021    BILITOT 0.3 11/02/2021    ALKPHOS 83 11/02/2021    AST 22 11/02/2021    ALT 23 11/02/2021     POC Tests:     11/02/21  1156   POCGLU 107*     Coags:    PROTIME 18.6 10/20/2021    INR 1.61 10/20/2021     COVID-19 Screening (If Applicable): COVID19 Not Detected 10/26/2021     Anesthesia Evaluation  Patient summary reviewed and Nursing notes reviewed  Airway: Mallampati: III  TM distance: >3 FB   Neck ROM: limited  Mouth opening: < 3 FB Dental:          Pulmonary: breath sounds clear to auscultation      (-) recent URI                           Cardiovascular:  Exercise tolerance: poor (<4 METS),   (+) hypertension:,     (-)  angina      Rhythm: regular                      Neuro/Psych:   (+) neuromuscular disease:, psychiatric history:             ROS comment: +Tic d/o    SEE HPI GI/Hepatic/Renal:        (-) hepatitis, liver disease and no renal disease       Endo/Other:    (+) DiabetesType II DM, , .                 Abdominal:             Vascular: Other Findings:           Anesthesia Plan      general     ASA 3     (DNR order has been suspended for the duration of the case.)  Induction: intravenous. MIPS: Prophylactic antiemetics administered. Anesthetic plan and risks discussed with patient and spouse. Plan discussed with CRNA.             Betsy Beckham MD

## 2021-11-02 NOTE — OP NOTE
Date of Surgery: 11/2/21    Preop Dx:  Sacral Wound    Postop Dx:  Same    Procedure:  Laparoscopic Diverting Colostomy    Surgeon:  aLnny Mancera    Assistant:      Anesthesia:  GETA    EBL:   <50ml    Specimen:  none    Complications: none    Drains/Lines:  none    Indications:  79 yo with large sacral wound with need for ostomy for wound healing    Description:       Patient and family were given adequate description of the risks and rewards of the procedure, including bleeding, infection, injury to surrounding structures, need for further operations, possible open procedure and freely consented. He was given appropriate antibiotics and brought to the OR where GETA anesthesia was induced. He was placed in supine position. Prepped and draped in usual sterile fashion. Initial incision made in right upper abdomen allowing for insertion of 5mm optiview trocar. Once this was inserted the abdomen was insufflated to 15mmHg pressure. The sigmoid colon was inspected and found to be redundan. Another 5mm trocar was inserted under direct visualization and without injury to underlying structures at premarked colostomy site. A 12mm trocar was placed in right lower abdomen. The patient was placed in reverse trendelenberg position with right side down. The sigmoid was retracted out of the pelvis and a point of resection decided upon. The colon was then mobilized laterally along the white line of [place] with the sonacision so it would reach without tension. The ureter was identified and not injured. The point of resection was then exposed and a window made in the mesentery. Using the echelon stapler with blue loads x1 the colon was transected. Some of the mesentery was mobilized with sonacision without compromising blood supply. The proximal end was grasped with a locking grasper. The abdomen was desufflated. The left lower abdomen 5mm trocar was removed and colostomy created at this site.   A quarter sized incision was made and carried down to fascia with cautery. The fascia and peritoneum were widened to finger breadths and colon brought through in nontwisting manner. The laparoscope was briefly reinserted to confirm their was no twisting. Using 3-0 vicryl sutures the colostomy was matured in a brook fashion. Colostomy appliance was placed. The fascia of the 12mm trocar site was closed with 0-0 vicryl suture. The epidermis at the remaining two trocar sites was closed with 3-0 vicryl sutures. Sterile dressing placed. All suture, sponge and instrument count correct times two at end of case. Transferred to PACU in stable condition.     Usama Cloon MD

## 2021-11-02 NOTE — PROGRESS NOTES
Progress Note  Date:2021       Room:[address] [place] [address]-  Patient Name:Juan Alberto Ramos     YOB: 1934     Age:87 y.o. Subjective    Subjective:  Symptoms:  Stable. Pain:  He reports no pain. Review of Systems  Objective         Vitals Last 24 Hours:  TEMPERATURE:  Temp  Av.5 °F (36.9 °C)  Min: 98 °F (36.7 °C)  Max: 98.9 °F (37.2 °C)  RESPIRATIONS RANGE: Resp  Av.3  Min: 18  Max: 20  PULSE OXIMETRY RANGE: SpO2  Av %  Min: 96 %  Max: 98 %  PULSE RANGE: Pulse  Av.3  Min: 77  Max: 90  BLOOD PRESSURE RANGE: Systolic (77BDV), WXS:751 , Min:129 , QXD:958   ; Diastolic (87FBP), DTC:39, Min:63, Max:74    I/O (24Hr): Intake/Output Summary (Last 24 hours) at 2021 1155  Last data filed at 2021 0957  Gross per 24 hour   Intake 600 ml   Output 1825 ml   Net -1225 ml     Objective:  General Appearance:  Not in pain. Vital signs: (most recent): Blood pressure (!) 146/67, pulse 77, temperature 98 °F (36.7 °C), temperature source Oral, resp. rate 18, height 6' (1.829 m), weight 125 lb 11.2 oz (57 kg), SpO2 98 %. Abdomen: Abdomen is soft. Bowel sounds are normal.   There is no abdominal tenderness. Labs/Imaging/Diagnostics    Labs:  CBC:  Recent Labs     10/31/21  0622 11/01/21  0536 21  0707   WBC 10.3 6.7 6.9   RBC 2.79* 2.42* 2.63*   HGB 8.7* 7.7* 8.3*   HCT 25.9* 22.5* 24.8*   MCV 92.8 92.9 94.1   RDW 17.9* 17.9* 17.9*    319 319     CHEMISTRIES:  Recent Labs     10/31/21  0622 11/01/21  0536 21  0707   * 134* 130*   K 4.2 3.7 4.1   CL 94* 99 96*   CO2 27 30 28   BUN 14 17 16   CREATININE 0.6* 0.6* 0.7*   GLUCOSE 136* 102* 82   MG 1.80 1.90 1.90     PT/INR:No results for input(s): PROTIME, INR in the last 72 hours. APTT:No results for input(s): APTT in the last 72 hours.   LIVER PROFILE:  Recent Labs     10/31/21  0622 21  0536 21  0707   AST 27 21 22   ALT 25 21 23   BILITOT 0.3 0.3 0.3   ALKPHOS 97 88 83       Imaging Last 24 Hours:  XR ABDOMEN (KUB) (SINGLE AP VIEW)    Result Date: 10/31/2021  EXAMINATION: ONE SUPINE XRAY VIEW(S) OF THE ABDOMEN 10/31/2021 4:42 pm COMPARISON: 10/23/2021 HISTORY: ORDERING SYSTEM PROVIDED HISTORY: diarrhea. Evaluate stool burden. TECHNOLOGIST PROVIDED HISTORY: Reason for exam:->diarrhea. Evaluate stool burden. Reason for Exam: constipation Acuity: Acute Type of Exam: Initial FINDINGS: The gas pattern is unremarkable. There is no significant feces in the colon. No renal stones are seen. There is no free air. The bones are intact. There is a catheter projecting in the along the bladder inferiorly which is unchanged. Resolving constipation with a non-specific gas pattern. Assessment//Plan           Hospital Problems         Last Modified POA    * (Principal) Pressure injury of sacral region, unstageable ([address] 10/22/2021 Yes    DM2 (diabetes mellitus, type 2) ([address] 10/15/2021 Yes    Lumbar stenosis with neurogenic claudication 10/15/2021 Yes    Overview Signed 9/19/2021  4:10 AM by Daphnie Bhatti MD     L2-5 decompression, laminectomy; 9-2-21;  Emergency exploration of lumbar laminectomy for epidural fluid collection; 9-6-21           Urinary retention 10/15/2021 Yes    Severe sepsis ([address] 10/16/2021 Yes    Osteomyelitis ([address] 10/16/2021 Yes    Chronic anemia 10/16/2021 Yes    Tic disorder 10/19/2021 Yes        Assessment & Plan  81 yo w HTN, DM, spinal stenosis and a large sacral ulcer, debrided, who has been having diarrhea w h/o constipation, associated w hematochezia. H/H 8/22. Is malnourished. Colonoscopy 10/26 revealed a normal colon/TI except internal hemorrhoids w distal rectal ulceration, probably stercoral, not biopsied, but w neg.  random colon Bx's. His diarrhea recurred after the colonoscopy, w KUB neg for constipation.     - Supportive care  - Continue Questran trial  - Diversion colostomy is planned due to the persistent diarrhea in order to help the healing process of his decub ulcer     Kirby Schroeder MD       Aidan Filter) [phone]  Electronically signed by Kirby Schroeder MD on 11/2/21 at 11:55 AM EDT

## 2021-11-02 NOTE — PROGRESS NOTES
Spoke to bed side CLEMENTE Jimenez). Patient scheduled for diverting ostomy today at 300 pm.  Will follow up on ostomy care and per surgery possible initiation of negative pressure wound therapy to large non healing sacral / coccygeal wound post op.

## 2021-11-03 LAB
A/G RATIO: 0.7 (ref 1.1–2.2)
ALBUMIN SERPL-MCNC: 2.5 G/DL (ref 3.4–5)
ALP BLD-CCNC: 88 U/L (ref 40–129)
ALT SERPL-CCNC: 25 U/L (ref 10–40)
ANION GAP SERPL CALCULATED.3IONS-SCNC: 7 MMOL/L (ref 3–16)
ANISOCYTOSIS: ABNORMAL
AST SERPL-CCNC: 29 U/L (ref 15–37)
BANDED NEUTROPHILS RELATIVE PERCENT: 23 % (ref 0–7)
BASOPHILIC STIPPLING: ABNORMAL
BASOPHILS ABSOLUTE: 0 K/UL (ref 0–0.2)
BASOPHILS RELATIVE PERCENT: 0 %
BILIRUB SERPL-MCNC: 0.3 MG/DL (ref 0–1)
BUN BLDV-MCNC: 11 MG/DL (ref 7–20)
CALCIUM SERPL-MCNC: 8.2 MG/DL (ref 8.3–10.6)
CHLORIDE BLD-SCNC: 94 MMOL/L (ref 99–110)
CO2: 29 MMOL/L (ref 21–32)
CREAT SERPL-MCNC: <0.5 MG/DL (ref 0.8–1.3)
EOSINOPHILS ABSOLUTE: 0 K/UL (ref 0–0.6)
EOSINOPHILS RELATIVE PERCENT: 0 %
GFR AFRICAN AMERICAN: >60
GFR NON-AFRICAN AMERICAN: >60
GLUCOSE BLD-MCNC: 123 MG/DL (ref 70–99)
GLUCOSE BLD-MCNC: 171 MG/DL (ref 70–99)
GLUCOSE BLD-MCNC: 176 MG/DL (ref 70–99)
GLUCOSE BLD-MCNC: 198 MG/DL (ref 70–99)
GLUCOSE BLD-MCNC: 71 MG/DL (ref 70–99)
GLUCOSE BLD-MCNC: 79 MG/DL (ref 70–99)
HCT VFR BLD CALC: 27 % (ref 40.5–52.5)
HEMOGLOBIN: 9.2 G/DL (ref 13.5–17.5)
HYPOCHROMIA: ABNORMAL
LYMPHOCYTES ABSOLUTE: 1.3 K/UL (ref 1–5.1)
LYMPHOCYTES RELATIVE PERCENT: 14 %
MACROCYTES: ABNORMAL
MAGNESIUM: 1.9 MG/DL (ref 1.8–2.4)
MCH RBC QN AUTO: 31.9 PG (ref 26–34)
MCHC RBC AUTO-ENTMCNC: 34.2 G/DL (ref 31–36)
MCV RBC AUTO: 93.3 FL (ref 80–100)
MICROCYTES: ABNORMAL
MONOCYTES ABSOLUTE: 0.3 K/UL (ref 0–1.3)
MONOCYTES RELATIVE PERCENT: 3 %
NEUTROPHILS ABSOLUTE: 7.8 K/UL (ref 1.7–7.7)
NEUTROPHILS RELATIVE PERCENT: 60 %
OVALOCYTES: ABNORMAL
PDW BLD-RTO: 17.6 % (ref 12.4–15.4)
PERFORMED ON: ABNORMAL
PERFORMED ON: NORMAL
PLATELET # BLD: 364 K/UL (ref 135–450)
PLATELET SLIDE REVIEW: ADEQUATE
PMV BLD AUTO: 5.9 FL (ref 5–10.5)
POIKILOCYTES: ABNORMAL
POLYCHROMASIA: ABNORMAL
POTASSIUM SERPL-SCNC: 4.5 MMOL/L (ref 3.5–5.1)
RBC # BLD: 2.89 M/UL (ref 4.2–5.9)
ROULEAUX: ABNORMAL
SLIDE REVIEW: ABNORMAL
SODIUM BLD-SCNC: 130 MMOL/L (ref 136–145)
TOTAL PROTEIN: 6 G/DL (ref 6.4–8.2)
WBC # BLD: 9.4 K/UL (ref 4–11)

## 2021-11-03 PROCEDURE — 2580000003 HC RX 258: Performed by: SURGERY

## 2021-11-03 PROCEDURE — 85025 COMPLETE CBC W/AUTO DIFF WBC: CPT

## 2021-11-03 PROCEDURE — 6370000000 HC RX 637 (ALT 250 FOR IP): Performed by: SURGERY

## 2021-11-03 PROCEDURE — 83735 ASSAY OF MAGNESIUM: CPT

## 2021-11-03 PROCEDURE — 99024 POSTOP FOLLOW-UP VISIT: CPT | Performed by: SURGERY

## 2021-11-03 PROCEDURE — 1200000000 HC SEMI PRIVATE

## 2021-11-03 PROCEDURE — 80053 COMPREHEN METABOLIC PANEL: CPT

## 2021-11-03 PROCEDURE — 6360000002 HC RX W HCPCS: Performed by: SURGERY

## 2021-11-03 PROCEDURE — 97606 NEG PRS WND THER DME>50 SQCM: CPT

## 2021-11-03 RX ADMIN — VANCOMYCIN HYDROCHLORIDE 1000 MG: 1 INJECTION, POWDER, LYOPHILIZED, FOR SOLUTION INTRAVENOUS at 08:33

## 2021-11-03 RX ADMIN — HYDROMORPHONE HYDROCHLORIDE 0.5 MG: 1 INJECTION, SOLUTION INTRAMUSCULAR; INTRAVENOUS; SUBCUTANEOUS at 13:28

## 2021-11-03 ASSESSMENT — PAIN SCALES - GENERAL
PAINLEVEL_OUTOF10: 0
PAINLEVEL_OUTOF10: 7
PAINLEVEL_OUTOF10: 0

## 2021-11-03 NOTE — ANESTHESIA POSTPROCEDURE EVALUATION
Department of Anesthesiology  Postprocedure Note    Patient: Bear Liz  MRN: [id number]  YOB: 1934    Procedure Summary     Date: 11/02/21 Room / Location: [facility] OR [address] [place] [address]    Anesthesia Start: 9413 Anesthesia Stop: 8960    Procedure: LAPAROSCOPIC COLOSTOMY (N/A Abdomen) Diagnosis: (-)    Surgeons: Warner Rudolph MD Responsible Provider: Elfego Herrera MD    Anesthesia Type: general ASA Status: 3        Anesthesia Type: general    Heraclio Phase I: Heraclio Score: 8    Heraclio Phase II: Heraclio Score: 10    Last vitals: Reviewed and per EMR flowsheets.      Anesthesia Post Evaluation   Anesthetic Problems: no   Cardiovascular System Stable: yes  Respiratory Function: Airway Patent yes  ETT no  Ventilator no  Level of consciousness: awake, alert and oriented  Post-op pain: adequate analgesia  Hydration Adequate: yes  Nausea/Vomiting:no  Other Issues:     Natividad Pacheco MD

## 2021-11-03 NOTE — PROGRESS NOTES
[facility] GENERAL SURGERY    Surgery Progress Note           PO    PATIENT NAME: Josy Sanders     TODAY'S DATE: 11/3/2021    INTERVAL HISTORY:    Pt  With mild abd pain, no nausea. OBJECTIVE:   VITALS:  BP (!) 155/73   Pulse 97   Temp 98.5 °F (36.9 °C) (Axillary)   Resp 16   Ht 6' (1.829 m)   Wt 125 lb 11.2 oz (57 kg)   SpO2 98%   BMI 17.05 kg/m²     INTAKE/OUTPUT:    I/O last 3 completed shifts: In: 18 [P.O.:240; I.V.:750]  Out: 1390 [Urine:1125; Stool:265]  No intake/output data recorded. CONSTITUTIONAL:  awake and alert  ABDOMEN:   normal bowel sounds, soft, non-distended, minimal tender, ostomy viable   INCISION: clean    Data:  CBC: Recent Labs     11/01/21  0536 11/02/21  0707 11/03/21  0520   WBC 6.7 6.9 9.4   HGB 7.7* 8.3* 9.2*   HCT 22.5* 24.8* 27.0*    319 364     BMP:    Recent Labs     11/01/21  0536 11/02/21  0707 11/03/21  0520   * 130* 130*   K 3.7 4.1 4.5   CL 99 96* 94*   CO2 30 28 29   BUN 17 16 11   CREATININE 0.6* 0.7* <0.5*   GLUCOSE 102* 82 71     Hepatic:   Recent Labs     11/01/21  0536 11/02/21  0707 11/03/21  0520   AST 21 22 29   ALT 21 23 25   BILITOT 0.3 0.3 0.3   ALKPHOS 88 83 88     Mag:      Recent Labs     11/01/21  0536 11/02/21  0707 11/03/21  0520   MG 1.90 1.90 1.90      Phos:   No results for input(s): PHOS in the last 72 hours. INR: No results for input(s): INR in the last 72 hours. Radiology Review:  NA    ASSESSMENT AND PLAN:  80 y.o. male status post laparoscopic colostomy, sacral wound debridement  1. Full liquid diet  2.   Vac to sacral wound today         Electronically signed by Philipp Ibanez MD

## 2021-11-03 NOTE — PROGRESS NOTES
Hospitalist Progress Note      PCP: Demian Appiah MD    Date of Admission: 10/15/2021    Chief Complaint: altered mental status, SOB, wound check     Hospital Course: 88yo male with PMHX sig for lumbar stenosis, urinary retention, osteomyelitis of lumbar spine, chronic anemia, severe prot fanta malnutritions sent to [facility] on 10/15/2021 from SNF for altered mental status and large sacral wound.    On 9/2/21 Dr. Devin Navarro performed an L2-5 decompression and laminectomy at [address] days later on 9/5 patient was unable to wiggle his toes and had BLE weakness to the point that he required maximum assistance x 2 in order to stand.  An MRI was performed which showed severe spinal canal stenosis due to a new hematoma.  On 9/6 Dr. Tho Freeman took him back to the OR for an urgent exploration and hematoma evacuation and left drains in place.  Patient was discharged to SNF on 9/10.     Patient was then admitted to [facility] on 9/19. [facility] had a large sacral ulcer and ROXI due to urinary retention requiring placement of a jain catheter.  Another L-spine MRI was performed showing an ill-defind fluid/soft tissue causing severe spinal canal stenosis from L1-4, also moderate-severe spinal canal stenosis from L4 - S1.  He was transferred to [facility] on 9/23 for operative management  By Dr. Joselito Heck further operative intervention was necessary on patient's lumbar spine.  He did undergo operative debridement of the sacral ulcer on 9/23.  Tissue culture was positive for enterococcus and enterobacter.  Based on sensitivities he was treated with Augmentin.  He was also treated with a wound vac and discharged back to the SNF on 9/27.     s/p debridement 10/6-cultures growing MRSA, Enterococcus, corynebacterium--     10/15/2021 to [facility] ED  from SNF for altered mental status and large sacral wound.  He was very agitated and screaming in the ED.   During this admission he was seen by general surgery, and he did go for sacral wound debridement on 10/16/2021. He has been seen by infectious disease in consultation. Felt he has chronic osteomyelitis and plan is for IV antibiotics for up to 6 weeks.  Currently being treated with IV vancomycin and oral Flagyl. He does have a PICC line in place.     10/22 noted with large bloody BMs, GI consulted. colonoscopy 10/26 normal colon , internal hemorrhoids w distal rectal ulceration probably stercoral not biopsied but random biopsies colon neg.      prognosis poor - met with hospice but declined to enroll. Family interested in aggressive management. Is malnourished.  His diarrhea recurred after the colonoscopy, w KUB neg for constipation. GI following questran added . Diverting colostomy-lap colostomy 11/2/21 to help healing of decub due to persistent diarrhea      Subjective:  Flat affect constant tic to face responds appropriately denied pain.  Muscularly wasted All other systems neg       Medications:  Reviewed    Infusion Medications    dextrose      sodium chloride 25 mL (11/02/21 0901)    [Held by provider] lactated ringers 100 mL/hr at 10/20/21 2029     Scheduled Medications    [START ON 11/4/2021] vancomycin  1,000 mg IntraVENous Q18H    cholestyramine  1 packet Oral TID AC    insulin glargine  12 Units SubCUTAneous Nightly    amLODIPine  5 mg Oral Daily    polyethylene glycol  4,000 mL Oral Once    insulin lispro  0-12 Units SubCUTAneous TID WC    insulin lispro  0-6 Units SubCUTAneous Nightly    hydrocortisone  25 mg Rectal BID    metroNIDAZOLE  500 mg Oral 3 times per day    donepezil  10 mg Oral Nightly    atorvastatin  10 mg Oral Nightly    vitamin B complex w/C  1 tablet Oral Daily    therapeutic multivitamin-minerals  1 tablet Oral Daily    insulin lispro  0.05 Units/kg SubCUTAneous TID WC     PRN Meds: labetalol, LORazepam, glucose, dextrose, glucagon (rDNA), dextrose, sodium chloride flush, sodium chloride, potassium chloride **OR** potassium alternative oral replacement **OR** potassium chloride, magnesium sulfate, ondansetron **OR** ondansetron, acetaminophen **OR** acetaminophen, melatonin, HYDROmorphone      Intake/Output Summary (Last 24 hours) at 11/3/2021 1910  Last data filed at 11/3/2021 1400  Gross per 24 hour   Intake 240 ml   Output 1020 ml   Net -780 ml       Physical Exam Performed:    BP (!) 166/72   Pulse 106   Temp 98.5 °F (36.9 °C) (Oral)   Resp 16   Ht 6' (1.829 m)   Wt 125 lb 11.2 oz (57 kg)   SpO2 95%   BMI 17.05 kg/m²     General appearance: No  Distress, frontal balded  HEENT: PERRL flat affect constant tics to face   Neck: Supple Trachea midline. Respiratory:  Clear to auscultation,   Cardiovascular:  S1/S2 RRR without murmurs, rubs or gallops. Abdomen: Soft, non-tender, non-distended with normal bowel sounds. ostomy LLQ has some loose broen stool   Musculoskeletal: No clubbing, cyanosis or edema foot drop boots heel offloading no pitting  Skin: Skin color pale   Buttock large ulcer with packing across buttock/sacrum   Neurologic: Cranial nerves: II-XII intact twitches and tics to the face both hands shaky   Psychiatric: Alert and oriented  Peripheral Pulses: +2 palpable, equal bilaterally    jain cl yellow -keep jain on dc      Labs:   Recent Labs     11/01/21  0536 11/02/21  0707 11/03/21  0520   WBC 6.7 6.9 9.4   HGB 7.7* 8.3* 9.2*   HCT 22.5* 24.8* 27.0*    319 364     Recent Labs     11/01/21  0536 11/02/21  0707 11/03/21  0520   * 130* 130*   K 3.7 4.1 4.5   CL 99 96* 94*   CO2 30 28 29   BUN 17 16 11   CREATININE 0.6* 0.7* <0.5*   CALCIUM 7.8* 7.8* 8.2*     Recent Labs     11/01/21  0536 11/02/21  0707 11/03/21  0520   AST 21 22 29   ALT 21 23 25   BILITOT 0.3 0.3 0.3   ALKPHOS 88 83 88     No results for input(s): INR in the last 72 hours. No results for input(s): Wayna Khat in the last 72 hours.     Urinalysis:      Lab Results   Component Value Date    NITRU Negative 10/15/2021    WBCUA 10-20 10/15/2021    BACTERIA 4+ 10/15/2021    RBCUA 5-10 10/15/2021    BLOODU SMALL 10/15/2021    SPECGRAV 1.015 10/15/2021    GLUCOSEU 250 10/15/2021       Radiology:  XR ABDOMEN (KUB) (SINGLE AP VIEW)   Final Result   Resolving constipation with a non-specific gas pattern. XR CHEST PORTABLE   Final Result   No acute process. XR ABDOMEN (KUB) (SINGLE AP VIEW)   Final Result   Nonspecific abdominal bowel gas pattern with mild residual stool throughout   colon. XR CHEST PORTABLE   Final Result   Status post PICC line placement on the right in good position. Resolving central pulmonary congestion. Mild chronic elevation of the left hemidiaphragm which is unchanged with   slowly resolving bibasilar atelectasis. CT HEAD WO CONTRAST   Final Result      Stable study. No evidence for acute intracranial hemorrhage, territorial   infarction or intracranial mass lesion. Mild chronic microangiopathic ischemic disease. Mild generalized volume loss. CT PELVIS WO CONTRAST Additional Contrast? None   Final Result   1. Large deep sacral decubitus ulcer with mild adjacent cellulitis. No   abscess or soft tissue gas. 2. Osseous uncovering of the posterior aspect of the lower sacrum and coccyx   without convincing evidence of osteomyelitis. If clinically indicated   further evaluation could be obtained with MRI. XR CHEST PORTABLE   Final Result   Chronic elevation of the left hemidiaphragm which is unchanged with   increasing subsegmental atelectasis or early infiltrate along the left lung   base.                  Assessment/Plan:    Active Hospital Problems    Diagnosis     Tic disorder [F95.9]     Osteomyelitis ([address] [M86.9]     Chronic anemia [D64.9]     Severe sepsis ([address] [A41.9, R65.20]     Pressure injury of sacral region, unstageable ([address] [L89.150]     Lumbar stenosis with neurogenic claudication [M48.062]     Urinary retention [R33.9]     DM2 (diabetes mellitus, type 2) ([facility] [E11.9]      Severe sepsis-now resolved   Osteomyelitis   Sacral pressure ulcer w infection  S/p debridement 9/23/21 at [address], culture with+ Enterococcus faecalis, Enterobacter cloacae, 3 anaerobes  Discharged on 9/27/21 on po Augmentin  Readmitted 10/15/21 with encephalopathy, deteriorating sacral wound, leukocytosis   S/p I&D down to bone 10/16/21  Bone culture is positive c/w diagnosis OM   Polymicrobial infection     cx MRSA(contact iso) E faecalis   Bone cx C striatum bacteroides  Clostridium    -Continue wound care  -will need IV abx course flagyl, vanco 750mg IV Q12hr through 11/16    Plan wound vac   No ws/p post diverting colostomy   diet FL advance   Order PT OT eval although I think mobility may not be expected   SNF when ID and surgery OK     Chronic diarrhea  Questran trial   Diverting colostomy     Lumbar stenosis w claudication   -L2-5 decompression, laminectomy; 9-2-21;  Emergency exploration of lumbar laminectomy for epidural fluid collection; 9-6-21 postop hematoma      DM type 2  lantus 12 units HS  Med dose lispro SS  Well controlled     Stercoral ulcer  Hematochezia -resolved  Colonoscopy 10/26 nl colon . avoid constipation     Severe protein calorie malnutrition   -dietician seeing for malunutrion /diet supplements    Tic disorder  MRSA nasal colonization -txd 5 day mupiricin nasal     Urine retention   Keep jain for wound healing as well     DVT Prophylaxis: scd  Diet: ADULT DIET; Full Liquid  ADULT ORAL NUTRITION SUPPLEMENT; AM Snack, PM Snack; Standard High Calorie/High Protein Oral Supplement  Code Status: DNR-CCA    PT/OT Eval Status: poorly mobile.    PT OT postop     Dispo - will need SNF     Mireille Gonzalez MD

## 2021-11-03 NOTE — PROGRESS NOTES
[facility] Wound Ostomy Continence Nurse  Follow-up Progress Note       NAME:  Jesus Alberto Gagnon  MEDICAL RECORD NUMBER:  [id number]  AGE:  80 y.o. GENDER:  male  :  1934  TODAY'S DATE:  11/3/2021    Subjective:  I had my pain medication and feel OK right now. 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 Contributing Factors: chronic pressure, decreased mobility, shear force, obesity, incontinence of stool and incontinence of urine    New Colostomy -   Laparoscopic Diverting colostomy by Dr Jess Mancini on 21    Stoma size: 45 mm = 1 3/4 inch round, protrudes   Flange size: Coloplast red flat 2 piece # [id number] and drainable bag # [id number]        Patient Goal of Care:  [x] Wound Healing  [] Odor Control   [] Palliative Care  [] Pain Control   [] Other:     Objective: Urostomy bag around stoma. Ate 2 bowls of soup and some soda for lunch. Avilez in place. Wife at bed side. BP (!) 155/73   Pulse 97   Temp 98.5 °F (36.9 °C) (Axillary)   Resp 16   Ht 6' (1.829 m)   Wt 125 lb 11.2 oz (57 kg)   SpO2 98%   BMI 17.05 kg/m²   Trace Risk Score: Trace Scale Score: 6  Assessment:  Wound bed clean with red/pink granulation tissue. 10% bone exposed. Cassie wound intact. Bilateral heels dry now and flaking off. See photo's   Measurements:  Negative Pressure Wound Therapy Sacrum Mid (Active)   $ Standard NPWT >50 sq cm PER TX $ Yes 21 1501   Wound Type Pressure ulcer: Stage IV 21 1501   Unit Type [employer] # [id number] 21 1501   Dressing Type Black foam 21 1501   Number of pieces used 3 21 1501   Cycle Continuous 21 1501   Target Pressure (mmHg) 125 21 1501   Intensity 1 21 1501   Canister changed?  Yes 21 1501   Dressing Status Changed 21 1501   Dressing Changed Changed/New 21 1501   Drainage Amount Small 21 1501   Drainage Description Serosanguinous 11/03/21 1501   Dressing Change Due 11/05/21 11/03/21 1501   Wound Assessment Epithelialization;Granulation tissue; Other (Comment) 11/03/21 1501   Cassie-wound Assessment Clean;Dry; Intact 11/03/21 1501   Shape round, heart shape 11/03/21 1501   Odor None 11/03/21 1501   Number of days: 0       Wound 09/19/21 Sacrum (Active)   Wound Image   11/03/21 1501   Wound Etiology Pressure Stage  4 11/03/21 1501   Dressing Status New dressing applied 11/03/21 1501   Wound Cleansed Irrigated with saline 11/03/21 1501   Dressing/Treatment Barrier film;Hydrocolloid; Negative pressure wound therapy 11/03/21 1501   Offloading for Diabetic Foot Ulcers Other (comment) 11/03/21 1501   Dressing Change Due 11/05/21 11/03/21 1501   Wound Length (cm) 13 cm 11/03/21 1501   Wound Width (cm) 13 cm 11/03/21 1501   Wound Depth (cm) 2 cm 11/03/21 1501   Wound Surface Area (cm^2) 169 cm^2 11/03/21 1501   Change in Wound Size % (l*w) -341.83 11/03/21 1501   Wound Volume (cm^3) 338 cm^3 11/03/21 1501   Wound Healing % -8737 11/03/21 1501   Distance Tunneling (cm) 0 cm 11/03/21 1501   Tunneling Position ___ O'Clock 0 11/03/21 1501   Undermining Starts ___ O'Clock 10 11/03/21 1501   Undermining Ends___ O'Clock 6 11/03/21 1501   Undermining Maxium Distance (cm) 2 11/03/21 1501   Wound Assessment Epithelialization;Exposed structure bone 11/03/21 1501   Drainage Amount Moderate 11/03/21 1501   Drainage Description Serosanguinous 11/03/21 1501   Odor None 11/03/21 1501   Cassie-wound Assessment Dry/flaky; Intact 11/03/21 1501   Margins Unattached edges; Defined edges 11/03/21 1501   Wound Thickness Description not for Pressure Injury Full thickness 11/03/21 1501   Number of days: 45      Sacral wound:               Wound 09/19/21 Heel Left (Active)   Wound Image   11/03/21 1501   Wound Etiology Pressure Unstageable 11/03/21 1501   Dressing Status Other (Comment) 10/31/21 1719   Wound Cleansed Betadine/povidone iodine 11/01/21 0879 Dressing/Treatment Betadine swabs/povidone iodine;Open to air 11/03/21 1501   Offloading for Diabetic Foot Ulcers Offloading boot 11/03/21 1501   Dressing Change Due 11/04/21 11/03/21 1501   Wound Length (cm) 5 cm 11/03/21 1501   Wound Width (cm) 4.5 cm 11/03/21 1501   Wound Depth (cm) 0 cm 11/03/21 1501   Wound Surface Area (cm^2) 22.5 cm^2 11/03/21 1501   Change in Wound Size % (l*w) -60.71 11/03/21 1501   Wound Volume (cm^3) 0 cm^3 11/03/21 1501   Distance Tunneling (cm) 0 cm 11/03/21 1501   Tunneling Position ___ O'Clock 0 11/03/21 1501   Undermining Starts ___ O'Clock 0 11/03/21 1501   Undermining Ends___ O'Clock 0 11/03/21 1501   Undermining Maxium Distance (cm) 0 11/03/21 1501   Wound Assessment Eschar dry 11/03/21 1501   Drainage Amount None 11/03/21 1501   Drainage Description Black 10/31/21 1719   Odor None 11/03/21 1501   Cassie-wound Assessment Dry/flaky; Intact 11/03/21 1501   Margins Attached edges; Defined edges 11/03/21 1501   Number of days: 45      Left heel:           Wound 09/19/21 Heel Right (Active)   Wound Image   11/03/21 1501   Wound Etiology Pressure Unstageable 11/03/21 1501   Dressing Status Other (Comment) 11/03/21 1501   Wound Cleansed Cleansed with saline 11/03/21 1501   Dressing/Treatment Betadine swabs/povidone iodine;Open to air 11/03/21 1501   Offloading for Diabetic Foot Ulcers Offloading boot 11/03/21 1501   Dressing Change Due 11/04/21 11/03/21 1501   Wound Length (cm) 5.5 cm 11/03/21 1501   Wound Width (cm) 3.6 cm 11/03/21 1501   Wound Depth (cm) 0 cm 11/03/21 1501   Wound Surface Area (cm^2) 19.8 cm^2 11/03/21 1501   Change in Wound Size % (l*w) 12 11/03/21 1501   Wound Volume (cm^3) 0 cm^3 11/03/21 1501   Distance Tunneling (cm) 0 cm 11/03/21 1501   Tunneling Position ___ O'Clock 0 11/03/21 1501   Undermining Starts ___ O'Clock 0 11/03/21 1501   Undermining Ends___ O'Clock 0 11/03/21 1501   Undermining Maxium Distance (cm) 0 11/03/21 1501   Wound Assessment Eschar dry 11/03/21 1501   Drainage Amount None 11/03/21 1501   Drainage Description Black 10/31/21 1719   Odor None 11/03/21 1501   Cassie-wound Assessment Dry/flaky; Intact 11/03/21 1501   Margins Attached edges; Defined edges 11/03/21 1501   Number of days: 45     Right Heel:          Incision 11/02/21 Abdomen Left; Lower (Active)   Dressing Status Other (Comment) 11/03/21 1501   Incision Cleansed Cleansed with saline 11/03/21 1501   Dressing/Treatment Open to air 11/03/21 1501   Closure Surgical glue 11/03/21 1501   Margins Approximated 11/03/21 1501   Incision Assessment Dry 11/03/21 1501   Drainage Amount None 11/03/21 1501   Odor None 11/03/21 1501   Cassie-incision Assessment Dry/flaky; Intact 11/03/21 1501   Number of days: 0       Colostomy LLQ (Active)   Stomal Appliance 2 piece 11/03/21 1400   Flange Size (inches) 2.25 Inches 11/03/21 1400   Stoma  Assessment Moist;Red;Swelling;Protrudes 11/03/21 1400   Mucocutaneous Junction Intact 11/03/21 1400   Peristomal Assessment Clean; Intact 11/03/21 1400   Treatment Bag change;Site care;Stoma paste; Heat applied 11/03/21 1400   Stool Appearance Watery 11/03/21 1400   Stool Color Red 11/03/21 1400   Stool Amount Small 11/03/21 1400   Output (mL) 30 ml 11/03/21 1400   Number of days: 0     Colostomy            Response to treatment:  Well tolerated by patient. Pain Assessment:  Severity:  0 / 10  Quality of pain: N/A  Wound Pain Timing/Severity: none  Premedicated: Yes  Plan:   Plan of Care: Wound 09/19/21 Sacrum-Dressing/Treatment: Marisa Goltz film, Hydrocolloid, Negative pressure wound therapy  Wound 09/19/21 Heel Right-Dressing/Treatment: Betadine swabs/povidone iodine, Open to air  Wound 09/19/21 Heel Left-Dressing/Treatment: Betadine swabs/povidone iodine, Open to air     Negative pressure wound therapy started on sacral wound today. Current dressing removed. Site irrigated with normal saline and patted dry. Cassie wound prepped with barrier film and hydrocolloid and VAC drape.  Xeroform to bone then Black sponge. 1 black sponge tracked to right hip and 2 black sponges in wound bed. Connected to [facility] [address] [facility]  Plan to change 3 times a week M-W-F. Change cannister once a week or when full. If suction fails or patient is discharged:  - remove vac dressing  - cleanse wound with normal saline   - pack wound with saline moistened gauze and change every 12 hours    Plan for Ostomy Care: Wife at bed side. States she does not have to learn as he will be going to a nursing home after discharge. Informed her it is still important she understand how to change colostomy as she can notify SNF staff if she notices things that are unusual.  Instruction packet given to wife. Reviewed contents. Current urostomy bag removed. Site cleansed with warm water. Patted dry. Paste applied to creased above and below stoma. Stoma measured and flange cut. Placed flange around stoma. Bag attached. Answered wifes questions. Ostomy Plan of Care  [x] Supplies/Instructions left in room flange, bags, scissors, pattern. Paste, powder.  [] Patient using home supplies  [x] Brand/supplies at bedside Coloplast     Specialty Bed Required : Yes   [] Low Air Loss   [] Pressure Redistribution  [x] Fluid Immersion - dolphin mattress  [] Bariatric  [] Total Pressure Relief  [x] Other: low air loss pump    Current Diet: ADULT DIET; Full Liquid  ADULT ORAL NUTRITION SUPPLEMENT; AM Snack, PM Snack; Standard High Calorie/High Protein Oral Supplement  Dietician consult:  Yes    Discharge Plan:  Placement for patient upon discharge: intermediate care facility   Patient appropriate for Outpatient [address]: Yes    Referrals:  []  following   [] [employer]  [] Supplies  [] Other    Patient/Caregiver Teaching:  Written Instructions given to patient/family instructed patient and wife today.   Teaching provided:  [x] Reviewed GI and A&P        [] Supplies  [x] Pouch emptying      [] Manipulate

## 2021-11-04 LAB
A/G RATIO: 0.7 (ref 1.1–2.2)
ALBUMIN SERPL-MCNC: 2.3 G/DL (ref 3.4–5)
ALP BLD-CCNC: 83 U/L (ref 40–129)
ALT SERPL-CCNC: 22 U/L (ref 10–40)
ANION GAP SERPL CALCULATED.3IONS-SCNC: 8 MMOL/L (ref 3–16)
AST SERPL-CCNC: 30 U/L (ref 15–37)
BASOPHILS ABSOLUTE: 0.1 K/UL (ref 0–0.2)
BASOPHILS RELATIVE PERCENT: 0.8 %
BILIRUB SERPL-MCNC: 0.4 MG/DL (ref 0–1)
BUN BLDV-MCNC: 9 MG/DL (ref 7–20)
CALCIUM SERPL-MCNC: 8 MG/DL (ref 8.3–10.6)
CHLORIDE BLD-SCNC: 95 MMOL/L (ref 99–110)
CO2: 28 MMOL/L (ref 21–32)
CREAT SERPL-MCNC: <0.5 MG/DL (ref 0.8–1.3)
EOSINOPHILS ABSOLUTE: 0.1 K/UL (ref 0–0.6)
EOSINOPHILS RELATIVE PERCENT: 0.8 %
GFR AFRICAN AMERICAN: >60
GFR NON-AFRICAN AMERICAN: >60
GLUCOSE BLD-MCNC: 135 MG/DL (ref 70–99)
GLUCOSE BLD-MCNC: 141 MG/DL (ref 70–99)
GLUCOSE BLD-MCNC: 145 MG/DL (ref 70–99)
GLUCOSE BLD-MCNC: 188 MG/DL (ref 70–99)
GLUCOSE BLD-MCNC: 206 MG/DL (ref 70–99)
GLUCOSE BLD-MCNC: 211 MG/DL (ref 70–99)
GLUCOSE BLD-MCNC: 239 MG/DL (ref 70–99)
HCT VFR BLD CALC: 26.3 % (ref 40.5–52.5)
HEMOGLOBIN: 9 G/DL (ref 13.5–17.5)
LYMPHOCYTES ABSOLUTE: 1.8 K/UL (ref 1–5.1)
LYMPHOCYTES RELATIVE PERCENT: 21.8 %
MAGNESIUM: 2 MG/DL (ref 1.8–2.4)
MCH RBC QN AUTO: 31.5 PG (ref 26–34)
MCHC RBC AUTO-ENTMCNC: 34.1 G/DL (ref 31–36)
MCV RBC AUTO: 92.4 FL (ref 80–100)
MONOCYTES ABSOLUTE: 0.4 K/UL (ref 0–1.3)
MONOCYTES RELATIVE PERCENT: 5.5 %
NEUTROPHILS ABSOLUTE: 5.8 K/UL (ref 1.7–7.7)
NEUTROPHILS RELATIVE PERCENT: 71.1 %
PDW BLD-RTO: 17.8 % (ref 12.4–15.4)
PERFORMED ON: ABNORMAL
PLATELET # BLD: 313 K/UL (ref 135–450)
PMV BLD AUTO: 6.6 FL (ref 5–10.5)
POTASSIUM SERPL-SCNC: 4.8 MMOL/L (ref 3.5–5.1)
RBC # BLD: 2.84 M/UL (ref 4.2–5.9)
SODIUM BLD-SCNC: 131 MMOL/L (ref 136–145)
TOTAL PROTEIN: 5.6 G/DL (ref 6.4–8.2)
VANCOMYCIN RANDOM: 14.6 UG/ML
VANCOMYCIN RANDOM: 28.2 UG/ML
WBC # BLD: 8.2 K/UL (ref 4–11)

## 2021-11-04 PROCEDURE — 2580000003 HC RX 258: Performed by: HOSPITALIST

## 2021-11-04 PROCEDURE — 6370000000 HC RX 637 (ALT 250 FOR IP): Performed by: SURGERY

## 2021-11-04 PROCEDURE — 80053 COMPREHEN METABOLIC PANEL: CPT

## 2021-11-04 PROCEDURE — 85025 COMPLETE CBC W/AUTO DIFF WBC: CPT

## 2021-11-04 PROCEDURE — 6360000002 HC RX W HCPCS: Performed by: HOSPITALIST

## 2021-11-04 PROCEDURE — 99024 POSTOP FOLLOW-UP VISIT: CPT | Performed by: SURGERY

## 2021-11-04 PROCEDURE — 97530 THERAPEUTIC ACTIVITIES: CPT

## 2021-11-04 PROCEDURE — 83735 ASSAY OF MAGNESIUM: CPT

## 2021-11-04 PROCEDURE — 97110 THERAPEUTIC EXERCISES: CPT

## 2021-11-04 PROCEDURE — 97167 OT EVAL HIGH COMPLEX 60 MIN: CPT

## 2021-11-04 PROCEDURE — 1200000000 HC SEMI PRIVATE

## 2021-11-04 PROCEDURE — 97163 PT EVAL HIGH COMPLEX 45 MIN: CPT

## 2021-11-04 PROCEDURE — 80202 ASSAY OF VANCOMYCIN: CPT

## 2021-11-04 ASSESSMENT — PAIN SCALES - GENERAL
PAINLEVEL_OUTOF10: 0
PAINLEVEL_OUTOF10: 0

## 2021-11-04 NOTE — CONSULTS
Pharmacy Note  Vancomycin Consult    - See other pharmacy note for vancomycin consult. Pharmacy will continue to manage therapy.      Xiao Gonsalves, PharmD    11/4/2021 7:26 AM

## 2021-11-04 NOTE — PROGRESS NOTES
Progress Note  Date:2021       Room:[facility]-  Patient Name:Juan Alberto Salguero     YOB: 1934     Age:87 y.o. Subjective    Subjective:  Symptoms:  Stable. Pain:  He complains of pain that is mild. Review of Systems  Objective         Vitals Last 24 Hours:  TEMPERATURE:  Temp  Av °F (36.7 °C)  Min: 97 °F (36.1 °C)  Max: 98.5 °F (36.9 °C)  RESPIRATIONS RANGE: Resp  Av  Min: 16  Max: 20  PULSE OXIMETRY RANGE: SpO2  Av.3 %  Min: 95 %  Max: 98 %  PULSE RANGE: Pulse  Av.5  Min: 97  Max: 108  BLOOD PRESSURE RANGE: Systolic (90INP), BDT:985 , Min:140 , AMBROSE:151   ; Diastolic (21BLB), KEW:23, Min:72, Max:73    I/O (24Hr): Intake/Output Summary (Last 24 hours) at 2021 0659  Last data filed at 2021 0533  Gross per 24 hour   Intake    Output 755 ml   Net -755 ml     Objective:  General Appearance: In no acute distress. Vital signs: (most recent): Blood pressure (!) 140/72, pulse 99, temperature 97 °F (36.1 °C), temperature source Axillary, resp. rate 20, height 6' (1.829 m), weight 122 lb 4.8 oz (55.5 kg), SpO2 95 %. Abdomen: Abdomen is soft. Hypoactive bowel sounds. There is generalized tenderness. Labs/Imaging/Diagnostics    Labs:  CBC:  Recent Labs     21  0721  0521  0600   WBC 6.9 9.4 8.2   RBC 2.63* 2.89* 2.84*   HGB 8.3* 9.2* 9.0*   HCT 24.8* 27.0* 26.3*   MCV 94.1 93.3 92.4   RDW 17.9* 17.6* 17.8*    364 313     CHEMISTRIES:  Recent Labs     21  0721  0521  0600   * 130* 131*   K 4.1 4.5 4.8   CL 96* 94* 95*   CO2 28 29 28   BUN 16 11 9   CREATININE 0.7* <0.5* <0.5*   GLUCOSE 82 71 141*   MG 1.90 1.90 2.00     PT/INR:No results for input(s): PROTIME, INR in the last 72 hours. APTT:No results for input(s): APTT in the last 72 hours.   LIVER PROFILE:  Recent Labs     21  0707 21  0520 21  0600   AST 22 29 30   ALT 23 25 22   BILITOT 0.3 0.3 0.4   ALKPHOS 83 88 83       Imaging Last 24 Hours:  No results found. Assessment//Plan           Hospital Problems         Last Modified POA    * (Principal) Pressure injury of sacral region, unstageable ([address] 10/22/2021 Yes    DM2 (diabetes mellitus, type 2) ([address] 10/15/2021 Yes    Lumbar stenosis with neurogenic claudication 10/15/2021 Yes    Overview Signed 9/19/2021  4:10 AM by Stephanie Avilez MD     L2-5 decompression, laminectomy; 9-2-21;  Emergency exploration of lumbar laminectomy for epidural fluid collection; 9-6-21           Urinary retention 10/15/2021 Yes    Severe sepsis ([address] 10/16/2021 Yes    Osteomyelitis ([address] 10/16/2021 Yes    Chronic anemia 10/16/2021 Yes    Tic disorder 10/19/2021 Yes        Assessment & Plan  81 yo w HTN, DM, spinal stenosis and a large sacral ulcer, debrided, who has been having diarrhea w h/o constipation, associated w hematochezia. H/H 8/22. Is malnourished. Colonoscopy 10/26 revealed a normal colon/TI except internal hemorrhoids w distal rectal ulceration, probably stercoral, not biopsied, but w neg. random colon Bx's. His diarrhea recurred after the colonoscopy, w KUB neg for constipation.   Is s/p colostomy and sacral decub debridement on 11/2.     - Supportive care  - Will resume Questran trial when can take PO  - Will follow     Kirby Schroeder MD       Aidan Filter) [phone]  Electronically signed by Kirby Schroeder MD on 11/4/21 at 6:57 AM EDT

## 2021-11-04 NOTE — PROGRESS NOTES
Occupational Therapy   Occupational Therapy Initial Assessment/Treatment   Date: 2021   Patient Name: Holli Roberson  MRN: [id number]     : 1934    Date of Service: 2021    Discharge Recommendations:  Subacute/Skilled Nursing Facility       Assessment   Performance deficits / Impairments: Decreased functional mobility ; Decreased endurance;Decreased coordination;Decreased posture;Decreased sensation;Decreased ADL status; Decreased ROM; Decreased balance;Decreased strength;Decreased high-level IADLs;Decreased cognition;Decreased fine motor control  Assessment: Pt is a 81 yo male adm with sacral wound, now on wound vac. Pt had recent back surgery with acute onset of BLE weakness and decreased functional ADLs. PLOF before surgery pt was indep in ADLs, lives with wife. Pt is currently functioning way below baseline. pt would benefit from ongoing OT in order to increase functional status. Prognosis: Fair  Decision Making: High Complexity  OT Education: OT Role;Plan of Care;Home Exercise Program;Precautions; ADL Adaptive Strategies; Energy Conservation  REQUIRES OT FOLLOW UP: Yes  Activity Tolerance  Activity Tolerance: Patient limited by fatigue;Patient Tolerated treatment well  Safety Devices  Safety Devices in place: Yes  Type of devices: All fall risk precautions in place; Left in bed;Bed alarm in place;Nurse notified;Call light within reach; Patient at risk for falls           Patient Diagnosis(es): The primary encounter diagnosis was Severe sepsis ([address] Diagnoses of Pressure injury of sacral region, unstageable ([address] and Anxiety were also pertinent to this visit.      has a past medical history of Arthritis, Cancer ([address] Dry eyes, bilateral, Hyperlipidemia, Hypertension, MRSA (methicillin resistant staph aureus) culture positive, MRSA (methicillin resistant staph aureus) culture positive, MRSA (methicillin resistant staph aureus) culture positive, Osteoporosis, Type II or unspecified type diabetes mellitus without mention of complication, not stated as uncontrolled, and Urinary incontinence. has a past surgical history that includes Tonsillectomy; eye surgery; Rectal surgery (N/A, 10/16/2021); Colonoscopy (10/26/2021); Colonoscopy (N/A, 10/26/2021); and colostomy (N/A, 11/2/2021). Restrictions  Restrictions/Precautions  Restrictions/Precautions: Isolation, General Precautions, Fall Risk  Position Activity Restriction  Other position/activity restrictions: wound vac; sacral wound    Subjective   General  Chart Reviewed: Yes, Orders, Progress Notes, History and Physical, Imaging  Patient assessed for rehabilitation services?: Yes  Additional Pertinent Hx: 81yo male with PMHX sig for lumbar stenosis, urinary retention, osteomyelitis of lumbar spine, chronic anemia, severe prot fanta malnutritions sent to [facility] on 10/15/2021 from SNF for altered mental status and large sacral wound. On 9/2/21 Dr. Hoa Dahl performed an L2-5 decompression and laminectomy at [facility].  Three days later on 9/5 patient was unable to wiggle his toes and had BLE weakness to the point that he required maximum assistance x 2 in order to stand. An MRI was performed which showed severe spinal canal stenosis due to a new hematoma. On 9/6 Dr. Timmy Alvarado took him back to the OR for an urgent exploration and hematoma evacuation and left drains in place. Patient was discharged to SNF on 9/10. Patient was then admitted to [facility] on 9/19. He had a large sacral ulcer and ROXI due to urinary retention requiring placement of a jain catheter. Another L-spine MRI was performed showing an ill-defind fluid/soft tissue causing severe spinal canal stenosis from L1-4, also moderate-severe spinal canal stenosis from L4 - S1. He was transferred to [facility] on 9/23 for operative management  By Dr. Timmy Alvarado. No further operative intervention was necessary on patient's lumbar spine.   He did undergo operative debridement of the sacral ulcer on 9/23. Tissue culture was positive for enterococcus and enterobacter. Based on sensitivities he was treated with Augmentin. He was also treated with a wound vac and discharged back to the SNF on 9/27. s/p debridement 10/6-cultures growing MRSA, Enterococcus, corynebacterium-- 10/15/2021 to [facility] ED  from [facility] for altered mental status and large sacral wound. Family / Caregiver Present: No  Diagnosis: AMS; Sacral Wound  Subjective  Subjective: Pt in bed, agreeable to OT. Pt stating \"what's the reason to live. \" Pt expressed frustration with health and prognosis. Pt reports his mental health is struggling. Vital Signs  Pulse: 81  BP: (!) 147/87  BP Location: Left upper arm  Oxygen Therapy  SpO2: 98 %  O2 Device: None (Room air)  Social/Functional History  Social/Functional History  Additional Comments: pt came to hospital from [facility]. Prior to his back surgery in September pt lived with  spouse in 2 level home with steps to enter, was indep in ADLs and ambulation, and was able to drive. Pt reports he has been getting therapy daily at [facility]       Objective        Orientation  Overall Orientation Status: Impaired  Orientation Level: Disoriented to situation;Oriented to place; Disoriented to time;Oriented to person  Observation/Palpation  Posture: Fair  Balance  Sitting Balance: Moderate assistance (mod to SBA with fatigue)  Standing Balance: Unable to assess(comment)  Functional Mobility  Functional Mobility Comments: Unable to assess due to strength deficits  ADL  Feeding: Bringing food to mouth assist;Increased time to complete; Beverage management;Scoop assist (Varying level of assist- Pt sat EOB for ~10 mins with up to max A to self-feed. Pt able to scoop soup x3-4 times with SBA.)  Additional Comments: Pt participated in upright sitting at EOB for ~10-15 mins to complete self-feeding.   Tone RUE  RUE Tone: Normotonic  Tone LUE  LUE Tone: Normotonic  Coordination  Movements Are Fluid And Coordinated: Score: 9 (11/04/21 1315)  AM-PAC Inpatient ADL T-Scale Score : 25.33 (11/04/21 1315)  ADL Inpatient CMS 0-100% Score: 79.59 (11/04/21 1315)  ADL Inpatient CMS G-Code Modifier : CL (11/04/21 1315)    Goals  Short term goals  Time Frame for Short term goals: by 10 days (11/14/21)  Short term goal 1: Pt will complete grooming tasks seated at EOB with SBA  Short term goal 2: Pt will complete self-feeding with set-up while in a support upright position  Short term goal 3: Pt will complete rolling with min A in order to increase participation for ADLs  Short term goal 4: Pt will tolerate x15 reps of BUE exercises in order to increase functional strength for transfers  Patient Goals   Patient goals : to go home       Therapy Time   Individual Concurrent Group Co-treatment   Time In 1033         Time Out 1137         Minutes 64         Timed Code Treatment Minutes: [address] [facility], OTR/L

## 2021-11-04 NOTE — PROGRESS NOTES
Critical vanc level expected d/t just received vancomycin dose. He remains on target and next trough is due at 8pm tonight prior to 8:30pm dose.

## 2021-11-04 NOTE — PROGRESS NOTES
Progress Note  Date:2021       Room:[address] [place], [place] [id number]-  Patient Name:Carl A Darylene Freund     YOB: 1934     Age:87 y.o. Subjective    Subjective:  Symptoms:  Stable. Pain:  He complains of pain that is mild. Review of Systems  Objective         Vitals Last 24 Hours:  TEMPERATURE:  Temp  Av °F (36.7 °C)  Min: 97 °F (36.1 °C)  Max: 98.5 °F (36.9 °C)  RESPIRATIONS RANGE: Resp  Av  Min: 16  Max: 20  PULSE OXIMETRY RANGE: SpO2  Av.3 %  Min: 95 %  Max: 98 %  PULSE RANGE: Pulse  Av.5  Min: 97  Max: 108  BLOOD PRESSURE RANGE: Systolic (94XNG), GLZ:015 , Min:140 , MID:937   ; Diastolic (29IQM), LPI:34, Min:72, Max:73    I/O (24Hr): Intake/Output Summary (Last 24 hours) at 2021 0657  Last data filed at 2021 0533  Gross per 24 hour   Intake    Output 755 ml   Net -755 ml     Objective:  General Appearance: In no acute distress. Vital signs: (most recent): Blood pressure (!) 140/72, pulse 99, temperature 97 °F (36.1 °C), temperature source Axillary, resp. rate 20, height 6' (1.829 m), weight 122 lb 4.8 oz (55.5 kg), SpO2 95 %. Abdomen: Abdomen is soft. Hypoactive bowel sounds. There is generalized tenderness. Labs/Imaging/Diagnostics    Labs:  CBC:  Recent Labs     21  0721  0521  0600   WBC 6.9 9.4 8.2   RBC 2.63* 2.89* 2.84*   HGB 8.3* 9.2* 9.0*   HCT 24.8* 27.0* 26.3*   MCV 94.1 93.3 92.4   RDW 17.9* 17.6* 17.8*    364 313     CHEMISTRIES:  Recent Labs     21  0707 21  0521  0600   * 130* 131*   K 4.1 4.5 4.8   CL 96* 94* 95*   CO2 28 29 28   BUN 16 11 9   CREATININE 0.7* <0.5* <0.5*   GLUCOSE 82 71 141*   MG 1.90 1.90 2.00     PT/INR:No results for input(s): PROTIME, INR in the last 72 hours. APTT:No results for input(s): APTT in the last 72 hours.   LIVER PROFILE:  Recent Labs     21  0707 21  0520 21  0600   AST 22 29 30   ALT 23 25 22   BILITOT 0.3 0.3 0.4   ALKPHOS 83 88 83       Imaging Last 24 Hours:  No results found. Assessment//Plan           Hospital Problems         Last Modified POA    * (Principal) Pressure injury of sacral region, unstageable ([address] 10/22/2021 Yes    DM2 (diabetes mellitus, type 2) ([address] 10/15/2021 Yes    Lumbar stenosis with neurogenic claudication 10/15/2021 Yes    Overview Signed 9/19/2021  4:10 AM by Lois Cruz MD     L2-5 decompression, laminectomy; 9-2-21;  Emergency exploration of lumbar laminectomy for epidural fluid collection; 9-6-21           Urinary retention 10/15/2021 Yes    Severe sepsis ([address] 10/16/2021 Yes    Osteomyelitis ([address] 10/16/2021 Yes    Chronic anemia 10/16/2021 Yes    Tic disorder 10/19/2021 Yes        Assessment & Plan  81 yo w HTN, DM, spinal stenosis and a large sacral ulcer, debrided, who has been having diarrhea w h/o constipation, associated w hematochezia. H/H 8/22. Is malnourished. Colonoscopy 10/26 revealed a normal colon/TI except internal hemorrhoids w distal rectal ulceration, probably stercoral, not biopsied, but w neg. random colon Bx's. His diarrhea recurred after the colonoscopy, w KUB neg for constipation.   Is s/p colostomy and sacral decub debridement on 11/2.     - Supportive care  - Will resume Questran trial when can take PO  - Will follow     MD Griselda Leroy [facility]) [phone]  Electronically signed by Vinicio Kim MD on 11/4/21 at 6:57 AM EDT

## 2021-11-04 NOTE — PROGRESS NOTES
Physical Therapy    Facility/Department: [facility] C5 - MED SURG/ORTHO  Initial Assessment    NAME: Pk Ardon  : 1934  MRN: [id number]    Date of Service: 2021    Discharge Recommendations:  Subacute/Skilled Nursing Facility   PT Equipment Recommendations  Equipment Needed:  (defer to next level of care)  If pt discharges prior to next PT session this note will serve as discharge summary. Assessment   Body structures, Functions, Activity limitations: Decreased functional mobility ; Decreased strength;Decreased endurance;Decreased balance;Decreased high-level IADLs;Decreased posture  Assessment: 79 yo male adm with sacral wound, now on wound vac. Pt had recent back surgery with resultant BLE weakness and decreased functional mobility. PLOF before surgery pt was indep in amb and ADLs, lives with wife. Pt came to hospital this time from SNF where he was in rehab. He will benfit from skilled PT to address deficts. Recommend SNF at discharge to cont rehab process  Treatment Diagnosis: weakness, decreased mobility  Prognosis: Fair  Decision Making: High Complexity  PT Education: Goals;PT Role;Plan of Care;General Safety  Patient Education: Disease specific: Educated pt in prevention of complications of bedrest. He voices understanding  Barriers to Learning: none  REQUIRES PT FOLLOW UP: Yes  Activity Tolerance  Activity Tolerance: Patient Tolerated treatment well       Patient Diagnosis(es): The primary encounter diagnosis was Severe sepsis ([address] Diagnoses of Pressure injury of sacral region, unstageable ([address] and Anxiety were also pertinent to this visit.      has a past medical history of Arthritis, Cancer ([address] Dry eyes, bilateral, Hyperlipidemia, Hypertension, MRSA (methicillin resistant staph aureus) culture positive, MRSA (methicillin resistant staph aureus) culture positive, MRSA (methicillin resistant staph aureus) culture positive, Osteoporosis, Type II or unspecified type diabetes mellitus without mention of complication, not stated as uncontrolled, and Urinary incontinence. has a past surgical history that includes Tonsillectomy; eye surgery; Rectal surgery (N/A, 10/16/2021); Colonoscopy (10/26/2021); Colonoscopy (N/A, 10/26/2021); and colostomy (N/A, 11/2/2021). Restrictions  Restrictions/Precautions  Restrictions/Precautions: Isolation, General Precautions, Fall Risk  Vision/Hearing  Vision: Within Functional Limits  Hearing: Exceptions to [facility] Exceptions: Hard of hearing/hearing concerns     Subjective  General  Chart Reviewed: Yes  Patient assessed for rehabilitation services?: Yes  Additional Pertinent Hx: 79yo male with PMHX sig for lumbar stenosis, urinary retention, osteomyelitis of lumbar spine, chronic anemia, severe prot fanta malnutritions sent to [facility] on 10/15/2021 from SNF for altered mental status and large sacral wound. On 9/2/21 Dr. Beryle Ganja performed an L2-5 decompression and laminectomy at [facility].  Three days later on 9/5 patient was unable to wiggle his toes and had BLE weakness to the point that he required maximum assistance x 2 in order to stand. An MRI was performed which showed severe spinal canal stenosis due to a new hematoma. On 9/6 Dr. Jarred Franks took him back to the OR for an urgent exploration and hematoma evacuation and left drains in place. Patient was discharged to SNF on 9/10.   Family / Caregiver Present: No  Referring Practitioner: Mandeep Diallo  Referral Date : 11/03/21  Diagnosis: sacral wound  Follows Commands: Within Functional Limits  General Comment  Comments: CLEMENTE Kuhn July cleared pt for therapy, pt resting in bed on approach  Subjective  Subjective: Pt agrees to therapy, state he \"feels terrible\", \"mentally depleted\", denies pain  Pain Screening  Patient Currently in Pain: Denies    Vital Signs  Pulse: 81  BP: (!) 147/87  BP Location: Left upper arm  Patient Currently in Pain: Denies  Oxygen Therapy  SpO2: 98 %  O2 Device: None (Room air) Orientation  Orientation  Overall Orientation Status: Impaired  Orientation Level: Oriented to place;Oriented to person;Disoriented to time;Oriented to situation  Social/Functional History  Social/Functional History  Additional Comments: pt came to hospital from SNF. Prior to his back surgery in September pt lived with  spouse in 2 level home with steps to enter, was indep in ADLs and ambulation, and was able to drive. Objective     RLE PROM: WFL  LLE PROM: WFL  Strength : BLEs grossly trace to 2-/5 throughout  Balance: Pt sat EOB 12-15 minutes , initially CG with heavy reliance on UEs. Became max assist of 1 during feeding trial.     Bed mobility  Supine to Sit: 2 Person assistance;Maximum assistance  Sit to Supine: 2 Person assistance;Maximum assistance     Transfers  Sit to Stand: Unable to assess  Bed to Chair: Unable to assess     Ambulation  Ambulation?: No        Exercises  Quad Sets: 10 x B poor  Gluteal Sets: 10 x B trace  Comments: P/AAROM to BLEs 8-10 reps each     Plan   Plan  Times per week: 3-5 x week  Current Treatment Recommendations: Strengthening, ROM, Safety Education & Training, Positioning  Safety Devices  Type of devices:  All fall risk precautions in place, Bed alarm in place, Patient at risk for falls, Nurse notified, Call light within reach, Left in bed      AM-PAC Score     AM-PAC Inpatient Mobility without Stair Climbing Raw Score : 7 (11/04/21 1122)  AM-PAC Inpatient without Stair Climbing T-Scale Score : 28.66 (11/04/21 1122)  Mobility Inpatient CMS 0-100% Score: 86.29 (11/04/21 1122)  Mobility Inpatient without Stair CMS G-Code Modifier : CM (11/04/21 1122)     Goals  Short term goals  Time Frame for Short term goals: 1 wwek (11/9) unless otherwise specified  Short term goal 1: pt to perform bed mob with min assist of 2  Short term goal 2: pt to achieve sitting balance with CG/ min assist for 10 minutes EOB  Short term goal 3: pt to transfer bed to chair with mod assist of 2  Short term goal 4: pt to participate in 12-15 reps LE Ex by 11/5  Patient Goals   Patient goals : \"to be able to get out of bed with help\"       Therapy Time   Individual Concurrent Group Co-treatment   Time In [address] [place] [address]         Time Out 1127         Minutes 48         Timed Code Treatment Minutes: [address] [facility]

## 2021-11-04 NOTE — PROGRESS NOTES
Hospitalist Progress Note      PCP: Issa Richard MD    Date of Admission: 10/15/2021    Chief Complaint: altered mental status, SOB, wound check     Hospital Course:   80 yoM with unfortunately multiple interventions as result of post operative complications from spinal decompression and laminectomy performed on 09/2021 who presented from SNF with altered mental status and large sacral wound found to have sacral osteomyelitis. Now s/p wound debridement 10/16 and on antimicrobials for 6 week course. Hospital course c/b LGIB s/t internal hemorroids with distal rectal ulceration likely stercoral, random biopsy colon negative as well as recurrence of diarrhea and now with diverting colostomy performed on 11/02  To help aid healing of decubitus ulcer. Pmhx: L2-L5 decompression and laminectomy c/b compressive hematoma, c/b sacral ulcer and ROXI from urinary retention and now with chronic osteomyelitis and severe protein calorie malnutrition. Subjective:  No concerns at this time. Wife at bedside says he is depressed due to the long hospital course. Otherwise feel fine.       Medications:  Reviewed    Infusion Medications    dextrose      sodium chloride 25 mL (11/02/21 0901)     Scheduled Medications    vancomycin  1,000 mg IntraVENous Q18H    cholestyramine  1 packet Oral TID AC    insulin glargine  12 Units SubCUTAneous Nightly    amLODIPine  5 mg Oral Daily    polyethylene glycol  4,000 mL Oral Once    insulin lispro  0-12 Units SubCUTAneous TID WC    insulin lispro  0-6 Units SubCUTAneous Nightly    hydrocortisone  25 mg Rectal BID    metroNIDAZOLE  500 mg Oral 3 times per day    donepezil  10 mg Oral Nightly    atorvastatin  10 mg Oral Nightly    vitamin B complex w/C  1 tablet Oral Daily    therapeutic multivitamin-minerals  1 tablet Oral Daily    insulin lispro  0.05 Units/kg SubCUTAneous TID WC     PRN Meds: labetalol, LORazepam, glucose, dextrose, glucagon (rDNA), dextrose, sodium hours.    Urinalysis:      Lab Results   Component Value Date    NITRU Negative 10/15/2021    WBCUA 10-20 10/15/2021    BACTERIA 4+ 10/15/2021    RBCUA 5-10 10/15/2021    BLOODU SMALL 10/15/2021    SPECGRAV 1.015 10/15/2021    GLUCOSEU 250 10/15/2021       Radiology:  XR ABDOMEN (KUB) (SINGLE AP VIEW)   Final Result   Resolving constipation with a non-specific gas pattern. XR CHEST PORTABLE   Final Result   No acute process. XR ABDOMEN (KUB) (SINGLE AP VIEW)   Final Result   Nonspecific abdominal bowel gas pattern with mild residual stool throughout   colon. XR CHEST PORTABLE   Final Result   Status post PICC line placement on the right in good position. Resolving central pulmonary congestion. Mild chronic elevation of the left hemidiaphragm which is unchanged with   slowly resolving bibasilar atelectasis. CT HEAD WO CONTRAST   Final Result      Stable study. No evidence for acute intracranial hemorrhage, territorial   infarction or intracranial mass lesion. Mild chronic microangiopathic ischemic disease. Mild generalized volume loss. CT PELVIS WO CONTRAST Additional Contrast? None   Final Result   1. Large deep sacral decubitus ulcer with mild adjacent cellulitis. No   abscess or soft tissue gas. 2. Osseous uncovering of the posterior aspect of the lower sacrum and coccyx   without convincing evidence of osteomyelitis. If clinically indicated   further evaluation could be obtained with MRI. XR CHEST PORTABLE   Final Result   Chronic elevation of the left hemidiaphragm which is unchanged with   increasing subsegmental atelectasis or early infiltrate along the left lung   base.                  Assessment/Plan:    Active Hospital Problems    Diagnosis     Tic disorder [F95.9]     Osteomyelitis ([address] [M86.9]     Chronic anemia [D64.9]     Severe sepsis ([address] [A41.9, R65.20]     Pressure injury of sacral region, unstageable ([address] Devan Christianson

## 2021-11-04 NOTE — CARE COORDINATION
CM met with pt and wife at bedside. Both in agreement with DCP to [facility] as [facility] not able to accept. Spoke to Josie in admissions on the phone today. LVM with Misty at S pt needing wound VAC at dc.  CM following-Tegan Diallo RN

## 2021-11-05 VITALS
HEIGHT: 72 IN | DIASTOLIC BLOOD PRESSURE: 74 MMHG | SYSTOLIC BLOOD PRESSURE: 135 MMHG | HEART RATE: 109 BPM | RESPIRATION RATE: 15 BRPM | WEIGHT: 124.2 LBS | OXYGEN SATURATION: 93 % | TEMPERATURE: 98.3 F | BODY MASS INDEX: 16.82 KG/M2

## 2021-11-05 LAB
A/G RATIO: 0.6 (ref 1.1–2.2)
ALBUMIN SERPL-MCNC: 2.2 G/DL (ref 3.4–5)
ALP BLD-CCNC: 80 U/L (ref 40–129)
ALT SERPL-CCNC: 20 U/L (ref 10–40)
ANION GAP SERPL CALCULATED.3IONS-SCNC: 7 MMOL/L (ref 3–16)
AST SERPL-CCNC: 20 U/L (ref 15–37)
BANDED NEUTROPHILS RELATIVE PERCENT: 2 % (ref 0–7)
BASOPHILS ABSOLUTE: 0 K/UL (ref 0–0.2)
BASOPHILS RELATIVE PERCENT: 0 %
BILIRUB SERPL-MCNC: 0.4 MG/DL (ref 0–1)
BUN BLDV-MCNC: 9 MG/DL (ref 7–20)
CALCIUM SERPL-MCNC: 7.9 MG/DL (ref 8.3–10.6)
CHLORIDE BLD-SCNC: 94 MMOL/L (ref 99–110)
CO2: 28 MMOL/L (ref 21–32)
CREAT SERPL-MCNC: <0.5 MG/DL (ref 0.8–1.3)
EOSINOPHILS ABSOLUTE: 0.1 K/UL (ref 0–0.6)
EOSINOPHILS RELATIVE PERCENT: 2 %
GFR AFRICAN AMERICAN: >60
GFR NON-AFRICAN AMERICAN: >60
GLUCOSE BLD-MCNC: 121 MG/DL (ref 70–99)
GLUCOSE BLD-MCNC: 136 MG/DL (ref 70–99)
GLUCOSE BLD-MCNC: 141 MG/DL (ref 70–99)
GLUCOSE BLD-MCNC: 232 MG/DL (ref 70–99)
HCT VFR BLD CALC: 25.7 % (ref 40.5–52.5)
HEMOGLOBIN: 8.6 G/DL (ref 13.5–17.5)
LYMPHOCYTES ABSOLUTE: 1.5 K/UL (ref 1–5.1)
LYMPHOCYTES RELATIVE PERCENT: 21 %
MACROCYTES: ABNORMAL
MAGNESIUM: 1.9 MG/DL (ref 1.8–2.4)
MCH RBC QN AUTO: 31.6 PG (ref 26–34)
MCHC RBC AUTO-ENTMCNC: 33.4 G/DL (ref 31–36)
MCV RBC AUTO: 94.8 FL (ref 80–100)
METAMYELOCYTES RELATIVE PERCENT: 1 %
MICROCYTES: ABNORMAL
MONOCYTES ABSOLUTE: 0.6 K/UL (ref 0–1.3)
MONOCYTES RELATIVE PERCENT: 9 %
MYELOCYTE PERCENT: 2 %
NEUTROPHILS ABSOLUTE: 4.8 K/UL (ref 1.7–7.7)
NEUTROPHILS RELATIVE PERCENT: 63 %
PDW BLD-RTO: 17.9 % (ref 12.4–15.4)
PERFORMED ON: ABNORMAL
PLATELET # BLD: 321 K/UL (ref 135–450)
PLATELET SLIDE REVIEW: ADEQUATE
PMV BLD AUTO: 6.3 FL (ref 5–10.5)
POTASSIUM SERPL-SCNC: 4.1 MMOL/L (ref 3.5–5.1)
RBC # BLD: 2.71 M/UL (ref 4.2–5.9)
SARS-COV-2, NAAT: NOT DETECTED
SLIDE REVIEW: ABNORMAL
SODIUM BLD-SCNC: 129 MMOL/L (ref 136–145)
TOTAL PROTEIN: 5.6 G/DL (ref 6.4–8.2)
WBC # BLD: 7.1 K/UL (ref 4–11)

## 2021-11-05 PROCEDURE — 97530 THERAPEUTIC ACTIVITIES: CPT

## 2021-11-05 PROCEDURE — 6360000002 HC RX W HCPCS: Performed by: HOSPITALIST

## 2021-11-05 PROCEDURE — 87635 SARS-COV-2 COVID-19 AMP PRB: CPT

## 2021-11-05 PROCEDURE — 80053 COMPREHEN METABOLIC PANEL: CPT

## 2021-11-05 PROCEDURE — 6370000000 HC RX 637 (ALT 250 FOR IP): Performed by: SURGERY

## 2021-11-05 PROCEDURE — 99024 POSTOP FOLLOW-UP VISIT: CPT | Performed by: SURGERY

## 2021-11-05 PROCEDURE — 2580000003 HC RX 258: Performed by: HOSPITALIST

## 2021-11-05 PROCEDURE — 83735 ASSAY OF MAGNESIUM: CPT

## 2021-11-05 PROCEDURE — 97110 THERAPEUTIC EXERCISES: CPT

## 2021-11-05 PROCEDURE — 85025 COMPLETE CBC W/AUTO DIFF WBC: CPT

## 2021-11-05 RX ORDER — INSULIN GLARGINE 100 [IU]/ML
12 INJECTION, SOLUTION SUBCUTANEOUS NIGHTLY
Qty: 10 ML | Refills: 3
Start: 2021-11-05

## 2021-11-05 RX ORDER — CHOLESTYRAMINE 4 G/9G
1 POWDER, FOR SUSPENSION ORAL
Qty: 90 PACKET | Refills: 3
Start: 2021-11-05

## 2021-11-05 ASSESSMENT — PAIN SCALES - PAIN ASSESSMENT IN ADVANCED DEMENTIA (PAINAD)
NEGVOCALIZATION: 0
BODYLANGUAGE: 0
CONSOLABILITY: 0
CONSOLABILITY: 0
BODYLANGUAGE: 0
FACIALEXPRESSION: 0
BODYLANGUAGE: 0
BREATHING: 0
FACIALEXPRESSION: 0
BODYLANGUAGE: 0
TOTALSCORE: 0
CONSOLABILITY: 0
BREATHING: 0
NEGVOCALIZATION: 0
CONSOLABILITY: 0
NEGVOCALIZATION: 0
BREATHING: 0
NEGVOCALIZATION: 0
CONSOLABILITY: 0
FACIALEXPRESSION: 0
BREATHING: 0
BODYLANGUAGE: 0
FACIALEXPRESSION: 0
TOTALSCORE: 0
FACIALEXPRESSION: 0
NEGVOCALIZATION: 0
BREATHING: 0

## 2021-11-05 ASSESSMENT — PAIN SCALES - GENERAL: PAINLEVEL_OUTOF10: 0

## 2021-11-05 ASSESSMENT — PAIN SCALES - WONG BAKER
WONGBAKER_NUMERICALRESPONSE: 0

## 2021-11-05 NOTE — PROGRESS NOTES
Comprehensive Nutrition Assessment    Type and Reason for Visit:  Reassess    Nutrition Recommendations/Plan:   1. Continue regular diet with double protein at meals and encourage PO intake  2. Continue Modify ONS to glucerna  3. Monitor BG and need for carb control diet  4. Monitor nutrition adequacy, pertinent labs, bowel habits, wt changes, and clinical progress    Nutrition Assessment:  Follow up: Pt reports fair appetite, on general diet with PO intakes % per EMR. Does not like ensure very much but still drinks them. RD to adjust ONS to glucerna instead of ensure. Declined any other ONS. No N/V reported. Will continue to monitor. Plan to discharge today to [facility]. Malnutrition Assessment:  Malnutrition Status: At risk for malnutrition (Comment)    Context:  Chronic Illness     Findings of the 6 clinical characteristics of malnutrition:  Energy Intake:  Mild decrease in energy intake (Comment)  Weight Loss:  1 - Mild weight loss (specify amount and time period)     Body Fat Loss:  1 - Mild body fat loss     Muscle Mass Loss:  1 - Mild muscle mass loss    Fluid Accumulation:  7 - Severe Extremities    Estimated Daily Nutrient Needs:  Energy (kcal):  4360-9527 kcals/day; Weight Used for Energy Requirements:  Ideal (81 kg)     Protein (g):   g/day; Weight Used for Protein Requirements:  Ideal (1.0-1.6 g/kg)        Fluid (ml/day):   ; Method Used for Fluid Requirements:  1 ml/kcal      Nutrition Related Findings:  Colostomy, + 150 ml output today. BG elevated 121-211 x 24 hours. Monitoring with medium dose sliding scale insulin. Wounds:  Pressure Injury, Multiple, Wound Vac (Pressure injury on sacrum and left and right heels.)       Current Nutrition Therapies:    ADULT ORAL NUTRITION SUPPLEMENT; AM Snack, PM Snack; Standard High Calorie/High Protein Oral Supplement  ADULT DIET;  Regular    Anthropometric Measures:  · Height: 6' (182.9 cm)  · Current Body Weight: 125 lb 11.2 oz (57 kg)     · Ideal Body Weight: 178 lbs; % Ideal Body Weight 68 %   · BMI: 17    · BMI Categories: Underweight (BMI less than 22) age over 72       Nutrition Diagnosis:   · Increased nutrient needs related to increase demand for energy/nutrients as evidenced by wounds, BMI, weight loss    Nutrition Interventions:   Food and/or Nutrient Delivery:  Continue Current Diet, Modify Oral Nutrition Supplement  Nutrition Education/Counseling:  Education not indicated   Coordination of Nutrition Care:  Continue to monitor while inpatient    Goals:  Consume 50% or greater of 3 meals per day and ONS during this admission.        Nutrition Monitoring and Evaluation:   Behavioral-Environmental Outcomes:  None Identified   Food/Nutrient Intake Outcomes:  Food and Nutrient Intake, Supplement Intake  Physical Signs/Symptoms Outcomes:  Biochemical Data, Diarrhea, Skin, Weight     Discharge Planning:    Continue current diet, Continue Oral Nutrition Supplement     Electronically signed by Zahra Hinton MS, RD, LD on 11/5/21 at 2:34 PM EDT    Contact: Office: [phone]; [address] [place] [address] Road: [facility]

## 2021-11-05 NOTE — PLAN OF CARE
Nutrition Problem #1: Increased nutrient needs  Intervention: Food and/or Nutrient Delivery: Continue Current Diet, Modify Oral Nutrition Supplement  Nutritional Goals: Consume 50% or greater of 3 meals per day and ONS during this admission.

## 2021-11-05 NOTE — PROGRESS NOTES
Progress Note  Date:2021       Room:[facility]0545-  Patient Name:Carl A Gerrianne Dance     YOB: 1934     Age:87 y.o. Subjective    Subjective:  Symptoms:  Stable. Pain:  He complains of pain that is mild. Review of Systems  Objective         Vitals Last 24 Hours:  TEMPERATURE:  Temp  Av °F (36.7 °C)  Min: 97.6 °F (36.4 °C)  Max: 98.3 °F (36.8 °C)  RESPIRATIONS RANGE: Resp  Avg: 15.3  Min: 14  Max: 16  PULSE OXIMETRY RANGE: SpO2  Av.2 %  Min: 98 %  Max: 99 %  PULSE RANGE: Pulse  Av  Min: 81  Max: 94  BLOOD PRESSURE RANGE: Systolic (21DFB), HYL:356 , Min:132 , LRU:403   ; Diastolic (89LIQ), XBC:44, Min:65, Max:87    I/O (24Hr): Intake/Output Summary (Last 24 hours) at 2021 1011  Last data filed at 2021 0931  Gross per 24 hour   Intake 480 ml   Output 3410 ml   Net -2930 ml     Objective:  General Appearance:  Not in pain. Vital signs: (most recent): Blood pressure (!) 140/74, pulse 94, temperature 97.6 °F (36.4 °C), temperature source Oral, resp. rate 14, height 6' (1.829 m), weight 124 lb 3.2 oz (56.3 kg), SpO2 98 %. Abdomen: Abdomen is soft. Bowel sounds are normal.   There is no abdominal tenderness. Labs/Imaging/Diagnostics    Labs:  CBC:  Recent Labs     21  0611   WBC 9.4 8.2 7.1   RBC 2.89* 2.84* 2.71*   HGB 9.2* 9.0* 8.6*   HCT 27.0* 26.3* 25.7*   MCV 93.3 92.4 94.8   RDW 17.6* 17.8* 17.9*    313 321     CHEMISTRIES:  Recent Labs     21  0621  0611   * 131* 129*   K 4.5 4.8 4.1   CL 94* 95* 94*   CO2 29 28 28   BUN 11 9 9   CREATININE <0.5* <0.5* <0.5*   GLUCOSE 71 141* 136*   MG 1.90 2.00 1.90     PT/INR:No results for input(s): PROTIME, INR in the last 72 hours. APTT:No results for input(s): APTT in the last 72 hours.   LIVER PROFILE:  Recent Labs     21  0520 21  0600 21  0611   AST 29 30 20   ALT 25 22 20   BILITOT 0.3 0.4 0.4   ALKPHOS 88 83 80 Imaging Last 24 Hours:  No results found. Assessment//Plan           Hospital Problems         Last Modified POA    * (Principal) Pressure injury of sacral region, unstageable ([address] 10/22/2021 Yes    DM2 (diabetes mellitus, type 2) ([address] 10/15/2021 Yes    Lumbar stenosis with neurogenic claudication 10/15/2021 Yes    Overview Signed 9/19/2021  4:10 AM by Mark Esquivel MD     L2-5 decompression, laminectomy; 9-2-21;  Emergency exploration of lumbar laminectomy for epidural fluid collection; 9-6-21           Urinary retention 10/15/2021 Yes    Severe sepsis ([address] 10/16/2021 Yes    Osteomyelitis ([address] 10/16/2021 Yes    Chronic anemia 10/16/2021 Yes    Tic disorder 10/19/2021 Yes        Assessment & Plan  79 yo w HTN, DM, spinal stenosis and a large sacral ulcer, debrided, who has been having diarrhea w h/o constipation, associated w hematochezia. H/H 8/22. Is malnourished. Colonoscopy 10/26 revealed a normal colon/TI except internal hemorrhoids w distal rectal ulceration, probably stercoral, not biopsied, but w neg. random colon Bx's. His diarrhea recurred after the colonoscopy, w KUB neg for constipation.   Is s/p colostomy and sacral decub debridement on 11/2.     - Supportive care  - Continue Questran trial  - Will follow     MD Je Alberto) [phone]  Electronically signed by Lilliam Baker MD on 11/5/21 at 10:11 AM EDT

## 2021-11-05 NOTE — PLAN OF CARE
11/05/21 1129   Colostomy LLQ   Placement Date/Time: 11/02/21 1709   Pre-existing: No  Timeout: Procedure;Sterile Technique using full body drape;Site/Side;Appropriate Equipment; Consent Confirmed;Site prepped with chlorhexidine;Correct Position  Inserted by: DR Eileen Bro  Location: LLQ   Stomal Appliance 2 piece; Changed   Flange Size (inches) 2.25 Inches   Stoma  Assessment Protrudes; Moist;Red   Mucocutaneous Junction Intact   Peristomal Assessment Clean; Intact   Treatment Bag change;Stoma paste   Stool Appearance Soft   Stool Color Brown   Stool Amount Large   Output (mL) 150 ml     Removed current appliance, cleaned with warm water and dried completely, verified size of stoma, cut barrier accordingly, stoma paste in crease above stoma, placed barrier and attached drainable pouch.

## 2021-11-05 NOTE — PROGRESS NOTES
CHARLENE updated for discharge, Colostomy appliance changed today. Wound VAC to be removed, moist-dry dressing in place for discharge.

## 2021-11-05 NOTE — PROGRESS NOTES
Hospitalist Progress Note      PCP: Marlene Jeff MD    Date of Admission: 10/15/2021    Chief Complaint: altered mental status, SOB, wound check     Hospital Course:   80 yoM with unfortunately multiple interventions as result of post operative complications from spinal decompression and laminectomy performed on 09/2021 who presented from SNF with altered mental status and large sacral wound found to have sacral osteomyelitis. Now s/p wound debridement 10/16 and on antimicrobials for 6 week course. Hospital course c/b LGIB s/t internal hemorroids with distal rectal ulceration likely stercoral, random biopsy colon negative as well as recurrence of diarrhea and now with diverting colostomy performed on 11/02  To help aid healing of decubitus ulcer. Pmhx: L2-L5 decompression and laminectomy c/b compressive hematoma, c/b sacral ulcer and ROXI from urinary retention and now with chronic osteomyelitis and severe protein calorie malnutrition. Subjective:  No concerns at this time. Pt feels well. Interested in revisiting hospice recommendations. I advised this can be done outpt.     Medications:  Reviewed    Infusion Medications    dextrose      sodium chloride 25 mL (11/02/21 0901)     Scheduled Medications    vancomycin  1,000 mg IntraVENous Q18H    cholestyramine  1 packet Oral TID AC    insulin glargine  12 Units SubCUTAneous Nightly    amLODIPine  5 mg Oral Daily    polyethylene glycol  4,000 mL Oral Once    insulin lispro  0-12 Units SubCUTAneous TID WC    insulin lispro  0-6 Units SubCUTAneous Nightly    hydrocortisone  25 mg Rectal BID    metroNIDAZOLE  500 mg Oral 3 times per day    donepezil  10 mg Oral Nightly    atorvastatin  10 mg Oral Nightly    vitamin B complex w/C  1 tablet Oral Daily    therapeutic multivitamin-minerals  1 tablet Oral Daily    insulin lispro  0.05 Units/kg SubCUTAneous TID WC     PRN Meds: labetalol, LORazepam, glucose, dextrose, glucagon (rDNA), dextrose, sodium chloride flush, sodium chloride, potassium chloride **OR** potassium alternative oral replacement **OR** potassium chloride, magnesium sulfate, ondansetron **OR** ondansetron, acetaminophen **OR** acetaminophen, melatonin, HYDROmorphone      Intake/Output Summary (Last 24 hours) at 11/5/2021 1449  Last data filed at 11/5/2021 1129  Gross per 24 hour   Intake 240 ml   Output 1450 ml   Net -1210 ml       Physical Exam Performed:    BP (!) 140/74   Pulse 94   Temp 97.6 °F (36.4 °C) (Oral)   Resp 14   Ht 6' (1.829 m)   Wt 124 lb 3.2 oz (56.3 kg)   SpO2 98%   BMI 16.84 kg/m²     General appearance: No  Distress, frontal balded  HEENT: PERRL flat affect constant tics to face   Neck: Supple Trachea midline. Respiratory:  Clear to auscultation,   Cardiovascular:  S1/S2 RRR without murmurs, rubs or gallops. Abdomen: Soft, non-tender, non-distended with normal bowel sounds. ostomy c/d/i no output noted today  Musculoskeletal: No clubbing, cyanosis or edema, legs contracted and in offloading boots, no pitting  Skin: Skin color pale   Neurologic: Cranial nerves: II-XII intact twitches and tics to the face both hands shaky   Psychiatric: Alert and oriented  Peripheral Pulses: +2 palpable, equal bilaterally    jain cl yellow -keep jain on dc      Labs:   Recent Labs     11/03/21 0520 11/04/21 0600 11/05/21  0611   WBC 9.4 8.2 7.1   HGB 9.2* 9.0* 8.6*   HCT 27.0* 26.3* 25.7*    313 321     Recent Labs     11/03/21 0520 11/04/21 0600 11/05/21  0611   * 131* 129*   K 4.5 4.8 4.1   CL 94* 95* 94*   CO2 29 28 28   BUN 11 9 9   CREATININE <0.5* <0.5* <0.5*   CALCIUM 8.2* 8.0* 7.9*     Recent Labs     11/03/21 0520 11/04/21 0600 11/05/21  0611   AST 29 30 20   ALT 25 22 20   BILITOT 0.3 0.4 0.4   ALKPHOS 88 83 80     No results for input(s): INR in the last 72 hours. No results for input(s): Lexi Gazella in the last 72 hours.     Urinalysis:      Lab Results   Component Value Date    NITRU Negative 10/15/2021    WBCUA 10-20 10/15/2021    BACTERIA 4+ 10/15/2021    RBCUA 5-10 10/15/2021    BLOODU SMALL 10/15/2021    SPECGRAV 1.015 10/15/2021    GLUCOSEU 250 10/15/2021       Radiology:  XR ABDOMEN (KUB) (SINGLE AP VIEW)   Final Result   Resolving constipation with a non-specific gas pattern. XR CHEST PORTABLE   Final Result   No acute process. XR ABDOMEN (KUB) (SINGLE AP VIEW)   Final Result   Nonspecific abdominal bowel gas pattern with mild residual stool throughout   colon. XR CHEST PORTABLE   Final Result   Status post PICC line placement on the right in good position. Resolving central pulmonary congestion. Mild chronic elevation of the left hemidiaphragm which is unchanged with   slowly resolving bibasilar atelectasis. CT HEAD WO CONTRAST   Final Result      Stable study. No evidence for acute intracranial hemorrhage, territorial   infarction or intracranial mass lesion. Mild chronic microangiopathic ischemic disease. Mild generalized volume loss. CT PELVIS WO CONTRAST Additional Contrast? None   Final Result   1. Large deep sacral decubitus ulcer with mild adjacent cellulitis. No   abscess or soft tissue gas. 2. Osseous uncovering of the posterior aspect of the lower sacrum and coccyx   without convincing evidence of osteomyelitis. If clinically indicated   further evaluation could be obtained with MRI. XR CHEST PORTABLE   Final Result   Chronic elevation of the left hemidiaphragm which is unchanged with   increasing subsegmental atelectasis or early infiltrate along the left lung   base.                  Assessment/Plan:    Active Hospital Problems    Diagnosis     Tic disorder [F95.9]     Osteomyelitis ([address] [M86.9]     Chronic anemia [D64.9]     Severe sepsis ([address] [A41.9, R65.20]     Pressure injury of sacral region, unstageable ([address] [L89.150]     Lumbar stenosis with neurogenic claudication [M48.062]     Urinary

## 2021-11-05 NOTE — PROGRESS NOTES
11/4/21  2245  Vanco Trough recheck is 14.6mcg/mL, continue same Rx, no changes needed.   Lilia Wagner [facility] PharmD 11/4/2021 10:44 PM

## 2021-11-05 NOTE — PROGRESS NOTES
Occupational Therapy  Facility/Department: [facility] C5 - MED SURG/ORTHO  Daily Treatment Note  NAME: Holli Roberson  : 1934  MRN: [id number]    Date of Service: 2021    Discharge Recommendations:  Subacute/Skilled Nursing Facility       Assessment   Performance deficits / Impairments: Decreased functional mobility ; Decreased endurance;Decreased coordination;Decreased posture;Decreased sensation;Decreased ADL status; Decreased ROM; Decreased balance;Decreased strength;Decreased high-level IADLs;Decreased cognition;Decreased fine motor control  Assessment: Pt is a 81 yo male adm with sacral wound, now on wound vac. Pt had recent back surgery with acute onset of BLE weakness and decreased functional ADLs. PLOF before surgery pt was indep in ADLs, lives with wife. Pt is currently functioning way below baseline. pt would benefit from ongoing OT in order to increase functional status. OT Education: OT Role;Plan of Care;Home Exercise Program;Precautions; ADL Adaptive Strategies; Energy Conservation  REQUIRES OT FOLLOW UP: Yes  Activity Tolerance  Activity Tolerance: Patient limited by fatigue;Patient Tolerated treatment well;Treatment limited secondary to decreased cognition  Safety Devices  Safety Devices in place: Yes  Type of devices: All fall risk precautions in place; Left in bed;Bed alarm in place;Nurse notified;Call light within reach; Patient at risk for falls         Patient Diagnosis(es): The primary encounter diagnosis was Severe sepsis ([address] Diagnoses of Pressure injury of sacral region, unstageable ([address] and Anxiety were also pertinent to this visit.       has a past medical history of Arthritis, Cancer ([address] Dry eyes, bilateral, Hyperlipidemia, Hypertension, MRSA (methicillin resistant staph aureus) culture positive, MRSA (methicillin resistant staph aureus) culture positive, MRSA (methicillin resistant staph aureus) culture positive, Osteoporosis, Type II or unspecified type diabetes mellitus without mention of complication, not stated as uncontrolled, and Urinary incontinence. has a past surgical history that includes Tonsillectomy; eye surgery; Rectal surgery (N/A, 10/16/2021); Colonoscopy (10/26/2021); Colonoscopy (N/A, 10/26/2021); and colostomy (N/A, 11/2/2021). Restrictions  Restrictions/Precautions  Restrictions/Precautions: Isolation, General Precautions, Fall Risk  Position Activity Restriction  Other position/activity restrictions: wound vac; sacral wound  Subjective   General  Chart Reviewed: Yes, Orders, Progress Notes, History and Physical, Imaging  Patient assessed for rehabilitation services?: Yes  Additional Pertinent Hx: 81yo male with PMHX sig for lumbar stenosis, urinary retention, osteomyelitis of lumbar spine, chronic anemia, severe prot fanta malnutritions sent to [facility] on 10/15/2021 from SNF for altered mental status and large sacral wound. On 9/2/21 Dr. Lux Benjamin performed an L2-5 decompression and laminectomy at [facility].  Three days later on 9/5 patient was unable to wiggle his toes and had BLE weakness to the point that he required maximum assistance x 2 in order to stand. An MRI was performed which showed severe spinal canal stenosis due to a new hematoma. On 9/6 Dr. Ridge Garibay took him back to the OR for an urgent exploration and hematoma evacuation and left drains in place. Patient was discharged to SNF on 9/10. Patient was then admitted to [facility] on 9/19. He had a large sacral ulcer and ROXI due to urinary retention requiring placement of a jain catheter. Another L-spine MRI was performed showing an ill-defind fluid/soft tissue causing severe spinal canal stenosis from L1-4, also moderate-severe spinal canal stenosis from L4 - S1. He was transferred to [facility] on 9/23 for operative management  By Dr. Ridge Garibay. No further operative intervention was necessary on patient's lumbar spine. He did undergo operative debridement of the sacral ulcer on 9/23.   Tissue culture was positive for enterococcus and enterobacter. Based on sensitivities he was treated with Augmentin. He was also treated with a wound vac and discharged back to the SNF on 9/27. s/p debridement 10/6-cultures growing MRSA, Enterococcus, corynebacterium-- 10/15/2021 to [facility] ED  from SNF for altered mental status and large sacral wound. Family / Caregiver Present: No  Diagnosis: AMS; Sacral Wound  Subjective  Subjective: Pt in bed, yelling \" I need help. \" When asked what he needs help with, pt states \"I don't know. \"  Vital Signs  Patient Currently in Pain: Denies   Orientation  Orientation  Overall Orientation Status: Impaired  Orientation Level: Disoriented to situation;Oriented to place; Disoriented to time;Oriented to person  Objective    ADL  Feeding: None  Additional Comments: Pt participated in upright sitting at EOB for ~10-15 mins to complete simple grooming tasks        Balance  Sitting Balance: Moderate assistance (mod to CGA with sitting EOB; CGA with bilateral UE support on hand rails)  Standing Balance: Unable to assess(comment) (pt with decreased sensation and strength in BLEs from previous back surgery)  Standing Balance  Time: ~15 mins  Activity: EOB sitting, ther ex, grooming  Functional Mobility  Functional Mobility Comments: Unable to assess due to strength deficits  Bed mobility  Supine to Sit: Maximum assistance;2 Person assistance  Sit to Supine: Maximum assistance;2 Person assistance  Scooting: Maximal assistance;Dependent/Total;2 Person assistance  Transfers  Sit to stand: Unable to assess  Stand to sit: Unable to assess                       Cognition  Overall Cognitive Status: Exceptions  Arousal/Alertness: Delayed responses to stimuli  Following Commands: Follows one step commands with increased time; Follows one step commands with repetition  Memory: Decreased recall of recent events;Decreased short term memory  Safety Judgement: Decreased awareness of need for

## 2021-11-05 NOTE — PROGRESS NOTES
Pt being DCd at this time, pt wet to dry applied in place of wound vac prior to leaving, colostomy emptied, jain working as desired, report called to SNF

## 2021-11-05 NOTE — PROGRESS NOTES
Physical Therapy  Facility/Department: [facility] C5 - MED SURG/ORTHO  Daily Treatment Note  NAME: Nahum Weber  : 1934  MRN: [id number]    Date of Service: 2021    Discharge Recommendations:  Subacute/Skilled Nursing Facility   PT Equipment Recommendations  Equipment Needed: No  If pt discharges prior to next PT session this note will serve as discharge summary. Assessment   Body structures, Functions, Activity limitations: Decreased functional mobility ; Decreased strength;Decreased endurance;Decreased balance;Decreased high-level IADLs;Decreased posture  Assessment: Pt seen with OT for co treat due to high burden of care, need for assist of 2 during EOB sitting balance activities and assist of 2 supine ><Sit. Pt more alert today, participated in [address] 10 x each with improved strength today. Pt sat EOB ~12 minutes to work on sitting balance,abdominal and back strength. Pt will benefit from skilled PT to address current deficits. Recommed SNF at discharge  Treatment Diagnosis: weakness, decreased mobility  Prognosis: Fair  PT Education: Goals;PT Role;Plan of Care;General Safety  Patient Education: Disease specific: Educated pt in pressure relief while in bed. He voices understanding  Barriers to Learning: decreased memory  REQUIRES PT FOLLOW UP: Yes  Activity Tolerance  Activity Tolerance: Patient Tolerated treatment well  Activity Tolerance: /60    SpO2 101     Patient Diagnosis(es): The primary encounter diagnosis was Severe sepsis ([address] Diagnoses of Pressure injury of sacral region, unstageable ([address] and Anxiety were also pertinent to this visit.      has a past medical history of Arthritis, Cancer ([address] Dry eyes, bilateral, Hyperlipidemia, Hypertension, MRSA (methicillin resistant staph aureus) culture positive, MRSA (methicillin resistant staph aureus) culture positive, MRSA (methicillin resistant staph aureus) culture positive, Osteoporosis, Type II or unspecified type diabetes mellitus assistance  Scooting: Maximal assistance;Dependent/Total;2 Person assistance     Transfers  Sit to Stand: Unable to assess  Bed to Chair: Unable to assess     Ambulation  Ambulation?: No     Balance  Comments: Sitting balance activities ~ 12 minutes EOB including cervical rotation, UE reaching, abdominal contractions, practice going up and down from one elbow     Exercises  Heelslides: 10 x B assisted  Hip Abduction: 10 x B assisted  Knee Long Arc Quad: 5 x B  Hip IR/ER: 10 x B assisted  Ankle Pumps: 10 x B assist RLE, passive LLE  Upper Extremity: Alternate UE row with therapist assist 10 x B  BUE flexion/extension coordinated with breathing 5 x  BUE chest press in sitting holding 10 ounce water bottle 5 x  Cervical rotation 3 x B       Cervical flexion<> neutral 3 x       Voice \"HUT\" loudly 10 x      AM-PAC Score     AM-PAC Inpatient Mobility without Stair Climbing Raw Score : 7 (11/05/21 1046)  AM-PAC Inpatient without Stair Climbing T-Scale Score : 28.66 (11/05/21 1046)  Mobility Inpatient CMS 0-100% Score: 86.29 (11/05/21 1046)  Mobility Inpatient without Stair CMS G-Code Modifier : CM (11/05/21 1046)       Goals  Short term goals  Time Frame for Short term goals: 1 wwek (11/9) unless otherwise specified  Short term goal 1: pt to perform bed mob with min assist of 2  Short term goal 2: pt to achieve sitting balance with CG/ min assist for 10 minutes EOB  Short term goal 3: pt to transfer bed to chair with mod assist of 2  Short term goal 4: pt to participate in 12-15 reps LE Ex by 11/5  Patient Goals   Patient goals : \"to be able to get out of bed with help\"    Plan    Plan  Times per week: 3-5 x week  Current Treatment Recommendations: Strengthening, ROM, Safety Education & Training, Positioning  Safety Devices  Type of devices:  All fall risk precautions in place, Bed alarm in place, Patient at risk for falls, Nurse notified, Call light within reach, Left in bed     Therapy Time   Individual Concurrent Group

## 2021-11-05 NOTE — CARE COORDINATION
CASE MANAGEMENT DISCHARGE SUMMARY      Discharge to: [facility], Skilled. Spoke to Lola Meza and they will follow as well. Updated Misty with [facility]. Precertification completed: [address] Notification (HENS) completed: N/A skilled at [facility] [place] prior. Spoke to Vikash pineda and not needed. IMM given: 11/5/21    New Durable Medical Equipment ordered/agency: Wound Vac    Transportation:    Family/car: [employer] Transport explained to [facility]. Pt/family voice no agency preference. Agency used: [employer] up time: 1815   Ambulance form completed: Yes    Confirmed discharge plan with: spoke to wife and met with pt at bedside. Patient: yes     Facility/Agency, name:  [facility] faxed   Phone number for report to facility: [phone]     RN, name: Karen Jt    Note: Discharging nurse to complete [facility], reconcile [facility], and place final copy with patient's discharge packet. RN to ensure that written prescriptions for  Level II medications are sent with patient to the facility as per protocol.

## 2021-11-09 ENCOUNTER — TELEPHONE (OUTPATIENT)
Dept: INFECTIOUS DISEASES | Age: 86
End: 2021-11-09

## 2021-11-09 NOTE — TELEPHONE ENCOUNTER
Labs reviewed    Please verify this was true trough (appears it was collected at 11am, may be random level)    Please verify that currently ordered vanc dose is 750 q12    Continue to hold vanc pending further clarity    If true trough, repeat vanc level and BMP in AM while holding the dose

## 2021-11-09 NOTE — TELEPHONE ENCOUNTER
Spoke with Nicole nurse at facility and was a true trough. Stated vanc dose is scheduled for 0600 and labs are drawn at 0530. Advised of note and to fax labs when they are resulted to office.   Thank you

## 2021-11-11 ENCOUNTER — TELEPHONE (OUTPATIENT)
Dept: INFECTIOUS DISEASES | Age: 86
End: 2021-11-11

## 2021-11-16 LAB — VANCOMYCIN TROUGH: 8.1

## 2021-11-16 NOTE — TELEPHONE ENCOUNTER
Note the low vanc level     He is being treated for sacral OM  Could you please talk to RN at facility and find out how his wound is doing?   Thanks

## 2021-11-16 NOTE — TELEPHONE ENCOUNTER
Spoke with Adry at facility and requested labs to be faxed. Patient due to end IV abx today    Vanc trough 11/16 8.1 not a true trough.   He received his Vanc at 0900 and labs drawn before 0700    Thank you

## 2021-11-17 LAB
ALBUMIN SERPL-MCNC: 2.1 G/DL
ALP BLD-CCNC: 340 U/L
ALT SERPL-CCNC: 405 U/L
ANION GAP SERPL CALCULATED.3IONS-SCNC: NORMAL MMOL/L
AST SERPL-CCNC: 463 U/L
BILIRUB SERPL-MCNC: 0.4 MG/DL (ref 0.1–1.4)
BUN BLDV-MCNC: 16 MG/DL
C-REACTIVE PROTEIN: 15.6
CALCIUM SERPL-MCNC: 7.6 MG/DL
CHLORIDE BLD-SCNC: 110 MMOL/L
CO2: 29 MMOL/L
CREAT SERPL-MCNC: 0.5 MG/DL
GFR CALCULATED: NORMAL
GLUCOSE BLD-MCNC: 197 MG/DL
POTASSIUM SERPL-SCNC: 3.9 MMOL/L
SODIUM BLD-SCNC: 143 MMOL/L
TOTAL PROTEIN: 4.5

## 2021-11-17 NOTE — DISCHARGE SUMMARY
Surgery Discharge Summary    Patient Identification  Kike Guo is a 80 y.o. male. :  1934  Admit Date:  10/15/2021    Discharge date:   2021  6:41 PM                                   Disposition: SNF    Discharge Diagnoses:   Principal Problem:    Pressure injury of sacral region, unstageable ([facility]  Active Problems:    DM2 (diabetes mellitus, type 2) ([facility]    Lumbar stenosis with neurogenic claudication    Urinary retention    Severe sepsis (HCC)    Osteomyelitis (HCC)    Chronic anemia    Tic disorder  Resolved Problems:    * No resolved hospital problems. *      Discharge condition: fair    Discharge Medications:     Discharge Medication List as of 2021  6:03 PM      CONTINUE these medications which have NOT CHANGED    Details   melatonin 3 MG TABS tablet Take 3 mg by mouth nightly as neededHistorical Med      polyethylene glycol (GLYCOLAX) 17 g packet Take 17 g by mouth daily as needed for ConstipationHistorical Med      docusate sodium (COLACE) 100 MG capsule Take 100 mg by mouth 2 times dailyHistorical Med      tamsulosin (FLOMAX) 0.4 MG capsule Take 1 capsule by mouth daily, Disp-30 capsule, R-3NO PRINT      Chromium Picolinate 1000 MCG TABS Take 1,000 mcg by mouth dailyHistorical Med      cyanocobalamin 1000 MCG tablet Take 1,000 mcg by mouth dailyHistorical Med      zinc sulfate (ZINCATE) 220 (50 Zn) MG capsule Take 220 mg by mouth dailyHistorical Med      amLODIPine (NORVASC) 10 MG tablet Take 10 mg by mouth dailyHistorical Med      Multiple Vitamins-Minerals (THERAPEUTIC MULTIVITAMIN-MINERALS) tablet Take 1 tablet by mouth dailyHistorical Med      donepezil (ARICEPT) 10 MG tablet Take 10 mg by mouth nightlyHistorical Med      senna-docusate (PERICOLACE) 8.6-50 MG per tablet Take 1 tablet by mouth 2 times dailyHistorical Med      b complex vitamins capsule Take 1 capsule by mouth dailyHistorical Med      simvastatin (ZOCOR) 10 MG tablet Take 10 mg by mouth nightly. sitagliptan (JANUVIA) 100 MG tablet Take 100 mg by mouth daily. Discharge Medication List as of 11/5/2021  6:03 PM      START taking these medications    Details   cholestyramine (QUESTRAN) 4 g packet Take 1 packet by mouth 3 times daily (before meals), Disp-90 packet, R-3NO PRINT               Most Recent Labs:    CBC: No results for input(s): WBC, HGB, HCT, PLT in the last 72 hours. BMP:  No results for input(s): NA, K, CL, CO2, BUN, CREATININE, GLUCOSE in the last 72 hours. Hepatic: No results for input(s): AST, ALT, ALB, BILITOT, ALKPHOS in the last 72 hours. PT/INR:  No results for input(s): INR in the last 72 hours. Consults: General Surgery    Surgery: sacral wound debridement and diverting colostomy    Patient Instructions: Activity: no heavy lifting, pushing, pulling for 2 weeks, no driving for 1 weeks or while on analgesics  Diet: As tolerated  Follow-up with Shine Wilcox in 2 weeks. The patient and/or family/patient representatives, were provided education regarding discharge instructions, ongoing treatment and follow-up. Details of information given to the patient may be found in the discharge instructions located in the EMR. HPI and Hospital Course:   Patient admitted and underwent debridement of large sacral wound. Continued with abx and wound care postop. Required endoscopy for GI bleed. After discussion with patient and family about goals of care decided upon diverting colostomy to allow for better potential of wound healing.   Postop once diet tolerated was discharged with wound vac therapy      Kali Morales MD    [facility] Surgery

## 2021-11-18 ENCOUNTER — TELEPHONE (OUTPATIENT)
Dept: INFECTIOUS DISEASES | Age: 86
End: 2021-11-18

## 2021-11-18 NOTE — TELEPHONE ENCOUNTER
[facility] with [facility] called to schedule patient for a follow up appointment with Dr. Shawn Campos. Patient was supposed to have a follow up prior to ending IV antibiotics and was due to end on 11/16/21. They are still giving patient antibiotics. Just confirming that patient should continue on IV antibiotics until he is able to be seen by Dr. Shawn Campos.

## 2021-11-18 NOTE — TELEPHONE ENCOUNTER
Additional labs received    AST is 462      TB nl     How is he doing? How is the wound? Is house MD addressing the new elevation of LFTs?     He is currently on vanc and po flagyl   Have them stop flagyl   Will plan to DC vanc as long as wound is not overtly infected    Thanks

## 2021-11-19 NOTE — TELEPHONE ENCOUNTER
Thank you     Please have them get repeat LFTs today to address acute hepatitis on labs 11/17/21 and sen to me    No other meds or workup recommended from me so would have the PICC removed     No follow-up with me needed     thanks

## 2021-11-19 NOTE — TELEPHONE ENCOUNTER
Spoke with patients nurse Giuliano Telles ([phone]) and she said the patients would looks clean. No slough and the skin in pink. She did say the bone is still showing but it no longer looks infected. Patient did have a DVT so the removed his picc line 2 days ago but has been replaced. Advised to stop Flagyl and d/c the Vancomycin. Nurse would like to know if Dr. Loreto Mixon would like to see the patient in clinic?

## 2021-11-19 NOTE — TELEPHONE ENCOUNTER
Nurse Katerina Alvarado advised. Verbal order give to d/c IV antibiotic and pull picc line. Katerina Alvarado will have the lab draw LFTs on Monday when they return and forward results to Dr. Manjit Mari.

## 2021-11-23 ENCOUNTER — TELEPHONE (OUTPATIENT)
Dept: INFECTIOUS DISEASES | Age: 86
End: 2021-11-23

## 2021-11-23 NOTE — TELEPHONE ENCOUNTER
Repeat LFTs reviewed    The AST has declined while the ALT and alk phos are up further  He is off abx now    This is beyond my scope at this point - can you please check to see that the house physician is able to address this?  thanks

## 2021-11-29 NOTE — TELEPHONE ENCOUNTER
Spoke with Syed Gage, nurse at facility, and she stated that Dr Gaurav Jensen reviewed labs on 11/22 and stated no new orders for patient.

## 2021-11-29 NOTE — TELEPHONE ENCOUNTER
Spoke with Prasanna Mariscal at facility and advised of MD note. Stated she would have them drawn STAT and will fax results to office.   Thank you

## (undated) DEVICE — SUTURE PROL SZ 3-0 L36IN NONABSORBABLE BLU L26MM SH 1/2 CIR 8522H

## (undated) DEVICE — TIBURON LAPAROSCOPIC CHOLECYSTECTOMY DRAPE: Brand: CONVERTORS

## (undated) DEVICE — CANNULA NSL AD TBNG L7FT PVC STR NONFLARED PRNG O2 DEL W STD

## (undated) DEVICE — PENCIL ES CRD L10FT HND SWCHING ROCK SWCH W/ EDGE COAT BLDE

## (undated) DEVICE — POUCH STRL 8X3IN HTSL DL INDCTR VWPK LF

## (undated) DEVICE — SUTURE VCRL + SZ 3-0 L18IN ABSRB UD SH 1/2 CIR TAPERCUT NDL VCP864D

## (undated) DEVICE — GAUZE,SPONGE,4"X4",8PLY,STRL,LF,10/TRAY: Brand: MEDLINE

## (undated) DEVICE — GOWN SIRUS NONREIN XL W/TWL: Brand: MEDLINE INDUSTRIES, INC.

## (undated) DEVICE — Device

## (undated) DEVICE — 3M™ IOBAN™ 2 ANTIMICROBIAL INCISE DRAPE 6650EZ: Brand: IOBAN™ 2

## (undated) DEVICE — UNDERPAD INCONT XL MESH PROTCT + DISP FOR MAT USE

## (undated) DEVICE — ENDOSCOPIC KIT 2 12 FT OP4 DE2 GWN SYR

## (undated) DEVICE — PAD,ABDOMINAL,8"X10",ST,LF: Brand: MEDLINE

## (undated) DEVICE — COVER LT HNDL PLAS RIG 2 PER PK

## (undated) DEVICE — FORMALIN  10%NBF 20ML PREFLL CONT

## (undated) DEVICE — DISSECTOR ULTRASONIC L39CM CRV JAW CRDLSS SONICISION

## (undated) DEVICE — FORCEPS BX L240CM JAW DIA2.8MM L CAP W/ NDL MIC MESH TOOTH

## (undated) DEVICE — SUTURE PERMAHAND SZ 3-0 L30IN NONABSORBABLE BLK L26MM SH C017D

## (undated) DEVICE — BASIC SINGLE BASIN 1-LF: Brand: MEDLINE INDUSTRIES, INC.

## (undated) DEVICE — SOLUTION IV IRRIG POUR BRL 0.9% SODIUM CHL 2F7124

## (undated) DEVICE — Z DISCONTINUED PER MEDLINE TRAY SKIN PREP DRY W/ PREM GLV APPLICATORS GZ TWL OVERWRAP

## (undated) DEVICE — UROSTOMY KIT, FLEXTEND: Brand: NEW IMAGE

## (undated) DEVICE — SUTURE PERMAHAND SZ 3-0 L18IN NONABSORBABLE BLK L26MM SH C013D

## (undated) DEVICE — ELECTRODE,ECG,STRESS,FOAM,3PK: Brand: MEDLINE

## (undated) DEVICE — SUTURE PDS II SZ 0 L60IN ABSRB VLT L48MM CTX 1/2 CIR Z990G

## (undated) DEVICE — ADHESIVE LIQ COMPOUND TINC BENZ AMPULE

## (undated) DEVICE — STAPLER INT L34CM 60MM LNG ENDOSCP ARTC PWR + ECHELON FLX

## (undated) DEVICE — SUTURE NONABSORBABLE MONOFILAMENT 3-0 PS-1 18 IN BLK ETHILON 1663H

## (undated) DEVICE — BLADE ES L6IN ELASTOMERIC COAT EXT DURABLE BEND UPTO 90DEG

## (undated) DEVICE — TUBING, SUCTION, 3/16" X 12', STRAIGHT: Brand: MEDLINE

## (undated) DEVICE — [HIGH FLOW INSUFFLATOR,  DO NOT USE IF PACKAGE IS DAMAGED,  KEEP DRY,  KEEP AWAY FROM SUNLIGHT,  PROTECT FROM HEAT AND RADIOACTIVE SOURCES.]: Brand: PNEUMOSURE

## (undated) DEVICE — GLOVE,SURG,SENSICARE SLT,LF,PF,7: Brand: MEDLINE

## (undated) DEVICE — DRAPE,UNDERBUTTOCKS,PCH,STERILE: Brand: MEDLINE